# Patient Record
Sex: FEMALE | Race: WHITE | NOT HISPANIC OR LATINO
[De-identification: names, ages, dates, MRNs, and addresses within clinical notes are randomized per-mention and may not be internally consistent; named-entity substitution may affect disease eponyms.]

---

## 2019-03-21 ENCOUNTER — RESULT REVIEW (OUTPATIENT)
Age: 59
End: 2019-03-21

## 2019-05-07 ENCOUNTER — APPOINTMENT (OUTPATIENT)
Dept: NEUROSURGERY | Facility: CLINIC | Age: 59
End: 2019-05-07
Payer: MEDICARE

## 2019-05-07 VITALS
SYSTOLIC BLOOD PRESSURE: 154 MMHG | BODY MASS INDEX: 31.49 KG/M2 | WEIGHT: 189 LBS | DIASTOLIC BLOOD PRESSURE: 85 MMHG | HEIGHT: 65 IN

## 2019-05-07 PROCEDURE — 99205 OFFICE O/P NEW HI 60 MIN: CPT

## 2019-05-08 RX ORDER — FENTANYL 75 UG/H
75 PATCH, EXTENDED RELEASE TRANSDERMAL
Refills: 0 | Status: ACTIVE | COMMUNITY

## 2019-05-08 RX ORDER — OXYCODONE 20 MG/1
20 TABLET ORAL
Refills: 0 | Status: ACTIVE | COMMUNITY

## 2019-05-08 RX ORDER — LEVOTHYROXINE SODIUM 137 UG/1
TABLET ORAL
Refills: 0 | Status: ACTIVE | COMMUNITY

## 2019-05-08 RX ORDER — BACLOFEN 10 MG/1
10 TABLET ORAL
Refills: 0 | Status: ACTIVE | COMMUNITY

## 2019-05-23 ENCOUNTER — RX RENEWAL (OUTPATIENT)
Age: 59
End: 2019-05-23

## 2019-05-23 NOTE — REASON FOR VISIT
[New Patient Visit] : a new patient visit [Referred By: _________] : Patient was referred by YARED [FreeTextEntry1] : chronic pain

## 2019-05-23 NOTE — DATA REVIEWED
[de-identified] : 2/14/19 MRI Cervical (Stand up MRI pt disc reviewed today and returned same day)

## 2019-05-23 NOTE — ASSESSMENT
[FreeTextEntry1] : Mrs. Chaidez is a 58 yo woman with cervical myelopathy, positive reverse Lhermitte's sign, and positive Aviles's sign consistent with C3/C4 cervical cord compression.  We recommend a 2 part anterior and posterior decompression and fusion to adequately decompress and stabilize the cervical spine.  We discussed the risks and benefits including smoking cessation counseling ([place]-QUITS), and she wishes to proceed with the surgery.  She did have a previous surgical consult with Dr. Andres as well.  We will refer to ENT Dr. Dinh for pre operative swallowing evaluation due to preexisting dysphagia and management.  We will obtain a CT to evaluate the cervical bony anatomy prior to surgery as well.\par \par 1)  CT Cervical wo - now\par 2)  ENT - Dr. Dinh referral - pre op eval\par 3)  Schedule surgery

## 2019-05-23 NOTE — PHYSICAL EXAM
[General Appearance - Alert] : alert [General Appearance - In No Acute Distress] : in no acute distress [General Appearance - Well Developed] : well developed [General Appearance - Well Nourished] : well nourished [Impaired Insight] : insight and judgment were intact [Oriented To Time, Place, And Person] : oriented to person, place, and time [Affect] : the affect was normal [Mood] : the mood was normal [Cranial Nerves Oculomotor (III)] : extraocular motion intact [Cranial Nerves Optic (II)] : visual acuity intact bilaterally,  pupils equal round and reactive to light [Cranial Nerves Facial (VII)] : face symmetrical [Cranial Nerves Vestibulocochlear (VIII)] : hearing was intact bilaterally [Cranial Nerves Trigeminal (V)] : facial sensation intact symmetrically [Cranial Nerves Glossopharyngeal (IX)] : tongue and palate midline [Cranial Nerves Accessory (XI - Cranial And Spinal)] : head turning and shoulder shrug symmetric [Cranial Nerves Hypoglossal (XII)] : there was no tongue deviation with protrusion [Motor Strength] : muscle strength was normal in all four extremities [Sensation Tactile Decrease] : light touch was intact [Limited Balance] : the patient's balance was impaired [Aviles] : Aviles's sign was demonstrated [3+] : Brachioradialis left 3+ [No Masses] : no masses [No Visual Abnormalities] : no visible abnormalities [Full ROM] : full ROM [Normal] : normal [Able to toe walk] : the patient was able to toe walk [Able to heel walk] : the patient was able to heel walk [Intact] : all sensory within normal limits bilaterally [PERRL With Normal Accommodation] : pupils were equal in size, round, reactive to light, with normal accommodation [Neck Appearance] : the appearance of the neck was normal [Auscultation Breath Sounds / Voice Sounds] : lungs were clear to auscultation bilaterally [Respiration, Rhythm And Depth] : normal respiratory rhythm and effort [] : no respiratory distress [Heart Sounds] : normal S1 and S2 [Edema] : there was no peripheral edema [Abnormal Walk] : normal gait [Involuntary Movements] : no involuntary movements were seen [Motor Tone] : muscle strength and tone were normal [Skin Color & Pigmentation] : normal skin color and pigmentation [Tremor] : no tremor present [FreeTextEntry8] : reports being off balance , ambulating independently

## 2019-05-23 NOTE — HISTORY OF PRESENT ILLNESS
[> 3 months] : more  than 3 months [FreeTextEntry1] : chronic pain  [de-identified] : Mrs. Chaidez is a 58 yo woman with PMH of HLD, Hashimoto's, RA, fibromyalgia, nutcracker esophagus (spasms), 2003 Lumbar fusion, 2010 R THR, 2012 L TKR, and chronic neck pain over 30 years without neck pain recently.  Today, she arrives for initial consult for new 2 week onset of bilateral upper extremity electric shocks, posterior head and upper traps tingling, and numbness of 5th digits (denies neck pain).  She reports also being off balance for a few weeks.\par \par 2/14/19 Stand up MRI discs reviewed today demonstrating C3 on C4 anterior spondylolisthesis and degenerative changes with spinal cord compression.

## 2019-05-23 NOTE — REVIEW OF SYSTEMS
[Numbness] : numbness [Tingling] : tingling [Abnormal Sensation] : an abnormal sensation [Difficulty Walking] : difficulty walking [As Noted in HPI] : as noted in HPI [Negative] : Respiratory [Abdominal Pain] : no abdominal pain [Vomiting] : no vomiting [Diarrhea] : no diarrhea [Dysuria] : no dysuria [Incontinence] : no incontinence [Skin Lesions] : no skin lesions [Skin Wound] : no skin wound [Itching] : no itching [Easy Bleeding] : no tendency for easy bleeding [Easy Bruising] : no tendency for easy bruising

## 2019-05-24 ENCOUNTER — OUTPATIENT (OUTPATIENT)
Dept: OUTPATIENT SERVICES | Facility: HOSPITAL | Age: 59
LOS: 1 days | End: 2019-05-24
Payer: COMMERCIAL

## 2019-05-24 VITALS
SYSTOLIC BLOOD PRESSURE: 166 MMHG | DIASTOLIC BLOOD PRESSURE: 89 MMHG | HEART RATE: 63 BPM | RESPIRATION RATE: 20 BRPM | OXYGEN SATURATION: 96 % | TEMPERATURE: 98 F | WEIGHT: 188.94 LBS | HEIGHT: 63.5 IN

## 2019-05-24 DIAGNOSIS — Z98.1 ARTHRODESIS STATUS: Chronic | ICD-10-CM

## 2019-05-24 DIAGNOSIS — Z96.652 PRESENCE OF LEFT ARTIFICIAL KNEE JOINT: Chronic | ICD-10-CM

## 2019-05-24 DIAGNOSIS — G95.20 UNSPECIFIED CORD COMPRESSION: ICD-10-CM

## 2019-05-24 DIAGNOSIS — Z01.818 ENCOUNTER FOR OTHER PREPROCEDURAL EXAMINATION: ICD-10-CM

## 2019-05-24 DIAGNOSIS — Z98.890 OTHER SPECIFIED POSTPROCEDURAL STATES: Chronic | ICD-10-CM

## 2019-05-24 DIAGNOSIS — Z96.641 PRESENCE OF RIGHT ARTIFICIAL HIP JOINT: Chronic | ICD-10-CM

## 2019-05-24 DIAGNOSIS — Z29.9 ENCOUNTER FOR PROPHYLACTIC MEASURES, UNSPECIFIED: ICD-10-CM

## 2019-05-24 LAB
ANION GAP SERPL CALC-SCNC: 12 MMOL/L — SIGNIFICANT CHANGE UP (ref 5–17)
BLD GP AB SCN SERPL QL: NEGATIVE — SIGNIFICANT CHANGE UP
BUN SERPL-MCNC: 19 MG/DL — SIGNIFICANT CHANGE UP (ref 7–23)
CALCIUM SERPL-MCNC: 9.9 MG/DL — SIGNIFICANT CHANGE UP (ref 8.4–10.5)
CHLORIDE SERPL-SCNC: 95 MMOL/L — LOW (ref 96–108)
CO2 SERPL-SCNC: 28 MMOL/L — SIGNIFICANT CHANGE UP (ref 22–31)
CREAT SERPL-MCNC: 0.79 MG/DL — SIGNIFICANT CHANGE UP (ref 0.5–1.3)
GLUCOSE SERPL-MCNC: 94 MG/DL — SIGNIFICANT CHANGE UP (ref 70–99)
HCT VFR BLD CALC: 41 % — SIGNIFICANT CHANGE UP (ref 34.5–45)
HGB BLD-MCNC: 12.8 G/DL — SIGNIFICANT CHANGE UP (ref 11.5–15.5)
MCHC RBC-ENTMCNC: 30 PG — SIGNIFICANT CHANGE UP (ref 27–34)
MCHC RBC-ENTMCNC: 31.2 GM/DL — LOW (ref 32–36)
MCV RBC AUTO: 96 FL — SIGNIFICANT CHANGE UP (ref 80–100)
PLATELET # BLD AUTO: 422 K/UL — HIGH (ref 150–400)
POTASSIUM SERPL-MCNC: 3.9 MMOL/L — SIGNIFICANT CHANGE UP (ref 3.5–5.3)
POTASSIUM SERPL-SCNC: 3.9 MMOL/L — SIGNIFICANT CHANGE UP (ref 3.5–5.3)
RBC # BLD: 4.27 M/UL — SIGNIFICANT CHANGE UP (ref 3.8–5.2)
RBC # FLD: 13.9 % — SIGNIFICANT CHANGE UP (ref 10.3–14.5)
RH IG SCN BLD-IMP: POSITIVE — SIGNIFICANT CHANGE UP
SODIUM SERPL-SCNC: 135 MMOL/L — SIGNIFICANT CHANGE UP (ref 135–145)
WBC # BLD: 10.13 K/UL — SIGNIFICANT CHANGE UP (ref 3.8–10.5)
WBC # FLD AUTO: 10.13 K/UL — SIGNIFICANT CHANGE UP (ref 3.8–10.5)

## 2019-05-24 PROCEDURE — 87640 STAPH A DNA AMP PROBE: CPT

## 2019-05-24 PROCEDURE — 86900 BLOOD TYPING SEROLOGIC ABO: CPT

## 2019-05-24 PROCEDURE — 87641 MR-STAPH DNA AMP PROBE: CPT

## 2019-05-24 PROCEDURE — 86901 BLOOD TYPING SEROLOGIC RH(D): CPT

## 2019-05-24 PROCEDURE — G0463: CPT

## 2019-05-24 PROCEDURE — 85027 COMPLETE CBC AUTOMATED: CPT

## 2019-05-24 PROCEDURE — 86850 RBC ANTIBODY SCREEN: CPT

## 2019-05-24 PROCEDURE — 80048 BASIC METABOLIC PNL TOTAL CA: CPT

## 2019-05-24 NOTE — H&P PST ADULT - NSICDXPROBLEM_GEN_ALL_CORE_FT
PROBLEM DIAGNOSES  Problem: Cervical spinal cord compression  Assessment and Plan:  Stage 1 C3 Corpectomy, CAGE Placement, C2-4 Posterior cervical decompression and fusion on 6/3/19.          Problem: Need for prophylactic measure  Assessment and Plan: The Caprini score indicates that this patient is at high risk for a VTE event (score 6 or greater). Surgical patients in this group will benefit from both pharmacologic prophylaxis and intermittent compression devices.  The surgical team will determine the balance between VTE risk and bleeding risk, and other clinical considerations

## 2019-05-24 NOTE — H&P PST ADULT - NSICDXPASTSURGICALHX_GEN_ALL_CORE_FT
PAST SURGICAL HISTORY:  S/P arthroscopy of left knee x 4    S/P hip replacement, right     S/P laminectomy with spinal fusion     S/P total knee replacement, left

## 2019-05-24 NOTE — H&P PST ADULT - HISTORY OF PRESENT ILLNESS
59 yr old female with history of Hashimoto's hypothyroid, dyskinesia of esophagus , severe OA, Fibromyalgia, Chronic pain , with mew onset bilateral upper extremity electric shocks, posterior head tingling, numbness of 5 th digits, found to have C3-4 Anterior spondylolisthesis and degenerative changes with spinal cord compression. Now coming in for Stage 1 C3 Corpectomy, CAGE Placement, C2-4 Posterior cervical decompression and fusion on 6/3/19.

## 2019-05-24 NOTE — H&P PST ADULT - RS GEN PE MLT RESP DETAILS PC
respirations non-labored/breath sounds equal/normal/clear to auscultation bilaterally/good air movement/airway patent

## 2019-05-24 NOTE — H&P PST ADULT - NSICDXPASTMEDICALHX_GEN_ALL_CORE_FT
PAST MEDICAL HISTORY:  Cervical herniated disc     Depression     Dyskinesia of esophagus     H/O gastritis 1 mth ago - on pantoprazole    History of chronic pain on meds for 15 yrs    History of dysphagia     Hypothyroidism     Osteoarthritis     Spondylolisthesis, cervical region PAST MEDICAL HISTORY:  Achilles tendon tear bilateral    Cervical herniated disc     Degenerative tear of acetabular labrum of left hip with stress fracture    Depression     Dyskinesia of esophagus     Fibromyalgia     H/O gastritis 1 mth ago - on pantoprazole    History of chronic pain on meds for 15 yrs    History of dysphagia with neck issues to obtain barium swallow study soon    Hypothyroidism     Osteoarthritis     Spondylogenic compression of cervical spinal cord     Spondylolisthesis, cervical region

## 2019-05-24 NOTE — H&P PST ADULT - ASSESSMENT
SHAYYI VTE 2.0 SCORE [CLOT updated 2019]    AGE RELATED RISK FACTORS                                                       MOBILITY RELATED FACTORS  [x ] Age 41-60 years                                            (1 Point)                    [ ] Bed rest                                                        (1 Point)  [ ] Age: 61-74 years                                           (2 Points)                  [ ] Plaster cast                                                   (2 Points)  [ ] Age= 75 years                                              (3 Points)                    [ ] Bed bound for more than 72 hours                 (2 Points)    DISEASE RELATED RISK FACTORS                                               GENDER SPECIFIC FACTORS  [ ] Edema in the lower extremities                       (1 Point)              [ ] Pregnancy                                                     (1 Point)  [ ] Varicose veins                                               (1 Point)                     [ ] Post-partum < 6 weeks                                   (1 Point)             [x ] BMI > 25 Kg/m2                                            (1 Point)                     [ ] Hormonal therapy  or oral contraception          (1 Point)                 [ ] Sepsis (in the previous month)                        (1 Point)               [ ] History of pregnancy complications                 (1 point)  [ ] Pneumonia or serious lung disease                                               [ ] Unexplained or recurrent                     (1 Point)           (in the previous month)                               (1 Point)  [ ] Abnormal pulmonary function test                     (1 Point)                 SURGERY RELATED RISK FACTORS  [ ] Acute myocardial infarction                              (1 Point)               [ ]  Section                                             (1 Point)  [ ] Congestive heart failure (in the previous month)  (1 Point)      [ ] Minor surgery                                                  (1 Point)   [ ] Inflammatory bowel disease                             (1 Point)               [ ] Arthroscopic surgery                                        (2 Points)  [ ] Central venous access                                      (2 Points)                [x ] General surgery lasting more than 45 minutes (2 points)  [ ] Malignancy- Present or previous                   (2 Points)                [ ] Elective arthroplasty                                         (5 points)    [ ] Stroke (in the previous month)                          (5 Points)                                                                                                                                                           HEMATOLOGY RELATED FACTORS                                                 TRAUMA RELATED RISK FACTORS  [ ] Prior episodes of VTE                                     (3 Points)                [ ] Fracture of the hip, pelvis, or leg                       (5 Points)  [x ] Positive family history for VTE                         (3 Points)             [ ] Acute spinal cord injury (in the previous month)  (5 Points)  [ ] Prothrombin 83116 A                                     (3 Points)               [ ] Paralysis  (less than 1 month)                             (5 Points)  [ ] Factor V Leiden                                             (3 Points)                  [ ] Multiple Trauma within 1 month                        (5 Points)  [ ] Lupus anticoagulants                                     (3 Points)                                                           [ ] Anticardiolipin antibodies                               (3 Points)                                                       [ ] High homocysteine in the blood                      (3 Points)                                             [ ] Other congenital or acquired thrombophilia      (3 Points)                                                [ ] Heparin induced thrombocytopenia                  (3 Points)                                     Total Score [    7      ]

## 2019-05-24 NOTE — H&P PST ADULT - NSANTHOSAYNRD_GEN_A_CORE
No. JO screening performed.  STOP BANG Legend: 0-2 = LOW Risk; 3-4 = INTERMEDIATE Risk; 5-8 = HIGH Risk

## 2019-05-25 LAB
MRSA PCR RESULT.: SIGNIFICANT CHANGE UP
S AUREUS DNA NOSE QL NAA+PROBE: SIGNIFICANT CHANGE UP

## 2019-05-28 ENCOUNTER — FORM ENCOUNTER (OUTPATIENT)
Age: 59
End: 2019-05-28

## 2019-05-28 PROBLEM — M19.90 UNSPECIFIED OSTEOARTHRITIS, UNSPECIFIED SITE: Chronic | Status: ACTIVE | Noted: 2019-05-24

## 2019-05-28 PROBLEM — M43.12 SPONDYLOLISTHESIS, CERVICAL REGION: Chronic | Status: ACTIVE | Noted: 2019-05-24

## 2019-05-28 PROBLEM — E03.9 HYPOTHYROIDISM, UNSPECIFIED: Chronic | Status: ACTIVE | Noted: 2019-05-24

## 2019-05-28 PROBLEM — M50.20 OTHER CERVICAL DISC DISPLACEMENT, UNSPECIFIED CERVICAL REGION: Chronic | Status: ACTIVE | Noted: 2019-05-24

## 2019-05-28 PROBLEM — M79.7 FIBROMYALGIA: Chronic | Status: ACTIVE | Noted: 2019-05-24

## 2019-05-28 PROBLEM — M47.12 OTHER SPONDYLOSIS WITH MYELOPATHY, CERVICAL REGION: Chronic | Status: ACTIVE | Noted: 2019-05-24

## 2019-05-28 PROBLEM — K22.4 DYSKINESIA OF ESOPHAGUS: Chronic | Status: ACTIVE | Noted: 2019-05-24

## 2019-05-28 PROBLEM — S86.019A STRAIN OF UNSPECIFIED ACHILLES TENDON, INITIAL ENCOUNTER: Chronic | Status: ACTIVE | Noted: 2019-05-24

## 2019-05-28 PROBLEM — F32.9 MAJOR DEPRESSIVE DISORDER, SINGLE EPISODE, UNSPECIFIED: Chronic | Status: ACTIVE | Noted: 2019-05-24

## 2019-05-29 ENCOUNTER — OUTPATIENT (OUTPATIENT)
Dept: OUTPATIENT SERVICES | Facility: HOSPITAL | Age: 59
LOS: 1 days | End: 2019-05-29

## 2019-05-29 ENCOUNTER — APPOINTMENT (OUTPATIENT)
Dept: SPEECH THERAPY | Facility: HOSPITAL | Age: 59
End: 2019-05-29
Payer: MEDICARE

## 2019-05-29 ENCOUNTER — APPOINTMENT (OUTPATIENT)
Dept: RADIOLOGY | Facility: HOSPITAL | Age: 59
End: 2019-05-29
Payer: MEDICARE

## 2019-05-29 ENCOUNTER — OUTPATIENT (OUTPATIENT)
Dept: OUTPATIENT SERVICES | Facility: HOSPITAL | Age: 59
LOS: 1 days | Discharge: HOME | End: 2019-05-29

## 2019-05-29 DIAGNOSIS — Z96.641 PRESENCE OF RIGHT ARTIFICIAL HIP JOINT: Chronic | ICD-10-CM

## 2019-05-29 DIAGNOSIS — Z98.1 ARTHRODESIS STATUS: Chronic | ICD-10-CM

## 2019-05-29 DIAGNOSIS — Z96.652 PRESENCE OF LEFT ARTIFICIAL KNEE JOINT: Chronic | ICD-10-CM

## 2019-05-29 DIAGNOSIS — R13.10 DYSPHAGIA, UNSPECIFIED: ICD-10-CM

## 2019-05-29 DIAGNOSIS — Z98.890 OTHER SPECIFIED POSTPROCEDURAL STATES: Chronic | ICD-10-CM

## 2019-05-29 PROCEDURE — 74230 X-RAY XM SWLNG FUNCJ C+: CPT | Mod: 26

## 2019-05-31 ENCOUNTER — APPOINTMENT (OUTPATIENT)
Dept: CT IMAGING | Facility: CLINIC | Age: 59
End: 2019-05-31

## 2019-05-31 ENCOUNTER — OUTPATIENT (OUTPATIENT)
Dept: OUTPATIENT SERVICES | Facility: HOSPITAL | Age: 59
LOS: 1 days | End: 2019-05-31
Payer: COMMERCIAL

## 2019-05-31 ENCOUNTER — APPOINTMENT (OUTPATIENT)
Dept: OTOLARYNGOLOGY | Facility: CLINIC | Age: 59
End: 2019-05-31
Payer: MEDICARE

## 2019-05-31 VITALS
SYSTOLIC BLOOD PRESSURE: 143 MMHG | WEIGHT: 188 LBS | HEIGHT: 63.5 IN | BODY MASS INDEX: 32.9 KG/M2 | DIASTOLIC BLOOD PRESSURE: 81 MMHG | HEART RATE: 75 BPM

## 2019-05-31 DIAGNOSIS — Z98.1 ARTHRODESIS STATUS: Chronic | ICD-10-CM

## 2019-05-31 DIAGNOSIS — Z98.890 OTHER SPECIFIED POSTPROCEDURAL STATES: Chronic | ICD-10-CM

## 2019-05-31 DIAGNOSIS — Z96.652 PRESENCE OF LEFT ARTIFICIAL KNEE JOINT: Chronic | ICD-10-CM

## 2019-05-31 DIAGNOSIS — Z96.641 PRESENCE OF RIGHT ARTIFICIAL HIP JOINT: Chronic | ICD-10-CM

## 2019-05-31 DIAGNOSIS — Z00.8 ENCOUNTER FOR OTHER GENERAL EXAMINATION: ICD-10-CM

## 2019-05-31 PROCEDURE — 72125 CT NECK SPINE W/O DYE: CPT | Mod: 26

## 2019-05-31 PROCEDURE — 99204 OFFICE O/P NEW MOD 45 MIN: CPT | Mod: 25

## 2019-05-31 PROCEDURE — 72125 CT NECK SPINE W/O DYE: CPT

## 2019-05-31 PROCEDURE — 31575 DIAGNOSTIC LARYNGOSCOPY: CPT

## 2019-05-31 NOTE — ASSESSMENT
[FreeTextEntry1] : preop ACDF; hx esophageal spasm:\par - MBS reviewed\par - discussed risks of increased dysphagia/spasm postop and may need GI consult post-op if increase in esophageal spasm\par - discussed risks of injury to esophagus and/or laryngeal nerve resulting in temporary or permanent hoarseness\par \par HTN\par - F/U with PMD\par

## 2019-05-31 NOTE — PROCEDURE
[de-identified] : Afrin 0.05% and lidocaine 4% sprayed into both nasal passages. Flexible scope #2 used. Passed through nasal passage and nasopharynx/oropharynx/hypopharynx clear. Supraglottis normal. Glottis with fully mobile vocal cords without lesions or masses but mild Anna's edema. Visualized subglottis clear. Postcricoid area without erythema or edema. No pooling of secretions.

## 2019-05-31 NOTE — CONSULT LETTER
[Dear  ___] : Dear  [unfilled], [Courtesy Letter:] : I had the pleasure of seeing your patient, [unfilled], in my office today. [Please see my note below.] : Please see my note below. [Consult Closing:] : Thank you very much for allowing me to participate in the care of this patient.  If you have any questions, please do not hesitate to contact me. [Sincerely,] : Sincerely, [FreeTextEntry3] : Sudha Dinh MD\par Otolaryngology and Cranial Base Surgery\par Attending Physician - Department of Otolaryngology and Head & Neck Surgery\par [facility]\par  - [employer] [facility]\par Office: [phone]\par Fax: [phone]\par

## 2019-05-31 NOTE — HISTORY OF PRESENT ILLNESS
[de-identified] : Going for ACDF with Dr. Acevedo 6/3/19\par Notes voice is good.  No prior neck surgery.  Pos neck pain, no throat pain.  Hx of Dysphagia with esophageal spasm.  Had swallow study 5/29/19 that showed Functional oral and mild pharyngeal stage dysphagia. Notes she has spasms in esophagus, usually at night. She isnt really bothered and has had over 15 years without GI follow up. Notes hx tobacco and just quit 1.5 weeks ago.

## 2019-06-02 ENCOUNTER — TRANSCRIPTION ENCOUNTER (OUTPATIENT)
Age: 59
End: 2019-06-02

## 2019-06-03 ENCOUNTER — APPOINTMENT (OUTPATIENT)
Dept: NEUROSURGERY | Facility: HOSPITAL | Age: 59
End: 2019-06-03

## 2019-06-03 ENCOUNTER — APPOINTMENT (OUTPATIENT)
Dept: OTOLARYNGOLOGY | Facility: HOSPITAL | Age: 59
End: 2019-06-03

## 2019-06-03 ENCOUNTER — INPATIENT (INPATIENT)
Facility: HOSPITAL | Age: 59
LOS: 0 days | Discharge: HOME | DRG: 472 | End: 2019-06-04
Attending: STUDENT IN AN ORGANIZED HEALTH CARE EDUCATION/TRAINING PROGRAM | Admitting: STUDENT IN AN ORGANIZED HEALTH CARE EDUCATION/TRAINING PROGRAM
Payer: COMMERCIAL

## 2019-06-03 VITALS
RESPIRATION RATE: 18 BRPM | HEART RATE: 69 BPM | DIASTOLIC BLOOD PRESSURE: 76 MMHG | WEIGHT: 188.94 LBS | HEIGHT: 63.5 IN | SYSTOLIC BLOOD PRESSURE: 146 MMHG | TEMPERATURE: 99 F | OXYGEN SATURATION: 96 %

## 2019-06-03 DIAGNOSIS — Z96.641 PRESENCE OF RIGHT ARTIFICIAL HIP JOINT: Chronic | ICD-10-CM

## 2019-06-03 DIAGNOSIS — G95.20 UNSPECIFIED CORD COMPRESSION: ICD-10-CM

## 2019-06-03 DIAGNOSIS — Z96.652 PRESENCE OF LEFT ARTIFICIAL KNEE JOINT: Chronic | ICD-10-CM

## 2019-06-03 DIAGNOSIS — Z98.1 ARTHRODESIS STATUS: Chronic | ICD-10-CM

## 2019-06-03 DIAGNOSIS — Z98.890 OTHER SPECIFIED POSTPROCEDURAL STATES: Chronic | ICD-10-CM

## 2019-06-03 DIAGNOSIS — M43.12 SPONDYLOLISTHESIS, CERVICAL REGION: ICD-10-CM

## 2019-06-03 LAB
ANION GAP SERPL CALC-SCNC: 12 MMOL/L — SIGNIFICANT CHANGE UP (ref 5–17)
BUN SERPL-MCNC: 9 MG/DL — SIGNIFICANT CHANGE UP (ref 7–23)
CALCIUM SERPL-MCNC: 9.5 MG/DL — SIGNIFICANT CHANGE UP (ref 8.4–10.5)
CHLORIDE SERPL-SCNC: 99 MMOL/L — SIGNIFICANT CHANGE UP (ref 96–108)
CO2 SERPL-SCNC: 29 MMOL/L — SIGNIFICANT CHANGE UP (ref 22–31)
CREAT SERPL-MCNC: 0.65 MG/DL — SIGNIFICANT CHANGE UP (ref 0.5–1.3)
GLUCOSE SERPL-MCNC: 122 MG/DL — HIGH (ref 70–99)
HCT VFR BLD CALC: 34.6 % — SIGNIFICANT CHANGE UP (ref 34.5–45)
HGB BLD-MCNC: 11.9 G/DL — SIGNIFICANT CHANGE UP (ref 11.5–15.5)
MCHC RBC-ENTMCNC: 32.5 PG — SIGNIFICANT CHANGE UP (ref 27–34)
MCHC RBC-ENTMCNC: 34.3 GM/DL — SIGNIFICANT CHANGE UP (ref 32–36)
MCV RBC AUTO: 94.9 FL — SIGNIFICANT CHANGE UP (ref 80–100)
PLATELET # BLD AUTO: 337 K/UL — SIGNIFICANT CHANGE UP (ref 150–400)
POTASSIUM SERPL-MCNC: 4 MMOL/L — SIGNIFICANT CHANGE UP (ref 3.5–5.3)
POTASSIUM SERPL-SCNC: 4 MMOL/L — SIGNIFICANT CHANGE UP (ref 3.5–5.3)
RBC # BLD: 3.65 M/UL — LOW (ref 3.8–5.2)
RBC # FLD: 13 % — SIGNIFICANT CHANGE UP (ref 10.3–14.5)
RH IG SCN BLD-IMP: POSITIVE — SIGNIFICANT CHANGE UP
SODIUM SERPL-SCNC: 140 MMOL/L — SIGNIFICANT CHANGE UP (ref 135–145)
WBC # BLD: 10.5 K/UL — SIGNIFICANT CHANGE UP (ref 3.8–10.5)
WBC # FLD AUTO: 10.5 K/UL — SIGNIFICANT CHANGE UP (ref 3.8–10.5)

## 2019-06-03 PROCEDURE — 22551 ARTHRD ANT NTRBDY CERVICAL: CPT | Mod: 62

## 2019-06-03 PROCEDURE — 70496 CT ANGIOGRAPHY HEAD: CPT | Mod: 26

## 2019-06-03 PROCEDURE — 99222 1ST HOSP IP/OBS MODERATE 55: CPT | Mod: GC

## 2019-06-03 PROCEDURE — 22853 INSJ BIOMECHANICAL DEVICE: CPT | Mod: AS

## 2019-06-03 PROCEDURE — 70498 CT ANGIOGRAPHY NECK: CPT | Mod: 26

## 2019-06-03 PROCEDURE — 22853 INSJ BIOMECHANICAL DEVICE: CPT

## 2019-06-03 PROCEDURE — 22551 ARTHRD ANT NTRBDY CERVICAL: CPT | Mod: AS

## 2019-06-03 RX ORDER — FENTANYL CITRATE 50 UG/ML
1 INJECTION INTRAVENOUS
Refills: 0 | Status: DISCONTINUED | OUTPATIENT
Start: 2019-06-03 | End: 2019-06-04

## 2019-06-03 RX ORDER — ACETAMINOPHEN 500 MG
650 TABLET ORAL EVERY 6 HOURS
Refills: 0 | Status: DISCONTINUED | OUTPATIENT
Start: 2019-06-03 | End: 2019-06-04

## 2019-06-03 RX ORDER — OXYCODONE HYDROCHLORIDE 5 MG/1
5 TABLET ORAL EVERY 4 HOURS
Refills: 0 | Status: DISCONTINUED | OUTPATIENT
Start: 2019-06-03 | End: 2019-06-04

## 2019-06-03 RX ORDER — DOCUSATE SODIUM 100 MG
100 CAPSULE ORAL THREE TIMES A DAY
Refills: 0 | Status: DISCONTINUED | OUTPATIENT
Start: 2019-06-03 | End: 2019-06-04

## 2019-06-03 RX ORDER — DEXTROSE MONOHYDRATE, SODIUM CHLORIDE, AND POTASSIUM CHLORIDE 50; .745; 4.5 G/1000ML; G/1000ML; G/1000ML
1000 INJECTION, SOLUTION INTRAVENOUS
Refills: 0 | Status: DISCONTINUED | OUTPATIENT
Start: 2019-06-03 | End: 2019-06-04

## 2019-06-03 RX ORDER — DEXAMETHASONE 0.5 MG/5ML
4 ELIXIR ORAL EVERY 6 HOURS
Refills: 0 | Status: COMPLETED | OUTPATIENT
Start: 2019-06-03 | End: 2019-06-04

## 2019-06-03 RX ORDER — PANTOPRAZOLE SODIUM 20 MG/1
40 TABLET, DELAYED RELEASE ORAL
Refills: 0 | Status: DISCONTINUED | OUTPATIENT
Start: 2019-06-03 | End: 2019-06-04

## 2019-06-03 RX ORDER — CEFAZOLIN SODIUM 1 G
2000 VIAL (EA) INJECTION ONCE
Refills: 0 | Status: DISCONTINUED | OUTPATIENT
Start: 2019-06-03 | End: 2019-06-03

## 2019-06-03 RX ORDER — LEVOTHYROXINE SODIUM 125 MCG
200 TABLET ORAL DAILY
Refills: 0 | Status: DISCONTINUED | OUTPATIENT
Start: 2019-06-03 | End: 2019-06-04

## 2019-06-03 RX ORDER — CEFAZOLIN SODIUM 1 G
2000 VIAL (EA) INJECTION EVERY 8 HOURS
Refills: 0 | Status: COMPLETED | OUTPATIENT
Start: 2019-06-03 | End: 2019-06-04

## 2019-06-03 RX ORDER — GABAPENTIN 400 MG/1
600 CAPSULE ORAL THREE TIMES A DAY
Refills: 0 | Status: DISCONTINUED | OUTPATIENT
Start: 2019-06-03 | End: 2019-06-04

## 2019-06-03 RX ORDER — HYDROMORPHONE HYDROCHLORIDE 2 MG/ML
0.5 INJECTION INTRAMUSCULAR; INTRAVENOUS; SUBCUTANEOUS
Refills: 0 | Status: DISCONTINUED | OUTPATIENT
Start: 2019-06-03 | End: 2019-06-03

## 2019-06-03 RX ORDER — OXYCODONE HYDROCHLORIDE 5 MG/1
40 TABLET ORAL EVERY 12 HOURS
Refills: 0 | Status: DISCONTINUED | OUTPATIENT
Start: 2019-06-03 | End: 2019-06-04

## 2019-06-03 RX ORDER — SENNA PLUS 8.6 MG/1
2 TABLET ORAL AT BEDTIME
Refills: 0 | Status: DISCONTINUED | OUTPATIENT
Start: 2019-06-03 | End: 2019-06-04

## 2019-06-03 RX ADMIN — OXYCODONE HYDROCHLORIDE 40 MILLIGRAM(S): 5 TABLET ORAL at 18:45

## 2019-06-03 RX ADMIN — Medication 4 MILLIGRAM(S): at 22:22

## 2019-06-03 RX ADMIN — Medication 100 MILLIGRAM(S): at 22:22

## 2019-06-03 RX ADMIN — GABAPENTIN 600 MILLIGRAM(S): 400 CAPSULE ORAL at 15:10

## 2019-06-03 RX ADMIN — Medication 4 MILLIGRAM(S): at 16:01

## 2019-06-03 RX ADMIN — GABAPENTIN 600 MILLIGRAM(S): 400 CAPSULE ORAL at 22:22

## 2019-06-03 RX ADMIN — Medication 100 MILLIGRAM(S): at 15:12

## 2019-06-03 RX ADMIN — OXYCODONE HYDROCHLORIDE 40 MILLIGRAM(S): 5 TABLET ORAL at 18:05

## 2019-06-03 RX ADMIN — DEXTROSE MONOHYDRATE, SODIUM CHLORIDE, AND POTASSIUM CHLORIDE 75 MILLILITER(S): 50; .745; 4.5 INJECTION, SOLUTION INTRAVENOUS at 13:05

## 2019-06-03 RX ADMIN — FENTANYL CITRATE 1 PATCH: 50 INJECTION INTRAVENOUS at 12:53

## 2019-06-03 RX ADMIN — SENNA PLUS 2 TABLET(S): 8.6 TABLET ORAL at 22:22

## 2019-06-03 NOTE — CONSULT NOTE ADULT - ATTENDING COMMENTS
VASCULAR NEUROLOGY ATTENDING  The patient is seen and examined the history and imaging are reviewed. I agree with the resident note unless otherwise noted. No further inpatient neurologic work-up required.

## 2019-06-03 NOTE — CONSULT NOTE ADULT - SUBJECTIVE AND OBJECTIVE BOX
Neurology Consult    59y Female PMH Hashimoto's hypothyroid, dyskinesia of esophagus, severe OA, Fibromyalgia, Chronic pain , with mew onset bilateral upper extremity electric shocks, posterior head tingling, numbness of 5 th digits, found to have C3-4 Anterior spondylolisthesis and degenerative changes with spinal cord compression s/p decompression p/w as code stroke w/ facial droop. LKW 0730; no facial droop noted on preop; after surgery, patient w/ R facial droop.     REVIEW OF SYSTEMS:  As above    MEDICATIONS  acetaminophen   Tablet .. 650 milliGRAM(s) Oral every 6 hours PRN  ceFAZolin   IVPB 2000 milliGRAM(s) IV Intermittent every 8 hours  dexamethasone  Injectable 4 milliGRAM(s) IV Push every 6 hours  docusate sodium 100 milliGRAM(s) Oral three times a day  fentaNYL   Patch  75 MICROgram(s)/Hr 1 Patch Transdermal every 72 hours  gabapentin 600 milliGRAM(s) Oral three times a day  HYDROmorphone  Injectable 0.5 milliGRAM(s) IV Push every 10 minutes PRN  levothyroxine 200 MICROGram(s) Oral daily  oxyCODONE    IR 5 milliGRAM(s) Oral every 4 hours PRN  oxyCODONE  ER Tablet 40 milliGRAM(s) Oral every 12 hours  pantoprazole    Tablet 40 milliGRAM(s) Oral before breakfast  PARoxetine 20 milliGRAM(s) Oral daily  senna 2 Tablet(s) Oral at bedtime  sodium chloride 0.9% with potassium chloride 20 mEq/L 1000 milliLiter(s) IV Continuous <Continuous>      PMH: Spondylogenic compression of cervical spinal cord  Fibromyalgia  Degenerative tear of acetabular labrum of left hip  Achilles tendon tear  Depression  History of chronic pain  Dyskinesia of esophagus  History of dysphagia  H/O gastritis  Osteoarthritis  Spondylolisthesis, cervical region  Cervical herniated disc  Hypothyroidism       PSH: S/P arthroscopy of left knee  S/P laminectomy with spinal fusion  S/P hip replacement, right  S/P total knee replacement, left      FAMILY HISTORY:  Family history of DVT: mother / brother    No history of dementia, strokes, or seizures     SOCIAL HISTORY:  No history of tobacco or alcohol use     Allergies  No Known Allergies    Intolerances    Vital Signs Last 24 Hrs  T(C): 36.3 (03 Jun 2019 11:15), Max: 37.2 (03 Jun 2019 06:16)  T(F): 97.3 (03 Jun 2019 11:15), Max: 99 (03 Jun 2019 06:16)  HR: 66 (03 Jun 2019 11:15) (66 - 69)  BP: 153/73 (03 Jun 2019 11:15) (146/76 - 153/73)  BP(mean): --  RR: 17 (03 Jun 2019 11:15) (17 - 18)  SpO2: 97% (03 Jun 2019 11:15) (96% - 97%)    Neurological Examination:    Mental Status: Patient is alert, awake, oriented X3. Patient is fluent, no dysarthria, no aphasia. Follows commands well and able to answer questions appropriately. Mood and affect normal.    Cranial Nerves:EOMI, visual field intact, V1-V3 intact, slight facial droop on R with smile, not noted otherwise, tongue/uvula midline    Motor Exam: No drift  Right upper extremity: 5/5  Left upper extremity: 5/5  Right lower extremity: 5/5  Left lower extremity: 5/5    Normal bulk/tone    Sensory: Intact to light touch and pinprick bilaterally. No extinction    Coordination: Finger to nose intact bilaterally     GENERAL: No acute distress  HEENT:  Normocephalic, atraumatic  EXTREMITIES: No edema, clubbing, cyanosis  MUSCULOSKELETAL: Normal range of motion  SKIN: No rashes    LABS:  CBC Full  -  ( 03 Jun 2019 11:49 )  WBC Count : 10.5 K/uL  RBC Count : 3.65 M/uL  Hemoglobin : 11.9 g/dL  Hematocrit : 34.6 %  Platelet Count - Automated : 337 K/uL  Mean Cell Volume : 94.9 fl  Mean Cell Hemoglobin : 32.5 pg  Mean Cell Hemoglobin Concentration : 34.3 gm/dL  Auto Neutrophil # : x  Auto Lymphocyte # : x  Auto Monocyte # : x  Auto Eosinophil # : x  Auto Basophil # : x  Auto Neutrophil % : x  Auto Lymphocyte % : x  Auto Monocyte % : x  Auto Eosinophil % : x  Auto Basophil % : x              Hemoglobin A1C:           RADIOLOGY:  CT head:  MRI:
S/P Cervical surgical intervention this am with ho chronic low back pain. She reports 6/10 responding to current regime.     Exam: No signs of toxicity. Moves around without discomfort  Able to move all extremities again gravity

## 2019-06-03 NOTE — PROGRESS NOTE ADULT - ASSESSMENT
60 y/o female with h/o cord compression POD0 s/p ACDF, Reports she is feeling a lot better, minimal pain. Voice is at baseline, stable postop from ENT standpoint

## 2019-06-03 NOTE — PROGRESS NOTE ADULT - SUBJECTIVE AND OBJECTIVE BOX
ENT ISSUE/POD: POD0 ACDF    HPI: Patient is a 58 y/o female with h/o cord compression POD0 s/p ACDF, Reports she is feeling a lot better, minimal pain. Voice is at baseline, no issues with liquids         PAST MEDICAL & SURGICAL HISTORY:  Spondylogenic compression of cervical spinal cord  Fibromyalgia  Degenerative tear of acetabular labrum of left hip: with stress fracture  Achilles tendon tear: bilateral  Depression  History of chronic pain: on meds for 15 yrs  Dyskinesia of esophagus  History of dysphagia: with neck issues to obtain barium swallow study soon  H/O gastritis: 1 mth ago - on pantoprazole  Osteoarthritis  Spondylolisthesis, cervical region  Cervical herniated disc  Hypothyroidism  S/P arthroscopy of left knee: x 4  S/P laminectomy with spinal fusion  S/P hip replacement, right  S/P total knee replacement, left    Allergies    No Known Allergies    Intolerances      MEDICATIONS  (STANDING):  ceFAZolin   IVPB 2000 milliGRAM(s) IV Intermittent every 8 hours  dexamethasone  Injectable 4 milliGRAM(s) IV Push every 6 hours  docusate sodium 100 milliGRAM(s) Oral three times a day  fentaNYL   Patch  75 MICROgram(s)/Hr 1 Patch Transdermal every 72 hours  gabapentin 600 milliGRAM(s) Oral three times a day  levothyroxine 200 MICROGram(s) Oral daily  oxyCODONE  ER Tablet 40 milliGRAM(s) Oral every 12 hours  pantoprazole    Tablet 40 milliGRAM(s) Oral before breakfast  PARoxetine 20 milliGRAM(s) Oral daily  senna 2 Tablet(s) Oral at bedtime  sodium chloride 0.9% with potassium chloride 20 mEq/L 1000 milliLiter(s) (75 mL/Hr) IV Continuous <Continuous>    MEDICATIONS  (PRN):  acetaminophen   Tablet .. 650 milliGRAM(s) Oral every 6 hours PRN Temp greater or equal to 38C (100.4F), Mild Pain (1 - 3)  HYDROmorphone  Injectable 0.5 milliGRAM(s) IV Push every 10 minutes PRN Moderate Pain (4 - 6)  oxyCODONE    IR 5 milliGRAM(s) Oral every 4 hours PRN Severe Pain (7 - 10)    Vital Signs Last 24 Hrs  T(C): 36.2 (03 Jun 2019 20:00), Max: 37.2 (03 Jun 2019 06:16)  T(F): 97.2 (03 Jun 2019 20:00), Max: 99 (03 Jun 2019 06:16)  HR: 71 (03 Jun 2019 20:00) (59 - 72)  BP: 122/62 (03 Jun 2019 20:00) (118/59 - 155/66)  BP(mean): 95 (03 Jun 2019 18:27) (83 - 99)  RR: 18 (03 Jun 2019 20:00) (16 - 18)  SpO2: 97% (03 Jun 2019 20:00) (94% - 99%)                          11.9   10.5  )-----------( 337      ( 03 Jun 2019 11:49 )             34.6    06-03    140  |  99  |  9   ----------------------------<  122<H>  4.0   |  29  |  0.65    Ca    9.5      03 Jun 2019 11:49         PHYSICAL EXAM:  Gen: NAD  Skin: No rashes, bruises, or lesions  Head: Normocephalic, Atraumatic  Face: no edema, erythema, or fluctuance. Parotid glands soft without mass  Eyes: no scleral injection  Nose: Nares bilaterally patent, no discharge  Mouth: No Stridor / Drooling / Trismus.  Mucosa moist, tongue/uvula midline, oropharynx clear  Neck: Flat, supple, no lymphadenopathy, trachea midline, no masses, horizontal incision, clean dry, no signs of infection/oozing/erythema   Lymphatic: No lymphadenopathy  Resp: breathing easily, no stridor  Neuro: facial nerve intact, no facial droop

## 2019-06-03 NOTE — CONSULT NOTE ADULT - ASSESSMENT
59y Female PMH Hashimoto's hypothyroid, dyskinesia of esophagus, severe OA, Fibromyalgia, Chronic pain , with mew onset bilateral upper extremity electric shocks, posterior head tingling, numbness of 5 th digits, found to have C3-4 Anterior spondylolisthesis and degenerative changes with spinal cord compression s/p decompression p/w as code stroke w/ facial droop. NIHSS 1, MRS 0. R facial droop possibly 2/2 L brain dysfunction possibly 2/2 ischemic infarction. Would obtain MR head w/o contrast to screen for ischemia as well as secondary stroke prevention. As per discussion with Neurosurg, would hold of on Plavix given recent surgery. TPA not given due to recent surgery, NIHSS too low for endovascular intervention.     Recs:  F/u CT angio H/N results  MRI brain without contrast  Aspirin for secondary stroke prevention at this time. Atorvastatin 80, titrate the dose according to LDL.   CHANCE deferred due to recent surgery  TTE with bubble study and telemetry to look for a cardiac source of embolism.   DVT prophylaxis, Neurochecks  Permissive HTN up to 220/120 mmHg for first 24 hours after the symptom onset followed by gradual normotension.   HbA1C and LDL.
Chronic pain syndrome  Lumbosacral radiculopathy  S/P Cervical intervention for subacute neck pain    She appears to be stable with current dose of Duragesic 75 q 72 hours, Oxy 40 mgs bis and Oxycodone 5 mgs (not using0 for breakthrough  No signs of toxicity. For now would rec continue current analgesic regime. FU at your request

## 2019-06-03 NOTE — PHYSICAL THERAPY INITIAL EVALUATION ADULT - PLANNED THERAPY INTERVENTIONS, PT EVAL
transfer training/GOAL: Pt will perform 12 stairs with or without U HR as needed within 3-4weeks./bed mobility training/balance training/gait training

## 2019-06-03 NOTE — PHYSICAL THERAPY INITIAL EVALUATION ADULT - ADDITIONAL COMMENTS
As per pt, pt lives in a private house alone and 12 steps to enter w/ UHR. PTA pt was independent w/ all mobility and straight cane and ADLs.

## 2019-06-03 NOTE — PROGRESS NOTE ADULT - SUBJECTIVE AND OBJECTIVE BOX
Patient seen and examined at bedside.    --Anticoagulation--    T(C): 36.2 (06-03-19 @ 14:00), Max: 37.2 (06-03-19 @ 06:16)  HR: 62 (06-03-19 @ 15:30) (62 - 72)  BP: 139/66 (06-03-19 @ 14:00) (118/59 - 153/73)  RR: 16 (06-03-19 @ 15:30) (16 - 18)  SpO2: 98% (06-03-19 @ 15:30) (94% - 99%)  Wt(kg): --    Exam:  AOx3, FC, PERRL, EOMI, no facial   5/5 throughout, no drift  SILT  no clonus

## 2019-06-03 NOTE — PROGRESS NOTE ADULT - ASSESSMENT
59F s/p 1 level acdf with code stroke called for L facial which has since resolved  - CTH / CTA WNL  - Mild post operative swelling / mild ecchymosis, incision soft and non-indurated, no difficulty swallowing or breathing  - Pt to stay in PACU for 6h, if stable and no new complains may go to floor with cont pulse ox

## 2019-06-03 NOTE — CHART NOTE - NSCHARTNOTEFT_GEN_A_CORE
Called by nurse postoperatively to assess a Rt facial noted postop.  On exam pt AOx3, FC, COOK 5/5, no drift, speech fluent, mild right facial noted. Code stroke called and pt taken to CT scan.  Neurosurgeon and Neurosurgery resident aware. CT/CTA preliminary report negative. F/u official report. Plan to continue to monitor in PACU and postop check.

## 2019-06-03 NOTE — CHART NOTE - NSCHARTNOTEFT_GEN_A_CORE
PROCEDURE: S/P Anterior cervical discectomy with fusion  POD#0     Pt seen and examined. Pt denies any complaints. Code stroke called psotop and CT/CTA reviewed by neurosurgeon.      Vital Signs Last 24 Hrs  T(C): 36.2 (03 Jun 2019 14:00), Max: 37.2 (03 Jun 2019 06:16)  T(F): 97.2 (03 Jun 2019 14:00), Max: 99 (03 Jun 2019 06:16)  HR: 70 (03 Jun 2019 14:30) (65 - 72)  BP: 139/66 (03 Jun 2019 14:00) (118/59 - 153/73)  BP(mean): 95 (03 Jun 2019 14:00) (83 - 99)  RR: 17 (03 Jun 2019 14:30) (17 - 18)  SpO2: 95% (03 Jun 2019 14:30) (94% - 99%)    LABS:                        11.9   10.5  )-----------( 337      ( 03 Jun 2019 11:49 )             34.6     06-03    140  |  99  |  9   ----------------------------<  122<H>  4.0   |  29  |  0.65    Ca    9.5      03 Jun 2019 11:49    EXAM:   NEURO: Awake and alert, Ox3, Following Commands, Moving All Extremities. Mild Rt facial improving since initial postop exam.   INCISION: Dressing in place, dry and intact     PLAN:   Tx pt to Floor [x] 4 Ramirez Continue Q4 hour stroke neuro checks   Discontinue Gamez in AM   Physical Therapy to see in AM   Follow up AM Labs - CBC, BMP   Poss D/c home in Am PROCEDURE: S/P Anterior cervical discectomy with fusion  POD#0     Pt seen and examined. Pt denies any complaints. Code stroke called psotop and CT/CTA reviewed by neurosurgeon.      Vital Signs Last 24 Hrs  T(C): 36.2 (03 Jun 2019 14:00), Max: 37.2 (03 Jun 2019 06:16)  T(F): 97.2 (03 Jun 2019 14:00), Max: 99 (03 Jun 2019 06:16)  HR: 70 (03 Jun 2019 14:30) (65 - 72)  BP: 139/66 (03 Jun 2019 14:00) (118/59 - 153/73)  BP(mean): 95 (03 Jun 2019 14:00) (83 - 99)  RR: 17 (03 Jun 2019 14:30) (17 - 18)  SpO2: 95% (03 Jun 2019 14:30) (94% - 99%)    LABS:                        11.9   10.5  )-----------( 337      ( 03 Jun 2019 11:49 )             34.6     06-03    140  |  99  |  9   ----------------------------<  122<H>  4.0   |  29  |  0.65    Ca    9.5      03 Jun 2019 11:49    EXAM:   NEURO: Awake and alert, Ox3, Following Commands, Moving All Extremities. Mild Rt facial improving since initial postop exam.   INCISION: Dressing in place, dry and intact     PLAN:   Tx pt to Floor [x] 4 Ramirez Continue Q4 hour stroke neuro checks   Discontinue Gamez in AM   Physical Therapy to see in AM   Follow up AM Labs - CBC, BMP   Poss D/c home in Am  Chronic pain called for pain management

## 2019-06-04 ENCOUNTER — TRANSCRIPTION ENCOUNTER (OUTPATIENT)
Age: 59
End: 2019-06-04

## 2019-06-04 VITALS
RESPIRATION RATE: 18 BRPM | TEMPERATURE: 98 F | HEART RATE: 63 BPM | SYSTOLIC BLOOD PRESSURE: 126 MMHG | DIASTOLIC BLOOD PRESSURE: 71 MMHG | OXYGEN SATURATION: 95 %

## 2019-06-04 DIAGNOSIS — M50.322 OTHER CERVICAL DISC DEGENERATION AT C5-C6 LEVEL: ICD-10-CM

## 2019-06-04 DIAGNOSIS — Z01.818 ENCOUNTER FOR OTHER PREPROCEDURAL EXAMINATION: ICD-10-CM

## 2019-06-04 DIAGNOSIS — R13.13 DYSPHAGIA, PHARYNGEAL PHASE: ICD-10-CM

## 2019-06-04 DIAGNOSIS — M50.321 OTHER CERVICAL DISC DEGENERATION AT C4-C5 LEVEL: ICD-10-CM

## 2019-06-04 DIAGNOSIS — M50.323 OTHER CERVICAL DISC DEGENERATION AT C6-C7 LEVEL: ICD-10-CM

## 2019-06-04 LAB
ANION GAP SERPL CALC-SCNC: 10 MMOL/L — SIGNIFICANT CHANGE UP (ref 5–17)
BUN SERPL-MCNC: 7 MG/DL — SIGNIFICANT CHANGE UP (ref 7–23)
CALCIUM SERPL-MCNC: 9.7 MG/DL — SIGNIFICANT CHANGE UP (ref 8.4–10.5)
CHLORIDE SERPL-SCNC: 98 MMOL/L — SIGNIFICANT CHANGE UP (ref 96–108)
CO2 SERPL-SCNC: 32 MMOL/L — HIGH (ref 22–31)
CREAT SERPL-MCNC: 0.59 MG/DL — SIGNIFICANT CHANGE UP (ref 0.5–1.3)
GLUCOSE SERPL-MCNC: 134 MG/DL — HIGH (ref 70–99)
HCT VFR BLD CALC: 36 % — SIGNIFICANT CHANGE UP (ref 34.5–45)
HGB BLD-MCNC: 11.5 G/DL — SIGNIFICANT CHANGE UP (ref 11.5–15.5)
MCHC RBC-ENTMCNC: 30.5 PG — SIGNIFICANT CHANGE UP (ref 27–34)
MCHC RBC-ENTMCNC: 31.9 GM/DL — LOW (ref 32–36)
MCV RBC AUTO: 95.5 FL — SIGNIFICANT CHANGE UP (ref 80–100)
PLATELET # BLD AUTO: 422 K/UL — HIGH (ref 150–400)
POTASSIUM SERPL-MCNC: 4.2 MMOL/L — SIGNIFICANT CHANGE UP (ref 3.5–5.3)
POTASSIUM SERPL-SCNC: 4.2 MMOL/L — SIGNIFICANT CHANGE UP (ref 3.5–5.3)
RBC # BLD: 3.77 M/UL — LOW (ref 3.8–5.2)
RBC # FLD: 13.6 % — SIGNIFICANT CHANGE UP (ref 10.3–14.5)
SODIUM SERPL-SCNC: 140 MMOL/L — SIGNIFICANT CHANGE UP (ref 135–145)
WBC # BLD: 9.45 K/UL — SIGNIFICANT CHANGE UP (ref 3.8–10.5)
WBC # FLD AUTO: 9.45 K/UL — SIGNIFICANT CHANGE UP (ref 3.8–10.5)

## 2019-06-04 PROCEDURE — 72125 CT NECK SPINE W/O DYE: CPT

## 2019-06-04 PROCEDURE — C1889: CPT

## 2019-06-04 PROCEDURE — 80048 BASIC METABOLIC PNL TOTAL CA: CPT

## 2019-06-04 PROCEDURE — 99024 POSTOP FOLLOW-UP VISIT: CPT

## 2019-06-04 PROCEDURE — 70496 CT ANGIOGRAPHY HEAD: CPT

## 2019-06-04 PROCEDURE — 86900 BLOOD TYPING SEROLOGIC ABO: CPT

## 2019-06-04 PROCEDURE — 70450 CT HEAD/BRAIN W/O DYE: CPT

## 2019-06-04 PROCEDURE — 82962 GLUCOSE BLOOD TEST: CPT

## 2019-06-04 PROCEDURE — 86901 BLOOD TYPING SEROLOGIC RH(D): CPT

## 2019-06-04 PROCEDURE — 76000 FLUOROSCOPY <1 HR PHYS/QHP: CPT

## 2019-06-04 PROCEDURE — 85027 COMPLETE CBC AUTOMATED: CPT

## 2019-06-04 PROCEDURE — 97161 PT EVAL LOW COMPLEX 20 MIN: CPT

## 2019-06-04 PROCEDURE — 70498 CT ANGIOGRAPHY NECK: CPT

## 2019-06-04 RX ORDER — ENOXAPARIN SODIUM 100 MG/ML
40 INJECTION SUBCUTANEOUS AT BEDTIME
Refills: 0 | Status: DISCONTINUED | OUTPATIENT
Start: 2019-06-04 | End: 2019-06-04

## 2019-06-04 RX ORDER — OXYCODONE HYDROCHLORIDE 5 MG/1
1 TABLET ORAL
Qty: 20 | Refills: 0
Start: 2019-06-04

## 2019-06-04 RX ORDER — DOCUSATE SODIUM 100 MG
1 CAPSULE ORAL
Qty: 0 | Refills: 0 | DISCHARGE
Start: 2019-06-04

## 2019-06-04 RX ORDER — DIPHENHYDRAMINE HCL 50 MG
50 CAPSULE ORAL ONCE
Refills: 0 | Status: COMPLETED | OUTPATIENT
Start: 2019-06-04 | End: 2019-06-04

## 2019-06-04 RX ORDER — SENNA PLUS 8.6 MG/1
2 TABLET ORAL
Qty: 0 | Refills: 0 | DISCHARGE
Start: 2019-06-04

## 2019-06-04 RX ORDER — ACETAMINOPHEN 500 MG
2 TABLET ORAL
Qty: 0 | Refills: 0 | DISCHARGE
Start: 2019-06-04

## 2019-06-04 RX ADMIN — OXYCODONE HYDROCHLORIDE 40 MILLIGRAM(S): 5 TABLET ORAL at 06:40

## 2019-06-04 RX ADMIN — Medication 50 MILLIGRAM(S): at 00:54

## 2019-06-04 RX ADMIN — Medication 100 MILLIGRAM(S): at 06:12

## 2019-06-04 RX ADMIN — Medication 200 MICROGRAM(S): at 06:11

## 2019-06-04 RX ADMIN — Medication 4 MILLIGRAM(S): at 10:11

## 2019-06-04 RX ADMIN — GABAPENTIN 600 MILLIGRAM(S): 400 CAPSULE ORAL at 06:10

## 2019-06-04 RX ADMIN — Medication 100 MILLIGRAM(S): at 06:10

## 2019-06-04 RX ADMIN — OXYCODONE HYDROCHLORIDE 40 MILLIGRAM(S): 5 TABLET ORAL at 06:10

## 2019-06-04 RX ADMIN — FENTANYL CITRATE 1 PATCH: 50 INJECTION INTRAVENOUS at 06:17

## 2019-06-04 RX ADMIN — Medication 4 MILLIGRAM(S): at 04:09

## 2019-06-04 RX ADMIN — DEXTROSE MONOHYDRATE, SODIUM CHLORIDE, AND POTASSIUM CHLORIDE 75 MILLILITER(S): 50; .745; 4.5 INJECTION, SOLUTION INTRAVENOUS at 04:09

## 2019-06-04 RX ADMIN — GABAPENTIN 600 MILLIGRAM(S): 400 CAPSULE ORAL at 14:17

## 2019-06-04 RX ADMIN — Medication 100 MILLIGRAM(S): at 14:17

## 2019-06-04 RX ADMIN — Medication 20 MILLIGRAM(S): at 12:42

## 2019-06-04 RX ADMIN — PANTOPRAZOLE SODIUM 40 MILLIGRAM(S): 20 TABLET, DELAYED RELEASE ORAL at 06:11

## 2019-06-04 NOTE — PROGRESS NOTE ADULT - SUBJECTIVE AND OBJECTIVE BOX
POD/STATUS POST/ENT ISSUE:  S/P ACDF POD#1    INTERVAL HPI/OVERNIGHT EVENTS:Patient is a 58 y/o female with h/o cord compression POD#1 s/p ACDF, Reports she is feeling a lot better, minimal pain. Voice is at baseline, no issues with liquids         Vital Signs Last 24 Hrs  T(C): 36.5 (04 Jun 2019 05:04), Max: 37.2 (03 Jun 2019 09:19)  T(F): 97.7 (04 Jun 2019 05:04), Max: 98 (03 Jun 2019 22:40)  HR: 54 (04 Jun 2019 05:04) (54 - 84)  BP: 104/65 (04 Jun 2019 05:04) (104/65 - 155/66)  BP(mean): 95 (03 Jun 2019 18:27) (83 - 99)  RR: 18 (04 Jun 2019 05:04) (16 - 18)  SpO2: 95% (04 Jun 2019 05:04) (93% - 99%)    LABS:                        11.9   10.5  )-----------( 337      ( 03 Jun 2019 11:49 )             34.6       PHYSICAL EXAM:  Gen: NAD, well-developed  Head: Normocephalic, Atraumatic  Eyes:  no scleral injectio  Nose: Nares bilaterally patent, no discharge  Mouth: Mucosa moist, tongue/uvula midline, oropharynx clear  Neck: Flat, supple, no lymphadenopathy, trachea midline, no masses,horizontal incision, clean dry, no signs of infection/oozing/erythema   Resp: breathing easily, no stridor  CV: no peripheral edema/cyanosis    IMAGING/ADDITIONAL STUDIES:

## 2019-06-04 NOTE — DISCHARGE NOTE PROVIDER - CARE PROVIDER_API CALL
Ventura Acevedo)  Neurosurgery  General  [address] [facility], [address]  [place] [address]  Phone: [phone]  Fax: [phone]  Follow Up Time:

## 2019-06-04 NOTE — DISCHARGE NOTE PROVIDER - NSDCACTIVITY_GEN_ALL_CORE
Walking - Outdoors allowed/Do not drive or operate machinery/Showering allowed/Bathing allowed/No heavy lifting/straining/Do not make important decisions/Walking - Indoors allowed/Stairs allowed

## 2019-06-04 NOTE — PROGRESS NOTE ADULT - SUBJECTIVE AND OBJECTIVE BOX
CHIEF COMPLAINT: patient reports feeling much better post op, c/o sore throat  + void, pain well controlled     Vital Signs Last 24 Hrs  T(C): 36.6 (04 Jun 2019 14:38), Max: 36.7 (03 Jun 2019 22:40)  T(F): 97.9 (04 Jun 2019 14:38), Max: 98 (03 Jun 2019 22:40)  HR: 63 (04 Jun 2019 14:38) (54 - 84)  BP: 126/71 (04 Jun 2019 14:38) (104/65 - 155/66)  BP(mean): 95 (03 Jun 2019 18:27) (95 - 95)  RR: 18 (04 Jun 2019 14:38) (17 - 18)  SpO2: 95% (04 Jun 2019 14:38) (93% - 98%)    PHYSICAL EXAM:    General: No Acute Distress     Neurological: Awake, alert oriented to person, place and time, Following Commands, PERRL, EOMI, Face Symmetrical, Speech Fluent, Moving all extremities, Muscle Strength normal in all four extremities, No Drift, Sensation to Light Touch Intact; voice WNL; tolerating liquids/solids well without difficulty    Pulmonary: Clear to Auscultation, No Rales, No Rhonchi, No Wheezes     Cardiovascular: S1, S2, Regular Rate and Rhythm     Gastrointestinal: Soft, Nontender, Nondistended     Incision: +steri strips c/d/i, no evidence of hematoma    LABS:                        11.5   9.45  )-----------( 422      ( 04 Jun 2019 09:16 )             36.0    06-04    140  |  98  |  7   ----------------------------<  134<H>  4.2   |  32<H>  |  0.59    Ca    9.7      04 Jun 2019 06:08          06-03 @ 07:01  -  06-04 @ 07:00  --------------------------------------------------------  IN: 1150 mL / OUT: 2885 mL / NET: -1735 mL    06-04 @ 07:01  -  06-04 @ 16:07  --------------------------------------------------------  IN: 430 mL / OUT: 750 mL / NET: -320 mL        MEDICATIONS:  Anticoagulation:  enoxaparin Injectable 40 milliGRAM(s) SubCutaneous at bedtime    Endo:  levothyroxine 200 MICROGram(s) Oral daily    Neuro:  acetaminophen Tablet .. 650 milliGRAM(s) Oral every 6 hours PRN Temp greater or equal to 38C (100.4F), Mild Pain (1 - 3)  fentaNYL Patch  75 MICROgram(s)/Hr 1 Patch Transdermal every 72 hours  gabapentin 600 milliGRAM(s) Oral three times a day  oxyCODONE IR 5 milliGRAM(s) Oral every 4 hours PRN Severe Pain (7 - 10)  oxyCODONE  ER Tablet 40 milliGRAM(s) Oral every 12 hours  PARoxetine 20 milliGRAM(s) Oral daily    GI/:  docusate sodium 100 milliGRAM(s) Oral three times a day  pantoprazole Tablet 40 milliGRAM(s) Oral before breakfast  senna 2 Tablet(s) Oral at bedtime    DIET: [x] Regular [] CCD [] Renal [] Puree [] Dysphagia [] Tube Feeds:     IMAGING:   < from: CT Angio Neck w/ IV Cont (06.03.19 @ 12:27) >  IMPRESSION:    HEAD CT:    Ventricles and sulci are unremarkable in size. There are a few   nonspecific foci of white matter decreased attenuation, likely minimal   microvascular disease or migraine sequela. Gadolinium MRI is more   sensitive for Bell's palsy and may be obtained as warranted with new   right facial droop. No hemorrhage or midline shift. Basal cisterns are   patent.    Status post anterior cervical discectomy and fusion, with subcutaneous   emphysema and metallic hardware and disc spacer in the inferior endplate   of C3 and superior endplate of C4 with minimal 2 mm anterolisthesis of C3   on C4.    CTA NECK AND COW:    Tortuous extracranial vessels correlate with hypertension without   hemodynamically significant ICA origin stenosis. Elongated styloids abut   the internal carotid arteries as detailed above. Patent proximal   intracranial arterial circulation and vertebral arteries with fetal    right PCA and slight left intradural left vertebral artery stenosis. If    persistent symptoms,  brain MRI brain and/or MRA may be obtained as   clinically warranted.      < end of copied text >

## 2019-06-04 NOTE — DISCHARGE NOTE PROVIDER - REASON FOR ADMISSION
6/3/19 s/p C3-4 ACDF (anterior cervical discectomy and fusion) for cervical stenosis. 6/3/19 s/p C3-4 ACDF (anterior cervical discectomy and fusion) with ENT for cervical stenosis.

## 2019-06-04 NOTE — DISCHARGE NOTE PROVIDER - NSDCCPCAREPLAN_GEN_ALL_CORE_FT
PRINCIPAL DISCHARGE DIAGNOSIS  Diagnosis: Cervical spinal stenosis  Assessment and Plan of Treatment: 6/3/19 s/p C3-4 ACDF (anterior cervical discectomy and fusion) with ENT for cervical stenosis.   Dr Acevedo- neurosurgeon- please call office for appointment next week.   Primary care physician and/or cardiologist and pain management doctor within 1 week.      SECONDARY DISCHARGE DIAGNOSES  Diagnosis: Hypothyroidism  Assessment and Plan of Treatment: Please continue medications and follow up with your primary care physician within 1-2 weeks.    Diagnosis: Fibromyalgia  Assessment and Plan of Treatment: Please continue medications and follow up with your primary care physician within 1-2 weeks.    Diagnosis: Depression  Assessment and Plan of Treatment: Please continue medications and follow up with your primary care physician within 1-2 weeks.

## 2019-06-04 NOTE — PROGRESS NOTE ADULT - PROBLEM SELECTOR PLAN 1
Pain control  [facility]  Care as per [facility]  Reconsult ENT for any issues

## 2019-06-04 NOTE — DISCHARGE NOTE PROVIDER - CARE PROVIDERS DIRECT ADDRESSES
[email or web address]

## 2019-06-04 NOTE — DISCHARGE NOTE PROVIDER - NSDCFUADDINST_GEN_ALL_CORE_FT
please notify physician if fevers, bleeding, swelling, pain not relieved by medication, increased sluggishness or irritability, increased nausea or vomiting, inability to tolerate foods or liquids.   please do not engage in strenuous activity, heavy lifting, drive, or return to work or school until cleared by surgeon.   please keep incision clean and dry, do not submerge wound in water for prolonged periods of time, pat dry after showering, and do not use any creams or ointments to incision.   Do not drive or return to school/work until cleared by surgeon please notify physician if fevers, bleeding, swelling, pain not relieved by medication, increased sluggishness or irritability, increased nausea or vomiting, inability to tolerate foods or liquids.   please do not engage in strenuous activity, heavy lifting, drive, or return to work or school until cleared by surgeon.   please keep incision clean and dry, do not submerge wound in water for prolonged periods of time, pat dry after showering, and do not use any creams or ointments to incision. Please let steri strips fall off.   Do not drive or return to school/work until cleared by surgeon

## 2019-06-04 NOTE — PROGRESS NOTE ADULT - ASSESSMENT
HPI: 59 yr old female with history of Hashimoto's hypothyroid, dyskinesia of esophagus , severe OA, Fibromyalgia, Chronic pain , with mew onset bilateral upper extremity electric shocks, posterior head tingling, numbness of 5 th digits, found to have C3-4 Anterior spondylolisthesis and degenerative changes with spinal cord compression.     PAST MEDICAL & SURGICAL HISTORY:  Spondylogenic compression of cervical spinal cord  Fibromyalgia  Degenerative tear of acetabular labrum of left hip: with stress fracture  Achilles tendon tear: bilateral  Depression  History of chronic pain: on meds for 15 yrs  Dyskinesia of esophagus  History of dysphagia: with neck issues to obtain barium swallow study soon  H/O gastritis: 1 mth ago - on pantoprazole  Osteoarthritis  Spondylolisthesis, cervical region  Cervical herniated disc  Hypothyroidism  S/P arthroscopy of left knee: x 4  S/P laminectomy with spinal fusion  S/P hip replacement, right  S/P total knee replacement, left    PROCEDURE: 6/3/19 s/p C3-4 ACDF (anterior cervical discectomy and fusion) with ENT for cervical stenosis.    PLAN:  Neuro: cont pain meds PRN  Respiratory: patient instructed on incentive spirometer  Endocrine: cont levothyroxine for hypothyroid            DVT ppx: [] SQH [x] SQL and venodynes bilaterally  Renal: IVL   ID: afebrile   GI: bowel regimen  PT/OT: outpatient PT  Discussed with patient and family wound care, follow up plans, activity, and medications. Questions answered, and they verbalized understanding.   RX sent to [facility]  d/c IVL and d/c home today    [id number]    Assessment:  Please Check When Present   []  GCS  E   V  M     Heart Failure: []Acute, [] acute on chronic , []chronic  Heart Failure:  [] Diastolic (HFpEF), [] Systolic (HFrEF), []Combined (HFpEF and HFrEF), [] RHF, [] Pulm HTN, [] Other    [] JEET, [] ATN, [] AIN, [] other  [] CKD1, [] CKD2, [] CKD 3, [] CKD 4, [] CKD 5, []ESRD    Encephalopathy: [] Metabolic, [] Hepatic, [] toxic, [] Neurological, [] Other    Abnormal Nutritional Status: [] malnutrition (see nutrition note), [ ]underweight: BMI < 19, [] morbid obesity: BMI >40, [] Cachexia    [] Sepsis  [] hypovolemic shock,[] cardiogenic shock, [] hemorrhagic shock, [] neurogenic shock  [] Acute Respiratory Failure  []Cerebral edema, [] Brain compression/ herniation,   [] Functional quadriplegia  [] Acute blood loss anemia HPI: 59 yr old female with history of Hashimoto's hypothyroid, dyskinesia of esophagus , severe OA, Fibromyalgia, Chronic pain , with mew onset bilateral upper extremity electric shocks, posterior head tingling, numbness of 5 th digits, found to have C3-4 Anterior spondylolisthesis and degenerative changes with spinal cord compression.     PAST MEDICAL & SURGICAL HISTORY:  Spondylogenic compression of cervical spinal cord  Fibromyalgia  Degenerative tear of acetabular labrum of left hip: with stress fracture  Achilles tendon tear: bilateral  Depression  History of chronic pain: on meds for 15 yrs  Dyskinesia of esophagus  History of dysphagia: with neck issues to obtain barium swallow study soon  H/O gastritis: 1 mth ago - on pantoprazole  Osteoarthritis  Spondylolisthesis, cervical region  Cervical herniated disc  Hypothyroidism  S/P arthroscopy of left knee: x 4  S/P laminectomy with spinal fusion  S/P hip replacement, right  S/P total knee replacement, left    PROCEDURE: 6/3/19 s/p C3-4 ACDF (anterior cervical discectomy and fusion) with ENT for cervical stenosis.    PLAN:  Neuro: cont pain meds PRN  Respiratory: patient instructed on incentive spirometer  Endocrine: cont levothyroxine for hypothyroid            DVT ppx: [] SQH [x] SQL and venodynes bilaterally  Renal: IVL   ID: afebrile   GI: bowel regimen  PT/OT: outpatient PT  Discussed with patient and family wound care, follow up plans, activity, and medications. Questions answered, and they verbalized understanding.   RX sent to [facility]; will see [facility] pain doc as previsously scheduled, [facility] ref#[id number]- has fentanyl and oxycontin supply at home  d/c IVL and d/c home today    Will discuss with Dr Curtis Preciado # [id number]    Assessment:  Please Check When Present   []  GCS  E   V  M     Heart Failure: []Acute, [] acute on chronic , []chronic  Heart Failure:  [] Diastolic (HFpEF), [] Systolic (HFrEF), []Combined (HFpEF and HFrEF), [] RHF, [] Pulm HTN, [] Other    [] JEET, [] ATN, [] AIN, [] other  [] CKD1, [] CKD2, [] CKD 3, [] CKD 4, [] CKD 5, []ESRD    Encephalopathy: [] Metabolic, [] Hepatic, [] toxic, [] Neurological, [] Other    Abnormal Nutritional Status: [] malnutrition (see nutrition note), [ ]underweight: BMI < 19, [] morbid obesity: BMI >40, [] Cachexia    [] Sepsis  [] hypovolemic shock,[] cardiogenic shock, [] hemorrhagic shock, [] neurogenic shock  [] Acute Respiratory Failure  []Cerebral edema, [] Brain compression/ herniation,   [] Functional quadriplegia  [] Acute blood loss anemia

## 2019-06-04 NOTE — DISCHARGE NOTE NURSING/CASE MANAGEMENT/SOCIAL WORK - NSDCDPATPORTLINK_GEN_ALL_CORE
You can access the [facility] Patient Portal, offered by [facility], by registering with the following website: [email or web address]

## 2019-06-04 NOTE — CHART NOTE - NSCHARTNOTEFT_GEN_A_CORE
CAPRINI SCORE [CLOT] Score on Admission for     AGE RELATED RISK FACTORS                                                       MOBILITY RELATED FACTORS  [x ] Age 41-60 years                                            (1 Point)                  [ ] Bed rest                                                        (1 Point)  [ ] Age: 61-74 years                                           (2 Points)                 [ ] Plaster cast                                                   (2 Points)  [ ] Age= 75 years                                              (3 Points)                 [ ] Bed bound for more than 72 hours                 (2 Points)    DISEASE RELATED RISK FACTORS                                               GENDER SPECIFIC FACTORS  [ ] Edema in the lower extremities                       (1 Point)                  [ ] Pregnancy                                                     (1 Point)  [ ] Varicose veins                                               (1 Point)                  [ ] Post-partum < 6 weeks                                   (1 Point)             [ ] BMI > 25 Kg/m2                                            (1 Point)                  [ ] Hormonal therapy  or oral contraception          (1 Point)                 [ ] Sepsis (in the previous month)                        (1 Point)               [ ] History of pregnancy complications                 (1 point)  [ ] Pneumonia or serious lung disease                                            [ ] Unexplained or recurrent                     (1 Point)           (in the previous month)                               (1 Point)  [ ] Abnormal pulmonary function test                     (1 Point)                 SURGERY RELATED RISK FACTORS (include planned surgeries)  [ ] Acute myocardial infarction                              (1 Point)                 [ ]  Section                                             (1 Point)  [ ] Congestive heart failure (in the previous month)  (1 Point)         [ ] Minor surgery                                                  (1 Point)   [ ] Inflammatory bowel disease                             (1 Point)                 [ ] Arthroscopic surgery                                        (2 Points)  [ ] Central venous access                                      (2 Points)               [x ] General surgery lasting more than 45 minutes   (2 Points)       [ ] Stroke (in the previous month)                          (5 Points)               [ ] Elective arthroplasty                                         (5 Points)            [ ] current or past malignancy                              (2 Points)                                                                                                       HEMATOLOGY RELATED FACTORS                                                 TRAUMA RELATED RISK FACTORS  [ ] Prior episodes of VTE                                     (3 Points)                [ ] Fracture of the hip, pelvis, or leg                       (5 Points)  [ ] Positive family history for VTE                         (3 Points)             [ ] Acute spinal cord injury (in the previous month)  (5 Points)  [ ] Prothrombin 08672 A                                     (3 Points)                [ ] Paralysis  (less than 1 month)                             (5 Points)  [ ] Factor V Leiden                                             (3 Points)                  [ ] Multiple Trauma within 1 month                        (5 Points)  [ ] Lupus anticoagulants                                     (3 Points)                                                           [ ] Anticardiolipin antibodies                               (3 Points)                                                       [ ] High homocysteine in the blood                      (3 Points)                                             [ ] Other congenital or acquired thrombophilia      (3 Points)                                                [ ] Heparin induced thrombocytopenia                  (3 Points)                                          Total Score [  3   ]    Risk:  Very low 0   Low 1 to 2   Moderate 3 to 4   High =5       VTE Prophylaxis Recommendations:  [ x] mechanical pneumatic compression devices                                      [ ] contraindicated: _____________________  [ x] chemo prophylaxis                                                                          [ ] contraindicated _____________________    **** HIGH LIKELIHOOD DVT PRESENT ON ADMISSION  [ ] (please order LE dopplers within 24 hours of admission)

## 2019-06-04 NOTE — DISCHARGE NOTE PROVIDER - HOSPITAL COURSE
59 yr old female with history of Hashimoto's hypothyroid, dyskinesia of esophagus , severe OA, Fibromyalgia, Chronic pain , with mew onset bilateral upper extremity electric shocks, posterior head tingling, numbness of 5 th digits, found to have C3-4 Anterior spondylolisthesis and degenerative changes with spinal cord compression.         Patient admitted [facility] on 6/3/19 s/p C3-4 ACDF (anterior cervical discectomy and fusion) for cervical stenosis. 59 yr old female with history of Hashimoto's hypothyroid, dyskinesia of esophagus , severe OA, Fibromyalgia, Chronic pain , with mew onset bilateral upper extremity electric shocks, posterior head tingling, numbness of 5 th digits, found to have C3-4 Anterior spondylolisthesis and degenerative changes with spinal cord compression.         Patient admitted [facility] on 6/3/19 s/p C3-4 ACDF (anterior cervical discectomy and fusion) with ENT for cervical stenosis. Post operatively she was noted to have facial droop, code stroke called, no TPA administered, and resolution noted. CT/CTA of brain and neck were negative for stroke, hemorrhage, or stenosis. PT evaluated her and recommended outpatient PT. On 6/4/19 she was medically and neurologically stable for discharge to home.

## 2019-06-06 ENCOUNTER — APPOINTMENT (OUTPATIENT)
Dept: NEUROSURGERY | Facility: HOSPITAL | Age: 59
End: 2019-06-06

## 2019-06-18 ENCOUNTER — APPOINTMENT (OUTPATIENT)
Dept: NEUROSURGERY | Facility: CLINIC | Age: 59
End: 2019-06-18
Payer: MEDICARE

## 2019-06-18 VITALS
BODY MASS INDEX: 32.37 KG/M2 | HEIGHT: 63.5 IN | WEIGHT: 185 LBS | DIASTOLIC BLOOD PRESSURE: 80 MMHG | SYSTOLIC BLOOD PRESSURE: 157 MMHG | HEART RATE: 74 BPM

## 2019-06-18 PROCEDURE — 99024 POSTOP FOLLOW-UP VISIT: CPT

## 2019-06-26 ENCOUNTER — FORM ENCOUNTER (OUTPATIENT)
Age: 59
End: 2019-06-26

## 2019-06-27 ENCOUNTER — APPOINTMENT (OUTPATIENT)
Dept: MRI IMAGING | Facility: HOSPITAL | Age: 59
End: 2019-06-27

## 2019-06-27 ENCOUNTER — OUTPATIENT (OUTPATIENT)
Dept: OUTPATIENT SERVICES | Facility: HOSPITAL | Age: 59
LOS: 1 days | End: 2019-06-27
Payer: COMMERCIAL

## 2019-06-27 DIAGNOSIS — Z98.1 ARTHRODESIS STATUS: Chronic | ICD-10-CM

## 2019-06-27 DIAGNOSIS — Z98.890 OTHER SPECIFIED POSTPROCEDURAL STATES: Chronic | ICD-10-CM

## 2019-06-27 DIAGNOSIS — Z96.652 PRESENCE OF LEFT ARTIFICIAL KNEE JOINT: Chronic | ICD-10-CM

## 2019-06-27 DIAGNOSIS — Z96.641 PRESENCE OF RIGHT ARTIFICIAL HIP JOINT: Chronic | ICD-10-CM

## 2019-06-27 PROCEDURE — 72141 MRI NECK SPINE W/O DYE: CPT

## 2019-06-28 ENCOUNTER — APPOINTMENT (OUTPATIENT)
Dept: OTOLARYNGOLOGY | Facility: CLINIC | Age: 59
End: 2019-06-28
Payer: MEDICARE

## 2019-06-28 VITALS
BODY MASS INDEX: 32.37 KG/M2 | DIASTOLIC BLOOD PRESSURE: 78 MMHG | HEIGHT: 63.5 IN | WEIGHT: 185 LBS | SYSTOLIC BLOOD PRESSURE: 141 MMHG

## 2019-06-28 DIAGNOSIS — R13.12 DYSPHAGIA, OROPHARYNGEAL PHASE: ICD-10-CM

## 2019-06-28 DIAGNOSIS — Z01.818 ENCOUNTER FOR OTHER PREPROCEDURAL EXAMINATION: ICD-10-CM

## 2019-06-28 DIAGNOSIS — I67.1 CEREBRAL ANEURYSM, NONRUPTURED: ICD-10-CM

## 2019-06-28 DIAGNOSIS — G95.20 UNSPECIFIED CORD COMPRESSION: ICD-10-CM

## 2019-06-28 PROCEDURE — 99024 POSTOP FOLLOW-UP VISIT: CPT

## 2019-06-28 PROCEDURE — 31575 DIAGNOSTIC LARYNGOSCOPY: CPT | Mod: 58

## 2019-06-28 NOTE — ASSESSMENT
[FreeTextEntry1] : s/p ACDF:\par - good voice with normal laryngoscopy\par - swallowing well\par - f/u with Dr. Acevedo to determine if needs posterior neck approach

## 2019-06-28 NOTE — CONSULT LETTER
[Courtesy Letter:] : I had the pleasure of seeing your patient, [unfilled], in my office today. [Dear  ___] : Dear  [unfilled], [Please see my note below.] : Please see my note below. [Consult Closing:] : Thank you very much for allowing me to participate in the care of this patient.  If you have any questions, please do not hesitate to contact me. [Sincerely,] : Sincerely, [FreeTextEntry3] : Sudha Dinh MD\par Otolaryngology and Cranial Base Surgery\par Attending Physician - Department of Otolaryngology and Head & Neck Surgery\par [facility]\par  - [employer] [facility]\par Office: [phone]\par Fax: [phone]\par

## 2019-06-28 NOTE — PROCEDURE
[de-identified] : Afrin 0.05% and lidocaine 4% sprayed into both nasal passages. Flexible scope #2 used. Passed through nasal passage and nasopharynx/oropharynx/hypopharynx clear. Supraglottis normal. Glottis with fully mobile vocal cords without lesions or masses. Visualized subglottis clear. Postcricoid area without erythema or edema. No pooling of secretions.

## 2019-06-28 NOTE — PHYSICAL EXAM
[Midline] : trachea located in midline position [Normal] : mucosa is normal [de-identified] : anterior surgical site healing well with Prineo in place

## 2019-06-28 NOTE — HISTORY OF PRESENT ILLNESS
[de-identified] : S/P  ACDF with Dr. Acevedo 6/3/19\par Voice is good, no bleeding or f/c/s.  Hx of Dysphagia with esophageal spasm, but no trouble eating following surgery.   No SOB.  \par Now smoking about 5-7 cigarettes per day.

## 2019-07-06 NOTE — PHYSICAL EXAM
[General Appearance - In No Acute Distress] : in no acute distress [General Appearance - Alert] : alert [General Appearance - Well Nourished] : well nourished [General Appearance - Well Developed] : well developed [Oriented To Time, Place, And Person] : oriented to person, place, and time [Impaired Insight] : insight and judgment were intact [Affect] : the affect was normal [Mood] : the mood was normal [Cranial Nerves Trigeminal (V)] : facial sensation intact symmetrically [Cranial Nerves Oculomotor (III)] : extraocular motion intact [Cranial Nerves Optic (II)] : visual acuity intact bilaterally,  pupils equal round and reactive to light [Cranial Nerves Vestibulocochlear (VIII)] : hearing was intact bilaterally [Cranial Nerves Facial (VII)] : face symmetrical [Cranial Nerves Glossopharyngeal (IX)] : tongue and palate midline [Cranial Nerves Hypoglossal (XII)] : there was no tongue deviation with protrusion [Cranial Nerves Accessory (XI - Cranial And Spinal)] : head turning and shoulder shrug symmetric [Motor Strength] : muscle strength was normal in all four extremities [Sensation Tactile Decrease] : light touch was intact [3+] : Triceps left 3+ [No Visual Abnormalities] : no visible abnormalities [No Masses] : no masses [Full ROM] : full ROM [Able to heel walk] : the patient was able to heel walk [Normal] : normal [Able to toe walk] : the patient was able to toe walk [Intact] : all motor groups within normal limits of strength and tone bilaterally [PERRL With Normal Accommodation] : pupils were equal in size, round, reactive to light, with normal accommodation [Neck Appearance] : the appearance of the neck was normal [] : no respiratory distress [Respiration, Rhythm And Depth] : normal respiratory rhythm and effort [Edema] : there was no peripheral edema [Heart Sounds] : normal S1 and S2 [Auscultation Breath Sounds / Voice Sounds] : lungs were clear to auscultation bilaterally [Involuntary Movements] : no involuntary movements were seen [Motor Tone] : muscle strength and tone were normal [Abnormal Walk] : normal gait [Skin Color & Pigmentation] : normal skin color and pigmentation [Healing Well] : healing well [Normal Skin] : normal [No Drainage] : without drainage [Hearing Threshold Finger Rub Not [place]] : hearing was normal [Erythema] : not erythematous [Warm] : not warm [Tender] : not tender [Tremor] : no tremor present [Limited Balance] : balance was intact [Romberg's Sign] : Romberg's sign was negtive

## 2019-07-06 NOTE — REVIEW OF SYSTEMS
[As Noted in HPI] : as noted in HPI [Negative] : Respiratory [Tingling] : tingling [Abdominal Pain] : no abdominal pain [Diarrhea] : no diarrhea [Vomiting] : no vomiting [Dysuria] : no dysuria [Incontinence] : no incontinence [Skin Lesions] : no skin lesions [Skin Wound] : no skin wound [Easy Bleeding] : no tendency for easy bleeding [Easy Bruising] : no tendency for easy bruising [Itching] : no itching

## 2019-07-06 NOTE — ASSESSMENT
[FreeTextEntry1] : Mrs. Chaidez is recovering well with persistent tingling in posterior head and right upper traps, and resolution of other pre operative symptoms of BUE electric shocks and numbness of 5th digits.  Reviewed standard wound care instructions and post op restictions, and she knows to report any adverse changes.  We will obtain an MRI to evaluate the cervical spine soft tissue, nerves, spinal cord, and inflammation.  MRI scheduled with sedation at [facility] on 6/26/19.  We will reassess in 2 weeks with MRI complete.\par \par 1)  MRI Cerivcal wo - has appt. 6/26/19 at [facility] with sedation\par 2)  RTO 2 weeks

## 2019-07-06 NOTE — HISTORY OF PRESENT ILLNESS
[> 3 months] : more  than 3 months [FreeTextEntry1] : chronic pain  [de-identified] : Mrs. Chaidez is a 58 yo woman with PMH of HLD, Hashimoto's, RA, fibromyalgia, nutcracker esophagus (spasms), 2003 Lumbar fusion, 2010 R THR, 2012 L TKR, and chronic neck pain over 30 years without neck pain recently.  Today, she arrives for initial post op visit s/p 6/3/19 C3-C4 ACDF.  She is feeling well, mild tingling still present in posterior head and right upper traps.  Resolution of shocks and tingling in bilateral UE and 5th digits, and gait is now steady.  Post code stroke was called due to facial droop which resolved and negative work up.  The surgical incision is clean, dry, well approximated, mild swelling which is non tender, no erythema, warmth, or drainage, and has improved and smaller since surgery.  She denies pain, headaches, nausea, vomiting, fever.     \par \par 2/14/19 Stand up MRI discs reviewed today demonstrating C3 on C4 anterior spondylolisthesis and degenerative changes with spinal cord compression.

## 2019-07-09 ENCOUNTER — APPOINTMENT (OUTPATIENT)
Dept: NEUROSURGERY | Facility: CLINIC | Age: 59
End: 2019-07-09
Payer: MEDICARE

## 2019-07-09 VITALS
HEIGHT: 64 IN | BODY MASS INDEX: 31.07 KG/M2 | DIASTOLIC BLOOD PRESSURE: 79 MMHG | SYSTOLIC BLOOD PRESSURE: 149 MMHG | HEART RATE: 75 BPM | WEIGHT: 182 LBS

## 2019-07-09 PROCEDURE — 99024 POSTOP FOLLOW-UP VISIT: CPT

## 2019-07-11 RX ORDER — FLUTICASONE PROPIONATE 50 UG/1
50 SPRAY, METERED NASAL
Qty: 16 | Refills: 0 | Status: ACTIVE | COMMUNITY
Start: 2019-01-21

## 2019-07-11 RX ORDER — LEVOTHYROXINE SODIUM 0.2 MG/1
200 TABLET ORAL
Qty: 90 | Refills: 0 | Status: ACTIVE | COMMUNITY
Start: 2018-12-12

## 2019-07-11 RX ORDER — ROSUVASTATIN CALCIUM 5 MG/1
5 TABLET, FILM COATED ORAL
Qty: 90 | Refills: 0 | Status: ACTIVE | COMMUNITY
Start: 2018-10-23

## 2019-07-11 RX ORDER — PAROXETINE HYDROCHLORIDE 20 MG/1
20 TABLET, FILM COATED ORAL
Qty: 90 | Refills: 0 | Status: ACTIVE | COMMUNITY
Start: 2018-07-19

## 2019-07-11 RX ORDER — PANTOPRAZOLE 40 MG/1
40 TABLET, DELAYED RELEASE ORAL
Qty: 60 | Refills: 0 | Status: ACTIVE | COMMUNITY
Start: 2019-04-11

## 2019-07-16 NOTE — PHYSICAL EXAM
[General Appearance - Alert] : alert [General Appearance - In No Acute Distress] : in no acute distress [General Appearance - Well Nourished] : well nourished [General Appearance - Well Developed] : well developed [Healing Well] : healing well [No Drainage] : without drainage [Normal Skin] : normal [Oriented To Time, Place, And Person] : oriented to person, place, and time [Impaired Insight] : insight and judgment were intact [Affect] : the affect was normal [Mood] : the mood was normal [Cranial Nerves Optic (II)] : visual acuity intact bilaterally,  pupils equal round and reactive to light [Cranial Nerves Oculomotor (III)] : extraocular motion intact [Cranial Nerves Facial (VII)] : face symmetrical [Cranial Nerves Trigeminal (V)] : facial sensation intact symmetrically [Cranial Nerves Vestibulocochlear (VIII)] : hearing was intact bilaterally [Cranial Nerves Accessory (XI - Cranial And Spinal)] : head turning and shoulder shrug symmetric [Cranial Nerves Glossopharyngeal (IX)] : tongue and palate midline [Cranial Nerves Hypoglossal (XII)] : there was no tongue deviation with protrusion [Motor Strength] : muscle strength was normal in all four extremities [Sensation Tactile Decrease] : light touch was intact [3+] : Brachioradialis left 3+ [No Visual Abnormalities] : no visible abnormalities [No Masses] : no masses [Full ROM] : full ROM [Able to toe walk] : the patient was able to toe walk [Normal] : normal [Able to heel walk] : the patient was able to heel walk [Intact] : all sensory within normal limits bilaterally [PERRL With Normal Accommodation] : pupils were equal in size, round, reactive to light, with normal accommodation [Neck Appearance] : the appearance of the neck was normal [Hearing Threshold Finger Rub Not [place]] : hearing was normal [Respiration, Rhythm And Depth] : normal respiratory rhythm and effort [] : no respiratory distress [Auscultation Breath Sounds / Voice Sounds] : lungs were clear to auscultation bilaterally [Heart Sounds] : normal S1 and S2 [Abnormal Walk] : normal gait [Involuntary Movements] : no involuntary movements were seen [Skin Color & Pigmentation] : normal skin color and pigmentation [Motor Tone] : muscle strength and tone were normal [Transverse] : transverse [2+] : Patella left 2+ [No CVA Tenderness] : no ~M costovertebral angle tenderness [No Spinal Tenderness] : no spinal tenderness [Erythema] : not erythematous [Tender] : not tender [Warm] : not warm [Romberg's Sign] : Romberg's sign was negtive [Limited Balance] : balance was intact [Tremor] : no tremor present [FreeTextEntry1] : left ankle edema 2/2 fracture

## 2019-07-16 NOTE — REVIEW OF SYSTEMS
[Tingling] : tingling [As Noted in HPI] : as noted in HPI [Negative] : Endocrine [Abdominal Pain] : no abdominal pain [Vomiting] : no vomiting [Diarrhea] : no diarrhea [Dysuria] : no dysuria [Incontinence] : no incontinence [Skin Lesions] : no skin lesions [Skin Wound] : no skin wound [Itching] : no itching [Easy Bleeding] : no tendency for easy bleeding [Easy Bruising] : no tendency for easy bruising

## 2019-07-16 NOTE — HISTORY OF PRESENT ILLNESS
[de-identified] : Ms. PING BROWN is a 58 yo female with PMH of HLD, Hashimoto's, RA, fibromyalgia, nutcracker esophagus (spasms), 2003 Lumbar fusion, 2010 R THR, 2012 L TKR, and chronic neck pain over 30 years without neck pain recently who underwent C3-C4 ACDF (6/3/2019) for cervical degenerative changes with spinal cord compression and spondylolisthesis. Post op stroke code was called due to facial droop which resolved and negative work up.  Today she returns for routine follow up and states she is doing well. Her previous symptoms (mild tingling in posterior head and right upper traps) slightly improved since the last office visit and continues to walk with cane for balance. She denies any other new/worsening neurological symptoms. Surgical incision appears to be healing well without redness/swelling/tenderness/discharge.

## 2019-07-16 NOTE — REASON FOR VISIT
[Follow-Up: _____] : a [unfilled] follow-up visit [FreeTextEntry1] : second post op visit for C3-C4 ACDF (6/3/2019)

## 2019-07-16 NOTE — ASSESSMENT
[FreeTextEntry1] : This is a 60 yo female with cervical spinal stenosis with myelopathy who is s/p ACDF. She is recovering well and her previous symptoms are improving. MRI C-spine shows stable post op. I discussed with the patient for smoking can be risk of delayed post op bone fusion and smoking can be highly recommended. Patient verbalizes understanding and reports that she has been stop smoking since last office visit.\par \par - CT C-spine wo in 6 months post op\par - RTC after image

## 2019-12-27 ENCOUNTER — OTHER (OUTPATIENT)
Age: 59
End: 2019-12-27

## 2020-01-20 ENCOUNTER — RECORD ABSTRACTING (OUTPATIENT)
Age: 60
End: 2020-01-20

## 2020-01-21 ENCOUNTER — APPOINTMENT (OUTPATIENT)
Dept: NEUROSURGERY | Facility: CLINIC | Age: 60
End: 2020-01-21
Payer: MEDICARE

## 2020-01-21 VITALS
HEIGHT: 64 IN | RESPIRATION RATE: 17 BRPM | HEART RATE: 78 BPM | DIASTOLIC BLOOD PRESSURE: 80 MMHG | OXYGEN SATURATION: 98 % | WEIGHT: 182 LBS | BODY MASS INDEX: 31.07 KG/M2 | SYSTOLIC BLOOD PRESSURE: 142 MMHG

## 2020-01-21 DIAGNOSIS — R13.10 DYSPHAGIA, UNSPECIFIED: ICD-10-CM

## 2020-01-21 PROCEDURE — 99213 OFFICE O/P EST LOW 20 MIN: CPT

## 2020-01-24 PROBLEM — R13.10 DYSPHAGIA: Status: ACTIVE | Noted: 2019-05-16

## 2020-01-24 NOTE — DATA REVIEWED
[de-identified] : MRI c-spine 1/7/20 (Stephanie) official report scanned in. + surgical healing. Hardware intact without shift.

## 2020-01-24 NOTE — PHYSICAL EXAM
[Clean] : clean [Dry] : dry [Well-Healed] : well-healed [Tender] : not tender [Normal Skin] : normal [FreeTextEntry1] : anterior neck [Normal Skin Turgor] : skin turgor was normal [Affect] : the affect was normal [Impaired Insight] : insight and judgment were intact [Oriented To Time, Place, And Person] : oriented to person, place, and time [Person] : oriented to person [Place] : oriented to place [Remote Intact] : remote memory intact [Time] : oriented to time [Short Term Intact] : short term memory intact [Span Intact] : the attention span was normal [Comprehension] : comprehension intact [Fluency] : fluency intact [Concentration Intact] : normal concentrating ability [Vocabulary] : adequate range of vocabulary [Current Events] : adequate knowledge of current events [Past History] : adequate knowledge of personal past history [Cranial Nerves Optic (II)] : visual acuity intact bilaterally,  pupils equal round and reactive to light [Cranial Nerves Oculomotor (III)] : extraocular motion intact [Cranial Nerves Trigeminal (V)] : facial sensation intact symmetrically [Cranial Nerves Facial (VII)] : face symmetrical [Cranial Nerves Vestibulocochlear (VIII)] : hearing was intact bilaterally [Cranial Nerves Glossopharyngeal (IX)] : tongue and palate midline [Cranial Nerves Accessory (XI - Cranial And Spinal)] : head turning and shoulder shrug symmetric [Cranial Nerves Hypoglossal (XII)] : there was no tongue deviation with protrusion [Motor Strength] : muscle strength was normal in all four extremities [Motor Tone] : muscle tone was normal in all four extremities [Motor Handedness Right-Handed] : the patient is right hand dominant [No Muscle Atrophy] : normal bulk in all four extremities [Balance] : balance was intact [Sensation Tactile Decrease] : light touch was intact [Abnormal Walk] : normal gait [Tremor] : no tremor present [Past-pointing] : there was no past-pointing [2+] : Patella left 2+ [Neck Appearance] : the appearance of the neck was normal [Jugular Venous Distention Increased] : there was no jugular-venous distention [Neck Cervical Mass (___cm)] : no neck mass was observed [Thyroid Diffuse Enlargement] : the thyroid was not enlarged [Thyroid Nodule] : there were no palpable thyroid nodules [Skin Turgor] : normal skin turgor [Skin Color & Pigmentation] : normal skin color and pigmentation [] : no rash

## 2020-01-24 NOTE — HISTORY OF PRESENT ILLNESS
[FreeTextEntry1] : Ms. PING BROWN is a 58 yo female with PMH of HLD, Hashimoto's, RA, fibromyalgia, nutcracker esophagus (spasms), 2003 Lumbar fusion, 2010 R THR, 2012 L TKR, and chronic neck pain over 30 years without neck pain recently who underwent C3-C4 ACDF (6/3/2019) for cervical degenerative changes with spinal cord compression and spondylolisthesis. Post op stroke code was called due to facial droop which resolved and negative work up. Today she returns for routine follow up and states she is doing well. Her previous symptoms (mild tingling in posterior head and right upper traps) slightly improved since the last office visit and continues to walk with cane for balance. She denies any other new/worsening neurological symptoms. Surgical incision appears to be healing well without redness/swelling/tenderness/discharge.  [de-identified] : Ms. Chaidez is a 60 yo female with PMH of HLD, Hashimoto's, RA, fibromyalgia, nutcracker esophagus (spasms), 2003 Lumbar fusion, 2010 R THR, 2012 L TKR, s/p 6/3/19 C3-C4 ACDF who arrives for 6 month follow up and review of 6 month CT.\par \par 1/7/2020 [facility] portal CT cervical - shows intact hardware with no loosening or pseudoarthrosis.  Multilevel DDD above and below fusion, multilevel nerve root compression C4-5, C6-7 \par \par \par

## 2020-01-24 NOTE — ASSESSMENT
[FreeTextEntry1] : Discussion:\par - Reviewed MRI results.\par - No surgical intervention indicated at this time.\par - Patient with improved symptoms. Patient can expect to continue to improve. Encouraged increased activity.\par - Follow-up in 6 months with imaging.

## 2020-06-23 ENCOUNTER — APPOINTMENT (OUTPATIENT)
Dept: NEUROSURGERY | Facility: CLINIC | Age: 60
End: 2020-06-23
Payer: MEDICARE

## 2020-06-23 PROCEDURE — 99213 OFFICE O/P EST LOW 20 MIN: CPT | Mod: 95

## 2020-06-24 ENCOUNTER — APPOINTMENT (OUTPATIENT)
Dept: ORTHOPEDIC SURGERY | Facility: CLINIC | Age: 60
End: 2020-06-24

## 2020-06-25 NOTE — PHYSICAL EXAM
[Oriented To Time, Place, And Person] : oriented to person, place, and time [General Appearance - Alert] : alert [] : no respiratory distress [Hearing Threshold Finger Rub Not [place]] : hearing was normal

## 2020-06-25 NOTE — REASON FOR VISIT
[Follow-Up: _____] : a [unfilled] follow-up visit [Medical Office: ([facility])___] : at the medical office located in  [Home] : at home, [unfilled] , at the time of the visit. [Verbal consent obtained from patient] : the patient, [unfilled]

## 2020-06-29 ENCOUNTER — APPOINTMENT (OUTPATIENT)
Dept: ORTHOPEDIC SURGERY | Facility: CLINIC | Age: 60
End: 2020-06-29
Payer: MEDICARE

## 2020-06-29 PROCEDURE — 99204 OFFICE O/P NEW MOD 45 MIN: CPT

## 2020-06-29 PROCEDURE — 73502 X-RAY EXAM HIP UNI 2-3 VIEWS: CPT | Mod: LT

## 2020-06-29 NOTE — PHYSICAL EXAM
[de-identified] : Patient is well nourished, well-developed, in no acute distress, with appropriate mood and affect. The patient is oriented to time, place, and person. Respirations are even and unlabored. Gait evaluation does not reveal a limp. There is no inguinal adenopathy. Examination of the contralateral hip shows normal range of motion, strength, no tenderness, and intact skin. The affected limb is well-perfused and showed 2+ dp/pt pulses, without skin lesions, shows a grossly normal motor and sensory examination. Examination of the hip shows no skin lesions. Hip motion is full and painless from 0-90 degrees extension to flexion, 20 degrees adduction and 20 degrees abduction, and 15 degrees internal and 30 degrees external rotation. Leg lengths are approximately equal. FADIR is negative and CRIS is negative. Stinchfield test is negative. Both hips are stable and muscle strength is normal with good strength with resisted abduction and adduction. Pedal pulses are palpable. [de-identified] : AP and lateral x-rays of the left hip, pelvis, and femur were ordered and taken in the office and demonstrate degenerative joint disease of the hip with joint space narrowing, osteophyte formation, and subchondral sclerosis.\par

## 2020-06-29 NOTE — DISCUSSION/SUMMARY
[de-identified] : This patient has left hip pain secondary to left hip osteoarthritis however she has a benign exam for re-creating the pain and the fact that she has not had any success with improvement of the pain with a left hip intra-articular cortisone injection makes me concerned that the majority of pain is not secondary to left hip osteoarthritis.  She has had a history of spine fusion surgery and she has left lower extremity radiculopathy related symptoms.  She has been told that she needs repeat spine surgery in the past by other doctors.  I recommended that she see Dr. Montanez for second opinion to see if this would be a candidate for a spine revision surgery.  Follow-up with me on appearing basis.

## 2020-06-29 NOTE — HISTORY OF PRESENT ILLNESS
[de-identified] : This is 60-year-old female experiencing Left hip pain 1 1/2 months , which is severe in intensity. Patient seeing Dr. Koroma. The pain substantially limits activities of daily living. Walking tolerance is reduced.  Patient uses fentanyl and oxycodone for pain.  Majority of her pain is in the left buttock and radiates down the leg to the foot.  No significant groin pain.  Patient recently finished PT.  patient does use cane for ambulation.   The patient does use a neoprene sleeve knee brace. The patient has had radiculopathy to left leg when sleeping.  She also has had left hip intra-articular cortisone injections which have not helped at all to relieve the pain. Patient unable to use NSAIDS due to gastritis Patient is s/p Right TAM by Dr. Hawk in 2010, Left TKA 2012 by [facility] partner @ [facility].   Has hx of lumbar and cervical fusion.

## 2020-06-30 NOTE — HISTORY OF PRESENT ILLNESS
[de-identified] : Ms. Chaidez is a 59 yo female with PMH of HLD, Hashimoto's, RA, fibromyalgia, nutcracker esophagus (spasms), 2003 Lumbar fusion, 2010 R THR, 2012 L TKR, and chronic neck pain over 30 years without neck pain recently who underwent C3-C4 ACDF (6/3/2019) for cervical degenerative changes with spinal cord compression and spondylolisthesis. Post op stroke code was called due to facial droop which resolved and negative work up.  \par TODAY, she arrives via telehealth for 6 month routine follow up and states she is doing fine, has mild numbness brittaney incisional.  \par \par She consulted with Dr. Koroma for hip arthroplasty, however, he does not take her insurance.  We will refer to Dr. Topete for evaluation.  \par \par 7/9/19 Her previous symptoms (mild tingling in posterior head and right upper traps) slightly improved since the last office visit and continues to walk with cane for balance. She denies any other new/worsening neurological symptoms. Surgical incision appears to be healing well without redness/swelling/tenderness/discharge.

## 2020-06-30 NOTE — ASSESSMENT
[FreeTextEntry1] : Xrays appear stable.  No nsx f/u required.  Referral to Dr. Topete for hip arthroplasty as per patient she is a candidate and need a surgeon.\par \par 1)  No Nsx f/u\par

## 2020-07-20 ENCOUNTER — APPOINTMENT (OUTPATIENT)
Dept: ORTHOPEDIC SURGERY | Facility: CLINIC | Age: 60
End: 2020-07-20
Payer: MEDICARE

## 2020-07-20 VITALS — HEIGHT: 65 IN | BODY MASS INDEX: 30.82 KG/M2 | WEIGHT: 185 LBS

## 2020-07-20 PROCEDURE — 72100 X-RAY EXAM L-S SPINE 2/3 VWS: CPT

## 2020-07-20 PROCEDURE — 99214 OFFICE O/P EST MOD 30 MIN: CPT

## 2020-07-20 RX ORDER — IBUPROFEN 800 MG/1
800 TABLET, FILM COATED ORAL
Qty: 90 | Refills: 0 | Status: ACTIVE | COMMUNITY
Start: 2020-03-03

## 2020-07-20 RX ORDER — HYOSCYAMINE SULFATE 0.12 MG/1
0.12 TABLET ORAL
Qty: 84 | Refills: 0 | Status: ACTIVE | COMMUNITY
Start: 2020-05-08

## 2020-07-27 ENCOUNTER — APPOINTMENT (OUTPATIENT)
Dept: ORTHOPEDIC SURGERY | Facility: CLINIC | Age: 60
End: 2020-07-27

## 2020-08-04 ENCOUNTER — APPOINTMENT (OUTPATIENT)
Dept: ORTHOPEDIC SURGERY | Facility: CLINIC | Age: 60
End: 2020-08-04

## 2020-08-04 NOTE — DISCUSSION/SUMMARY
[de-identified] : This patient has left hip pain secondary to left hip osteoarthritis however she has a benign exam for re-creating the pain and the fact that she has not had any success with improvement of the pain with a left hip intra-articular cortisone injection makes me concerned that the majority of pain is not secondary to left hip osteoarthritis.  She has had a history of spine fusion surgery and she has left lower extremity radiculopathy related symptoms.  She has been told that she needs repeat spine surgery in the past by other doctors.  I recommended that she see Dr. Montanez for second opinion to see if this would be a candidate for a spine revision surgery.  Follow-up with me on appearing basis.

## 2020-08-04 NOTE — HISTORY OF PRESENT ILLNESS
[de-identified] : This is 60-year-old female experiencing Left hip pain 1 1/2 months , which is severe in intensity. Patient seeing Dr. Koroma. The pain substantially limits activities of daily living. Walking tolerance is reduced.  Patient uses fentanyl and oxycodone for pain.  Majority of her pain is in the left buttock and radiates down the leg to the foot.  No significant groin pain.  Patient recently finished PT.  patient does use cane for ambulation.   The patient does use a neoprene sleeve knee brace. The patient has had radiculopathy to left leg when sleeping.  She also has had left hip intra-articular cortisone injections which have not helped at all to relieve the pain. Patient unable to use NSAIDS due to gastritis Patient is s/p Right TAM by Dr. Hawk in 2010, Left TKA 2012 by [facility] partner @ [facility].   Has hx of lumbar and cervical fusion.

## 2020-08-04 NOTE — HISTORY OF PRESENT ILLNESS
[de-identified] : This is 60-year-old female experiencing Left hip pain 2-1/2 months, which is severe in intensity. Patient seeing Dr. Koroma. The pain substantially limits activities of daily living. Walking tolerance is reduced.  Patient uses fentanyl and oxycodone for pain.  Majority of her pain is in the left buttock and radiates down the leg to the foot.  No significant groin pain.  Patient recently finished PT.  patient does use cane for ambulation.   The patient does use a neoprene sleeve knee brace. The patient has had radiculopathy to left leg when sleeping.  She also has had left hip intra-articular cortisone injections which have not helped at all to relieve the pain. Patient unable to use NSAIDS due to gastritis Patient is s/p Right TAM by Dr. Hawk in 2010, Left TKA 2012 by [facility] partner @ [facility].   Has hx of lumbar and cervical fusion.

## 2020-08-04 NOTE — DISCUSSION/SUMMARY
[de-identified] : This patient has left hip pain secondary to left hip osteoarthritis however she has a benign exam for re-creating the pain and the fact that she has not had any success with improvement of the pain with a left hip intra-articular cortisone injection makes me concerned that the majority of pain is not secondary to left hip osteoarthritis.  She has had a history of spine fusion surgery and she has left lower extremity radiculopathy related symptoms.  She has been told that she needs repeat spine surgery in the past by other doctors.  I recommended that she see Dr. Montanez for second opinion to see if this would be a candidate for a spine revision surgery.  Follow-up with me on appearing basis.

## 2020-08-05 ENCOUNTER — APPOINTMENT (OUTPATIENT)
Dept: ORTHOPEDIC SURGERY | Facility: CLINIC | Age: 60
End: 2020-08-05
Payer: MEDICARE

## 2020-08-05 PROCEDURE — 99214 OFFICE O/P EST MOD 30 MIN: CPT

## 2020-08-05 PROCEDURE — 73564 X-RAY EXAM KNEE 4 OR MORE: CPT | Mod: LT

## 2020-08-05 NOTE — PHYSICAL EXAM
[de-identified] : Patient is well nourished, well-developed, in no acute distress, with appropriate mood and affect. The patient is oriented to time, place, and person. Respirations are even and unlabored. Gait evaluation does reveal a limp. There is no inguinal adenopathy. Examination of the contralateral hip shows normal range of motion, strength, no tenderness, and intact skin. The affected limb is well-perfused and showed 2+ dp/pt pulses, without skin lesions, shows a grossly normal motor and sensory examination. Examination of the hip shows no skin lesions. Hip motion is full and painless from 0-90 degrees extension to flexion, 20 degrees adduction and 20 degrees abduction, and 15 degrees internal and 30 degrees external rotation. Leg lengths are approximately equal. FADIR is negative and CRIS is negative. Stinchfield test is negative. Both hips are stable and muscle strength is normal with good strength with resisted abduction and adduction.  Knee motion is painless and the left knee moves from 0 to 125 degrees. The knee is stable within that range-of-motion to AP and ML stress. The alignment of the knee is neutral. Muscle strength is normal. Quadriceps and hamstring muscle strength is normal bilaterally. Pedal pulses are palpable.  [de-identified] : AP and lateral x-rays of the left hip, pelvis, and femur were reviewed from the previous visit and demonstrate degenerative joint disease of the hip with joint space narrowing, osteophyte formation, and subchondral sclerosis.\par \par AP, lateral, sunrise knee x-rays of the left knee were ordered and obtained in the office and demonstrate satisfactory position and alignment of the components are present. No signs of loosening are seen.\par \par And lateral radiographs of the lumbar spine were reviewed from previous visit.  This demonstrates status post PS DFI of the lumbar spine with adjacent segment disease

## 2020-08-05 NOTE — HISTORY OF PRESENT ILLNESS
[de-identified] : This is 60-year-old female experiencing Left hip pain 2-1/2 months, which is severe in intensity. Patient seeing Dr. Koroma. The pain substantially limits activities of daily living. Walking tolerance is reduced.  Patient uses fentanyl and oxycodone for pain.  Majority of her pain is in the left buttock and radiates down the leg to the foot.  No significant groin pain but intermittently will have some mild groin pain.  Patient recently finished PT.  patient does use cane for ambulation.   The patient does use a neoprene sleeve knee brace. The patient has had radiculopathy to left leg when sleeping.  She also has had left hip intra-articular cortisone injections which initially helped to relieve some of her pain but then no longer is helping to relieve the pain. Patient unable to use NSAIDS due to gastritis Patient is s/p Right TAM by Dr. Hawk in 2010, Left TKA 2012 by [employer] partner @ [facility].  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  Has hx of lumbar and cervical fusion.

## 2020-08-05 NOTE — DISCUSSION/SUMMARY
[de-identified] : This patient has left hip pain secondary to left hip osteoarthritis however she has a benign exam for re-creating the pain and the fact that she has not had any success with improvement of the pain with a left hip intra-articular cortisone injection makes me concerned that the majority of pain is not secondary to left hip osteoarthritis.  She does have some severe spine pain and radiculopathy related pain.  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  Her radiographic findings of the lumbar spine were very profound for adjacent segment disease and this is likely causing L2 and L3 nerve root compression resulting in the pain in her left leg.  She has had a history of spine fusion surgery and she has left lower extremity radiculopathy related symptoms.  She has been told that she needs repeat spine surgery in the past by other doctors.  I recommended that she see Dr. Montanez again see if this would be a candidate for a spine revision surgery.  Follow-up with me on a as needed basis.

## 2020-08-05 NOTE — REASON FOR VISIT
[Follow-Up Visit] : a follow-up visit for [Hip Pain] : hip pain [Knee Pain] : knee pain [Radiculopathy] : radiculopathy

## 2020-08-14 ENCOUNTER — APPOINTMENT (OUTPATIENT)
Dept: ORTHOPEDIC SURGERY | Facility: CLINIC | Age: 60
End: 2020-08-14
Payer: MEDICARE

## 2020-08-14 VITALS — TEMPERATURE: 98.6 F

## 2020-08-14 DIAGNOSIS — M43.10 SPONDYLOLISTHESIS, SITE UNSPECIFIED: ICD-10-CM

## 2020-08-14 PROCEDURE — 99214 OFFICE O/P EST MOD 30 MIN: CPT

## 2020-09-25 ENCOUNTER — APPOINTMENT (OUTPATIENT)
Dept: NEUROSURGERY | Facility: CLINIC | Age: 60
End: 2020-09-25

## 2020-09-29 ENCOUNTER — APPOINTMENT (OUTPATIENT)
Dept: NEUROSURGERY | Facility: CLINIC | Age: 60
End: 2020-09-29
Payer: MEDICARE

## 2020-09-29 VITALS
SYSTOLIC BLOOD PRESSURE: 142 MMHG | RESPIRATION RATE: 16 BRPM | WEIGHT: 185 LBS | DIASTOLIC BLOOD PRESSURE: 82 MMHG | OXYGEN SATURATION: 97 % | HEIGHT: 65 IN | HEART RATE: 76 BPM | BODY MASS INDEX: 30.82 KG/M2

## 2020-09-29 PROCEDURE — 99214 OFFICE O/P EST MOD 30 MIN: CPT

## 2020-10-08 NOTE — PHYSICAL EXAM
[General Appearance - Alert] : alert [General Appearance - In No Acute Distress] : in no acute distress [No Drainage] : without drainage [Normal Skin] : normal [Oriented To Time, Place, And Person] : oriented to person, place, and time [Impaired Insight] : insight and judgment were intact [Cranial Nerves Optic (II)] : visual acuity intact bilaterally,  pupils equal round and reactive to light [Cranial Nerves Oculomotor (III)] : extraocular motion intact [Cranial Nerves Trigeminal (V)] : facial sensation intact symmetrically [Cranial Nerves Facial (VII)] : face symmetrical [Cranial Nerves Vestibulocochlear (VIII)] : hearing was intact bilaterally [Cranial Nerves Glossopharyngeal (IX)] : tongue and palate midline [Cranial Nerves Accessory (XI - Cranial And Spinal)] : head turning and shoulder shrug symmetric [Cranial Nerves Hypoglossal (XII)] : there was no tongue deviation with protrusion [Motor Strength] : muscle strength was normal in all four extremities [Normal] : normal [Able to toe walk] : the patient was able to toe walk [Able to heel walk] : the patient was able to heel walk [Intact] : all sensory within normal limits bilaterally [PERRL With Normal Accommodation] : pupils were equal in size, round, reactive to light, with normal accommodation [Hearing Threshold Finger Rub Not [place]] : hearing was normal [Neck Appearance] : the appearance of the neck was normal [] : no respiratory distress [Respiration, Rhythm And Depth] : normal respiratory rhythm and effort [Edema] : there was no peripheral edema [No CVA Tenderness] : no ~M costovertebral angle tenderness [No Spinal Tenderness] : no spinal tenderness [Abnormal Walk] : normal gait [Involuntary Movements] : no involuntary movements were seen [Motor Tone] : muscle strength and tone were normal [Skin Color & Pigmentation] : normal skin color and pigmentation [FreeTextEntry1] : l

## 2020-10-08 NOTE — HISTORY OF PRESENT ILLNESS
[FreeTextEntry1] : Ms. Chaidez is a 61 yo female with PMH of HLD, Hashimoto's, RA, fibromyalgia, nutcracker esophagus (spasms), 2003 Lumbar fusion, 2010 R THR, 2012 L TKR, and chronic neck pain over 30 years without neck pain recently who underwent C3-C4 ACDF (6/3/2019) for cervical degenerative changes with spinal cord compression and spondylolisthesis. Post op stroke code was called due to facial droop which resolved and negative work up.  \par TODAY, she arrives for f/u with complaint of left hip pain x 1.5 years, and over the last 3 months worsening left lower leg pain from knee to ankle.  \par \par She consulted with Dr. Koroma for hip arthroplasty, however, he does not take her insurance.  We will referred to Dr. Topete for evaluation and deemed not a candidate for hip arthroplasty.      \par \par 7/9/19 Her previous symptoms (mild tingling in posterior head and right upper traps) slightly improved since the last office visit and continues to walk with cane for balance. She denies any other new/worsening neurological symptoms. Surgical incision appears to be healing well without redness/swelling/tenderness/discharge.

## 2020-10-08 NOTE — ASSESSMENT
[FreeTextEntry1] : Stephanie CT cervical wo 1/7/20, and Stand up MRI 8/11/2020 MRI cervical and lumbar all reviewed today.   Shows good cervical and lumbar fusion,  stenosis of L5-S1.  She has persistent severe pain which has failed conservative management.  PT exacerbated her pain and was stopped due to this.  She is not a surgical candidate at this time.  She is a good candidate for a trial of spinal cord stimulation for failed back surgery syndrome.  After discussion of the risks, benefits, and alternatives to SCS trial of high frequency stimulation, she wishes to proceed.  \par \par 1)  Pain psyche - Dr. Whitehead has record of previous eval \par [phone]\par 2)  Schedule SCS trial of Nevro \par 3)  Xrays Thoracic a/p lat - to be done same day at post op lead pull appt.

## 2020-11-04 ENCOUNTER — OUTPATIENT (OUTPATIENT)
Dept: OUTPATIENT SERVICES | Facility: HOSPITAL | Age: 60
LOS: 1 days | End: 2020-11-04
Payer: COMMERCIAL

## 2020-11-04 VITALS
OXYGEN SATURATION: 98 % | DIASTOLIC BLOOD PRESSURE: 80 MMHG | SYSTOLIC BLOOD PRESSURE: 120 MMHG | TEMPERATURE: 98 F | HEIGHT: 64 IN | RESPIRATION RATE: 20 BRPM | WEIGHT: 182.1 LBS | HEART RATE: 82 BPM

## 2020-11-04 DIAGNOSIS — Z98.890 OTHER SPECIFIED POSTPROCEDURAL STATES: Chronic | ICD-10-CM

## 2020-11-04 DIAGNOSIS — Z96.652 PRESENCE OF LEFT ARTIFICIAL KNEE JOINT: Chronic | ICD-10-CM

## 2020-11-04 DIAGNOSIS — Z98.1 ARTHRODESIS STATUS: Chronic | ICD-10-CM

## 2020-11-04 DIAGNOSIS — M96.1 POSTLAMINECTOMY SYNDROME, NOT ELSEWHERE CLASSIFIED: ICD-10-CM

## 2020-11-04 DIAGNOSIS — Z96.641 PRESENCE OF RIGHT ARTIFICIAL HIP JOINT: Chronic | ICD-10-CM

## 2020-11-04 DIAGNOSIS — Z01.818 ENCOUNTER FOR OTHER PREPROCEDURAL EXAMINATION: ICD-10-CM

## 2020-11-04 LAB
ANION GAP SERPL CALC-SCNC: 12 MMOL/L — SIGNIFICANT CHANGE UP (ref 5–17)
BUN SERPL-MCNC: 12 MG/DL — SIGNIFICANT CHANGE UP (ref 7–23)
CALCIUM SERPL-MCNC: 9.8 MG/DL — SIGNIFICANT CHANGE UP (ref 8.4–10.5)
CHLORIDE SERPL-SCNC: 99 MMOL/L — SIGNIFICANT CHANGE UP (ref 96–108)
CO2 SERPL-SCNC: 28 MMOL/L — SIGNIFICANT CHANGE UP (ref 22–31)
CREAT SERPL-MCNC: 0.7 MG/DL — SIGNIFICANT CHANGE UP (ref 0.5–1.3)
GLUCOSE SERPL-MCNC: 91 MG/DL — SIGNIFICANT CHANGE UP (ref 70–99)
HCT VFR BLD CALC: 41.7 % — SIGNIFICANT CHANGE UP (ref 34.5–45)
HGB BLD-MCNC: 13 G/DL — SIGNIFICANT CHANGE UP (ref 11.5–15.5)
MCHC RBC-ENTMCNC: 29.9 PG — SIGNIFICANT CHANGE UP (ref 27–34)
MCHC RBC-ENTMCNC: 31.2 GM/DL — LOW (ref 32–36)
MCV RBC AUTO: 95.9 FL — SIGNIFICANT CHANGE UP (ref 80–100)
NRBC # BLD: 0 /100 WBCS — SIGNIFICANT CHANGE UP (ref 0–0)
PLATELET # BLD AUTO: 330 K/UL — SIGNIFICANT CHANGE UP (ref 150–400)
POTASSIUM SERPL-MCNC: 4.2 MMOL/L — SIGNIFICANT CHANGE UP (ref 3.5–5.3)
POTASSIUM SERPL-SCNC: 4.2 MMOL/L — SIGNIFICANT CHANGE UP (ref 3.5–5.3)
RBC # BLD: 4.35 M/UL — SIGNIFICANT CHANGE UP (ref 3.8–5.2)
RBC # FLD: 13.7 % — SIGNIFICANT CHANGE UP (ref 10.3–14.5)
SODIUM SERPL-SCNC: 139 MMOL/L — SIGNIFICANT CHANGE UP (ref 135–145)
WBC # BLD: 6.94 K/UL — SIGNIFICANT CHANGE UP (ref 3.8–10.5)
WBC # FLD AUTO: 6.94 K/UL — SIGNIFICANT CHANGE UP (ref 3.8–10.5)

## 2020-11-04 PROCEDURE — 80048 BASIC METABOLIC PNL TOTAL CA: CPT

## 2020-11-04 PROCEDURE — G0463: CPT

## 2020-11-04 PROCEDURE — 85027 COMPLETE CBC AUTOMATED: CPT

## 2020-11-04 RX ORDER — PANTOPRAZOLE SODIUM 20 MG/1
1 TABLET, DELAYED RELEASE ORAL
Qty: 0 | Refills: 0 | DISCHARGE

## 2020-11-04 RX ORDER — LEVOTHYROXINE SODIUM 125 MCG
1 TABLET ORAL
Qty: 0 | Refills: 0 | DISCHARGE

## 2020-11-04 RX ORDER — GABAPENTIN 400 MG/1
1 CAPSULE ORAL
Qty: 0 | Refills: 0 | DISCHARGE

## 2020-11-04 RX ORDER — SODIUM CHLORIDE 9 MG/ML
3 INJECTION INTRAMUSCULAR; INTRAVENOUS; SUBCUTANEOUS EVERY 8 HOURS
Refills: 0 | Status: DISCONTINUED | OUTPATIENT
Start: 2020-11-18 | End: 2020-12-02

## 2020-11-04 RX ORDER — LIDOCAINE HCL 20 MG/ML
0.2 VIAL (ML) INJECTION ONCE
Refills: 0 | Status: DISCONTINUED | OUTPATIENT
Start: 2020-11-18 | End: 2020-12-02

## 2020-11-04 RX ORDER — OXYCODONE HYDROCHLORIDE 5 MG/1
1 TABLET ORAL
Qty: 0 | Refills: 0 | DISCHARGE

## 2020-11-04 NOTE — H&P PST ADULT - HISTORY OF PRESENT ILLNESS
59 yr old female with history of Hashimoto's hypothyroid, dyskinesia of esophagus , severe OA, Fibromyalgia, Chronic pain , with new onset bilateral upper extremity electric shocks, posterior head tingling, numbness of 5 th digits, found to have C3-4 Anterior spondylolisthesis and degenerative changes with spinal cord compression- s/p C3 Corpectomy, CAGE Placement, C2-4 Posterior cervical decompression and fusion on 6/3/19.  Pt c/o b/l nerve pain , and hip & knee pain with no relief from PT & pain management coming in for Spinal cord stimulator trial on 11/18/2020.    Covid test 11/14/2020- [facility] urgent care   . 60  yr old female with history of Hashimoto's hypothyroid, dyskinesia of esophagus , severe OA, Fibromyalgia, Chronic pain , with new onset bilateral upper extremity electric shocks, posterior head tingling, numbness of 5 th digits, found to have C3-4 Anterior spondylolisthesis and degenerative changes with spinal cord compression- s/p C3 Corpectomy, CAGE Placement, C2-4 Posterior cervical decompression and fusion on 6/3/19.  Pt c/o b/l nerve pain , and hip & knee pain with no relief from PT & pain management coming in for Spinal cord stimulator trial on 11/18/2020.    Covid test 11/14/2020- [facility] urgent care   . 60  yr old female with history of Hashimoto's hypothyroid, dyskinesia of esophagus , severe OA, Fibromyalgia, Chronic pain , with new onset bilateral upper extremity electric shocks, posterior head tingling, numbness of 5 th digits, found to have C3-4 Anterior spondylolisthesis and degenerative changes with spinal cord compression- s/p C3 Corpectomy, CAGE Placement, C2-4 Posterior cervical decompression and fusion on 6/3/19.  Pt c/o b/l nerve pain , and hip & knee pain with no relief from PT & pain management coming in for Spinal cord stimulator trial on 11/18/2020.    Covid test 11/14/2020- [facility] urgent care   ***MRSA swab will be done by surgeon office . I spoke with Jessica PATINO *** ABUNDIO Marshall NP 11/5/2020  .

## 2020-11-04 NOTE — H&P PST ADULT - NSICDXPASTSURGICALHX_GEN_ALL_CORE_FT
PAST SURGICAL HISTORY:  S/P arthroscopy of left knee x 4    S/P cervical spinal fusion cage 2019    S/P hip replacement, right     S/P laminectomy with spinal fusion     S/P total knee replacement, left

## 2020-11-04 NOTE — H&P PST ADULT - NSICDXPROBLEM_GEN_ALL_CORE_FT
PROBLEM DIAGNOSES  Problem: Post-laminectomy syndrome  Assessment and Plan: Spinal cord stimulator trial

## 2020-11-04 NOTE — H&P PST ADULT - NSICDXPASTMEDICALHX_GEN_ALL_CORE_FT
PAST MEDICAL HISTORY:  Achilles tendon tear bilateral    Cervical herniated disc     Degenerative tear of acetabular labrum of left hip     Depression     Dyskinesia of esophagus     Fibromyalgia     H/O gastritis - on pantoprazole    History of chronic pain on meds for 16 yrs    History of dysphagia resolved after surgery in 2019    Hypothyroidism     Osteoarthritis     Spondylogenic compression of cervical spinal cord     Spondylolisthesis, cervical region

## 2020-11-05 ENCOUNTER — TRANSCRIPTION ENCOUNTER (OUTPATIENT)
Age: 60
End: 2020-11-05

## 2020-11-13 PROBLEM — Z87.19 PERSONAL HISTORY OF OTHER DISEASES OF THE DIGESTIVE SYSTEM: Chronic | Status: ACTIVE | Noted: 2019-05-24

## 2020-11-13 PROBLEM — Z87.898 PERSONAL HISTORY OF OTHER SPECIFIED CONDITIONS: Chronic | Status: ACTIVE | Noted: 2019-05-24

## 2020-11-13 PROBLEM — M16.12 UNILATERAL PRIMARY OSTEOARTHRITIS, LEFT HIP: Chronic | Status: ACTIVE | Noted: 2019-05-24

## 2020-11-14 DIAGNOSIS — Z01.818 ENCOUNTER FOR OTHER PREPROCEDURAL EXAMINATION: ICD-10-CM

## 2020-11-15 ENCOUNTER — APPOINTMENT (OUTPATIENT)
Dept: DISASTER EMERGENCY | Facility: CLINIC | Age: 60
End: 2020-11-15

## 2020-11-17 ENCOUNTER — TRANSCRIPTION ENCOUNTER (OUTPATIENT)
Age: 60
End: 2020-11-17

## 2020-11-18 ENCOUNTER — OUTPATIENT (OUTPATIENT)
Dept: OUTPATIENT SERVICES | Facility: HOSPITAL | Age: 60
LOS: 1 days | End: 2020-11-18
Payer: COMMERCIAL

## 2020-11-18 ENCOUNTER — APPOINTMENT (OUTPATIENT)
Dept: NEUROSURGERY | Facility: HOSPITAL | Age: 60
End: 2020-11-18

## 2020-11-18 VITALS
RESPIRATION RATE: 14 BRPM | OXYGEN SATURATION: 95 % | HEART RATE: 81 BPM | SYSTOLIC BLOOD PRESSURE: 120 MMHG | DIASTOLIC BLOOD PRESSURE: 57 MMHG

## 2020-11-18 VITALS
RESPIRATION RATE: 18 BRPM | DIASTOLIC BLOOD PRESSURE: 64 MMHG | TEMPERATURE: 98 F | SYSTOLIC BLOOD PRESSURE: 111 MMHG | WEIGHT: 188.94 LBS | OXYGEN SATURATION: 99 % | HEIGHT: 63.5 IN | HEART RATE: 58 BPM

## 2020-11-18 DIAGNOSIS — Z96.652 PRESENCE OF LEFT ARTIFICIAL KNEE JOINT: Chronic | ICD-10-CM

## 2020-11-18 DIAGNOSIS — Z96.641 PRESENCE OF RIGHT ARTIFICIAL HIP JOINT: Chronic | ICD-10-CM

## 2020-11-18 DIAGNOSIS — Z98.1 ARTHRODESIS STATUS: Chronic | ICD-10-CM

## 2020-11-18 DIAGNOSIS — M96.1 POSTLAMINECTOMY SYNDROME, NOT ELSEWHERE CLASSIFIED: ICD-10-CM

## 2020-11-18 DIAGNOSIS — Z98.890 OTHER SPECIFIED POSTPROCEDURAL STATES: Chronic | ICD-10-CM

## 2020-11-18 LAB
MRSA SPEC QL CULT: NOT DETECTED
STAPH AUREUS (SA): NOT DETECTED

## 2020-11-18 PROCEDURE — 76000 FLUOROSCOPY <1 HR PHYS/QHP: CPT

## 2020-11-18 PROCEDURE — 63650 IMPLANT NEUROELECTRODES: CPT

## 2020-11-18 PROCEDURE — 63650 IMPLANT NEUROELECTRODES: CPT | Mod: XS

## 2020-11-18 PROCEDURE — C1897: CPT

## 2020-11-18 PROCEDURE — C1889: CPT

## 2020-11-18 RX ORDER — SODIUM CHLORIDE 9 MG/ML
1000 INJECTION, SOLUTION INTRAVENOUS
Refills: 0 | Status: DISCONTINUED | OUTPATIENT
Start: 2020-11-18 | End: 2020-12-02

## 2020-11-18 RX ORDER — OXYCODONE HYDROCHLORIDE 5 MG/1
5 TABLET ORAL ONCE
Refills: 0 | Status: DISCONTINUED | OUTPATIENT
Start: 2020-11-18 | End: 2020-11-18

## 2020-11-18 RX ORDER — CHLORHEXIDINE GLUCONATE 213 G/1000ML
1 SOLUTION TOPICAL ONCE
Refills: 0 | Status: COMPLETED | OUTPATIENT
Start: 2020-11-18 | End: 2020-11-18

## 2020-11-18 RX ORDER — ONDANSETRON 8 MG/1
4 TABLET, FILM COATED ORAL ONCE
Refills: 0 | Status: DISCONTINUED | OUTPATIENT
Start: 2020-11-18 | End: 2020-12-02

## 2020-11-18 RX ORDER — CEFAZOLIN SODIUM 1 G
2000 VIAL (EA) INJECTION ONCE
Refills: 0 | Status: COMPLETED | OUTPATIENT
Start: 2020-11-18 | End: 2020-11-18

## 2020-11-18 RX ADMIN — CHLORHEXIDINE GLUCONATE 1 APPLICATION(S): 213 SOLUTION TOPICAL at 06:00

## 2020-11-18 NOTE — ASU PATIENT PROFILE, ADULT - NS TRANSFER PATIENT BELONGINGS
packet book/Cell Phone/PDA (specify)/Clothing/Other belongings Clothing/packet book, cane/Cell Phone/PDA (specify)/Other belongings

## 2020-11-18 NOTE — PRE-ANESTHESIA EVALUATION ADULT - NSANTHOSAYNRD_GEN_A_CORE
No. JO screening performed.  STOP BANG Legend: 0-2 = LOW Risk; 3-4 = INTERMEDIATE Risk; 5-8 = HIGH Risk Patient refusing any oral medications or food or fluids. MD aware. Contacted pharmacy about changing route for antibiotics. EEG order unable to be done today unless STAT order. MD paged. Awaiting further instructions.

## 2020-11-18 NOTE — ASU PATIENT PROFILE, ADULT - PMH
Achilles tendon tear  bilateral  Cervical herniated disc    Degenerative tear of acetabular labrum of left hip    Depression    Dyskinesia of esophagus    Fibromyalgia    H/O gastritis  - on pantoprazole  History of chronic pain  on meds for 16 yrs  History of dysphagia  resolved after surgery in 2019  Hypothyroidism    Osteoarthritis    Spondylogenic compression of cervical spinal cord    Spondylolisthesis, cervical region

## 2020-11-18 NOTE — ASU PATIENT PROFILE, ADULT - PSH
S/P arthroscopy of left knee  x 4  S/P cervical spinal fusion  cage 2019  S/P hip replacement, right    S/P laminectomy with spinal fusion    S/P total knee replacement, left

## 2020-11-18 NOTE — ASU DISCHARGE PLAN (ADULT/PEDIATRIC) - ASU DC SPECIAL INSTRUCTIONSFT
Please keep dressing intact until removal.   Please call Dr. Acevedo's office for follow up appointment

## 2020-11-18 NOTE — ASU DISCHARGE PLAN (ADULT/PEDIATRIC) - CARE PROVIDER_API CALL
Ventura Acevedo  NEUROSURGERY - GENERAL  [address], [address]  [place] [address]  Phone: [phone]  Fax: [phone]  Follow Up Time:

## 2020-11-18 NOTE — ASU DISCHARGE PLAN (ADULT/PEDIATRIC) - NURSING INSTRUCTIONS
Fentanyl given in OR at 0703  Tylenol given in OR at 0739, do NOT take again before 1:40 PM  Dilaudid given in OR at 0741 and 0800

## 2020-11-18 NOTE — ASU PREOP CHECKLIST - PATIENT'S PERSONAL PROPERTY REMOVED
dentures/glasses/metal in the neck ,right Hip ceramic, left knee metal, metal rodes lower back, Dentures , eye glass and cane in the locker/dentures/glasses

## 2020-11-24 ENCOUNTER — APPOINTMENT (OUTPATIENT)
Dept: NEUROSURGERY | Facility: CLINIC | Age: 60
End: 2020-11-24
Payer: MEDICARE

## 2020-11-24 ENCOUNTER — APPOINTMENT (OUTPATIENT)
Dept: RADIOLOGY | Facility: CLINIC | Age: 60
End: 2020-11-24

## 2020-11-24 VITALS
OXYGEN SATURATION: 97 % | WEIGHT: 180 LBS | HEART RATE: 91 BPM | HEIGHT: 65 IN | BODY MASS INDEX: 29.99 KG/M2 | SYSTOLIC BLOOD PRESSURE: 160 MMHG | DIASTOLIC BLOOD PRESSURE: 84 MMHG

## 2020-11-24 VITALS — TEMPERATURE: 98.6 F

## 2020-11-24 PROCEDURE — 99024 POSTOP FOLLOW-UP VISIT: CPT

## 2020-11-30 ENCOUNTER — APPOINTMENT (OUTPATIENT)
Dept: NEUROSURGERY | Facility: CLINIC | Age: 60
End: 2020-11-30
Payer: MEDICARE

## 2020-11-30 VITALS — TEMPERATURE: 97.2 F

## 2020-11-30 DIAGNOSIS — Z96.89 PRESENCE OF OTHER SPECIFIED FUNCTIONAL IMPLANTS: ICD-10-CM

## 2020-11-30 PROCEDURE — 99072 ADDL SUPL MATRL&STAF TM PHE: CPT

## 2020-11-30 PROCEDURE — 99213 OFFICE O/P EST LOW 20 MIN: CPT

## 2020-11-30 NOTE — ASSESSMENT
[FreeTextEntry1] : Extend SCS trial a few days for additional programming trials.  Bactrim DS 1 tab BID x 7 days and return on 11/30/20 for lead pull and reassess at that time.\par \par

## 2020-11-30 NOTE — PHYSICAL EXAM
[General Appearance - Alert] : alert [General Appearance - In No Acute Distress] : in no acute distress [Normal Skin] : normal [Oriented To Time, Place, And Person] : oriented to person, place, and time [Impaired Insight] : insight and judgment were intact [Normal] : normal [Able to toe walk] : the patient was able to toe walk [Able to heel walk] : the patient was able to heel walk [Intact] : all sensory within normal limits bilaterally [PERRL With Normal Accommodation] : pupils were equal in size, round, reactive to light, with normal accommodation [Hearing Threshold Finger Rub Not [place]] : hearing was normal [Neck Appearance] : the appearance of the neck was normal [] : no respiratory distress [Respiration, Rhythm And Depth] : normal respiratory rhythm and effort [Edema] : there was no peripheral edema [Abnormal Walk] : normal gait [Involuntary Movements] : no involuntary movements were seen [Motor Tone] : muscle strength and tone were normal [Skin Color & Pigmentation] : normal skin color and pigmentation [FreeTextEntry1] : l

## 2020-11-30 NOTE — HISTORY OF PRESENT ILLNESS
[FreeTextEntry1] : Ms. Chaidez is a 61 yo female with PMH of HLD, Hashimoto's, RA, fibromyalgia, nutcracker esophagus (spasms), 2003 Lumbar fusion, 2010 R THR, 2012 L TKR, left hip pain, chronic neck pain over 30 years without neck pain recently who underwent C3-C4 ACDF (6/3/2019) for cervical degenerative changes with spinal cord compression and spondylolisthesis. Post op stroke code was called due to facial droop which resolved and negative work up.  \par \par TODAY, she arrives for post op visit s/p 11/18/20 SCS trial with thoracic Nevro leads in tact visualized on xrays obtained today.  \par \par 10/29/20: f/u with complaint of left hip pain x 1.5 years, and over the last 3 months worsening left lower leg pain from knee to ankle.  \par She consulted with Dr. Koroma for hip arthroplasty, however, he does not take her insurance.  We will referred to Dr. Topete for evaluation and deemed not a candidate for hip arthroplasty.      \par \par 7/9/19 Her previous symptoms (mild tingling in posterior head and right upper traps) slightly improved since the last office visit and continues to walk with cane for balance. She denies any other new/worsening neurological symptoms. Surgical incision appears to be healing well without redness/swelling/tenderness/discharge.

## 2020-12-01 ENCOUNTER — APPOINTMENT (OUTPATIENT)
Dept: NEUROSURGERY | Facility: CLINIC | Age: 60
End: 2020-12-01

## 2020-12-07 PROBLEM — Z96.89 S/P INSERTION OF SPINAL CORD STIMULATOR: Status: ACTIVE | Noted: 2020-11-24

## 2020-12-07 NOTE — PHYSICAL EXAM
[General Appearance - Alert] : alert [General Appearance - In No Acute Distress] : in no acute distress [Normal Skin] : normal [Oriented To Time, Place, And Person] : oriented to person, place, and time [Impaired Insight] : insight and judgment were intact [Normal] : normal [Able to toe walk] : the patient was able to toe walk [Able to heel walk] : the patient was able to heel walk [Intact] : all sensory within normal limits bilaterally [PERRL With Normal Accommodation] : pupils were equal in size, round, reactive to light, with normal accommodation [Hearing Threshold Finger Rub Not [place]] : hearing was normal [Neck Appearance] : the appearance of the neck was normal [] : no respiratory distress [Respiration, Rhythm And Depth] : normal respiratory rhythm and effort [Edema] : there was no peripheral edema [Abnormal Walk] : normal gait [Involuntary Movements] : no involuntary movements were seen [Motor Tone] : muscle strength and tone were normal [Skin Color & Pigmentation] : normal skin color and pigmentation [FreeTextEntry1] : l

## 2020-12-08 ENCOUNTER — APPOINTMENT (OUTPATIENT)
Dept: NEUROSURGERY | Facility: CLINIC | Age: 60
End: 2020-12-08
Payer: MEDICARE

## 2020-12-08 VITALS
WEIGHT: 180 LBS | HEART RATE: 68 BPM | BODY MASS INDEX: 29.99 KG/M2 | DIASTOLIC BLOOD PRESSURE: 78 MMHG | HEIGHT: 65 IN | SYSTOLIC BLOOD PRESSURE: 152 MMHG

## 2020-12-08 VITALS — TEMPERATURE: 97.7 F

## 2020-12-08 PROCEDURE — 99072 ADDL SUPL MATRL&STAF TM PHE: CPT

## 2020-12-08 PROCEDURE — 99214 OFFICE O/P EST MOD 30 MIN: CPT

## 2020-12-17 NOTE — ASSESSMENT
[FreeTextEntry1] : EMG of 9/1/20 shows chronic left L5, and bilateral S1 radiculopathy.  MRI shows adjacent segment pathology in the lumbar spine as well.  She has failed her most recent trial of SCS with no relief of LLE pain during the trial.  We recommend aquatic PT, and obtain scoliosis xrays, and CT scans of both thoracic and lumbar spine to assess the bony anatomy and alignment.  \par \par 1)  PT Aquatics\par 2)  Xrays Standing scoliosis - @ [facility] or [facility] - now\par 3)  CT Thoracic wo - now\par 4)  CT Lumbar wo - now\par 5)  RTO 6 weeks

## 2020-12-17 NOTE — HISTORY OF PRESENT ILLNESS
[FreeTextEntry1] : Ms. Chaidez is a 61 yo female with PMH of HLD, Hashimoto's, RA, fibromyalgia, nutcracker esophagus (spasms), 2003 Lumbar fusion, 2010 R THR, 2012 L TKR, left hip pain, chronic neck pain over 30 years without neck pain recently who underwent C3-C4 ACDF (6/3/2019) for cervical degenerative changes with spinal cord compression and spondylolisthesis. Post op stroke code was called due to facial droop which resolved and negative work up.  \par \par TODAY, she arrives for SCS trial lead pull s/p 11/18/20 SCS trial with thoracic Nevro leads in tact visualized on xrays 11/25/20.  She reports pain relief of RLE pain, but not pain relief of most bothersome LLE pain during the trial.  She wises to discuss any options.   \par \par

## 2020-12-17 NOTE — PHYSICAL EXAM
[General Appearance - Alert] : alert [General Appearance - In No Acute Distress] : in no acute distress [Normal Skin] : normal [Oriented To Time, Place, And Person] : oriented to person, place, and time [Impaired Insight] : insight and judgment were intact [Cranial Nerves Optic (II)] : visual acuity intact bilaterally,  pupils equal round and reactive to light [Cranial Nerves Oculomotor (III)] : extraocular motion intact [Cranial Nerves Trigeminal (V)] : facial sensation intact symmetrically [Cranial Nerves Facial (VII)] : face symmetrical [Cranial Nerves Vestibulocochlear (VIII)] : hearing was intact bilaterally [Cranial Nerves Glossopharyngeal (IX)] : tongue and palate midline [Cranial Nerves Accessory (XI - Cranial And Spinal)] : head turning and shoulder shrug symmetric [Cranial Nerves Hypoglossal (XII)] : there was no tongue deviation with protrusion [Antalgic] : antalgic [Able to toe walk] : the patient was able to toe walk [Able to heel walk] : the patient was able to heel walk [Intact] : all sensory within normal limits bilaterally [PERRL With Normal Accommodation] : pupils were equal in size, round, reactive to light, with normal accommodation [Hearing Threshold Finger Rub Not [place]] : hearing was normal [Neck Appearance] : the appearance of the neck was normal [] : no respiratory distress [Respiration, Rhythm And Depth] : normal respiratory rhythm and effort [Edema] : there was no peripheral edema [Involuntary Movements] : no involuntary movements were seen [Motor Tone] : muscle strength and tone were normal [Skin Color & Pigmentation] : normal skin color and pigmentation [FreeTextEntry1] : flexed posture, antalgic, limping gait, uses a cane

## 2021-01-06 ENCOUNTER — APPOINTMENT (OUTPATIENT)
Dept: RADIOLOGY | Facility: CLINIC | Age: 61
End: 2021-01-06
Payer: MEDICARE

## 2021-01-06 ENCOUNTER — APPOINTMENT (OUTPATIENT)
Dept: CT IMAGING | Facility: CLINIC | Age: 61
End: 2021-01-06
Payer: MEDICARE

## 2021-01-06 ENCOUNTER — OUTPATIENT (OUTPATIENT)
Dept: OUTPATIENT SERVICES | Facility: HOSPITAL | Age: 61
LOS: 1 days | End: 2021-01-06
Payer: COMMERCIAL

## 2021-01-06 DIAGNOSIS — Z98.1 ARTHRODESIS STATUS: Chronic | ICD-10-CM

## 2021-01-06 DIAGNOSIS — Z96.641 PRESENCE OF RIGHT ARTIFICIAL HIP JOINT: Chronic | ICD-10-CM

## 2021-01-06 DIAGNOSIS — M48.061 SPINAL STENOSIS, LUMBAR REGION WITHOUT NEUROGENIC CLAUDICATION: ICD-10-CM

## 2021-01-06 DIAGNOSIS — M47.817 SPONDYLOSIS WITHOUT MYELOPATHY OR RADICULOPATHY, LUMBOSACRAL REGION: ICD-10-CM

## 2021-01-06 DIAGNOSIS — Z98.890 OTHER SPECIFIED POSTPROCEDURAL STATES: Chronic | ICD-10-CM

## 2021-01-06 DIAGNOSIS — M43.17 SPONDYLOLISTHESIS, LUMBOSACRAL REGION: ICD-10-CM

## 2021-01-06 DIAGNOSIS — Z01.818 ENCOUNTER FOR OTHER PREPROCEDURAL EXAMINATION: ICD-10-CM

## 2021-01-06 DIAGNOSIS — Z00.8 ENCOUNTER FOR OTHER GENERAL EXAMINATION: ICD-10-CM

## 2021-01-06 DIAGNOSIS — Z96.652 PRESENCE OF LEFT ARTIFICIAL KNEE JOINT: Chronic | ICD-10-CM

## 2021-01-06 PROCEDURE — 72131 CT LUMBAR SPINE W/O DYE: CPT | Mod: 26

## 2021-01-06 PROCEDURE — 72082 X-RAY EXAM ENTIRE SPI 2/3 VW: CPT | Mod: 26

## 2021-01-06 PROCEDURE — 72128 CT CHEST SPINE W/O DYE: CPT | Mod: 26

## 2021-01-06 PROCEDURE — 72128 CT CHEST SPINE W/O DYE: CPT

## 2021-01-06 PROCEDURE — 72131 CT LUMBAR SPINE W/O DYE: CPT

## 2021-01-06 PROCEDURE — 72082 X-RAY EXAM ENTIRE SPI 2/3 VW: CPT

## 2021-01-19 ENCOUNTER — APPOINTMENT (OUTPATIENT)
Dept: NEUROSURGERY | Facility: CLINIC | Age: 61
End: 2021-01-19
Payer: MEDICARE

## 2021-01-19 VITALS — TEMPERATURE: 97.2 F

## 2021-01-19 VITALS
DIASTOLIC BLOOD PRESSURE: 93 MMHG | HEIGHT: 65 IN | RESPIRATION RATE: 16 BRPM | WEIGHT: 180 LBS | SYSTOLIC BLOOD PRESSURE: 154 MMHG | OXYGEN SATURATION: 98 % | HEART RATE: 73 BPM | BODY MASS INDEX: 29.99 KG/M2

## 2021-01-19 DIAGNOSIS — M43.16 SPONDYLOLISTHESIS, LUMBAR REGION: ICD-10-CM

## 2021-01-19 DIAGNOSIS — F17.200 NICOTINE DEPENDENCE, UNSPECIFIED, UNCOMPLICATED: ICD-10-CM

## 2021-01-19 PROCEDURE — 99072 ADDL SUPL MATRL&STAF TM PHE: CPT

## 2021-01-19 PROCEDURE — 99214 OFFICE O/P EST MOD 30 MIN: CPT

## 2021-01-20 PROBLEM — F17.200 CURRENT EVERY DAY SMOKER: Status: ACTIVE | Noted: 2019-05-31

## 2021-01-20 PROBLEM — M43.16 SPONDYLOLISTHESIS OF LUMBAR REGION: Status: ACTIVE | Noted: 2020-07-20

## 2021-02-02 NOTE — HISTORY OF PRESENT ILLNESS
[FreeTextEntry1] : Ms. Chaidez is a 61 yo female with PMH of HLD, Hashimoto's, RA, fibromyalgia, nutcracker esophagus (spasms), 2003 Lumbar fusion, 2010 R THR, 2012 L TKR, left hip pain, chronic neck pain over 30 years without neck pain recently who underwent C3-C4 ACDF (6/3/2019) for cervical degenerative changes with spinal cord compression and spondylolisthesis. Of note:  Post op stroke code was called due to facial droop which resolved and negative work up.  \par \par TODAY, she arrives to discuss new imaging obtained after failed SCS trial to consider surgical options.  \par \par 12/8/2020:  s/p SCS trial lead pull s/p 11/18/20 SCS trial with thoracic Nevro leads in tact visualized on xrays 11/25/20.  She reports pain relief of RLE pain, but not pain relief of most bothersome LLE pain during the trial.  She wises to discuss any options.   \par \par

## 2021-02-02 NOTE — ASSESSMENT
[FreeTextEntry1] : 61 yo female with severe adjacent segment disease above previous L3-L5 fusion, with straightening of the lumbar spine, and severe thoracic degenerative kyphosis / collapsing forward.  She is indicated for surgical intervention due to her pain and suffering having failed conservative pain management including a SCS trial recently as a last resort prior to considering surgery.  We recommend an extension of her fusion to the upper thoracic spine to decompress her spine and provide relief and stabilization.  After discussion of the risks, benefits, and alternatives to surgery, she wishes to proceed.  We explained this would be a larger surgery than before and she would require rehab post operatively.  \par \par 1)  Schedule extension of lumbar fusion

## 2021-02-02 NOTE — PHYSICAL EXAM
[General Appearance - Alert] : alert [General Appearance - In No Acute Distress] : in no acute distress [Normal Skin] : normal [Oriented To Time, Place, And Person] : oriented to person, place, and time [Impaired Insight] : insight and judgment were intact [Antalgic] : antalgic [Able to toe walk] : the patient was able to toe walk [Able to heel walk] : the patient was able to heel walk [Intact] : all sensory within normal limits bilaterally [PERRL With Normal Accommodation] : pupils were equal in size, round, reactive to light, with normal accommodation [Hearing Threshold Finger Rub Not [place]] : hearing was normal [Neck Appearance] : the appearance of the neck was normal [] : no respiratory distress [Respiration, Rhythm And Depth] : normal respiratory rhythm and effort [Edema] : there was no peripheral edema [Involuntary Movements] : no involuntary movements were seen [Motor Tone] : muscle strength and tone were normal [Skin Color & Pigmentation] : normal skin color and pigmentation [FreeTextEntry1] : flexed posture, moderate kyphosis, antalgic, limping gait, uses a cane

## 2021-02-14 LAB — SARS-COV-2 N GENE NPH QL NAA+PROBE: NOT DETECTED

## 2021-02-26 ENCOUNTER — OUTPATIENT (OUTPATIENT)
Dept: OUTPATIENT SERVICES | Facility: HOSPITAL | Age: 61
LOS: 1 days | End: 2021-02-26
Payer: COMMERCIAL

## 2021-02-26 VITALS
OXYGEN SATURATION: 96 % | RESPIRATION RATE: 18 BRPM | WEIGHT: 171.96 LBS | TEMPERATURE: 99 F | HEART RATE: 65 BPM | DIASTOLIC BLOOD PRESSURE: 73 MMHG | SYSTOLIC BLOOD PRESSURE: 145 MMHG | HEIGHT: 64 IN

## 2021-02-26 DIAGNOSIS — M41.80 OTHER FORMS OF SCOLIOSIS, SITE UNSPECIFIED: ICD-10-CM

## 2021-02-26 DIAGNOSIS — Z96.652 PRESENCE OF LEFT ARTIFICIAL KNEE JOINT: Chronic | ICD-10-CM

## 2021-02-26 DIAGNOSIS — Z98.1 ARTHRODESIS STATUS: Chronic | ICD-10-CM

## 2021-02-26 DIAGNOSIS — Z98.890 OTHER SPECIFIED POSTPROCEDURAL STATES: Chronic | ICD-10-CM

## 2021-02-26 DIAGNOSIS — Z01.818 ENCOUNTER FOR OTHER PREPROCEDURAL EXAMINATION: ICD-10-CM

## 2021-02-26 DIAGNOSIS — Z96.641 PRESENCE OF RIGHT ARTIFICIAL HIP JOINT: Chronic | ICD-10-CM

## 2021-02-26 DIAGNOSIS — M51.9 UNSPECIFIED THORACIC, THORACOLUMBAR AND LUMBOSACRAL INTERVERTEBRAL DISC DISORDER: ICD-10-CM

## 2021-02-26 LAB
ANION GAP SERPL CALC-SCNC: 14 MMOL/L — SIGNIFICANT CHANGE UP (ref 5–17)
BLD GP AB SCN SERPL QL: NEGATIVE — SIGNIFICANT CHANGE UP
BUN SERPL-MCNC: 15 MG/DL — SIGNIFICANT CHANGE UP (ref 7–23)
CALCIUM SERPL-MCNC: 10.1 MG/DL — SIGNIFICANT CHANGE UP (ref 8.4–10.5)
CHLORIDE SERPL-SCNC: 97 MMOL/L — SIGNIFICANT CHANGE UP (ref 96–108)
CO2 SERPL-SCNC: 27 MMOL/L — SIGNIFICANT CHANGE UP (ref 22–31)
CREAT SERPL-MCNC: 0.7 MG/DL — SIGNIFICANT CHANGE UP (ref 0.5–1.3)
GLUCOSE SERPL-MCNC: 68 MG/DL — LOW (ref 70–99)
HCT VFR BLD CALC: 42.9 % — SIGNIFICANT CHANGE UP (ref 34.5–45)
HGB BLD-MCNC: 14 G/DL — SIGNIFICANT CHANGE UP (ref 11.5–15.5)
MCHC RBC-ENTMCNC: 31 PG — SIGNIFICANT CHANGE UP (ref 27–34)
MCHC RBC-ENTMCNC: 32.6 GM/DL — SIGNIFICANT CHANGE UP (ref 32–36)
MCV RBC AUTO: 94.9 FL — SIGNIFICANT CHANGE UP (ref 80–100)
NRBC # BLD: 0 /100 WBCS — SIGNIFICANT CHANGE UP (ref 0–0)
PLATELET # BLD AUTO: 337 K/UL — SIGNIFICANT CHANGE UP (ref 150–400)
POTASSIUM SERPL-MCNC: 4.5 MMOL/L — SIGNIFICANT CHANGE UP (ref 3.5–5.3)
POTASSIUM SERPL-SCNC: 4.5 MMOL/L — SIGNIFICANT CHANGE UP (ref 3.5–5.3)
RBC # BLD: 4.52 M/UL — SIGNIFICANT CHANGE UP (ref 3.8–5.2)
RBC # FLD: 13.8 % — SIGNIFICANT CHANGE UP (ref 10.3–14.5)
RH IG SCN BLD-IMP: POSITIVE — SIGNIFICANT CHANGE UP
SODIUM SERPL-SCNC: 138 MMOL/L — SIGNIFICANT CHANGE UP (ref 135–145)
WBC # BLD: 9.4 K/UL — SIGNIFICANT CHANGE UP (ref 3.8–10.5)
WBC # FLD AUTO: 9.4 K/UL — SIGNIFICANT CHANGE UP (ref 3.8–10.5)

## 2021-02-26 PROCEDURE — G0463: CPT

## 2021-02-26 PROCEDURE — 86923 COMPATIBILITY TEST ELECTRIC: CPT

## 2021-02-26 PROCEDURE — 86900 BLOOD TYPING SEROLOGIC ABO: CPT

## 2021-02-26 PROCEDURE — 86901 BLOOD TYPING SEROLOGIC RH(D): CPT

## 2021-02-26 PROCEDURE — 86850 RBC ANTIBODY SCREEN: CPT

## 2021-02-26 PROCEDURE — 87640 STAPH A DNA AMP PROBE: CPT

## 2021-02-26 PROCEDURE — 85027 COMPLETE CBC AUTOMATED: CPT

## 2021-02-26 PROCEDURE — 80048 BASIC METABOLIC PNL TOTAL CA: CPT

## 2021-02-26 PROCEDURE — 87641 MR-STAPH DNA AMP PROBE: CPT

## 2021-02-26 RX ORDER — CEFAZOLIN SODIUM 1 G
2000 VIAL (EA) INJECTION ONCE
Refills: 0 | Status: DISCONTINUED | OUTPATIENT
Start: 2021-03-12 | End: 2021-03-15

## 2021-02-26 NOTE — H&P PST ADULT - NSICDXPASTSURGICALHX_GEN_ALL_CORE_FT
PAST SURGICAL HISTORY:  S/P arthroscopy of left knee x 4    S/P cervical spinal fusion cage 2019    S/P hip replacement, right     S/P laminectomy with spinal fusion lumbar spine    S/P total knee replacement, left

## 2021-02-26 NOTE — H&P PST ADULT - NSICDXPROBLEM_GEN_ALL_CORE_FT
PROBLEM DIAGNOSES  Problem: Lumbar disc disease  Assessment and Plan: L1-2, L2-L3 lateral interbody fusion, stage 2 posterior T8-pelvis instrumented fusion with mazor robot, plastic closure

## 2021-02-26 NOTE — H&P PST ADULT - HISTORY OF PRESENT ILLNESS
This is a 62 y/o female PMH esophageal spasms, usually while lying down, dysphagia,  is followed by GI for gastritis, as well, had an endoscopy in October, no interventions performed, takes pantoprazole, chronic lumbar and cervical spine disc disease, S/P cervical spinal fusion in 2020, S/P lumbar fusion 2003 This is a 62 y/o female PMH esophageal spasms, usually while lying down, dysphagia, gastritis, is followed by GI, had an endoscopy in October, no interventions performed, takes pantoprazole, chronic lumbar and cervical spine disc disease, S/P cervical spinal fusion in 2020, S/P lumbar fusion 2003, continues to have low back pain and bilateral sciatica.  Presents today for L1-L2, L2-3 lateral interbody fusion, posterior T8-pelvis instrumented fusion with Mazor robot plastic closure. This is a 60 y/o female PMH esophageal spasms, usually while lying down, dysphagia, gastritis, is followed by GI, had an endoscopy in October, no interventions performed, takes pantoprazole, chronic lumbar and cervical spine disc disease, S/P cervical spinal fusion in 2020, S/P lumbar fusion 2003, continues to have low back pain and bilateral sciatica.  Presents today for stage 1 L1-L2, L2-3 lateral interbody fusion, stage 2 posterior T8-pelvis instrumented fusion with Mazor robot plastic closure, both to be done on 3/12/21.

## 2021-02-27 LAB
MRSA PCR RESULT.: SIGNIFICANT CHANGE UP
S AUREUS DNA NOSE QL NAA+PROBE: SIGNIFICANT CHANGE UP

## 2021-03-05 DIAGNOSIS — Z01.812 ENCOUNTER FOR PREPROCEDURAL LABORATORY EXAMINATION: ICD-10-CM

## 2021-03-11 ENCOUNTER — TRANSCRIPTION ENCOUNTER (OUTPATIENT)
Age: 61
End: 2021-03-11

## 2021-03-12 ENCOUNTER — APPOINTMENT (OUTPATIENT)
Dept: NEUROSURGERY | Facility: HOSPITAL | Age: 61
End: 2021-03-12

## 2021-03-12 ENCOUNTER — INPATIENT (INPATIENT)
Facility: HOSPITAL | Age: 61
LOS: 8 days | Discharge: OTHER INSTITUTION | DRG: 454 | End: 2021-03-21
Attending: STUDENT IN AN ORGANIZED HEALTH CARE EDUCATION/TRAINING PROGRAM | Admitting: STUDENT IN AN ORGANIZED HEALTH CARE EDUCATION/TRAINING PROGRAM
Payer: COMMERCIAL

## 2021-03-12 VITALS
WEIGHT: 171.96 LBS | RESPIRATION RATE: 16 BRPM | TEMPERATURE: 98 F | DIASTOLIC BLOOD PRESSURE: 68 MMHG | HEART RATE: 66 BPM | SYSTOLIC BLOOD PRESSURE: 129 MMHG | HEIGHT: 64 IN | OXYGEN SATURATION: 96 %

## 2021-03-12 DIAGNOSIS — Z96.652 PRESENCE OF LEFT ARTIFICIAL KNEE JOINT: Chronic | ICD-10-CM

## 2021-03-12 DIAGNOSIS — Z96.641 PRESENCE OF RIGHT ARTIFICIAL HIP JOINT: Chronic | ICD-10-CM

## 2021-03-12 DIAGNOSIS — Z98.1 ARTHRODESIS STATUS: Chronic | ICD-10-CM

## 2021-03-12 DIAGNOSIS — M41.80 OTHER FORMS OF SCOLIOSIS, SITE UNSPECIFIED: ICD-10-CM

## 2021-03-12 DIAGNOSIS — Z98.890 OTHER SPECIFIED POSTPROCEDURAL STATES: Chronic | ICD-10-CM

## 2021-03-12 LAB
ANION GAP SERPL CALC-SCNC: 10 MMOL/L — SIGNIFICANT CHANGE UP (ref 5–17)
BASOPHILS # BLD AUTO: 0 K/UL — SIGNIFICANT CHANGE UP (ref 0–0.2)
BASOPHILS NFR BLD AUTO: 0 % — SIGNIFICANT CHANGE UP (ref 0–2)
BUN SERPL-MCNC: 9 MG/DL — SIGNIFICANT CHANGE UP (ref 7–23)
CALCIUM SERPL-MCNC: 9 MG/DL — SIGNIFICANT CHANGE UP (ref 8.4–10.5)
CHLORIDE SERPL-SCNC: 100 MMOL/L — SIGNIFICANT CHANGE UP (ref 96–108)
CO2 SERPL-SCNC: 24 MMOL/L — SIGNIFICANT CHANGE UP (ref 22–31)
CREAT SERPL-MCNC: 0.54 MG/DL — SIGNIFICANT CHANGE UP (ref 0.5–1.3)
EOSINOPHIL # BLD AUTO: 0 K/UL — SIGNIFICANT CHANGE UP (ref 0–0.5)
EOSINOPHIL NFR BLD AUTO: 0 % — SIGNIFICANT CHANGE UP (ref 0–6)
GAS PNL BLDA: SIGNIFICANT CHANGE UP
GLUCOSE BLDC GLUCOMTR-MCNC: 169 MG/DL — HIGH (ref 70–99)
GLUCOSE SERPL-MCNC: 200 MG/DL — HIGH (ref 70–99)
HCT VFR BLD CALC: 36.2 % — SIGNIFICANT CHANGE UP (ref 34.5–45)
HGB BLD-MCNC: 11.9 G/DL — SIGNIFICANT CHANGE UP (ref 11.5–15.5)
LYMPHOCYTES # BLD AUTO: 0.68 K/UL — LOW (ref 1–3.3)
LYMPHOCYTES # BLD AUTO: 5.2 % — LOW (ref 13–44)
MANUAL SMEAR VERIFICATION: SIGNIFICANT CHANGE UP
MCHC RBC-ENTMCNC: 30.7 PG — SIGNIFICANT CHANGE UP (ref 27–34)
MCHC RBC-ENTMCNC: 32.9 GM/DL — SIGNIFICANT CHANGE UP (ref 32–36)
MCV RBC AUTO: 93.3 FL — SIGNIFICANT CHANGE UP (ref 80–100)
MONOCYTES # BLD AUTO: 1.03 K/UL — HIGH (ref 0–0.9)
MONOCYTES NFR BLD AUTO: 7.8 % — SIGNIFICANT CHANGE UP (ref 2–14)
NEUTROPHILS # BLD AUTO: 11.32 K/UL — HIGH (ref 1.8–7.4)
NEUTROPHILS NFR BLD AUTO: 78.3 % — HIGH (ref 43–77)
NEUTS BAND # BLD: 7.8 % — SIGNIFICANT CHANGE UP (ref 0–8)
PLAT MORPH BLD: NORMAL — SIGNIFICANT CHANGE UP
PLATELET # BLD AUTO: 352 K/UL — SIGNIFICANT CHANGE UP (ref 150–400)
POTASSIUM SERPL-MCNC: 3.9 MMOL/L — SIGNIFICANT CHANGE UP (ref 3.5–5.3)
POTASSIUM SERPL-SCNC: 3.9 MMOL/L — SIGNIFICANT CHANGE UP (ref 3.5–5.3)
RBC # BLD: 3.88 M/UL — SIGNIFICANT CHANGE UP (ref 3.8–5.2)
RBC # FLD: 13.8 % — SIGNIFICANT CHANGE UP (ref 10.3–14.5)
RBC BLD AUTO: SIGNIFICANT CHANGE UP
SODIUM SERPL-SCNC: 134 MMOL/L — LOW (ref 135–145)
TROPONIN T, HIGH SENSITIVITY RESULT: 8 NG/L — SIGNIFICANT CHANGE UP (ref 0–51)
VARIANT LYMPHS # BLD: 0.9 % — SIGNIFICANT CHANGE UP (ref 0–6)
WBC # BLD: 13.15 K/UL — HIGH (ref 3.8–10.5)
WBC # FLD AUTO: 13.15 K/UL — HIGH (ref 3.8–10.5)

## 2021-03-12 PROCEDURE — 22533 ARTHRD LAT XTRCVTRY TQ LMBR: CPT

## 2021-03-12 PROCEDURE — 22216 INCIS ADDL SPINE SEGMENT: CPT | Mod: 82

## 2021-03-12 PROCEDURE — 22848 INSERT PELV FIXATION DEVICE: CPT | Mod: 82

## 2021-03-12 PROCEDURE — 22534 ARTHRD LAT XTRCVTRY TQ EA AD: CPT

## 2021-03-12 PROCEDURE — 22843 INSERT SPINE FIXATION DEVICE: CPT | Mod: 82

## 2021-03-12 PROCEDURE — 99291 CRITICAL CARE FIRST HOUR: CPT

## 2021-03-12 PROCEDURE — 22853 INSJ BIOMECHANICAL DEVICE: CPT | Mod: 82

## 2021-03-12 PROCEDURE — 22853 INSJ BIOMECHANICAL DEVICE: CPT

## 2021-03-12 PROCEDURE — 22802 ARTHRD PST DFRM 7-12 VRT SGM: CPT | Mod: 82

## 2021-03-12 PROCEDURE — 22533 ARTHRD LAT XTRCVTRY TQ LMBR: CPT | Mod: 82

## 2021-03-12 PROCEDURE — 22534 ARTHRD LAT XTRCVTRY TQ EA AD: CPT | Mod: 82

## 2021-03-12 PROCEDURE — 22214 INCIS 1 VERTEBRAL SEG LUMBAR: CPT | Mod: 82

## 2021-03-12 PROCEDURE — 72020 X-RAY EXAM OF SPINE 1 VIEW: CPT | Mod: 26

## 2021-03-12 RX ORDER — DIAZEPAM 5 MG
5 TABLET ORAL EVERY 6 HOURS
Refills: 0 | Status: DISCONTINUED | OUTPATIENT
Start: 2021-03-12 | End: 2021-03-15

## 2021-03-12 RX ORDER — LEVOTHYROXINE SODIUM 125 MCG
200 TABLET ORAL DAILY
Refills: 0 | Status: DISCONTINUED | OUTPATIENT
Start: 2021-03-12 | End: 2021-03-21

## 2021-03-12 RX ORDER — INSULIN LISPRO 100/ML
VIAL (ML) SUBCUTANEOUS EVERY 6 HOURS
Refills: 0 | Status: DISCONTINUED | OUTPATIENT
Start: 2021-03-12 | End: 2021-03-14

## 2021-03-12 RX ORDER — HYDROMORPHONE HYDROCHLORIDE 2 MG/ML
0.5 INJECTION INTRAMUSCULAR; INTRAVENOUS; SUBCUTANEOUS
Refills: 0 | Status: DISCONTINUED | OUTPATIENT
Start: 2021-03-12 | End: 2021-03-14

## 2021-03-12 RX ORDER — HYDROMORPHONE HYDROCHLORIDE 2 MG/ML
30 INJECTION INTRAMUSCULAR; INTRAVENOUS; SUBCUTANEOUS
Refills: 0 | Status: DISCONTINUED | OUTPATIENT
Start: 2021-03-12 | End: 2021-03-14

## 2021-03-12 RX ORDER — NALOXONE HYDROCHLORIDE 4 MG/.1ML
0.1 SPRAY NASAL
Refills: 0 | Status: DISCONTINUED | OUTPATIENT
Start: 2021-03-12 | End: 2021-03-21

## 2021-03-12 RX ORDER — DEXTROSE MONOHYDRATE, SODIUM CHLORIDE, AND POTASSIUM CHLORIDE 50; .745; 4.5 G/1000ML; G/1000ML; G/1000ML
1000 INJECTION, SOLUTION INTRAVENOUS
Refills: 0 | Status: DISCONTINUED | OUTPATIENT
Start: 2021-03-12 | End: 2021-03-15

## 2021-03-12 RX ORDER — FENTANYL CITRATE 50 UG/ML
50 INJECTION INTRAVENOUS ONCE
Refills: 0 | Status: DISCONTINUED | OUTPATIENT
Start: 2021-03-12 | End: 2021-03-12

## 2021-03-12 RX ORDER — ATORVASTATIN CALCIUM 80 MG/1
20 TABLET, FILM COATED ORAL AT BEDTIME
Refills: 0 | Status: DISCONTINUED | OUTPATIENT
Start: 2021-03-12 | End: 2021-03-21

## 2021-03-12 RX ORDER — PANTOPRAZOLE SODIUM 20 MG/1
20 TABLET, DELAYED RELEASE ORAL
Refills: 0 | Status: DISCONTINUED | OUTPATIENT
Start: 2021-03-12 | End: 2021-03-21

## 2021-03-12 RX ORDER — SENNA PLUS 8.6 MG/1
2 TABLET ORAL AT BEDTIME
Refills: 0 | Status: DISCONTINUED | OUTPATIENT
Start: 2021-03-12 | End: 2021-03-21

## 2021-03-12 RX ORDER — DEXMEDETOMIDINE HYDROCHLORIDE IN 0.9% SODIUM CHLORIDE 4 UG/ML
1.5 INJECTION INTRAVENOUS
Qty: 200 | Refills: 0 | Status: DISCONTINUED | OUTPATIENT
Start: 2021-03-12 | End: 2021-03-13

## 2021-03-12 RX ORDER — HYDROMORPHONE HYDROCHLORIDE 2 MG/ML
30 INJECTION INTRAMUSCULAR; INTRAVENOUS; SUBCUTANEOUS
Refills: 0 | Status: DISCONTINUED | OUTPATIENT
Start: 2021-03-12 | End: 2021-03-12

## 2021-03-12 RX ORDER — INFLUENZA VIRUS VACCINE 15; 15; 15; 15 UG/.5ML; UG/.5ML; UG/.5ML; UG/.5ML
0.5 SUSPENSION INTRAMUSCULAR ONCE
Refills: 0 | Status: COMPLETED | OUTPATIENT
Start: 2021-03-12 | End: 2021-03-12

## 2021-03-12 RX ORDER — HYDROMORPHONE HYDROCHLORIDE 2 MG/ML
0.5 INJECTION INTRAMUSCULAR; INTRAVENOUS; SUBCUTANEOUS ONCE
Refills: 0 | Status: DISCONTINUED | OUTPATIENT
Start: 2021-03-12 | End: 2021-03-12

## 2021-03-12 RX ORDER — SODIUM CHLORIDE 9 MG/ML
1000 INJECTION INTRAMUSCULAR; INTRAVENOUS; SUBCUTANEOUS ONCE
Refills: 0 | Status: COMPLETED | OUTPATIENT
Start: 2021-03-12 | End: 2021-03-12

## 2021-03-12 RX ORDER — FENTANYL CITRATE 50 UG/ML
1 INJECTION INTRAVENOUS
Refills: 0 | Status: DISCONTINUED | OUTPATIENT
Start: 2021-03-12 | End: 2021-03-16

## 2021-03-12 RX ORDER — CEFAZOLIN SODIUM 1 G
2000 VIAL (EA) INJECTION EVERY 8 HOURS
Refills: 0 | Status: COMPLETED | OUTPATIENT
Start: 2021-03-12 | End: 2021-03-13

## 2021-03-12 RX ORDER — ENOXAPARIN SODIUM 100 MG/ML
40 INJECTION SUBCUTANEOUS DAILY
Refills: 0 | Status: DISCONTINUED | OUTPATIENT
Start: 2021-03-13 | End: 2021-03-21

## 2021-03-12 RX ORDER — HYOSCYAMINE SULFATE 0.13 MG
0.12 TABLET ORAL DAILY
Refills: 0 | Status: DISCONTINUED | OUTPATIENT
Start: 2021-03-13 | End: 2021-03-21

## 2021-03-12 RX ORDER — ONDANSETRON 8 MG/1
4 TABLET, FILM COATED ORAL EVERY 6 HOURS
Refills: 0 | Status: DISCONTINUED | OUTPATIENT
Start: 2021-03-12 | End: 2021-03-21

## 2021-03-12 RX ORDER — CHLORHEXIDINE GLUCONATE 213 G/1000ML
1 SOLUTION TOPICAL
Refills: 0 | Status: DISCONTINUED | OUTPATIENT
Start: 2021-03-12 | End: 2021-03-13

## 2021-03-12 RX ORDER — DIAZEPAM 5 MG
2 TABLET ORAL ONCE
Refills: 0 | Status: DISCONTINUED | OUTPATIENT
Start: 2021-03-12 | End: 2021-03-13

## 2021-03-12 RX ORDER — SODIUM CHLORIDE 9 MG/ML
3 INJECTION INTRAMUSCULAR; INTRAVENOUS; SUBCUTANEOUS EVERY 8 HOURS
Refills: 0 | Status: DISCONTINUED | OUTPATIENT
Start: 2021-03-12 | End: 2021-03-12

## 2021-03-12 RX ADMIN — HYDROMORPHONE HYDROCHLORIDE 0.5 MILLIGRAM(S): 2 INJECTION INTRAMUSCULAR; INTRAVENOUS; SUBCUTANEOUS at 22:50

## 2021-03-12 RX ADMIN — CHLORHEXIDINE GLUCONATE 1 APPLICATION(S): 213 SOLUTION TOPICAL at 22:30

## 2021-03-12 RX ADMIN — FENTANYL CITRATE 50 MICROGRAM(S): 50 INJECTION INTRAVENOUS at 17:40

## 2021-03-12 RX ADMIN — HYDROMORPHONE HYDROCHLORIDE 30 MILLILITER(S): 2 INJECTION INTRAMUSCULAR; INTRAVENOUS; SUBCUTANEOUS at 22:33

## 2021-03-12 RX ADMIN — SENNA PLUS 2 TABLET(S): 8.6 TABLET ORAL at 23:08

## 2021-03-12 RX ADMIN — FENTANYL CITRATE 50 MICROGRAM(S): 50 INJECTION INTRAVENOUS at 17:55

## 2021-03-12 RX ADMIN — HYDROMORPHONE HYDROCHLORIDE 0.5 MILLIGRAM(S): 2 INJECTION INTRAMUSCULAR; INTRAVENOUS; SUBCUTANEOUS at 23:05

## 2021-03-12 RX ADMIN — Medication 100 MILLIGRAM(S): at 22:41

## 2021-03-12 RX ADMIN — SODIUM CHLORIDE 1000 MILLILITER(S): 9 INJECTION INTRAMUSCULAR; INTRAVENOUS; SUBCUTANEOUS at 22:25

## 2021-03-12 RX ADMIN — Medication 2: at 23:02

## 2021-03-12 RX ADMIN — ATORVASTATIN CALCIUM 20 MILLIGRAM(S): 80 TABLET, FILM COATED ORAL at 23:20

## 2021-03-12 RX ADMIN — HYDROMORPHONE HYDROCHLORIDE 30 MILLILITER(S): 2 INJECTION INTRAMUSCULAR; INTRAVENOUS; SUBCUTANEOUS at 23:05

## 2021-03-12 NOTE — PRE-ANESTHESIA EVALUATION ADULT - NSPROPOSEDPROCEDFT_GEN_ALL_CORE
stage 1 L1-L2, L2-3 lateral interbody fusion, stage 2 posterior T8-pelvis instrumented fusion with Mazor robot plastic closure

## 2021-03-12 NOTE — PRE-ANESTHESIA EVALUATION ADULT - NSANTHAIRWAYFT_ENT_ALL_CORE
Mouth opening: >2cm  Thyromental distance: >3 FBs  Upper lip bite: adequate  Cervical ROM: restricted

## 2021-03-12 NOTE — PRE-ANESTHESIA EVALUATION ADULT - NSANTHPMHFT_GEN_ALL_CORE
This is a 60 y/o female PMH esophageal spasms, usually while lying down, dysphagia, gastritis, is followed by GI, had an endoscopy in October, no interventions performed, takes pantoprazole, chronic lumbar and cervical spine disc disease, S/P cervical spinal fusion in 2020, S/P lumbar fusion 2003, continues to have low back pain and bilateral sciatica.  Presents today for stage 1 L1-L2, L2-3 lateral interbody fusion, stage 2 posterior T8-pelvis instrumented fusion with Mazor robot plastic closure, both to be done on 3/12/21.

## 2021-03-12 NOTE — PROGRESS NOTE ADULT - ASSESSMENT
ASSESSMENT: Spinal stenosis and failed back syndrome status post L1-3 XLIF with T8-pelvis arthrodesis, post-operative day 0    PLAN:  Feeding: [] diet [] tube feeds  Analgesia/Sedation:   Seizure prophylaxis: [] not indicated  Thromboprophylaxis: [] SCDs [] chemoprophylaxis [] hold chemoprophylaxis due to: [] high risk of DVT/PE on admission due to:  Head of Bed/Activity: [] 30 degrees [] mobilize as tolerated [] PT [] OT [] PMNR  Ulcer prophylaxis: [] not indicated [] PPI [] other:  Glucose Control: Goal -180 [] RISS [] other:    [x] Patient critically ill due to: acute blood loss edema and risk of hemorrhagic shock    Contact: [phone] ASSESSMENT: Spinal stenosis and failed back syndrome status post L1-3 XLIF with T8-pelvis arthrodesis, post-operative day 0    PLAN:  CT T/L spine post-op  Monitor H/H  Monitor drain outputs (HMV x 2, UCHE x 2)  -160mmHg  Telemetry monitoring given reported bigeminy at end of OR case  Optimize electrolytes  Feeding: [x] diet: as tolerated    Analgesia: PCA pump; PRN muscle relaxant  Thromboprophylaxis: [x] SCDs [] chemoprophylaxis [x] hold chemoprophylaxis due to: fresh post-op [x] high risk of DVT/PE on admission due to: limited mobility   Head of Bed/Activity: [x] mobilize as tolerated [x] PT [x] OT  Glucose Control: Goal -180 [] RISS [] other:    [x] Patient critically ill due to: acute blood loss edema and risk of hemorrhagic shock    Contact: [phone]

## 2021-03-12 NOTE — BRIEF OPERATIVE NOTE - OPERATION/FINDINGS
Closure of the back incision with a muscle flap.
T8-PELVIS ARTHRODESIS  L1-3 ANTERIOR COLUMN RELEASE (RIGHT LATERAL APPROACH)

## 2021-03-12 NOTE — PRE-OP CHECKLIST - HEIGHT IN CM
Telephone Encounter by Gwen Gross NCMA at 08/13/18 02:08 PM     Author:  Gwen Gross NCMA Service:  (none) Author Type:  Certified Medical Assistant     Filed:  08/13/18 02:08 PM Encounter Date:  8/13/2018 Status:  Signed     :  Gwen Gross NCMA (Certified Medical Assistant)            Medication refilled per pcp authorization.[KS1.1T]  Faxed to pharmacy.[KS1.1M]     Electronically Signed by:    FRANK Mckenzie , 8/13/2018[KS1.1T]          Revision History        User Key Date/Time User Provider Type Action    > KS1.1 08/13/18 02:08 PM Gwen Gross NCMA Certified Medical Assistant Sign    M - Manual, T - Template            
Telephone Encounter by Gwen Gross NCMA at 08/13/18 11:24 AM     Author:  Gwen Gross NCMA Service:  (none) Author Type:  Certified Medical Assistant     Filed:  08/13/18 11:25 AM Encounter Date:  8/13/2018 Status:  Signed     :  Gwen Gross NCMA (Certified Medical Assistant)            Last seen 6/14/18- Next appt 1/10/19  Last refill 6/14/18 qty of 30x1[KS1.1M]    Medication not on protocol please advise, thanks[KS1.1T]            Revision History        User Key Date/Time User Provider Type Action    > KS1.1 08/13/18 11:25 AM Gwen Gross NCMA Certified Medical Assistant Sign    M - Manual, T - Template            
Telephone Encounter by Vamshi Fountain MD at 08/13/18 12:33 PM     Author:  Vamshi Fountain MD Service:  (none) Author Type:  Physician     Filed:  08/13/18 12:33 PM Encounter Date:  8/13/2018 Status:  Signed     :  Vamshi Fountain MD (Physician)            Ok[TK1.1M]          Revision History        User Key Date/Time User Provider Type Action    > TK1.1 08/13/18 12:33 PM Vamshi Fountain MD Physician Sign    M - Manual            
[id number]

## 2021-03-12 NOTE — PROGRESS NOTE ADULT - SUBJECTIVE AND OBJECTIVE BOX
NEUROCRITICAL CARE ATTENDING EVENING NOTE    BRIEF SUMMARY:  61 year-old woman underwent L1-3 XLIF with T8-pelvis arthrodesis and plastic surgery assisted closure. EBL ~500cc. History of chronic pain, placed on PC post-op.     24 HOUR EVENTS:  status post OR as above.     VITALS/IMAGING/LABORATORY VALUES/MEDICATIONS/FLUIDS AND DRIPS: [x] Reviewed    ALLERGIES:   No Known Allergies    EXAMINATION:  General:   HEENT: Anicteric sclerae  Cardiac: [id number]  Lungs: Clear  Abdomen: Soft, non-tender, +BS  Extremities: No c/c/e  Skin/Incision Site: Clean, dry and intact  Neurologic:      NEUROCRITICAL CARE ATTENDING EVENING NOTE    BRIEF SUMMARY:  61 year-old woman underwent L1-3 XLIF with T8-pelvis arthrodesis and plastic surgery assisted closure. EBL ~500cc. History of chronic pain, placed on PC post-op.     24 HOUR EVENTS:  status post OR as above.     VITALS/IMAGING/LABORATORY VALUES/MEDICATIONS/FLUIDS AND DRIPS: [x] Reviewed    ALLERGIES:   No Known Allergies    EXAMINATION:  General: Irritable  HEENT: Anicteric sclerae  Cardiac: [id number]  Lungs: Clear  Abdomen: Soft, non-tender, +BS  Extremities: No c/c/e  Skin/Incision Site: Clean, dry and intact  Neurologic: Pain limited exam, but when gives best effort, appears to have full strength throughout; denies loss of light touch sensation

## 2021-03-12 NOTE — BRIEF OPERATIVE NOTE - NSICDXBRIEFPROCEDURE_GEN_ALL_CORE_FT
PROCEDURES:  Closure of surgical wound of back 12-Mar-2021 17:27:48 with a muscle flap Irving Gonzalez  
PROCEDURES:  Arthrodesis of thoracic or thoracolumbar region posterolateral technique 12-Mar-2021 16:02:09  Espinoza Anderson

## 2021-03-12 NOTE — PROGRESS NOTE ADULT - SUBJECTIVE AND OBJECTIVE BOX
HPI:  This is a 60 y/o female PMH esophageal spasms, usually while lying down, dysphagia, gastritis, is followed by GI, had an endoscopy in October, no interventions performed, takes pantoprazole, chronic lumbar and cervical spine disc disease, S/P cervical spinal fusion in 2020, S/P lumbar fusion 2003, continues to have low back pain and bilateral sciatica.  Presents today for stage 1 L1-L2, L2-3 lateral interbody fusion, stage 2 posterior T8-pelvis instrumented fusion with Mazor robot plastic closure, both to be done on 3/12/21. (26 Feb 2021 15:12)    SURGERY: Closure of surgical wound of back  with a muscle flap    Arthrodesis of thoracic or thoracolumbar region posterolateral technique          EVENTS:         ICU Vital Signs Last 24 Hrs  T(C): 36.6 (12 Mar 2021 07:04), Max: 36.6 (12 Mar 2021 06:08)  T(F): 97.9 (12 Mar 2021 06:08), Max: 97.9 (12 Mar 2021 06:08)  HR: 66 (12 Mar 2021 07:04) (66 - 66)  BP: 129/68 (12 Mar 2021 07:04) (129/68 - 129/68)  BP(mean): --  ABP: --  ABP(mean): --  RR: 16 (12 Mar 2021 07:04) (16 - 16)  SpO2: 96% (12 Mar 2021 07:04) (96% - 96%)            ABG - ( 12 Mar 2021 16:52 )  pH, Arterial: 7.43  pH, Blood: x     /  pCO2: 41    /  pO2: 300   / HCO3: 27    / Base Excess: 2.6   /  SaO2: 100                      PHYSICAL EXAM:    General: No Acute Distress     Neurological: Awake, alert oriented to person, place and time, Following Commands, PERRL, EOMI, Face Symmetrical, Speech Fluent, Moving all extremities, Muscle Strength normal in all four extremities, No Drift, Sensation to Light Touch Intact    Pulmonary: Clear to Auscultation, No Rales, No Rhonchi, No Wheezes     Cardiovascular: S1, S2, Regular Rate and Rhythm     Gastrointestinal: Soft, Nontender, Nondistended     Extremities: No calf tenderness     Incision:       MEDICATIONS:  Antibiotics:   ceFAZolin   IVPB 2000 milliGRAM(s) IV Intermittent once     Neurological:     Cardiac:     Pulm:    Heme:     Other:   influenza   Vaccine 0.5 milliLiter(s) IntraMuscular once       DEVICES: [] Restraints [] UCHE/HMV []LD [] ET tube [] Trach [] Chest Tube [] A-line [] Gamez [] NGT [] Rectal Tube       A/P:  HPI:  This is a 60 y/o female PMH esophageal spasms, usually while lying down, dysphagia, gastritis, is followed by GI, had an endoscopy in October, no interventions performed, takes pantoprazole, chronic lumbar and cervical spine disc disease, S/P cervical spinal fusion in 2020, S/P lumbar fusion 2003, continues to have low back pain and bilateral sciatica.  Presents today for stage 1 L1-L2, L2-3 lateral interbody fusion, stage 2 posterior T8-pelvis instrumented fusion with Mazor robot plastic closure, both to be done on 3/12/21. (26 Feb 2021 15:12)      Neuro: neuro checks q 1 hr   2 deep hemovac  2 superficial UCHE   monitor output  CT spine in am   PCA pump   Respiratory: RA, IS    CV: MAP> 65 mmhg   Endocrine: target euglycemia   Heme/Onc:      ml  , will gte cbc now        DVT ppx: SCD   Renal:   ID:   GI:   Social/Family:   Discharge planning:     Code Status: [] Full Code [] DNR [] DNI [] Goals of Care:   Disposition: [] ICU [] Stroke Unit [] RCU []PCU []Floor [] Discharge Home     Patient at high risk for neurologic deterioration, critical care time, excluding procedures: 45 minutes                    HPI:  This is a 62 y/o female PMH esophageal spasms, usually while lying down, dysphagia, gastritis, is followed by GI, had an endoscopy in October, no interventions performed, takes pantoprazole, chronic lumbar and cervical spine disc disease, S/P cervical spinal fusion in 2020, S/P lumbar fusion 2003, continues to have low back pain and bilateral sciatica.  Presents today for stage 1 L1-L2, L2-3 lateral interbody fusion, stage 2 posterior T8-pelvis instrumented fusion with Mazor robot plastic closure, both to be done on 3/12/21. (26 Feb 2021 15:12)    SURGERY: Closure of surgical wound of back  with a muscle flap    Arthrodesis of thoracic or thoracolumbar region posterolateral technique    EVENTS:   T8-PELVIS ARTHRODESIS  L1-3 ANTERIOR COLUMN RELEASE (RIGHT LATERAL APPROACH)  She had 500 ml EBL  Received 3 L NS in the OR  at the end of the procedure, she went into bigeminy and hypotension , resolved       ICU Vital Signs Last 24 Hrs  T(C): 36.6 (12 Mar 2021 07:04), Max: 36.6 (12 Mar 2021 06:08)  T(F): 97.9 (12 Mar 2021 06:08), Max: 97.9 (12 Mar 2021 06:08)  HR: 66 (12 Mar 2021 07:04) (66 - 66)  BP: 129/68 (12 Mar 2021 07:04) (129/68 - 129/68)  BP(mean): --  ABP: --  ABP(mean): --  RR: 16 (12 Mar 2021 07:04) (16 - 16)  SpO2: 96% (12 Mar 2021 07:04) (96% - 96%)            ABG - ( 12 Mar 2021 16:52 )  pH, Arterial: 7.43  pH, Blood: x     /  pCO2: 41    /  pO2: 300   / HCO3: 27    / Base Excess: 2.6   /  SaO2: 100                      PHYSICAL EXAM:    General: No Acute Distress   lip edema   Neurological: Awake,, alert, agitated, not follwoing commands, moving all 4 extremities at least antigravity   Pulmonary: Clear to Auscultation, No Rales, No Rhonchi, No Wheezes     Cardiovascular: S1, S2, Regular Rate and Rhythm     Gastrointestinal: Soft, Nontender, Nondistended     Extremities: No calf tenderness     Incision:       MEDICATIONS:  Antibiotics:   ceFAZolin   IVPB 2000 milliGRAM(s) IV Intermittent once     Neurological:     Cardiac:     Pulm:    Heme:     Other:   influenza   Vaccine 0.5 milliLiter(s) IntraMuscular once       DEVICES: [] Restraints [] UCHE/HMV []LD [] ET tube [] Trach [] Chest Tube [] A-line [] Gamez [] NGT [] Rectal Tube       A/P:  This is a 62 y/o female s/p  stage 1 L1-L2, L2-3 lateral interbody fusion, stage 2 posterior T8-pelvis instrumented fusion with Mazor robot plastic closure, both to be done on 3/12/21. (26 Feb 2021 15:12)    Neuro: neuro checks q 1 hr   2 deep hemovac  2 superficial UCHE   monitor output  CT spine in am   PCA pump   history of opiate dependence   dilaudid as needed until she is more awake to use the PCA pump   precedex   Respiratory: RA, IS    CV: MAP> 65 mmhg, ECG  now as she was having bigeminy   a line   Endocrine: target euglycemia   Heme/Onc: cbc now      ml  , will gte cbc now    Tranfuse if hgb < 8    DVT ppx: SCD , hold chemoprophylaxis given POD 0   Renal: BMP  NS 75 ml/hr    ID: brittaney-op [id number]   GI: NPO for now   Discharge planning:     Code Status: [x] Full Code [] DNR [] DNI [] Goals of Care:   Disposition: [x] ICU [] Stroke Unit [] RCU []PCU []Floor [] Discharge Home     Patient at high risk for neurologic deterioration, critical care time, excluding procedures: 35 minutes                    HPI:  This is a 60 y/o female PMH esophageal spasms, usually while lying down, dysphagia, gastritis, is followed by GI, had an endoscopy in October, no interventions performed, takes pantoprazole, chronic lumbar and cervical spine disc disease, S/P cervical spinal fusion in 2020, S/P lumbar fusion 2003, continues to have low back pain and bilateral sciatica.  Presents today for stage 1 L1-L2, L2-3 lateral interbody fusion, stage 2 posterior T8-pelvis instrumented fusion with Mazor robot plastic closure, both to be done on 3/12/21. (26 Feb 2021 15:12)    SURGERY: Closure of surgical wound of back  with a muscle flap    Arthrodesis of thoracic or thoracolumbar region posterolateral technique    EVENTS:   T8-PELVIS ARTHRODESIS  L1-3 ANTERIOR COLUMN RELEASE (RIGHT LATERAL APPROACH)  She had 500 ml EBL  Received 3 L NS in the OR  at the end of the procedure, she went into bigeminy and hypotension , resolved       ICU Vital Signs Last 24 Hrs  T(C): 36.6 (12 Mar 2021 07:04), Max: 36.6 (12 Mar 2021 06:08)  T(F): 97.9 (12 Mar 2021 06:08), Max: 97.9 (12 Mar 2021 06:08)  HR: 66 (12 Mar 2021 07:04) (66 - 66)  BP: 129/68 (12 Mar 2021 07:04) (129/68 - 129/68)  BP(mean): --  ABP: --  ABP(mean): --  RR: 16 (12 Mar 2021 07:04) (16 - 16)  SpO2: 96% (12 Mar 2021 07:04) (96% - 96%)            ABG - ( 12 Mar 2021 16:52 )  pH, Arterial: 7.43  pH, Blood: x     /  pCO2: 41    /  pO2: 300   / HCO3: 27    / Base Excess: 2.6   /  SaO2: 100                      PHYSICAL EXAM:    General: No Acute Distress   lip edema   Neurological: Awake,, alert, agitated, not follwoing commands, moving all 4 extremities at least antigravity   Pulmonary: Clear to Auscultation, No Rales, No Rhonchi, No Wheezes     Cardiovascular: S1, S2, Regular Rate and Rhythm     Gastrointestinal: Soft, Nontender, Nondistended     Extremities: No calf tenderness     Incision:       MEDICATIONS:  Antibiotics:   ceFAZolin   IVPB 2000 milliGRAM(s) IV Intermittent once     Neurological:     Cardiac:     Pulm:    Heme:     Other:   influenza   Vaccine 0.5 milliLiter(s) IntraMuscular once       DEVICES: [] Restraints [] UCHE/HMV []LD [] ET tube [] Trach [] Chest Tube [] A-line [] Gamez [] NGT [] Rectal Tube       A/P:  This is a 60 y/o female s/p  stage 1 L1-L2, L2-3 lateral interbody fusion, stage 2 posterior T8-pelvis instrumented fusion with Mazor robot plastic closure, both to be done on 3/12/21. (26 Feb 2021 15:12)    Neuro: neuro checks q 1 hr   2 deep hemovac  2 superficial UCHE   monitor output  CT spine in am   PCA pump   history of opiate dependence   dilaudid as needed until she is more awake to use the PCA pump   precedex   fentanyl patch   depression continue paroxetine   Respiratory: RA, IS    CV: MAP> 65 mmhg, ECG  now as she was having bigeminy   a line   Endocrine: target euglycemia   hypothyroid on synthroid   Heme/Onc: cbc now      ml  , will gte cbc now    Tranfuse if hgb < 8    DVT ppx: SCD , hold chemoprophylaxis given POD 0   Renal: BMP  NS 75 ml/hr    ID: brittaney-op [id number]   GI: NPO for now   Discharge planning:     Code Status: [x] Full Code [] DNR [] DNI [] Goals of Care:   Disposition: [x] ICU [] Stroke Unit [] RCU []PCU []Floor [] Discharge Home     Patient at high risk for neurologic deterioration, critical care time, excluding procedures: 35 minutes

## 2021-03-12 NOTE — CHART NOTE - NSCHARTNOTEFT_GEN_A_CORE
CAPRINI SCORE [CLOT] Score on Admission for     AGE RELATED RISK FACTORS                                                       MOBILITY RELATED FACTORS  [ ] Age 41-60 years                                            (1 Point)                  [ ] Bed rest                                                        (1 Point)  [ x] Age: 61-74 years                                           (2 Points)                 [ ] Plaster cast                                                   (2 Points)  [ ] Age= 75 years                                              (3 Points)                 [ ] Bed bound for more than 72 hours                 (2 Points)    DISEASE RELATED RISK FACTORS                                               GENDER SPECIFIC FACTORS  [ ] Edema in the lower extremities                       (1 Point)                  [ ] Pregnancy                                                     (1 Point)  [ ] Varicose veins                                               (1 Point)                  [ ] Post-partum < 6 weeks                                   (1 Point)             [ x] BMI > 25 Kg/m2                                            (1 Point)                  [ ] Hormonal therapy  or oral contraception          (1 Point)                 [ ] Sepsis (in the previous month)                        (1 Point)                  [ ] History of pregnancy complications                 (1 point)  [ ] Pneumonia or serious lung disease                                               [ ] Unexplained or recurrent                     (1 Point)           (in the previous month)                               (1 Point)  [ ] Abnormal pulmonary function test                     (1 Point)                 SURGERY RELATED RISK FACTORS (include planned surgeries)  [ ] Acute myocardial infarction                              (1 Point)                 [ ]  Section                                             (1 Point)  [ ] Congestive heart failure (in the previous month)  (1 Point)               [ ] Minor surgery                                                  (1 Point)   [ ] Inflammatory bowel disease                             (1 Point)                 [ ] Arthroscopic surgery                                        (2 Points)  [ ] Central venous access                                      (2 Points)                [x ] General surgery lasting more than 45 minutes   (2 Points)       [ ] Stroke (in the previous month)                          (5 Points)               [ ] Elective arthroplasty                                         (5 Points)            [ ] Current or past malignancy                                (2 Points)                                                                                                     HEMATOLOGY RELATED FACTORS                                                 TRAUMA RELATED RISK FACTORS  [ ] Prior episodes of VTE                                     (3 Points)                [ ] Fracture of the hip, pelvis, or leg                       (5 Points)  [ ] Positive family history for VTE                         (3 Points)                 [ ] Acute spinal cord injury (in the previous month)  (5 Points)  [ ] Prothrombin 09296 A                                     (3 Points)                 [ ] Paralysis  (less than 1 month)                             (5 Points)  [ ] Factor V Leiden                                             (3 Points)                  [ ] Multiple Trauma within 1 month                        (5 Points)  [ ] Lupus anticoagulants                                     (3 Points)                                                           [ ] Anticardiolipin antibodies                               (3 Points)                                                       [ ] High homocysteine in the blood                      (3 Points)                                             [ ] Other congenital or acquired thrombophilia      (3 Points)                                                [ ] Heparin induced thrombocytopenia                  (3 Points)                                          Total Score [    5      ]    Risk:  Very low 0   Low 1 to 2   Moderate 3 to 4   High =5       VTE Prophylasix Recommednations:  [ x] mechanical pneumatic compression devices                                      [ ] contraindicated: _____________________  [ ] chemo prophylasix                                                                                   [ ] contraindicated _____________________    **** HIGH LIKELIHOOD DVT PRESENT ON ADMISSION  [x ] (please order LE dopplers within 24 hours of admission)

## 2021-03-12 NOTE — BRIEF OPERATIVE NOTE - NSICDXBRIEFPOSTOP_GEN_ALL_CORE_FT
POST-OP DIAGNOSIS:  Spinal stenosis of lumbar region with radiculopathy 12-Mar-2021 16:02:28  Espinoza Anderson  
POST-OP DIAGNOSIS:  Spinal stenosis of lumbar region with radiculopathy 12-Mar-2021 16:02:28  Espinoza Anderson

## 2021-03-12 NOTE — BRIEF OPERATIVE NOTE - NSICDXBRIEFPREOP_GEN_ALL_CORE_FT
PRE-OP DIAGNOSIS:  Spinal stenosis of lumbar region with radiculopathy 12-Mar-2021 16:02:18  Espinoza Anderson

## 2021-03-13 LAB
GLUCOSE BLDC GLUCOMTR-MCNC: 134 MG/DL — HIGH (ref 70–99)
GLUCOSE BLDC GLUCOMTR-MCNC: 135 MG/DL — HIGH (ref 70–99)
GLUCOSE BLDC GLUCOMTR-MCNC: 145 MG/DL — HIGH (ref 70–99)
GLUCOSE BLDC GLUCOMTR-MCNC: 156 MG/DL — HIGH (ref 70–99)

## 2021-03-13 PROCEDURE — 72131 CT LUMBAR SPINE W/O DYE: CPT | Mod: 26

## 2021-03-13 PROCEDURE — 93970 EXTREMITY STUDY: CPT | Mod: 26

## 2021-03-13 PROCEDURE — 99233 SBSQ HOSP IP/OBS HIGH 50: CPT

## 2021-03-13 PROCEDURE — 72128 CT CHEST SPINE W/O DYE: CPT | Mod: 26

## 2021-03-13 RX ADMIN — HYDROMORPHONE HYDROCHLORIDE 30 MILLILITER(S): 2 INJECTION INTRAMUSCULAR; INTRAVENOUS; SUBCUTANEOUS at 11:22

## 2021-03-13 RX ADMIN — PANTOPRAZOLE SODIUM 20 MILLIGRAM(S): 20 TABLET, DELAYED RELEASE ORAL at 07:40

## 2021-03-13 RX ADMIN — Medication 100 MILLIGRAM(S): at 13:07

## 2021-03-13 RX ADMIN — Medication 0.12 MILLIGRAM(S): at 11:21

## 2021-03-13 RX ADMIN — Medication 5 MILLIGRAM(S): at 09:00

## 2021-03-13 RX ADMIN — Medication 40 MILLIGRAM(S): at 11:21

## 2021-03-13 RX ADMIN — HYDROMORPHONE HYDROCHLORIDE 0.5 MILLIGRAM(S): 2 INJECTION INTRAMUSCULAR; INTRAVENOUS; SUBCUTANEOUS at 14:50

## 2021-03-13 RX ADMIN — HYDROMORPHONE HYDROCHLORIDE 30 MILLILITER(S): 2 INJECTION INTRAMUSCULAR; INTRAVENOUS; SUBCUTANEOUS at 07:19

## 2021-03-13 RX ADMIN — HYDROMORPHONE HYDROCHLORIDE 0.5 MILLIGRAM(S): 2 INJECTION INTRAMUSCULAR; INTRAVENOUS; SUBCUTANEOUS at 19:58

## 2021-03-13 RX ADMIN — FENTANYL CITRATE 1 PATCH: 50 INJECTION INTRAVENOUS at 08:23

## 2021-03-13 RX ADMIN — SENNA PLUS 2 TABLET(S): 8.6 TABLET ORAL at 22:39

## 2021-03-13 RX ADMIN — HYDROMORPHONE HYDROCHLORIDE 30 MILLILITER(S): 2 INJECTION INTRAMUSCULAR; INTRAVENOUS; SUBCUTANEOUS at 19:09

## 2021-03-13 RX ADMIN — Medication 100 MILLIGRAM(S): at 06:26

## 2021-03-13 RX ADMIN — ONDANSETRON 4 MILLIGRAM(S): 8 TABLET, FILM COATED ORAL at 03:00

## 2021-03-13 RX ADMIN — HYDROMORPHONE HYDROCHLORIDE 30 MILLILITER(S): 2 INJECTION INTRAMUSCULAR; INTRAVENOUS; SUBCUTANEOUS at 19:53

## 2021-03-13 RX ADMIN — FENTANYL CITRATE 1 PATCH: 50 INJECTION INTRAVENOUS at 03:00

## 2021-03-13 RX ADMIN — ATORVASTATIN CALCIUM 20 MILLIGRAM(S): 80 TABLET, FILM COATED ORAL at 22:39

## 2021-03-13 RX ADMIN — Medication 200 MICROGRAM(S): at 07:01

## 2021-03-13 RX ADMIN — FENTANYL CITRATE 1 PATCH: 50 INJECTION INTRAVENOUS at 19:33

## 2021-03-13 RX ADMIN — Medication 2: at 06:27

## 2021-03-13 RX ADMIN — ENOXAPARIN SODIUM 40 MILLIGRAM(S): 100 INJECTION SUBCUTANEOUS at 11:21

## 2021-03-13 RX ADMIN — HYDROMORPHONE HYDROCHLORIDE 0.5 MILLIGRAM(S): 2 INJECTION INTRAMUSCULAR; INTRAVENOUS; SUBCUTANEOUS at 09:50

## 2021-03-13 RX ADMIN — Medication 5 MILLIGRAM(S): at 23:09

## 2021-03-13 RX ADMIN — Medication 1 TABLET(S): at 11:21

## 2021-03-13 NOTE — PROGRESS NOTE ADULT - ASSESSMENT
A/P:     Patient 60 y/o F s/p T8-arthodesis, L1-L3 anterior column release with complex back closure 3/12:     - Primary management per NeuroSx, appreciate care   - Will change Aquacel POD#3  - Keep HV and UCHE drains to suction, monitor output.   - Will continue to monitor output, continue drains.       Plastic Surgery   [phone]

## 2021-03-13 NOTE — PROGRESS NOTE ADULT - SUBJECTIVE AND OBJECTIVE BOX
HPI:  This is a 62 y/o female PMH esophageal spasms, usually while lying down, dysphagia, gastritis, is followed by GI, had an endoscopy in October, no interventions performed, takes pantoprazole, chronic lumbar and cervical spine disc disease, S/P cervical spinal fusion in 2020, S/P lumbar fusion 2003, continues to have low back pain and bilateral sciatica.  Presents today for stage 1 L1-L2, L2-3 lateral interbody fusion, stage 2 posterior T8-pelvis instrumented fusion with Mazor robot plastic closure, both to be done on 3/12/21. (26 Feb 2021 15:12)    SURGERY: Closure of surgical wound of back with a muscle flap Arthrodesis of thoracic or thoracolumbar region posterolateral technique    24 hrEVENTS:   T8-PELVIS ARTHRODESIS, L1-3 ANTERIOR COLUMN RELEASE (RIGHT LATERAL APPROACH), She had 500 ml EBL, Received 3 L NS in the OR, at the end of the procedure, she went into bigeminy and hypotension , resolved     REVIEW OF SYSTEMS: [] Unable to Assess due to neurologic exam   [x] All ROS addressed below are non-contributory, except:  Neuro: [ ] Headache [ ] Back pain [ ] Numbness [ ] Weakness [ ] Ataxia [ ] Dizziness [ ] Aphasia [ ] Dysarthria [ ] Visual disturbance  Resp: [ ] Shortness of breath/dyspnea [ ] Orthopnea [ ] Cough  CV: [ ] Chest pain [ ] Palpitation [ ] Lightheadedness [ ] Syncope  Renal: [ ] Thirst [ ] Edema  GI: [ ] Nausea [ ] Emesis [ ] Abdominal pain [ ] Constipation [ ] Diarrhea  Hem: [ ] Hematemesis [ ] bBright red blood per rectum  ID: [ ] Fever [ ] Chills [ ] Dysuria  ENT: [ ] Rhinorrhea    PHYSICAL EXAM:  General: No Acute Distress   lip edema   Neurological: Awake,, alert, agitated, not follwoing commands, moving all 4 extremities at least antigravity   Pulmonary: Clear to Auscultation, No Rales, No Rhonchi, No Wheezes   Cardiovascular: S1, S2, Regular Rate and Rhythm   Gastrointestinal: Soft, Nontender, Nondistended   Extremities: No calf tenderness        DEVICES: [] Restraints [] UCHE/HMV []LD [] ET tube [] Trach [] Chest Tube [] A-line [] Gamez [] NGT [] Rectal Tube     ICU Vital Signs Last 24 Hrs  T(C): 36.6 (13 Mar 2021 03:00), Max: 37.7 (12 Mar 2021 17:40)  T(F): 97.8 (13 Mar 2021 03:00), Max: 99.9 (12 Mar 2021 17:40)  HR: 82 (13 Mar 2021 06:30) (52 - 82)  BP: 129/68 (12 Mar 2021 07:04) (129/68 - 129/68)  BP(mean): --  ABP: 118/67 (13 Mar 2021 06:30) (101/92 - 157/72)  ABP(mean): 86 (13 Mar 2021 06:30) (71 - 112)  RR: 16 (13 Mar 2021 06:30) (12 - 22)  SpO2: 98% (13 Mar 2021 06:30) (95% - 100%)      03-12-21 @ 07:01  -  03-13-21 @ 06:50  --------------------------------------------------------  IN: 2586.7 mL / OUT: 1360 mL / NET: 1226.7 mL            atorvastatin 20 milliGRAM(s) Oral at bedtime  ceFAZolin   IVPB 2000 milliGRAM(s) IV Intermittent every 8 hours  ceFAZolin   IVPB 2000 milliGRAM(s) IV Intermittent once  chlorhexidine 4% Liquid 1 Application(s) Topical <User Schedule>  dexMEDEtomidine Infusion 1.5 MICROgram(s)/kG/Hr (29.3 mL/Hr) IV Continuous <Continuous>  diazepam    Tablet 5 milliGRAM(s) Oral every 6 hours PRN  enoxaparin Injectable 40 milliGRAM(s) SubCutaneous daily  fentaNYL   Patch  50 MICROgram(s)/Hr 1 Patch Transdermal every 72 hours  HYDROmorphone PCA (1 mG/mL) 30 milliLiter(s) PCA Continuous PCA Continuous  HYDROmorphone PCA (1 mG/mL) Rescue Clinician Bolus 0.5 milliGRAM(s) IV Push every 15 minutes PRN  hyoscyamine SL 0.125 milliGRAM(s) SubLingual daily  influenza   Vaccine 0.5 milliLiter(s) IntraMuscular once  insulin lispro (ADMELOG) corrective regimen sliding scale   SubCutaneous every 6 hours  levothyroxine 200 MICROGram(s) Oral daily  multivitamin 1 Tablet(s) Oral daily  naloxone Injectable 0.1 milliGRAM(s) IV Push every 3 minutes PRN  ondansetron Injectable 4 milliGRAM(s) IV Push every 6 hours PRN  pantoprazole    Tablet 20 milliGRAM(s) Oral before breakfast  PARoxetine 40 milliGRAM(s) Oral daily  senna 2 Tablet(s) Oral at bedtime  sodium chloride 0.9% with potassium chloride 20 mEq/L 1000 milliLiter(s) (75 mL/Hr) IV Continuous <Continuous>      LABS:  Na: 134 (03-12 @ 19:54)  K: 3.9 (03-12 @ 19:54)  Cl: 100 (03-12 @ 19:54)  CO2: 24 (03-12 @ 19:54)  BUN: 9 (03-12 @ 19:54)  Cr: 0.54 (03-12 @ 19:54)  Glu: 200(03-12 @ 19:54)    Hgb: 11.9 (03-12 @ 19:54)  Hct: 36.2 (03-12 @ 19:54)  WBC: 13.15 (03-12 @ 19:54)  Plt: 352 (03-12 @ 19:54)    INR:   PTT:   ABG - ( 12 Mar 2021 16:52 )  pH, Arterial: 7.43  pH, Blood: x     /  pCO2: 41    /  pO2: 300   / HCO3: 27    / Base Excess: 2.6   /  SaO2: 100                                       HPI:  This is a 60 y/o female PMH esophageal spasms, usually while lying down, dysphagia, gastritis, is followed by GI, had an endoscopy in October, no interventions performed, takes pantoprazole, chronic lumbar and cervical spine disc disease, S/P cervical spinal fusion in 2020, S/P lumbar fusion 2003, continues to have low back pain and bilateral sciatica.  Presents today for stage 1 L1-L2, L2-3 lateral interbody fusion, stage 2 posterior T8-pelvis instrumented fusion with Mazor robot plastic closure, both to be done on 3/12/21. (26 Feb 2021 15:12)    SURGERY: Closure of surgical wound of back with a muscle flap Arthrodesis of thoracic or thoracolumbar region posterolateral technique      PAST MEDICAL & SURGICAL HISTORY:  Spondylogenic compression of cervical spinal cord    Fibromyalgia    Degenerative tear of acetabular labrum of left hip    Achilles tendon tear  bilateral    Depression    History of chronic pain  on meds for 16 yrs    Dyskinesia of esophagus    History of dysphagia  resolved after surgery in 2019    H/O gastritis  - on pantoprazole    Osteoarthritis    Spondylolisthesis, cervical region    Cervical herniated disc    Hypothyroidism    S/P cervical spinal fusion  cage 2019    S/P arthroscopy of left knee  x 4    S/P laminectomy with spinal fusion  lumbar spine    S/P hip replacement, right    S/P total knee replacement, left      Allergies    No Known Allergies    Intolerances        24 hrEVENTS:   agitated  PVA pump     REVIEW OF SYSTEMS: [] Unable to Assess due to neurologic exam   [x] All ROS addressed below are non-contributory, except:  Neuro: [ ] Headache [ x] Back pain [ ] Numbness [ ] Weakness [ ] Ataxia [ ] Dizziness [ ] Aphasia [ ] Dysarthria [ ] Visual disturbance  Resp: [ ] Shortness of breath/dyspnea [ ] Orthopnea [ ] Cough  CV: [ ] Chest pain [ ] Palpitation [ ] Lightheadedness [ ] Syncope  Renal: [ ] Thirst [ ] Edema  GI: [ ] Nausea [ ] Emesis [ ] Abdominal pain [ ] Constipation [ ] Diarrhea  Hem: [ ] Hematemesis [ ] bBright red blood per rectum  ID: [ ] Fever [ ] Chills [ ] Dysuria  ENT: [ ] Rhinorrhea    PHYSICAL EXAM:  General: No Acute Distress   lip edema   Neurological: Awake,, alert, following commands, moving all 4 extremities at least antigravity   Pulmonary: Clear to Auscultation, No Rales, No Rhonchi, No Wheezes   Cardiovascular: S1, S2, Regular Rate and Rhythm   Gastrointestinal: Soft, Nontender, Nondistended   Extremities: No calf tenderness        DEVICES: [] Restraints [] UCHE/HMV []LD [] ET tube [] Trach [] Chest Tube [] A-line [] Gamez [] NGT [] Rectal Tube     ICU Vital Signs Last 24 Hrs  T(C): 36.6 (13 Mar 2021 03:00), Max: 37.7 (12 Mar 2021 17:40)  T(F): 97.8 (13 Mar 2021 03:00), Max: 99.9 (12 Mar 2021 17:40)  HR: 82 (13 Mar 2021 06:30) (52 - 82)  BP: 129/68 (12 Mar 2021 07:04) (129/68 - 129/68)  BP(mean): --  ABP: 118/67 (13 Mar 2021 06:30) (101/92 - 157/72)  ABP(mean): 86 (13 Mar 2021 06:30) (71 - 112)  RR: 16 (13 Mar 2021 06:30) (12 - 22)  SpO2: 98% (13 Mar 2021 06:30) (95% - 100%)      03-12-21 @ 07:01  -  03-13-21 @ 06:50  --------------------------------------------------------  IN: 2586.7 mL / OUT: 1360 mL / NET: 1226.7 mL            atorvastatin 20 milliGRAM(s) Oral at bedtime  ceFAZolin   IVPB 2000 milliGRAM(s) IV Intermittent every 8 hours  ceFAZolin   IVPB 2000 milliGRAM(s) IV Intermittent once  chlorhexidine 4% Liquid 1 Application(s) Topical <User Schedule>  dexMEDEtomidine Infusion 1.5 MICROgram(s)/kG/Hr (29.3 mL/Hr) IV Continuous <Continuous>  diazepam    Tablet 5 milliGRAM(s) Oral every 6 hours PRN  enoxaparin Injectable 40 milliGRAM(s) SubCutaneous daily  fentaNYL   Patch  50 MICROgram(s)/Hr 1 Patch Transdermal every 72 hours  HYDROmorphone PCA (1 mG/mL) 30 milliLiter(s) PCA Continuous PCA Continuous  HYDROmorphone PCA (1 mG/mL) Rescue Clinician Bolus 0.5 milliGRAM(s) IV Push every 15 minutes PRN  hyoscyamine SL 0.125 milliGRAM(s) SubLingual daily  influenza   Vaccine 0.5 milliLiter(s) IntraMuscular once  insulin lispro (ADMELOG) corrective regimen sliding scale   SubCutaneous every 6 hours  levothyroxine 200 MICROGram(s) Oral daily  multivitamin 1 Tablet(s) Oral daily  naloxone Injectable 0.1 milliGRAM(s) IV Push every 3 minutes PRN  ondansetron Injectable 4 milliGRAM(s) IV Push every 6 hours PRN  pantoprazole    Tablet 20 milliGRAM(s) Oral before breakfast  PARoxetine 40 milliGRAM(s) Oral daily  senna 2 Tablet(s) Oral at bedtime  sodium chloride 0.9% with potassium chloride 20 mEq/L 1000 milliLiter(s) (75 mL/Hr) IV Continuous <Continuous>      LABS:  Na: 134 (03-12 @ 19:54)  K: 3.9 (03-12 @ 19:54)  Cl: 100 (03-12 @ 19:54)  CO2: 24 (03-12 @ 19:54)  BUN: 9 (03-12 @ 19:54)  Cr: 0.54 (03-12 @ 19:54)  Glu: 200(03-12 @ 19:54)    Hgb: 11.9 (03-12 @ 19:54)  Hct: 36.2 (03-12 @ 19:54)  WBC: 13.15 (03-12 @ 19:54)  Plt: 352 (03-12 @ 19:54)    INR:   PTT:   ABG - ( 12 Mar 2021 16:52 )  pH, Arterial: 7.43  pH, Blood: x     /  pCO2: 41    /  pO2: 300   / HCO3: 27    / Base Excess: 2.6   /  SaO2: 100

## 2021-03-13 NOTE — PROGRESS NOTE ADULT - SUBJECTIVE AND OBJECTIVE BOX
PING KEVIN  [id number]    Subjective:    Patient seen and examined in [facility], afebrile overnight.        Objective:  T(C): 36.6 (03-13-21 @ 07:00), Max: 37.7 (03-12-21 @ 17:40)  HR: 85 (03-13-21 @ 08:00) (52 - 85)  BP: 140/71 (03-13-21 @ 08:00) (140/71 - 140/71)  RR: 17 (03-13-21 @ 08:00) (12 - 22)  SpO2: 99% (03-13-21 @ 08:00) (95% - 100%)  Wt(kg): --   03-12    134<L>  |  100  |  9   ----------------------------<  200<H>  3.9   |  24  |  0.54    Ca    9.0      12 Mar 2021 19:54                          11.9   13.15 )-----------( 352      ( 12 Mar 2021 19:54 )             36.2       03-12 @ 07:01  -  03-13 @ 07:00  --------------------------------------------------------  IN: 2586.7 mL / OUT: 1360 mL / NET: 1226.7 mL    03-13 @ 06:01  -  03-13 @ 10:40  --------------------------------------------------------  IN: 285 mL / OUT: 95 mL / NET: 190 mL      PHYSICAL EXAM:    >> General: Well-developed. Well-nourished. No acute distress.  >> Neck: Supple.  >> Cardiovascular: RRR  >> Abdomen: Non-distended   >> Back: Back soft, flat, dressings clean dry and intact. Drains serosang, holding suction, R HV removed.   >> Extremities: Unremarkable.             MEDICATIONS  (STANDING):  atorvastatin 20 milliGRAM(s) Oral at bedtime  ceFAZolin   IVPB 2000 milliGRAM(s) IV Intermittent every 8 hours  ceFAZolin   IVPB 2000 milliGRAM(s) IV Intermittent once  chlorhexidine 4% Liquid 1 Application(s) Topical <User Schedule>  enoxaparin Injectable 40 milliGRAM(s) SubCutaneous daily  fentaNYL   Patch  50 MICROgram(s)/Hr 1 Patch Transdermal every 72 hours  HYDROmorphone PCA (1 mG/mL) 30 milliLiter(s) PCA Continuous PCA Continuous  hyoscyamine SL 0.125 milliGRAM(s) SubLingual daily  influenza   Vaccine 0.5 milliLiter(s) IntraMuscular once  insulin lispro (ADMELOG) corrective regimen sliding scale   SubCutaneous every 6 hours  levothyroxine 200 MICROGram(s) Oral daily  multivitamin 1 Tablet(s) Oral daily  pantoprazole    Tablet 20 milliGRAM(s) Oral before breakfast  PARoxetine 40 milliGRAM(s) Oral daily  senna 2 Tablet(s) Oral at bedtime  sodium chloride 0.9% with potassium chloride 20 mEq/L 1000 milliLiter(s) (75 mL/Hr) IV Continuous <Continuous>    MEDICATIONS  (PRN):  diazepam    Tablet 5 milliGRAM(s) Oral every 6 hours PRN SPASM  HYDROmorphone PCA (1 mG/mL) Rescue Clinician Bolus 0.5 milliGRAM(s) IV Push every 15 minutes PRN for Pain Scale GREATER THAN 6  naloxone Injectable 0.1 milliGRAM(s) IV Push every 3 minutes PRN For ANY of the following changes in patient status:  A. RR LESS THAN 10 breaths per minute, B. Oxygen saturation LESS THAN 90%, C. Sedation score of 6  ondansetron Injectable 4 milliGRAM(s) IV Push every 6 hours PRN Nausea

## 2021-03-13 NOTE — PROGRESS NOTE ADULT - ASSESSMENT
A/P: This is a 62 y/o female s/p  stage 1 L1-L2, L2-3 lateral interbody fusion, stage 2 posterior T8-pelvis instrumented fusion w/ Mazor robot  w/ plastic closure    NEURO:  neuro checks q 1 hr   2 deep hemovac, 2 superficial UCHE monitor output  CT spine in am   PCA pump   history of opiate dependence   Dilaudid as needed until she is more awake to use the PCA pump   Precedex   fentanyl patch   depression continue paroxetine   -PT/OT evaluation placed     PULMONARY:  saturating well on RA,   -continue to monitor on pulse o2   -incentive spirometry 10q/hr when awake     CARDIOVASCULAR:  monitor on telemetry   vitals q1  sbp goal 100-160  MAP> 65 mmhg,   a line     GASTROENTEROLOGY:  bedside speech & swallow if pass can start advancing diet as tolerated.   ensure BMs w/ Miralax & senna    RENAL/:  -check BMP qd  NS @75 ml/hr    strict i/o's    ENDOCRINE:  A1c-  TSH-  hypothyroid on synthroid     HEME/ONC:    ml   Transfuse if hgb < 8   DVT ppx: will hold chemical dvt ppx in setting of recent operation.     Code Status: [x] Full Code [] DNR [] DNI [] Goals of Care:   Disposition: [x] ICU [] Stroke Unit [] RCU []PCU []Floor [] Discharge Home     Patient at high risk for neurologic deterioration, critical care time, excluding procedures: 35 minutes   A/P: This is a 62 y/o female s/p  stage 1 L1-L2, L2-3 lateral interbody fusion, stage 2 posterior T8-pelvis instrumented fusion w/ Mazor robot  w/ plastic closure    NEURO:  neuro checks q 4 hr   2 deep hemovac,  lef 280 cc, right 275 ,2 superficial UCHE monitor output, left 60 cc, right 30 cc   CT spine T and L spine today    PCA pump   history of opiate dependence   Dilaudid PCA pump , anesthesia   fentanyl patch   valium PRN   depression continue paroxetine   -PT/OT evaluation placed     PULMONARY:  saturating well on NC 2 L   -continue to monitor on pulse o2   -incentive spirometry 10q/hr when awake     CARDIOVASCULAR:  monitor on telemetry   vitals stable   sbp goal 100-160  MAP> 65 mmhg,   d/c a line     GASTROENTEROLOGY:  bedside speech & swallow if pass can start advancing diet as tolerated.   ensure BMs w/ Miralax & senna    RENAL/:  -check BMP qd  NS @75 ml/hr  until she can tolerate PO intake   hyponatremia, will monitor   strict i/o's    ENDOCRINE:  hypothyroid on synthroid   ISS   finger sticks   slightly hyperglycemic could be from intra-op steroids she got     HEME/ONC:    ml   Transfuse if hgb < 8   slight drop in hgb , monitor   DVT ppx: lovenox 40 mg sc qhs     Code Status: [x] Full Code [] DNR [] DNI [] Goals of Care:   Disposition: [] ICU [] Stroke Unit [] RCU []PCU [x]Floor [] Discharge Home     not critical

## 2021-03-13 NOTE — PROGRESS NOTE ADULT - SUBJECTIVE AND OBJECTIVE BOX
Patient seen and examined at bedside.    --Anticoagulation--    T(C): 36.4 (03-12-21 @ 23:00), Max: 37.7 (03-12-21 @ 17:40)  HR: 56 (03-13-21 @ 01:00) (52 - 81)  BP: 129/68 (03-12-21 @ 07:04) (129/68 - 129/68)  RR: 15 (03-13-21 @ 01:00) (12 - 22)  SpO2: 98% (03-13-21 @ 01:00) (95% - 100%)  Wt(kg): --    Exam: AAOx3, FC, COOK full strength, SILT

## 2021-03-13 NOTE — PROGRESS NOTE ADULT - SUBJECTIVE AND OBJECTIVE BOX
Day __ of Anesthesia Pain Management Service    SUBJECTIVE: Patient is doing well with IV PCA    Pain Scale Score:	[X] Refer to charted pain scores    THERAPY:    [ ] IV PCA Morphine		[ ] 5 mg/mL	[ ] 1 mg/mL  [X] IV PCA Hydromorphone	[ ] 5 mg/mL	[X] 1 mg/mL  [ ] IV PCA Fentanyl		[ ] 50 micrograms/mL    Demand dose: 0.2 mg     Lockout: 6 minutes   Continuous Rate: 0 mg/hr  4 Hour Limit: 4 mg    MEDICATIONS  (STANDING):  atorvastatin 20 milliGRAM(s) Oral at bedtime  ceFAZolin   IVPB 2000 milliGRAM(s) IV Intermittent every 8 hours  ceFAZolin   IVPB 2000 milliGRAM(s) IV Intermittent once  chlorhexidine 4% Liquid 1 Application(s) Topical <User Schedule>  enoxaparin Injectable 40 milliGRAM(s) SubCutaneous daily  fentaNYL   Patch  50 MICROgram(s)/Hr 1 Patch Transdermal every 72 hours  HYDROmorphone PCA (1 mG/mL) 30 milliLiter(s) PCA Continuous PCA Continuous  hyoscyamine SL 0.125 milliGRAM(s) SubLingual daily  influenza   Vaccine 0.5 milliLiter(s) IntraMuscular once  insulin lispro (ADMELOG) corrective regimen sliding scale   SubCutaneous every 6 hours  levothyroxine 200 MICROGram(s) Oral daily  multivitamin 1 Tablet(s) Oral daily  pantoprazole    Tablet 20 milliGRAM(s) Oral before breakfast  PARoxetine 40 milliGRAM(s) Oral daily  senna 2 Tablet(s) Oral at bedtime  sodium chloride 0.9% with potassium chloride 20 mEq/L 1000 milliLiter(s) (75 mL/Hr) IV Continuous <Continuous>    MEDICATIONS  (PRN):  diazepam    Tablet 5 milliGRAM(s) Oral every 6 hours PRN SPASM  HYDROmorphone PCA (1 mG/mL) Rescue Clinician Bolus 0.5 milliGRAM(s) IV Push every 15 minutes PRN for Pain Scale GREATER THAN 6  naloxone Injectable 0.1 milliGRAM(s) IV Push every 3 minutes PRN For ANY of the following changes in patient status:  A. RR LESS THAN 10 breaths per minute, B. Oxygen saturation LESS THAN 90%, C. Sedation score of 6  ondansetron Injectable 4 milliGRAM(s) IV Push every 6 hours PRN Nausea      OBJECTIVE:    Sedation Score:	[ X] Alert	[ ] Drowsy 	[ ] Arousable	[ ] Asleep	[ ] Unresponsive    Side Effects:	[X ] None	[ ] Nausea	[ ] Vomiting	[ ] Pruritus  		[ ] Other:    Vital Signs Last 24 Hrs  T(C): 36.6 (13 Mar 2021 07:00), Max: 37.7 (12 Mar 2021 17:40)  T(F): 97.9 (13 Mar 2021 07:00), Max: 99.9 (12 Mar 2021 17:40)  HR: 85 (13 Mar 2021 08:00) (52 - 85)  BP: 140/71 (13 Mar 2021 08:00) (140/71 - 140/71)  BP(mean): 85 (13 Mar 2021 08:00) (85 - 85)  RR: 17 (13 Mar 2021 08:00) (12 - 22)  SpO2: 99% (13 Mar 2021 08:00) (95% - 100%)    ASSESSMENT/ PLAN    Therapy to  be:               [X] Continued   [ ] Discontinued   [ ] Changed to PRN Analgesics    Documentation and Verification of current medications:   [X] Done	[ ] Not done, not eligible    Comments:

## 2021-03-14 LAB
ANION GAP SERPL CALC-SCNC: 8 MMOL/L — SIGNIFICANT CHANGE UP (ref 5–17)
BUN SERPL-MCNC: 7 MG/DL — SIGNIFICANT CHANGE UP (ref 7–23)
CALCIUM SERPL-MCNC: 9.6 MG/DL — SIGNIFICANT CHANGE UP (ref 8.4–10.5)
CHLORIDE SERPL-SCNC: 102 MMOL/L — SIGNIFICANT CHANGE UP (ref 96–108)
CO2 SERPL-SCNC: 30 MMOL/L — SIGNIFICANT CHANGE UP (ref 22–31)
CREAT SERPL-MCNC: 0.58 MG/DL — SIGNIFICANT CHANGE UP (ref 0.5–1.3)
GLUCOSE BLDC GLUCOMTR-MCNC: 118 MG/DL — HIGH (ref 70–99)
GLUCOSE BLDC GLUCOMTR-MCNC: 123 MG/DL — HIGH (ref 70–99)
GLUCOSE BLDC GLUCOMTR-MCNC: 127 MG/DL — HIGH (ref 70–99)
GLUCOSE BLDC GLUCOMTR-MCNC: 130 MG/DL — HIGH (ref 70–99)
GLUCOSE SERPL-MCNC: 131 MG/DL — HIGH (ref 70–99)
HCT VFR BLD CALC: 32.3 % — LOW (ref 34.5–45)
HGB BLD-MCNC: 10.2 G/DL — LOW (ref 11.5–15.5)
MCHC RBC-ENTMCNC: 30.7 PG — SIGNIFICANT CHANGE UP (ref 27–34)
MCHC RBC-ENTMCNC: 31.6 GM/DL — LOW (ref 32–36)
MCV RBC AUTO: 97.3 FL — SIGNIFICANT CHANGE UP (ref 80–100)
NRBC # BLD: 0 /100 WBCS — SIGNIFICANT CHANGE UP (ref 0–0)
PLATELET # BLD AUTO: 337 K/UL — SIGNIFICANT CHANGE UP (ref 150–400)
POTASSIUM SERPL-MCNC: 4.4 MMOL/L — SIGNIFICANT CHANGE UP (ref 3.5–5.3)
POTASSIUM SERPL-SCNC: 4.4 MMOL/L — SIGNIFICANT CHANGE UP (ref 3.5–5.3)
RBC # BLD: 3.32 M/UL — LOW (ref 3.8–5.2)
RBC # FLD: 14.1 % — SIGNIFICANT CHANGE UP (ref 10.3–14.5)
SODIUM SERPL-SCNC: 140 MMOL/L — SIGNIFICANT CHANGE UP (ref 135–145)
WBC # BLD: 18.43 K/UL — HIGH (ref 3.8–10.5)
WBC # FLD AUTO: 18.43 K/UL — HIGH (ref 3.8–10.5)

## 2021-03-14 RX ORDER — GLUCAGON INJECTION, SOLUTION 0.5 MG/.1ML
1 INJECTION, SOLUTION SUBCUTANEOUS ONCE
Refills: 0 | Status: DISCONTINUED | OUTPATIENT
Start: 2021-03-14 | End: 2021-03-16

## 2021-03-14 RX ORDER — OXYCODONE HYDROCHLORIDE 5 MG/1
15 TABLET ORAL EVERY 4 HOURS
Refills: 0 | Status: DISCONTINUED | OUTPATIENT
Start: 2021-03-14 | End: 2021-03-16

## 2021-03-14 RX ORDER — LEVOTHYROXINE SODIUM 125 MCG
200 TABLET ORAL DAILY
Refills: 0 | Status: DISCONTINUED | OUTPATIENT
Start: 2021-03-14 | End: 2021-03-14

## 2021-03-14 RX ORDER — OXYCODONE HYDROCHLORIDE 5 MG/1
15 TABLET ORAL EVERY 4 HOURS
Refills: 0 | Status: DISCONTINUED | OUTPATIENT
Start: 2021-03-14 | End: 2021-03-14

## 2021-03-14 RX ORDER — ATORVASTATIN CALCIUM 80 MG/1
20 TABLET, FILM COATED ORAL AT BEDTIME
Refills: 0 | Status: DISCONTINUED | OUTPATIENT
Start: 2021-03-14 | End: 2021-03-14

## 2021-03-14 RX ORDER — PANTOPRAZOLE SODIUM 20 MG/1
20 TABLET, DELAYED RELEASE ORAL
Refills: 0 | Status: DISCONTINUED | OUTPATIENT
Start: 2021-03-14 | End: 2021-03-14

## 2021-03-14 RX ORDER — INSULIN LISPRO 100/ML
VIAL (ML) SUBCUTANEOUS
Refills: 0 | Status: DISCONTINUED | OUTPATIENT
Start: 2021-03-14 | End: 2021-03-16

## 2021-03-14 RX ORDER — OXYCODONE HYDROCHLORIDE 5 MG/1
15 TABLET ORAL EVERY 6 HOURS
Refills: 0 | Status: DISCONTINUED | OUTPATIENT
Start: 2021-03-14 | End: 2021-03-14

## 2021-03-14 RX ORDER — SODIUM CHLORIDE 9 MG/ML
1000 INJECTION, SOLUTION INTRAVENOUS
Refills: 0 | Status: DISCONTINUED | OUTPATIENT
Start: 2021-03-14 | End: 2021-03-16

## 2021-03-14 RX ORDER — DEXTROSE 50 % IN WATER 50 %
25 SYRINGE (ML) INTRAVENOUS ONCE
Refills: 0 | Status: DISCONTINUED | OUTPATIENT
Start: 2021-03-14 | End: 2021-03-16

## 2021-03-14 RX ORDER — INSULIN LISPRO 100/ML
VIAL (ML) SUBCUTANEOUS AT BEDTIME
Refills: 0 | Status: DISCONTINUED | OUTPATIENT
Start: 2021-03-14 | End: 2021-03-16

## 2021-03-14 RX ORDER — DEXTROSE 50 % IN WATER 50 %
15 SYRINGE (ML) INTRAVENOUS ONCE
Refills: 0 | Status: DISCONTINUED | OUTPATIENT
Start: 2021-03-14 | End: 2021-03-16

## 2021-03-14 RX ORDER — SODIUM CHLORIDE 9 MG/ML
1000 INJECTION, SOLUTION INTRAVENOUS
Refills: 0 | Status: DISCONTINUED | OUTPATIENT
Start: 2021-03-14 | End: 2021-03-15

## 2021-03-14 RX ORDER — DEXTROSE 50 % IN WATER 50 %
12.5 SYRINGE (ML) INTRAVENOUS ONCE
Refills: 0 | Status: DISCONTINUED | OUTPATIENT
Start: 2021-03-14 | End: 2021-03-16

## 2021-03-14 RX ORDER — OXYCODONE HYDROCHLORIDE 5 MG/1
10 TABLET ORAL EVERY 6 HOURS
Refills: 0 | Status: DISCONTINUED | OUTPATIENT
Start: 2021-03-14 | End: 2021-03-15

## 2021-03-14 RX ADMIN — OXYCODONE HYDROCHLORIDE 15 MILLIGRAM(S): 5 TABLET ORAL at 13:39

## 2021-03-14 RX ADMIN — ENOXAPARIN SODIUM 40 MILLIGRAM(S): 100 INJECTION SUBCUTANEOUS at 12:01

## 2021-03-14 RX ADMIN — OXYCODONE HYDROCHLORIDE 15 MILLIGRAM(S): 5 TABLET ORAL at 17:25

## 2021-03-14 RX ADMIN — OXYCODONE HYDROCHLORIDE 15 MILLIGRAM(S): 5 TABLET ORAL at 13:09

## 2021-03-14 RX ADMIN — DEXTROSE MONOHYDRATE, SODIUM CHLORIDE, AND POTASSIUM CHLORIDE 75 MILLILITER(S): 50; .745; 4.5 INJECTION, SOLUTION INTRAVENOUS at 05:03

## 2021-03-14 RX ADMIN — OXYCODONE HYDROCHLORIDE 15 MILLIGRAM(S): 5 TABLET ORAL at 21:43

## 2021-03-14 RX ADMIN — Medication 40 MILLIGRAM(S): at 12:00

## 2021-03-14 RX ADMIN — SENNA PLUS 2 TABLET(S): 8.6 TABLET ORAL at 21:43

## 2021-03-14 RX ADMIN — FENTANYL CITRATE 1 PATCH: 50 INJECTION INTRAVENOUS at 19:24

## 2021-03-14 RX ADMIN — FENTANYL CITRATE 1 PATCH: 50 INJECTION INTRAVENOUS at 07:06

## 2021-03-14 RX ADMIN — PANTOPRAZOLE SODIUM 20 MILLIGRAM(S): 20 TABLET, DELAYED RELEASE ORAL at 06:13

## 2021-03-14 RX ADMIN — HYDROMORPHONE HYDROCHLORIDE 0.5 MILLIGRAM(S): 2 INJECTION INTRAMUSCULAR; INTRAVENOUS; SUBCUTANEOUS at 01:50

## 2021-03-14 RX ADMIN — Medication 1 TABLET(S): at 12:00

## 2021-03-14 RX ADMIN — HYDROMORPHONE HYDROCHLORIDE 0.5 MILLIGRAM(S): 2 INJECTION INTRAMUSCULAR; INTRAVENOUS; SUBCUTANEOUS at 06:19

## 2021-03-14 RX ADMIN — OXYCODONE HYDROCHLORIDE 15 MILLIGRAM(S): 5 TABLET ORAL at 17:55

## 2021-03-14 RX ADMIN — DEXTROSE MONOHYDRATE, SODIUM CHLORIDE, AND POTASSIUM CHLORIDE 75 MILLILITER(S): 50; .745; 4.5 INJECTION, SOLUTION INTRAVENOUS at 17:29

## 2021-03-14 RX ADMIN — OXYCODONE HYDROCHLORIDE 15 MILLIGRAM(S): 5 TABLET ORAL at 09:03

## 2021-03-14 RX ADMIN — Medication 5 MILLIGRAM(S): at 11:18

## 2021-03-14 RX ADMIN — OXYCODONE HYDROCHLORIDE 15 MILLIGRAM(S): 5 TABLET ORAL at 09:33

## 2021-03-14 RX ADMIN — Medication 0.12 MILLIGRAM(S): at 12:00

## 2021-03-14 RX ADMIN — ATORVASTATIN CALCIUM 20 MILLIGRAM(S): 80 TABLET, FILM COATED ORAL at 21:43

## 2021-03-14 RX ADMIN — Medication 5 MILLIGRAM(S): at 05:10

## 2021-03-14 RX ADMIN — HYDROMORPHONE HYDROCHLORIDE 30 MILLILITER(S): 2 INJECTION INTRAMUSCULAR; INTRAVENOUS; SUBCUTANEOUS at 07:33

## 2021-03-14 RX ADMIN — OXYCODONE HYDROCHLORIDE 15 MILLIGRAM(S): 5 TABLET ORAL at 22:13

## 2021-03-14 RX ADMIN — Medication 200 MICROGRAM(S): at 05:00

## 2021-03-14 NOTE — PHYSICAL THERAPY INITIAL EVALUATION ADULT - DID THE PATIENT HAVE SURGERY?
yes stage 1 L1-L2, L2-3 lateral interbody fusion, stage 2 posterior T8-pelvis instrumented fusion w/ Mazor robot  w/ plastic closure./yes

## 2021-03-14 NOTE — PROGRESS NOTE ADULT - SUBJECTIVE AND OBJECTIVE BOX
Day __ of Anesthesia Pain Management Service    SUBJECTIVE: Patient is doing well with IV PCA    Pain Scale Score:	[X] Refer to charted pain scores    THERAPY:    [ ] IV PCA Morphine		[ ] 5 mg/mL	[ ] 1 mg/mL  [X] IV PCA Hydromorphone	[ ] 5 mg/mL	[X] 1 mg/mL  [ ] IV PCA Fentanyl		[ ] 50 micrograms/mL    Demand dose: 0.2 mg     Lockout: 6 minutes   Continuous Rate: 0 mg/hr  4 Hour Limit: 4 mg    MEDICATIONS  (STANDING):  atorvastatin 20 milliGRAM(s) Oral at bedtime  ceFAZolin   IVPB 2000 milliGRAM(s) IV Intermittent once  enoxaparin Injectable 40 milliGRAM(s) SubCutaneous daily  fentaNYL   Patch  50 MICROgram(s)/Hr 1 Patch Transdermal every 72 hours  hyoscyamine SL 0.125 milliGRAM(s) SubLingual daily  influenza   Vaccine 0.5 milliLiter(s) IntraMuscular once  insulin lispro (ADMELOG) corrective regimen sliding scale   SubCutaneous every 6 hours  levothyroxine 200 MICROGram(s) Oral daily  multivitamin 1 Tablet(s) Oral daily  pantoprazole    Tablet 20 milliGRAM(s) Oral before breakfast  PARoxetine 40 milliGRAM(s) Oral daily  senna 2 Tablet(s) Oral at bedtime  sodium chloride 0.9% with potassium chloride 20 mEq/L 1000 milliLiter(s) (75 mL/Hr) IV Continuous <Continuous>    MEDICATIONS  (PRN):  diazepam    Tablet 5 milliGRAM(s) Oral every 6 hours PRN SPASM  naloxone Injectable 0.1 milliGRAM(s) IV Push every 3 minutes PRN For ANY of the following changes in patient status:  A. RR LESS THAN 10 breaths per minute, B. Oxygen saturation LESS THAN 90%, C. Sedation score of 6  ondansetron Injectable 4 milliGRAM(s) IV Push every 6 hours PRN Nausea  oxyCODONE    IR 10 milliGRAM(s) Oral every 6 hours PRN Moderate Pain (4 - 6)  oxyCODONE    IR 15 milliGRAM(s) Oral every 4 hours PRN Severe Pain (7 - 10)      OBJECTIVE:    Sedation Score:	[ X] Alert	[ ] Drowsy 	[ ] Arousable	[ ] Asleep	[ ] Unresponsive    Side Effects:	[X ] None	[ ] Nausea	[ ] Vomiting	[ ] Pruritus  		[ ] Other:    Vital Signs Last 24 Hrs  T(C): 36.6 (14 Mar 2021 09:54), Max: 36.8 (14 Mar 2021 05:53)  T(F): 97.8 (14 Mar 2021 09:54), Max: 98.3 (14 Mar 2021 05:53)  HR: 82 (14 Mar 2021 09:54) (78 - 89)  BP: 153/63 (14 Mar 2021 09:54) (101/65 - 153/63)  BP(mean): 93 (13 Mar 2021 15:00) (93 - 93)  RR: 18 (14 Mar 2021 09:54) (16 - 18)  SpO2: 93% (14 Mar 2021 09:54) (92% - 100%)    ASSESSMENT/ PLAN    Therapy to  be:               [] Continued   [ ] Discontinued   [x ] Changed to PRN Analgesics    Documentation and Verification of current medications:   [X] Done	[ ] Not done, not eligible    Comments:

## 2021-03-14 NOTE — PROGRESS NOTE ADULT - SUBJECTIVE AND OBJECTIVE BOX
Plastic Surgery    SUBJECTIVE:   Seen and examined at bedside this am. No acute events overnight. Afebrile.     OBJECTIVE: T(C): 36.8 (03-14-21 @ 05:53), Max: 36.8 (03-14-21 @ 05:53)  HR: 86 (03-14-21 @ 05:53) (78 - 93)  BP: 101/65 (03-14-21 @ 05:53) (101/65 - 153/60)  RR: 17 (03-14-21 @ 05:53) (14 - 18)  SpO2: 95% (03-14-21 @ 05:53) (92% - 100%)  Wt(kg): --  I&O's Summary    13 Mar 2021 06:01  -  14 Mar 2021 07:00  --------------------------------------------------------  IN: 2195 mL / OUT: 1610 mL / NET: 585 mL      I&O's Detail    13 Mar 2021 06:01  -  14 Mar 2021 07:00  --------------------------------------------------------  IN:    IV PiggyBack: 50 mL    Oral Fluid: 720 mL    sodium chloride 0.9% w/ Additives: 1425 mL  Total IN: 2195 mL    OUT:    Accordian (mL): 415 mL    Bulb (mL): 75 mL    Bulb (mL): 45 mL    Dexmedetomidine: 0 mL    Indwelling Catheter - Urethral (mL): 1075 mL  Total OUT: 1610 mL    Total NET: 585 mL      General: NAD, lying down  Back: Back soft, flat, dressings clean dry and intact. Drains serosang, holding suction, L HV and JPx2 intact.        MEDICATIONS  (STANDING):  atorvastatin 20 milliGRAM(s) Oral at bedtime  atorvastatin 20 milliGRAM(s) Oral at bedtime  ceFAZolin   IVPB 2000 milliGRAM(s) IV Intermittent once  enoxaparin Injectable 40 milliGRAM(s) SubCutaneous daily  fentaNYL   Patch  50 MICROgram(s)/Hr 1 Patch Transdermal every 72 hours  HYDROmorphone PCA (1 mG/mL) 30 milliLiter(s) PCA Continuous PCA Continuous  hyoscyamine SL 0.125 milliGRAM(s) SubLingual daily  influenza   Vaccine 0.5 milliLiter(s) IntraMuscular once  insulin lispro (ADMELOG) corrective regimen sliding scale   SubCutaneous every 6 hours  levothyroxine 200 MICROGram(s) Oral daily  levothyroxine 200 MICROGram(s) Oral daily  multivitamin 1 Tablet(s) Oral daily  pantoprazole    Tablet 20 milliGRAM(s) Oral before breakfast  pantoprazole    Tablet 20 milliGRAM(s) Oral before breakfast  PARoxetine 40 milliGRAM(s) Oral daily  PARoxetine 30 milliGRAM(s) Oral daily  senna 2 Tablet(s) Oral at bedtime  sodium chloride 0.9% with potassium chloride 20 mEq/L 1000 milliLiter(s) (75 mL/Hr) IV Continuous <Continuous>    MEDICATIONS  (PRN):  diazepam    Tablet 5 milliGRAM(s) Oral every 6 hours PRN SPASM  HYDROmorphone PCA (1 mG/mL) Rescue Clinician Bolus 0.5 milliGRAM(s) IV Push every 15 minutes PRN for Pain Scale GREATER THAN 6  naloxone Injectable 0.1 milliGRAM(s) IV Push every 3 minutes PRN For ANY of the following changes in patient status:  A. RR LESS THAN 10 breaths per minute, B. Oxygen saturation LESS THAN 90%, C. Sedation score of 6  ondansetron Injectable 4 milliGRAM(s) IV Push every 6 hours PRN Nausea      LABS:                        10.2   18.43 )-----------( 337      ( 14 Mar 2021 07:06 )             32.3     03-14    140  |  102  |  7   ----------------------------<  131<H>  4.4   |  30  |  0.58    Ca    9.6      14 Mar 2021 07:05

## 2021-03-14 NOTE — PHYSICAL THERAPY INITIAL EVALUATION ADULT - NAME OF CLINICIAN
INDICATION: 

 

COMPARISON: None.

 

FINDINGS: Single frontal view of the chest is provided.

 

There is minor left basilar subsegmental atelectasis/scar.  The lungs are 

otherwise clear, without effusion or pneumothorax.  Heart size and 

pulmonary vascularity are normal.  There are no acute osseous findings.

 

IMPRESSION: No acute cardiopulmonary disease. NANNETTE Gardner

## 2021-03-14 NOTE — PROGRESS NOTE ADULT - SUBJECTIVE AND OBJECTIVE BOX
SUBJECTIVE: Patient in significant post operative pain, has not had a BM, denies n/v    Vital Signs Last 24 Hrs  T(C): 36.6 (03-14-21 @ 09:54), Max: 36.8 (03-14-21 @ 05:53)  T(F): 97.8 (03-14-21 @ 09:54), Max: 98.3 (03-14-21 @ 05:53)  HR: 82 (03-14-21 @ 09:54) (78 - 89)  BP: 153/63 (03-14-21 @ 09:54) (101/65 - 153/63)  BP(mean): 93 (03-13-21 @ 15:00) (93 - 93)  RR: 18 (03-14-21 @ 09:54) (16 - 18)  SpO2: 93% (03-14-21 @ 09:54) (92% - 100%)    PHYSICAL EXAM:    Constitutional: No Acute Distress     Neurological: Awake, alert, oriented to person, place and time, speech clear and fluent, face equal, tongue midline, briskly following commands, no drift, moves all extremities with 5/5 strength, sensation intact to light touch throughout    Incision: Aqucel in place    Drains: HMV and UCHE in place    Pulmonary: no resp distress    Gastrointestinal: Soft, Non-tender    Extremities: No calf tenderness bilaterally          LABS:                          10.2   18.43 )-----------( 337      ( 14 Mar 2021 07:06 )             32.3    03-14    140  |  102  |  7   ----------------------------<  131<H>  4.4   |  30  |  0.58    Ca    9.6      14 Mar 2021 07:05        03-13 @ 06:01  -  03-14 @ 07:00  --------------------------------------------------------  IN: 2195 mL / OUT: 1610 mL / NET: 585 mL    03-14 @ 07:01  -  03-14 @ 12:28  --------------------------------------------------------  IN: 240 mL / OUT: 570 mL / NET: -330 mL        IMAGING:     MEDICATIONS:  Antibiotics:  ceFAZolin   IVPB 2000 milliGRAM(s) IV Intermittent once    Neuro:  diazepam    Tablet 5 milliGRAM(s) Oral every 6 hours PRN SPASM  fentaNYL   Patch  50 MICROgram(s)/Hr 1 Patch Transdermal every 72 hours  ondansetron Injectable 4 milliGRAM(s) IV Push every 6 hours PRN Nausea  oxyCODONE    IR 10 milliGRAM(s) Oral every 6 hours PRN Moderate Pain (4 - 6)  oxyCODONE    IR 15 milliGRAM(s) Oral every 4 hours PRN Severe Pain (7 - 10)  PARoxetine 40 milliGRAM(s) Oral daily    Cardiac:    Pulm:    GI/:  hyoscyamine SL 0.125 milliGRAM(s) SubLingual daily  pantoprazole    Tablet 20 milliGRAM(s) Oral before breakfast  senna 2 Tablet(s) Oral at bedtime    Other:   atorvastatin 20 milliGRAM(s) Oral at bedtime  enoxaparin Injectable 40 milliGRAM(s) SubCutaneous daily  influenza   Vaccine 0.5 milliLiter(s) IntraMuscular once  insulin lispro (ADMELOG) corrective regimen sliding scale   SubCutaneous every 6 hours  levothyroxine 200 MICROGram(s) Oral daily  multivitamin 1 Tablet(s) Oral daily  naloxone Injectable 0.1 milliGRAM(s) IV Push every 3 minutes PRN For ANY of the following changes in patient status:  A. RR LESS THAN 10 breaths per minute, B. Oxygen saturation LESS THAN 90%, C. Sedation score of 6  sodium chloride 0.9% with potassium chloride 20 mEq/L 1000 milliLiter(s) IV Continuous <Continuous>        DIET:

## 2021-03-14 NOTE — PROGRESS NOTE ADULT - ASSESSMENT
61F with severe lumbar degenerative disease s/p L1-3 XLIF with ACR and T8 to pelvis posterior spinal fusion  Summary:     NEURO:  q4h neuro checks  Pain control - DCed PCA, started oral pain meds  Out of bed as tolerated  PT   Monitor CBC given high drain output  Follow up drain output, per plastics    CARDS:  -150    PULM:  sat > 92%    RENAL:  Light hydration     GASTRO:  advance as tolerated  Patient with no BM, needs BM, will advance bowel regimen    HEME:  monitor H/H    ---> DVT prophylaxis: SCDs, SQL    ENDO:  euglycemia    ID:  afebrile    Discussed with Dr. Petersen and Curtis

## 2021-03-14 NOTE — PHYSICAL THERAPY INITIAL EVALUATION ADULT - PLANNED THERAPY INTERVENTIONS, PT EVAL
1. GOAL: In 3 weeks, pt will be able to navigate 4 steps independently./bed mobility training/gait training/transfer training

## 2021-03-14 NOTE — PHYSICAL THERAPY INITIAL EVALUATION ADULT - PERTINENT HX OF CURRENT PROBLEM, REHAB EVAL
61 y.o. F PMH esophageal spasms, dysphagia, gastritis, chronic lumbar & cervical spine disc disease, S/P cervical spinal fusion in 2020, S/P lumbar fusion 2003, continues to have low back pain & bilateral sciatica. Now s/p stage 1 L1-L2, L2-3 lateral interbody fusion, stage 2 posterior T8-pelvis instrumented fusion w/ Mazor robot  w/ plastic closure.

## 2021-03-14 NOTE — PHYSICAL THERAPY INITIAL EVALUATION ADULT - ADDITIONAL COMMENTS
Pt lives alone in basement apartment, 4 steps down to enter. PTA pt reports being independent w/ all functional mobility & utilized a straight cane for ambulation

## 2021-03-14 NOTE — PROGRESS NOTE ADULT - ASSESSMENT
Patient 60 y/o F s/p T8-arthodesis, L1-L3 anterior column release with complex back closure 3/12.     - Primary management per NeuroSx, appreciate care   - Will change Aquacel POD#3  - Keep HV and UCHE drains to suction, monitor output.   - Will continue to monitor output, continue drains.       Plastic Surgery   [phone]

## 2021-03-15 LAB
A1C WITH ESTIMATED AVERAGE GLUCOSE RESULT: 5.6 % — SIGNIFICANT CHANGE UP (ref 4–5.6)
ESTIMATED AVERAGE GLUCOSE: 114 MG/DL — SIGNIFICANT CHANGE UP (ref 68–114)
GLUCOSE BLDC GLUCOMTR-MCNC: 109 MG/DL — HIGH (ref 70–99)
GLUCOSE BLDC GLUCOMTR-MCNC: 120 MG/DL — HIGH (ref 70–99)
GLUCOSE BLDC GLUCOMTR-MCNC: 153 MG/DL — HIGH (ref 70–99)
GLUCOSE BLDC GLUCOMTR-MCNC: 173 MG/DL — HIGH (ref 70–99)
SARS-COV-2 N GENE NPH QL NAA+PROBE: NOT DETECTED

## 2021-03-15 RX ORDER — POLYETHYLENE GLYCOL 3350 17 G/17G
17 POWDER, FOR SOLUTION ORAL
Refills: 0 | Status: DISCONTINUED | OUTPATIENT
Start: 2021-03-15 | End: 2021-03-21

## 2021-03-15 RX ORDER — ACETAMINOPHEN 500 MG
1000 TABLET ORAL ONCE
Refills: 0 | Status: COMPLETED | OUTPATIENT
Start: 2021-03-15 | End: 2021-03-15

## 2021-03-15 RX ORDER — SODIUM CHLORIDE 9 MG/ML
500 INJECTION INTRAMUSCULAR; INTRAVENOUS; SUBCUTANEOUS ONCE
Refills: 0 | Status: COMPLETED | OUTPATIENT
Start: 2021-03-15 | End: 2021-03-15

## 2021-03-15 RX ORDER — ACETAMINOPHEN 500 MG
650 TABLET ORAL EVERY 6 HOURS
Refills: 0 | Status: DISCONTINUED | OUTPATIENT
Start: 2021-03-15 | End: 2021-03-21

## 2021-03-15 RX ORDER — OXYCODONE HYDROCHLORIDE 5 MG/1
10 TABLET ORAL EVERY 4 HOURS
Refills: 0 | Status: DISCONTINUED | OUTPATIENT
Start: 2021-03-15 | End: 2021-03-16

## 2021-03-15 RX ORDER — DIAZEPAM 5 MG
5 TABLET ORAL EVERY 8 HOURS
Refills: 0 | Status: DISCONTINUED | OUTPATIENT
Start: 2021-03-15 | End: 2021-03-21

## 2021-03-15 RX ORDER — CYCLOBENZAPRINE HYDROCHLORIDE 10 MG/1
5 TABLET, FILM COATED ORAL THREE TIMES A DAY
Refills: 0 | Status: DISCONTINUED | OUTPATIENT
Start: 2021-03-15 | End: 2021-03-21

## 2021-03-15 RX ORDER — SODIUM CHLORIDE 9 MG/ML
1000 INJECTION INTRAMUSCULAR; INTRAVENOUS; SUBCUTANEOUS
Refills: 0 | Status: DISCONTINUED | OUTPATIENT
Start: 2021-03-15 | End: 2021-03-17

## 2021-03-15 RX ADMIN — OXYCODONE HYDROCHLORIDE 15 MILLIGRAM(S): 5 TABLET ORAL at 03:43

## 2021-03-15 RX ADMIN — Medication 0.12 MILLIGRAM(S): at 13:04

## 2021-03-15 RX ADMIN — SODIUM CHLORIDE 1000 MILLILITER(S): 9 INJECTION INTRAMUSCULAR; INTRAVENOUS; SUBCUTANEOUS at 15:13

## 2021-03-15 RX ADMIN — OXYCODONE HYDROCHLORIDE 15 MILLIGRAM(S): 5 TABLET ORAL at 04:13

## 2021-03-15 RX ADMIN — Medication 1000 MILLIGRAM(S): at 09:57

## 2021-03-15 RX ADMIN — POLYETHYLENE GLYCOL 3350 17 GRAM(S): 17 POWDER, FOR SOLUTION ORAL at 17:51

## 2021-03-15 RX ADMIN — Medication 1 TABLET(S): at 13:02

## 2021-03-15 RX ADMIN — CYCLOBENZAPRINE HYDROCHLORIDE 5 MILLIGRAM(S): 10 TABLET, FILM COATED ORAL at 15:13

## 2021-03-15 RX ADMIN — CYCLOBENZAPRINE HYDROCHLORIDE 5 MILLIGRAM(S): 10 TABLET, FILM COATED ORAL at 21:58

## 2021-03-15 RX ADMIN — Medication 200 MICROGRAM(S): at 06:02

## 2021-03-15 RX ADMIN — FENTANYL CITRATE 1 PATCH: 50 INJECTION INTRAVENOUS at 18:59

## 2021-03-15 RX ADMIN — OXYCODONE HYDROCHLORIDE 10 MILLIGRAM(S): 5 TABLET ORAL at 21:58

## 2021-03-15 RX ADMIN — SODIUM CHLORIDE 50 MILLILITER(S): 9 INJECTION INTRAMUSCULAR; INTRAVENOUS; SUBCUTANEOUS at 17:52

## 2021-03-15 RX ADMIN — OXYCODONE HYDROCHLORIDE 10 MILLIGRAM(S): 5 TABLET ORAL at 22:28

## 2021-03-15 RX ADMIN — FENTANYL CITRATE 1 PATCH: 50 INJECTION INTRAVENOUS at 06:01

## 2021-03-15 RX ADMIN — Medication 400 MILLIGRAM(S): at 09:17

## 2021-03-15 RX ADMIN — ONDANSETRON 4 MILLIGRAM(S): 8 TABLET, FILM COATED ORAL at 03:37

## 2021-03-15 RX ADMIN — PANTOPRAZOLE SODIUM 20 MILLIGRAM(S): 20 TABLET, DELAYED RELEASE ORAL at 06:02

## 2021-03-15 RX ADMIN — ATORVASTATIN CALCIUM 20 MILLIGRAM(S): 80 TABLET, FILM COATED ORAL at 21:58

## 2021-03-15 RX ADMIN — ENOXAPARIN SODIUM 40 MILLIGRAM(S): 100 INJECTION SUBCUTANEOUS at 13:06

## 2021-03-15 RX ADMIN — Medication 2: at 12:38

## 2021-03-15 RX ADMIN — Medication 40 MILLIGRAM(S): at 13:02

## 2021-03-15 NOTE — PROGRESS NOTE ADULT - ASSESSMENT
HPI:  This is a 62 y/o female PMH esophageal spasms, usually while lying down, dysphagia, gastritis, is followed by GI, had an endoscopy in October, no interventions performed, takes pantoprazole, chronic lumbar and cervical spine disc disease, S/P cervical spinal fusion in 2020, S/P lumbar fusion 2003, continues to have low back pain and bilateral sciatica.  Presents today for stage 1 L1-L2, L2-3 lateral interbody fusion, stage 2 posterior T8-pelvis instrumented fusion with Mazor robot plastic closure, both to be done on 3/12/21. (26 Feb 2021 15:12)    PAST MEDICAL & SURGICAL HISTORY:  Spondylogenic compression of cervical spinal cord    Fibromyalgia    Degenerative tear of acetabular labrum of left hip    Achilles tendon tear  bilateral    Depression    History of chronic pain  on meds for 16 yrs    Dyskinesia of esophagus    History of dysphagia  resolved after surgery in 2019    H/O gastritis  - on pantoprazole    Osteoarthritis    Spondylolisthesis, cervical region    Cervical herniated disc    Hypothyroidism    S/P cervical spinal fusion  cage 2019    S/P arthroscopy of left knee  x 4    S/P laminectomy with spinal fusion  lumbar spine    S/P hip replacement, right    S/P total knee replacement, left      PROCEDURE: 3/12/21 s/p T8-PELVIS ARTHRODESIS, L1-3 ANTERIOR COLUMN RELEASE (RIGHT LATERAL APPROACH), with plastic closure for spinal stenosis with radiculopathy.  POD#3    PLAN:  Neuro: continue fentanyl patch, oxycodone PRN pain, flexeril 5mg q8hrs with valium 5mg PRN severe spasms  continue drains and monitor output  Respiratory: patient instructed on incentive spirometer  CV: stable, no meds  Endocrine: on sliding scale coverage, HGA1C 5.6- will monitor and likely stop coverage if FS remain low              DVT ppx: [] SQH [x] SQL and venodynes bilaterally  Renal: IVL, d/c moran in am  ID: afebrile  GI: last BM 3/12, bowel regimen  PT/OT: TBD, needs to increase OOB  f/u am labs    Will discuss with Dr Curtis Contrerasink # [id number]

## 2021-03-15 NOTE — OCCUPATIONAL THERAPY INITIAL EVALUATION ADULT - LIVES WITH, PROFILE
lives alone, independent with ADLs, reports difficulty with IADLs but able to complete (assist for activities outside of the house ie: food shopping). Use of SC. +tub with seat, +standard toilet.

## 2021-03-15 NOTE — PROGRESS NOTE ADULT - SUBJECTIVE AND OBJECTIVE BOX
Patient is a 60 y/o F s/p T8-arthodesis, L1-L3 anterior column release with complex back closure 3/12. seen and examined at bedside. Afebrile and vital signs stable overnight. Reports nausea, but no vomiting.     SUBJECTIVE:   Patient seen and examined at bedside. Afebrile and vital signs stable overnight. Reports nausea, but no vomiting.     OBJECTIVE: T(C): 36.9 (03-15-21 @ 12:10), Max: 36.9 (03-15-21 @ 12:10)  HR: 88 (03-15-21 @ 12:10) (83 - 100)  BP: 129/67 (03-15-21 @ 12:10) (129/67 - 157/69)  RR: 18 (03-15-21 @ 12:10) (18 - 18)  SpO2: 92% (03-15-21 @ 12:10) (92% - 95%)  Wt(kg): --  I&O's Summary    14 Mar 2021 07:01  -  15 Mar 2021 07:00  --------------------------------------------------------  IN: 2645 mL / OUT: 1872 mL / NET: 773 mL    15 Mar 2021 07:01  -  15 Mar 2021 13:43  --------------------------------------------------------  IN: 120 mL / OUT: 370 mL / NET: -250 mL      I&O's Detail    14 Mar 2021 07:01  -  15 Mar 2021 07:00  --------------------------------------------------------  IN:    Oral Fluid: 1070 mL    sodium chloride 0.9% w/ Additives: 1575 mL  Total IN: 2645 mL    OUT:    Accordian (mL): 370 mL    Bulb (mL): 62 mL    Bulb (mL): 65 mL    Indwelling Catheter - Urethral (mL): 1375 mL  Total OUT: 1872 mL    Total NET: 773 mL      15 Mar 2021 07:01  -  15 Mar 2021 13:43  --------------------------------------------------------  IN:    Oral Fluid: 120 mL  Total IN: 120 mL    OUT:    Bulb (mL): 70 mL    Indwelling Catheter - Urethral (mL): 300 mL  Total OUT: 370 mL    Total NET: -250 mL    LHV: 370 cc  LJP: 25 cc  RJP: 42 cc    General: NAD, lying down  Back: Back soft, flat, no palpable collections. Dressings clean dry and intact. Aquplast, and bioderm dressings changed. Drains serosang, holding suction, L HV and JPx2 intact.      MEDICATIONS  (STANDING):  atorvastatin 20 milliGRAM(s) Oral at bedtime  cyclobenzaprine 5 milliGRAM(s) Oral three times a day  dextrose 40% Gel 15 Gram(s) Oral once  dextrose 5%. 1000 milliLiter(s) (100 mL/Hr) IV Continuous <Continuous>  dextrose 50% Injectable 25 Gram(s) IV Push once  dextrose 50% Injectable 12.5 Gram(s) IV Push once  dextrose 50% Injectable 25 Gram(s) IV Push once  enoxaparin Injectable 40 milliGRAM(s) SubCutaneous daily  fentaNYL   Patch  50 MICROgram(s)/Hr 1 Patch Transdermal every 72 hours  glucagon  Injectable 1 milliGRAM(s) IntraMuscular once  hyoscyamine SL 0.125 milliGRAM(s) SubLingual daily  influenza   Vaccine 0.5 milliLiter(s) IntraMuscular once  insulin lispro (ADMELOG) corrective regimen sliding scale   SubCutaneous three times a day before meals  insulin lispro (ADMELOG) corrective regimen sliding scale   SubCutaneous at bedtime  levothyroxine 200 MICROGram(s) Oral daily  multivitamin 1 Tablet(s) Oral daily  pantoprazole    Tablet 20 milliGRAM(s) Oral before breakfast  PARoxetine 40 milliGRAM(s) Oral daily  polyethylene glycol 3350 17 Gram(s) Oral two times a day  senna 2 Tablet(s) Oral at bedtime    MEDICATIONS  (PRN):  acetaminophen   Tablet .. 650 milliGRAM(s) Oral every 6 hours PRN Temp greater or equal to 38C (100.4F), Mild Pain (1 - 3)  bisacodyl Suppository 10 milliGRAM(s) Rectal daily PRN Constipation  diazepam    Tablet 5 milliGRAM(s) Oral every 8 hours PRN severe spasms  naloxone Injectable 0.1 milliGRAM(s) IV Push every 3 minutes PRN For ANY of the following changes in patient status:  A. RR LESS THAN 10 breaths per minute, B. Oxygen saturation LESS THAN 90%, C. Sedation score of 6  ondansetron Injectable 4 milliGRAM(s) IV Push every 6 hours PRN Nausea  oxyCODONE    IR 10 milliGRAM(s) Oral every 4 hours PRN Moderate Pain (4 - 6)  oxyCODONE    IR 15 milliGRAM(s) Oral every 4 hours PRN Severe Pain (7 - 10)      LABS:                        10.2   18.43 )-----------( 337      ( 14 Mar 2021 07:06 )             32.3     03-14    140  |  102  |  7   ----------------------------<  131<H>  4.4   |  30  |  0.58    Ca    9.6      14 Mar 2021 07:05

## 2021-03-15 NOTE — OCCUPATIONAL THERAPY INITIAL EVALUATION ADULT - PLANNED THERAPY INTERVENTIONS, OT EVAL
ADL retraining/balance training/bed mobility training/cognitive, visual perceptual/strengthening/transfer training

## 2021-03-15 NOTE — PROGRESS NOTE ADULT - SUBJECTIVE AND OBJECTIVE BOX
SUBJECTIVE:   Patient seen and examined at bedside. Afebrile and vital signs stable overnight. Reports nausea, but no vomiting.     OBJECTIVE: T(C): 36.9 (03-15-21 @ 12:10), Max: 36.9 (03-15-21 @ 12:10)  HR: 88 (03-15-21 @ 12:10) (83 - 100)  BP: 129/67 (03-15-21 @ 12:10) (129/67 - 157/69)  RR: 18 (03-15-21 @ 12:10) (18 - 18)  SpO2: 92% (03-15-21 @ 12:10) (92% - 95%)  Wt(kg): --  I&O's Summary    14 Mar 2021 07:01  -  15 Mar 2021 07:00  --------------------------------------------------------  IN: 2645 mL / OUT: 1872 mL / NET: 773 mL    15 Mar 2021 07:01  -  15 Mar 2021 13:43  --------------------------------------------------------  IN: 120 mL / OUT: 370 mL / NET: -250 mL      I&O's Detail    14 Mar 2021 07:01  -  15 Mar 2021 07:00  --------------------------------------------------------  IN:    Oral Fluid: 1070 mL    sodium chloride 0.9% w/ Additives: 1575 mL  Total IN: 2645 mL    OUT:    Accordian (mL): 370 mL    Bulb (mL): 62 mL    Bulb (mL): 65 mL    Indwelling Catheter - Urethral (mL): 1375 mL  Total OUT: 1872 mL    Total NET: 773 mL      15 Mar 2021 07:01  -  15 Mar 2021 13:43  --------------------------------------------------------  IN:    Oral Fluid: 120 mL  Total IN: 120 mL    OUT:    Bulb (mL): 70 mL    Indwelling Catheter - Urethral (mL): 300 mL  Total OUT: 370 mL    Total NET: -250 mL        General: NAD, lying down  Back: Back soft, flat, no palpable collections. dressings clean dry and intact. Drains serosang, holding suction, L HV and JPx2 intact.      MEDICATIONS  (STANDING):  atorvastatin 20 milliGRAM(s) Oral at bedtime  cyclobenzaprine 5 milliGRAM(s) Oral three times a day  dextrose 40% Gel 15 Gram(s) Oral once  dextrose 5%. 1000 milliLiter(s) (100 mL/Hr) IV Continuous <Continuous>  dextrose 50% Injectable 25 Gram(s) IV Push once  dextrose 50% Injectable 12.5 Gram(s) IV Push once  dextrose 50% Injectable 25 Gram(s) IV Push once  enoxaparin Injectable 40 milliGRAM(s) SubCutaneous daily  fentaNYL   Patch  50 MICROgram(s)/Hr 1 Patch Transdermal every 72 hours  glucagon  Injectable 1 milliGRAM(s) IntraMuscular once  hyoscyamine SL 0.125 milliGRAM(s) SubLingual daily  influenza   Vaccine 0.5 milliLiter(s) IntraMuscular once  insulin lispro (ADMELOG) corrective regimen sliding scale   SubCutaneous three times a day before meals  insulin lispro (ADMELOG) corrective regimen sliding scale   SubCutaneous at bedtime  levothyroxine 200 MICROGram(s) Oral daily  multivitamin 1 Tablet(s) Oral daily  pantoprazole    Tablet 20 milliGRAM(s) Oral before breakfast  PARoxetine 40 milliGRAM(s) Oral daily  polyethylene glycol 3350 17 Gram(s) Oral two times a day  senna 2 Tablet(s) Oral at bedtime    MEDICATIONS  (PRN):  acetaminophen   Tablet .. 650 milliGRAM(s) Oral every 6 hours PRN Temp greater or equal to 38C (100.4F), Mild Pain (1 - 3)  bisacodyl Suppository 10 milliGRAM(s) Rectal daily PRN Constipation  diazepam    Tablet 5 milliGRAM(s) Oral every 8 hours PRN severe spasms  naloxone Injectable 0.1 milliGRAM(s) IV Push every 3 minutes PRN For ANY of the following changes in patient status:  A. RR LESS THAN 10 breaths per minute, B. Oxygen saturation LESS THAN 90%, C. Sedation score of 6  ondansetron Injectable 4 milliGRAM(s) IV Push every 6 hours PRN Nausea  oxyCODONE    IR 10 milliGRAM(s) Oral every 4 hours PRN Moderate Pain (4 - 6)  oxyCODONE    IR 15 milliGRAM(s) Oral every 4 hours PRN Severe Pain (7 - 10)      LABS:                        10.2   18.43 )-----------( 337      ( 14 Mar 2021 07:06 )             32.3     03-14    140  |  102  |  7   ----------------------------<  131<H>  4.4   |  30  |  0.58    Ca    9.6      14 Mar 2021 07:05

## 2021-03-15 NOTE — OCCUPATIONAL THERAPY INITIAL EVALUATION ADULT - ADL RETRAINING, OT EVAL
1: complete LBD independently with AE as needed within 4 weeks. 2: complete grooming standing at sink independently within 4 weeks.

## 2021-03-15 NOTE — PROGRESS NOTE ADULT - SUBJECTIVE AND OBJECTIVE BOX
CHIEF COMPLAINT: patient reports pain horrible, not sleeping last night, not able to get OOB,   + moran and refusing to have removed, + HMVC x 1, + JPs x 2    Vital Signs Last 24 Hrs  T(C): 36.9 (15 Mar 2021 12:10), Max: 36.9 (15 Mar 2021 12:10)  T(F): 98.5 (15 Mar 2021 12:10), Max: 98.5 (15 Mar 2021 12:10)  HR: 88 (15 Mar 2021 12:10) (83 - 100)  BP: 129/67 (15 Mar 2021 12:10) (129/67 - 157/69)  BP(mean): --  RR: 18 (15 Mar 2021 12:10) (18 - 18)  SpO2: 92% (15 Mar 2021 12:10) (92% - 95%)    DRAINS: [x] UCHE (left 65cc/24h, R 62cc/24hrs) ) [x] HMV (L 370cc/24h)    PHYSICAL EXAM:    General: No Acute Distress     Neurological: Awake, alert oriented to person, place and time, Following Commands, PERRL, EOMI, Face Symmetrical, Speech Fluent, Moving all extremities, Muscle Strength normal in all four extremities in bed, pain limited at times, No Drift, Sensation to Light Touch Intact    Pulmonary: Clear to Auscultation, No Rales, No Rhonchi, No Wheezes     Cardiovascular: S1, S2, Regular Rate and Rhythm     Gastrointestinal: Soft, Nontender, Nondistended     Incision: +aqucel AG intact, drains x 3 in place    LABS:                        10.2   18.43 )-----------( 337      ( 14 Mar 2021 07:06 )             32.3    03-14    140  |  102  |  7   ----------------------------<  131<H>  4.4   |  30  |  0.58    Ca    9.6      14 Mar 2021 07:05    03-14 @ 07:01  -  03-15 @ 07:00  --------------------------------------------------------  IN: 2645 mL / OUT: 1872 mL / NET: 773 mL    03-15 @ 07:01  -  03-15 @ 13:30  --------------------------------------------------------  IN: 120 mL / OUT: 370 mL / NET: -250 mL    MEDICATIONS:  Anticoagulation:  enoxaparin Injectable 40 milliGRAM(s) SubCutaneous daily    Endo:  atorvastatin 20 milliGRAM(s) Oral at bedtime  dextrose 40% Gel 15 Gram(s) Oral once  dextrose 50% Injectable 25 Gram(s) IV Push once  dextrose 50% Injectable 12.5 Gram(s) IV Push once  dextrose 50% Injectable 25 Gram(s) IV Push once  glucagon  Injectable 1 milliGRAM(s) IntraMuscular once  insulin lispro (ADMELOG) corrective regimen sliding scale   SubCutaneous three times a day before meals  insulin lispro (ADMELOG) corrective regimen sliding scale   SubCutaneous at bedtime  levothyroxine 200 MICROGram(s) Oral daily    Neuro:  acetaminophen   Tablet .. 650 milliGRAM(s) Oral every 6 hours PRN Temp greater or equal to 38C (100.4F), Mild Pain (1 - 3)  cyclobenzaprine 5 milliGRAM(s) Oral three times a day  diazepam    Tablet 5 milliGRAM(s) Oral every 8 hours PRN severe spasms  fentaNYL   Patch  50 MICROgram(s)/Hr 1 Patch Transdermal every 72 hours  ondansetron Injectable 4 milliGRAM(s) IV Push every 6 hours PRN Nausea  oxyCODONE    IR 10 milliGRAM(s) Oral every 4 hours PRN Moderate Pain (4 - 6)  oxyCODONE    IR 15 milliGRAM(s) Oral every 4 hours PRN Severe Pain (7 - 10)  PARoxetine 40 milliGRAM(s) Oral daily    GI/:  bisacodyl Suppository 10 milliGRAM(s) Rectal daily PRN Constipation  hyoscyamine SL 0.125 milliGRAM(s) SubLingual daily  pantoprazole    Tablet 20 milliGRAM(s) Oral before breakfast  polyethylene glycol 3350 17 Gram(s) Oral two times a day  senna 2 Tablet(s) Oral at bedtime    Other:   dextrose 5%. 1000 milliLiter(s) IV Continuous <Continuous>  influenza   Vaccine 0.5 milliLiter(s) IntraMuscular once  multivitamin 1 Tablet(s) Oral daily  naloxone Injectable 0.1 milliGRAM(s) IV Push every 3 minutes PRN For ANY of the following changes in patient status:  A. RR LESS THAN 10 breaths per minute, B. Oxygen saturation LESS THAN 90%, C. Sedation score of 6  sodium chloride 0.9% with potassium chloride 20 mEq/L 1000 milliLiter(s) IV Continuous <Continuous>    DIET: [x] Regular [] CCD [] Renal [] Puree [] Dysphagia [] Tube Feeds:     IMAGING:

## 2021-03-15 NOTE — PROGRESS NOTE ADULT - ASSESSMENT
Patient is a 62 y/o F s/p T8-arthodesis, L1-L3 anterior column release with complex back closure 3/12.     - Today the Aquacel dressings, and biopatches were changed and  incisions and staples  were assessed No signs of infections. Dressing will be changed again in 3 days.  - Keep HV and UCHE drains to suction, monitor output. UCHE drain may be removed when output is <30 cc in 24 hours.  - Will continue to monitor output, continue drains.   - please call with any concerns [phone]

## 2021-03-15 NOTE — OCCUPATIONAL THERAPY INITIAL EVALUATION ADULT - THERAPY FREQUENCY, OT EVAL
Fidel Bermudez is a 76 y o  female  HPI 72-1-2x/week

## 2021-03-15 NOTE — PROGRESS NOTE ADULT - ASSESSMENT
Patient 62 y/o F s/p T8-arthodesis, L1-L3 anterior column release with complex back closure 3/12.     - Primary management per NeuroSx, appreciate care   - Change Aquacel, assess incisions and staples     - Keep HV and UCHE drains to suction, monitor output.   - Will continue to monitor output, continue drains.       Plastic Surgery   [phone]

## 2021-03-15 NOTE — OCCUPATIONAL THERAPY INITIAL EVALUATION ADULT - PERTINENT HX OF CURRENT PROBLEM, REHAB EVAL
60yo F PMH esophageal spasms, usually while lying down, dysphagia, gastritis, is followed by GI, had an endoscopy in October, no interventions performed, takes pantoprazole, chronic lumbar and cervical spine disc disease, S/P cervical spinal fusion in 2020, S/P lumbar fusion 2003, continues to have low back pain and bilateral sciatica.  Presents today for stage 1 L1-L2, L2-3 lateral interbody fusion, stage 2 posterior T8-pelvis instrumented fusion with Mazor robot plastic closure on 3/12/21.

## 2021-03-15 NOTE — OCCUPATIONAL THERAPY INITIAL EVALUATION ADULT - HOME MANAGEMENT SKILLS, PREVIOUS LEVEL OF FUNCTION, OT EVAL
assist outside of the house (ie: food shopping). independent in the home but reports some difficulty due to back pain./independent/needed assist

## 2021-03-16 LAB
ANION GAP SERPL CALC-SCNC: 8 MMOL/L — SIGNIFICANT CHANGE UP (ref 5–17)
BASOPHILS # BLD AUTO: 0.01 K/UL — SIGNIFICANT CHANGE UP (ref 0–0.2)
BASOPHILS NFR BLD AUTO: 0.1 % — SIGNIFICANT CHANGE UP (ref 0–2)
BUN SERPL-MCNC: 5 MG/DL — LOW (ref 7–23)
CALCIUM SERPL-MCNC: 8.6 MG/DL — SIGNIFICANT CHANGE UP (ref 8.4–10.5)
CHLORIDE SERPL-SCNC: 100 MMOL/L — SIGNIFICANT CHANGE UP (ref 96–108)
CO2 SERPL-SCNC: 31 MMOL/L — SIGNIFICANT CHANGE UP (ref 22–31)
CREAT SERPL-MCNC: 0.45 MG/DL — LOW (ref 0.5–1.3)
EOSINOPHIL # BLD AUTO: 0.02 K/UL — SIGNIFICANT CHANGE UP (ref 0–0.5)
EOSINOPHIL NFR BLD AUTO: 0.2 % — SIGNIFICANT CHANGE UP (ref 0–6)
GLUCOSE BLDC GLUCOMTR-MCNC: 113 MG/DL — HIGH (ref 70–99)
GLUCOSE BLDC GLUCOMTR-MCNC: 130 MG/DL — HIGH (ref 70–99)
GLUCOSE SERPL-MCNC: 115 MG/DL — HIGH (ref 70–99)
HCT VFR BLD CALC: 26.8 % — LOW (ref 34.5–45)
HGB BLD-MCNC: 8.7 G/DL — LOW (ref 11.5–15.5)
IMM GRANULOCYTES NFR BLD AUTO: 0.7 % — SIGNIFICANT CHANGE UP (ref 0–1.5)
LYMPHOCYTES # BLD AUTO: 1.6 K/UL — SIGNIFICANT CHANGE UP (ref 1–3.3)
LYMPHOCYTES # BLD AUTO: 13 % — SIGNIFICANT CHANGE UP (ref 13–44)
MCHC RBC-ENTMCNC: 30.3 PG — SIGNIFICANT CHANGE UP (ref 27–34)
MCHC RBC-ENTMCNC: 32.5 GM/DL — SIGNIFICANT CHANGE UP (ref 32–36)
MCV RBC AUTO: 93.4 FL — SIGNIFICANT CHANGE UP (ref 80–100)
MONOCYTES # BLD AUTO: 1.23 K/UL — HIGH (ref 0–0.9)
MONOCYTES NFR BLD AUTO: 10 % — SIGNIFICANT CHANGE UP (ref 2–14)
NEUTROPHILS # BLD AUTO: 9.33 K/UL — HIGH (ref 1.8–7.4)
NEUTROPHILS NFR BLD AUTO: 76 % — SIGNIFICANT CHANGE UP (ref 43–77)
NRBC # BLD: 0 /100 WBCS — SIGNIFICANT CHANGE UP (ref 0–0)
PLATELET # BLD AUTO: 342 K/UL — SIGNIFICANT CHANGE UP (ref 150–400)
POTASSIUM SERPL-MCNC: 3.4 MMOL/L — LOW (ref 3.5–5.3)
POTASSIUM SERPL-SCNC: 3.4 MMOL/L — LOW (ref 3.5–5.3)
RBC # BLD: 2.87 M/UL — LOW (ref 3.8–5.2)
RBC # FLD: 14 % — SIGNIFICANT CHANGE UP (ref 10.3–14.5)
SODIUM SERPL-SCNC: 139 MMOL/L — SIGNIFICANT CHANGE UP (ref 135–145)
WBC # BLD: 12.28 K/UL — HIGH (ref 3.8–10.5)
WBC # FLD AUTO: 12.28 K/UL — HIGH (ref 3.8–10.5)

## 2021-03-16 PROCEDURE — 99222 1ST HOSP IP/OBS MODERATE 55: CPT

## 2021-03-16 RX ORDER — OXYCODONE HYDROCHLORIDE 5 MG/1
20 TABLET ORAL EVERY 4 HOURS
Refills: 0 | Status: DISCONTINUED | OUTPATIENT
Start: 2021-03-16 | End: 2021-03-21

## 2021-03-16 RX ORDER — LANOLIN ALCOHOL/MO/W.PET/CERES
3 CREAM (GRAM) TOPICAL AT BEDTIME
Refills: 0 | Status: DISCONTINUED | OUTPATIENT
Start: 2021-03-16 | End: 2021-03-21

## 2021-03-16 RX ORDER — FENTANYL CITRATE 50 UG/ML
1 INJECTION INTRAVENOUS
Refills: 0 | Status: DISCONTINUED | OUTPATIENT
Start: 2021-03-16 | End: 2021-03-21

## 2021-03-16 RX ORDER — POTASSIUM CHLORIDE 20 MEQ
40 PACKET (EA) ORAL EVERY 4 HOURS
Refills: 0 | Status: COMPLETED | OUTPATIENT
Start: 2021-03-16 | End: 2021-03-16

## 2021-03-16 RX ORDER — ACETAMINOPHEN 500 MG
1000 TABLET ORAL ONCE
Refills: 0 | Status: COMPLETED | OUTPATIENT
Start: 2021-03-16 | End: 2021-03-21

## 2021-03-16 RX ADMIN — Medication 40 MILLIGRAM(S): at 12:33

## 2021-03-16 RX ADMIN — CYCLOBENZAPRINE HYDROCHLORIDE 5 MILLIGRAM(S): 10 TABLET, FILM COATED ORAL at 23:14

## 2021-03-16 RX ADMIN — SODIUM CHLORIDE 50 MILLILITER(S): 9 INJECTION INTRAMUSCULAR; INTRAVENOUS; SUBCUTANEOUS at 06:52

## 2021-03-16 RX ADMIN — Medication 0.12 MILLIGRAM(S): at 12:32

## 2021-03-16 RX ADMIN — Medication 1 TABLET(S): at 12:33

## 2021-03-16 RX ADMIN — CYCLOBENZAPRINE HYDROCHLORIDE 5 MILLIGRAM(S): 10 TABLET, FILM COATED ORAL at 13:20

## 2021-03-16 RX ADMIN — ENOXAPARIN SODIUM 40 MILLIGRAM(S): 100 INJECTION SUBCUTANEOUS at 12:33

## 2021-03-16 RX ADMIN — Medication 3 MILLIGRAM(S): at 23:16

## 2021-03-16 RX ADMIN — POLYETHYLENE GLYCOL 3350 17 GRAM(S): 17 POWDER, FOR SOLUTION ORAL at 06:50

## 2021-03-16 RX ADMIN — OXYCODONE HYDROCHLORIDE 20 MILLIGRAM(S): 5 TABLET ORAL at 16:40

## 2021-03-16 RX ADMIN — PANTOPRAZOLE SODIUM 20 MILLIGRAM(S): 20 TABLET, DELAYED RELEASE ORAL at 06:50

## 2021-03-16 RX ADMIN — FENTANYL CITRATE 1 PATCH: 50 INJECTION INTRAVENOUS at 10:14

## 2021-03-16 RX ADMIN — FENTANYL CITRATE 1 PATCH: 50 INJECTION INTRAVENOUS at 19:28

## 2021-03-16 RX ADMIN — FENTANYL CITRATE 1 PATCH: 50 INJECTION INTRAVENOUS at 03:40

## 2021-03-16 RX ADMIN — CYCLOBENZAPRINE HYDROCHLORIDE 5 MILLIGRAM(S): 10 TABLET, FILM COATED ORAL at 06:50

## 2021-03-16 RX ADMIN — OXYCODONE HYDROCHLORIDE 20 MILLIGRAM(S): 5 TABLET ORAL at 16:01

## 2021-03-16 RX ADMIN — OXYCODONE HYDROCHLORIDE 15 MILLIGRAM(S): 5 TABLET ORAL at 02:53

## 2021-03-16 RX ADMIN — FENTANYL CITRATE 1 PATCH: 50 INJECTION INTRAVENOUS at 07:00

## 2021-03-16 RX ADMIN — ATORVASTATIN CALCIUM 20 MILLIGRAM(S): 80 TABLET, FILM COATED ORAL at 23:14

## 2021-03-16 RX ADMIN — FENTANYL CITRATE 1 PATCH: 50 INJECTION INTRAVENOUS at 03:30

## 2021-03-16 RX ADMIN — Medication 10 MILLIGRAM(S): at 14:10

## 2021-03-16 RX ADMIN — Medication 40 MILLIEQUIVALENT(S): at 10:14

## 2021-03-16 RX ADMIN — Medication 200 MICROGRAM(S): at 06:50

## 2021-03-16 RX ADMIN — OXYCODONE HYDROCHLORIDE 20 MILLIGRAM(S): 5 TABLET ORAL at 23:15

## 2021-03-16 RX ADMIN — POLYETHYLENE GLYCOL 3350 17 GRAM(S): 17 POWDER, FOR SOLUTION ORAL at 17:58

## 2021-03-16 RX ADMIN — OXYCODONE HYDROCHLORIDE 20 MILLIGRAM(S): 5 TABLET ORAL at 23:45

## 2021-03-16 RX ADMIN — Medication 40 MILLIEQUIVALENT(S): at 13:20

## 2021-03-16 RX ADMIN — OXYCODONE HYDROCHLORIDE 15 MILLIGRAM(S): 5 TABLET ORAL at 03:23

## 2021-03-16 NOTE — PROGRESS NOTE ADULT - ASSESSMENT
Patient 60 y/o F s/p T8-arthodesis, L1-L3 anterior column release with complex back closure 3/12.     - Aquacel dressings changed yesterday  - Left UCHE to be removed today  - Primary management per NeuroSx, appreciate care   - Keep HV and UCHE drains to suction, monitor output very closely  - Will continue to monitor output, continue drains.       Plastic Surgery   [phone]

## 2021-03-16 NOTE — CONSULT NOTE ADULT - SUBJECTIVE AND OBJECTIVE BOX
Patient is a 61y old  Female who presents with a chief complaint of Patient is a 62 y/o F s/p T8-arthodesis, L1-L3 anterior column release with complex back closure 3/12. (16 Mar 2021 09:57)      HPI:  This is a 62 y/o female PMH esophageal spasms, usually while lying down, dysphagia, gastritis, is followed by GI, had an endoscopy in October, no interventions performed, takes pantoprazole, chronic lumbar and cervical spine disc disease, S/P cervical spinal fusion in 2020, S/P lumbar fusion 2003, continues to have low back pain and bilateral sciatica.  Presents today for stage 1 L1-L2, L2-3 lateral interbody fusion, stage 2 posterior T8-pelvis instrumented fusion with Mazor robot plastic closure, both to be done on 3/12/21. (26 Feb 2021 15:12)    Interval History:  On 3/12 underwent  stage 1 L1-L2, L2-3 lateral interbody fusion, stage 2 posterior T8-pelvis instrumented fusion with Mazor robot plastic closure,  Post-op course uncomplicated.     REVIEW OF SYSTEMS: No chest pain, shortness of breath, nausea, vomiting or diarhea; other ROS neg     PAST MEDICAL & SURGICAL HISTORY  Spondylogenic compression of cervical spinal cord    Fibromyalgia    Degenerative tear of acetabular labrum of left hip    Achilles tendon tear    Depression    History of chronic pain    Dyskinesia of esophagus    History of dysphagia    H/O gastritis    Osteoarthritis    Spondylolisthesis, cervical region    Cervical herniated disc    Hypothyroidism    S/P cervical spinal fusion    S/P arthroscopy of left knee    S/P laminectomy with spinal fusion    S/P hip replacement, right    S/P total knee replacement, left    FUNCTIONAL HISTORY:   Lives alone in a basement apartmetn w 4 steps downstairs.  PTA Independent    CURRENT FUNCTIONAL STATUS:  Mod A transfers.    FAMILY HISTORY   Family history of DVT    MEDICATIONS   acetaminophen   Tablet .. 650 milliGRAM(s) Oral every 6 hours PRN  atorvastatin 20 milliGRAM(s) Oral at bedtime  bisacodyl Suppository 10 milliGRAM(s) Rectal daily PRN  cyclobenzaprine 5 milliGRAM(s) Oral three times a day  dextrose 40% Gel 15 Gram(s) Oral once  dextrose 5%. 1000 milliLiter(s) IV Continuous <Continuous>  dextrose 50% Injectable 25 Gram(s) IV Push once  dextrose 50% Injectable 25 Gram(s) IV Push once  dextrose 50% Injectable 12.5 Gram(s) IV Push once  diazepam    Tablet 5 milliGRAM(s) Oral every 8 hours PRN  enoxaparin Injectable 40 milliGRAM(s) SubCutaneous daily  fentaNYL   Patch  75 MICROgram(s)/Hr 1 Patch Transdermal every 72 hours  glucagon  Injectable 1 milliGRAM(s) IntraMuscular once  hyoscyamine SL 0.125 milliGRAM(s) SubLingual daily  insulin lispro (ADMELOG) corrective regimen sliding scale   SubCutaneous three times a day before meals  insulin lispro (ADMELOG) corrective regimen sliding scale   SubCutaneous at bedtime  levothyroxine 200 MICROGram(s) Oral daily  multivitamin 1 Tablet(s) Oral daily  naloxone Injectable 0.1 milliGRAM(s) IV Push every 3 minutes PRN  ondansetron Injectable 4 milliGRAM(s) IV Push every 6 hours PRN  oxyCODONE    IR 15 milliGRAM(s) Oral every 4 hours PRN  pantoprazole    Tablet 20 milliGRAM(s) Oral before breakfast  PARoxetine 40 milliGRAM(s) Oral daily  polyethylene glycol 3350 17 Gram(s) Oral two times a day  potassium chloride    Tablet ER 40 milliEquivalent(s) Oral every 4 hours  senna 2 Tablet(s) Oral at bedtime  sodium chloride 0.9%. 1000 milliLiter(s) IV Continuous <Continuous>      ALLERGIES  No Known Allergies      VITALS  T(C): 36.8 (03-16-21 @ 05:55), Max: 37.2 (03-15-21 @ 23:58)  HR: 90 (03-16-21 @ 05:55) (86 - 97)  BP: 138/67 (03-16-21 @ 05:55) (127/65 - 147/76)  RR: 18 (03-16-21 @ 05:55) (18 - 18)  SpO2: 97% (03-16-21 @ 05:55) (92% - 97%)  Wt(kg): --    PHYSICAL EXAM  Constitutional - NAD, Comfortable  HEENT - NCAT, EOMI  Neck - Supple, No limited ROM  Chest - CTA bilaterally, No wheeze, No rhonchi, No crackles  Cardiovascular - RRR, S1S2, No murmurs  Abdomen - BS+, Soft, NTND  Extremities - No C/C/E, No calf tenderness   Neurologic Exam -                    Cognitive - Awake, Alert, AAO to self, place, date, year, situation     Communication - Fluent, No dysarthria, no aphasia     Cranial Nerves - CN 2-12 intact     Motor - No focal deficits      Sensory - Intact to LT     Reflexes - DTR Intact, No primitive reflexive  Psychiatric - Mood stable, Affect WNL    RECENT LABS/IMAGING  CBC Full  -  ( 16 Mar 2021 06:29 )  WBC Count : 12.28 K/uL  RBC Count : 2.87 M/uL  Hemoglobin : 8.7 g/dL  Hematocrit : 26.8 %  Platelet Count - Automated : 342 K/uL  Mean Cell Volume : 93.4 fl  Mean Cell Hemoglobin : 30.3 pg  Mean Cell Hemoglobin Concentration : 32.5 gm/dL  Auto Neutrophil # : 9.33 K/uL  Auto Lymphocyte # : 1.60 K/uL  Auto Monocyte # : 1.23 K/uL  Auto Eosinophil # : 0.02 K/uL  Auto Basophil # : 0.01 K/uL  Auto Neutrophil % : 76.0 %  Auto Lymphocyte % : 13.0 %  Auto Monocyte % : 10.0 %  Auto Eosinophil % : 0.2 %  Auto Basophil % : 0.1 %    03-16    139  |  100  |  5<L>  ----------------------------<  115<H>  3.4<L>   |  31  |  0.45<L>    Ca    8.6      16 Mar 2021 06:29    Impression:  60 yo with functional deficits secondary to diagnosis of Lumbar stenosis s/p fusion.    Plan:  PT- ROM, Bed Mob, Transfers, Amb w AD and bracing as needed  OT- ADLs, bracing  SLP- Dysphagia eval and treat  Prec- Falls, Cardiac  DVT Prophylaxis- Lovenox  monitor anemia and K+  Skin- Turn q2 h  Dispo-  Patient is a 61y old  Female who presents with a chief complaint of Patient is a 60 y/o F s/p T8-arthodesis, L1-L3 anterior column release with complex back closure 3/12. (16 Mar 2021 09:57)      HPI:  This is a 60 y/o female PMH esophageal spasms, usually while lying down, dysphagia, gastritis, is followed by GI, had an endoscopy in October, no interventions performed, takes pantoprazole, chronic lumbar and cervical spine disc disease, S/P cervical spinal fusion in 2020, S/P lumbar fusion 2003, continues to have low back pain and bilateral sciatica.  Presents today for stage 1 L1-L2, L2-3 lateral interbody fusion, stage 2 posterior T8-pelvis instrumented fusion with Mazor robot plastic closure, both to be done on 3/12/21. (26 Feb 2021 15:12)    Interval History:  On 3/12 underwent  stage 1 L1-L2, L2-3 lateral interbody fusion, stage 2 posterior T8-pelvis instrumented fusion with Mazor robot plastic closure,  Post-op course uncomplicated.     REVIEW OF SYSTEMS: Back pain, non - radiating, No chest pain, shortness of breath, nausea, vomiting or diarhea; other ROS neg     PAST MEDICAL & SURGICAL HISTORY  Spondylogenic compression of cervical spinal cord    Fibromyalgia    Degenerative tear of acetabular labrum of left hip    Achilles tendon tear    Depression    History of chronic pain    Dyskinesia of esophagus    History of dysphagia    H/O gastritis    Osteoarthritis    Spondylolisthesis, cervical region    Cervical herniated disc    Hypothyroidism    S/P cervical spinal fusion    S/P arthroscopy of left knee    S/P laminectomy with spinal fusion    S/P hip replacement, right    S/P total knee replacement, left    FUNCTIONAL HISTORY:   Lives alone in a basement apartment w 2 steps downstairs.  PTA Independent    CURRENT FUNCTIONAL STATUS:  Mod A transfers.    FAMILY HISTORY   Family history of DVT    MEDICATIONS   acetaminophen   Tablet .. 650 milliGRAM(s) Oral every 6 hours PRN  atorvastatin 20 milliGRAM(s) Oral at bedtime  bisacodyl Suppository 10 milliGRAM(s) Rectal daily PRN  cyclobenzaprine 5 milliGRAM(s) Oral three times a day  dextrose 40% Gel 15 Gram(s) Oral once  dextrose 5%. 1000 milliLiter(s) IV Continuous <Continuous>  dextrose 50% Injectable 25 Gram(s) IV Push once  dextrose 50% Injectable 25 Gram(s) IV Push once  dextrose 50% Injectable 12.5 Gram(s) IV Push once  diazepam    Tablet 5 milliGRAM(s) Oral every 8 hours PRN  enoxaparin Injectable 40 milliGRAM(s) SubCutaneous daily  fentaNYL   Patch  75 MICROgram(s)/Hr 1 Patch Transdermal every 72 hours  glucagon  Injectable 1 milliGRAM(s) IntraMuscular once  hyoscyamine SL 0.125 milliGRAM(s) SubLingual daily  insulin lispro (ADMELOG) corrective regimen sliding scale   SubCutaneous three times a day before meals  insulin lispro (ADMELOG) corrective regimen sliding scale   SubCutaneous at bedtime  levothyroxine 200 MICROGram(s) Oral daily  multivitamin 1 Tablet(s) Oral daily  naloxone Injectable 0.1 milliGRAM(s) IV Push every 3 minutes PRN  ondansetron Injectable 4 milliGRAM(s) IV Push every 6 hours PRN  oxyCODONE    IR 15 milliGRAM(s) Oral every 4 hours PRN  pantoprazole    Tablet 20 milliGRAM(s) Oral before breakfast  PARoxetine 40 milliGRAM(s) Oral daily  polyethylene glycol 3350 17 Gram(s) Oral two times a day  potassium chloride    Tablet ER 40 milliEquivalent(s) Oral every 4 hours  senna 2 Tablet(s) Oral at bedtime  sodium chloride 0.9%. 1000 milliLiter(s) IV Continuous <Continuous>    ALLERGIES  No Known Allergies    VITALS  T(C): 36.8 (03-16-21 @ 05:55), Max: 37.2 (03-15-21 @ 23:58)  HR: 90 (03-16-21 @ 05:55) (86 - 97)  BP: 138/67 (03-16-21 @ 05:55) (127/65 - 147/76)  RR: 18 (03-16-21 @ 05:55) (18 - 18)  SpO2: 97% (03-16-21 @ 05:55) (92% - 97%)  Wt(kg): --    PHYSICAL EXAM  Constitutional - NAD, Comfortable  HEENT - NCAT, EOMI  Neck - Supple, No limited ROM  Chest - CTA bilaterally, No wheeze, No rhonchi, No crackles  Cardiovascular - RRR, S1S2, No murmurs  Abdomen - BS+, Soft, NTND  Extremities - No C/C/E, No calf tenderness   Neurologic Exam -                 AAO x 3   Motor 4+/5 bl UE and LEs  Sens intact  No clonus     Psychiatric - Mood stable, Affect WNL    RECENT LABS/IMAGING  CBC Full  -  ( 16 Mar 2021 06:29 )  WBC Count : 12.28 K/uL  RBC Count : 2.87 M/uL  Hemoglobin : 8.7 g/dL  Hematocrit : 26.8 %  Platelet Count - Automated : 342 K/uL  Mean Cell Volume : 93.4 fl  Mean Cell Hemoglobin : 30.3 pg  Mean Cell Hemoglobin Concentration : 32.5 gm/dL  Auto Neutrophil # : 9.33 K/uL  Auto Lymphocyte # : 1.60 K/uL  Auto Monocyte # : 1.23 K/uL  Auto Eosinophil # : 0.02 K/uL  Auto Basophil # : 0.01 K/uL  Auto Neutrophil % : 76.0 %  Auto Lymphocyte % : 13.0 %  Auto Monocyte % : 10.0 %  Auto Eosinophil % : 0.2 %  Auto Basophil % : 0.1 %    03-16    139  |  100  |  5<L>  ----------------------------<  115<H>  3.4<L>   |  31  |  0.45<L>    Ca    8.6      16 Mar 2021 06:29    Impression:  60 yo with functional deficits secondary to diagnosis of Lumbar stenosis s/p fusion.    Plan:  PT- ROM, Bed Mob, Transfers, Amb w AD and bracing as needed  OT- ADLs, bracing  Prec- Falls, Cardiac  DVT Prophylaxis- Lovenox  monitor anemia and K+  Skin- Turn q2 h  Dispo- Acute Rehab- can tolerate 3h/d of therapies and requires daily physician visits

## 2021-03-16 NOTE — PROGRESS NOTE ADULT - SUBJECTIVE AND OBJECTIVE BOX
CHIEF COMPLAINT: patient reports pain still bad, not sleeping again last night, got OOB with PT yesterday,   TOV today, + HMVC x 1, + JPs x 2    Vital Signs Last 24 Hrs  T(C): 36.8 (16 Mar 2021 05:55), Max: 37.2 (15 Mar 2021 23:58)  T(F): 98.2 (16 Mar 2021 05:55), Max: 98.9 (15 Mar 2021 23:58)  HR: 90 (16 Mar 2021 05:55) (86 - 97)  BP: 138/67 (16 Mar 2021 05:55) (127/65 - 147/76)  BP(mean): --  RR: 18 (16 Mar 2021 05:55) (18 - 18)  SpO2: 97% (16 Mar 2021 05:55) (92% - 97%)    DRAINS: [x] UCHE (left 25cc/24h, R 120cc/24hrs) ) [x] HMV (L 215cc/24h)    PHYSICAL EXAM:    General: No Acute Distress     Neurological: Awake, alert oriented to person, place and time, Following Commands, PERRL, EOMI, Face Symmetrical, Speech Fluent, Moving all extremities, Muscle Strength normal in all four extremities in bed, pain limited at times, No Drift, Sensation to Light Touch Intact    Pulmonary: Clear to Auscultation, No Rales, No Rhonchi, No Wheezes     Cardiovascular: S1, S2, Regular Rate and Rhythm     Gastrointestinal: Soft, Nontender, Nondistended     Incision: +aqucel AG intact (changed by plastics yesterday), drains x 3 in place (plastics managing)    LABS:                        8.7    12.28 )-----------( 342      ( 16 Mar 2021 06:29 )             26.8   03-16    139  |  100  |  5<L>  ----------------------------<  115<H>  3.4<L>   |  31  |  0.45<L>    Ca    8.6      16 Mar 2021 06:29     MEDICATIONS  (STANDING):  atorvastatin 20 milliGRAM(s) Oral at bedtime  cyclobenzaprine 5 milliGRAM(s) Oral three times a day  dextrose 40% Gel 15 Gram(s) Oral once  dextrose 5%. 1000 milliLiter(s) (100 mL/Hr) IV Continuous <Continuous>  dextrose 50% Injectable 25 Gram(s) IV Push once  dextrose 50% Injectable 12.5 Gram(s) IV Push once  dextrose 50% Injectable 25 Gram(s) IV Push once  enoxaparin Injectable 40 milliGRAM(s) SubCutaneous daily  fentaNYL   Patch  75 MICROgram(s)/Hr 1 Patch Transdermal every 72 hours  glucagon  Injectable 1 milliGRAM(s) IntraMuscular once  hyoscyamine SL 0.125 milliGRAM(s) SubLingual daily  insulin lispro (ADMELOG) corrective regimen sliding scale   SubCutaneous three times a day before meals  insulin lispro (ADMELOG) corrective regimen sliding scale   SubCutaneous at bedtime  levothyroxine 200 MICROGram(s) Oral daily  multivitamin 1 Tablet(s) Oral daily  pantoprazole    Tablet 20 milliGRAM(s) Oral before breakfast  PARoxetine 40 milliGRAM(s) Oral daily  polyethylene glycol 3350 17 Gram(s) Oral two times a day  potassium chloride    Tablet ER 40 milliEquivalent(s) Oral every 4 hours  senna 2 Tablet(s) Oral at bedtime  sodium chloride 0.9%. 1000 milliLiter(s) (50 mL/Hr) IV Continuous <Continuous>    MEDICATIONS  (PRN):  acetaminophen   Tablet .. 650 milliGRAM(s) Oral every 6 hours PRN Temp greater or equal to 38C (100.4F), Mild Pain (1 - 3)  bisacodyl Suppository 10 milliGRAM(s) Rectal daily PRN Constipation  diazepam    Tablet 5 milliGRAM(s) Oral every 8 hours PRN severe spasms  naloxone Injectable 0.1 milliGRAM(s) IV Push every 3 minutes PRN For ANY of the following changes in patient status:  A. RR LESS THAN 10 breaths per minute, B. Oxygen saturation LESS THAN 90%, C. Sedation score of 6  ondansetron Injectable 4 milliGRAM(s) IV Push every 6 hours PRN Nausea  oxyCODONE    IR 15 milliGRAM(s) Oral every 4 hours PRN Severe Pain (7 - 10)    DIET: [x] Regular [] CCD [] Renal [] Puree [] Dysphagia [] Tube Feeds:     IMAGING:

## 2021-03-16 NOTE — PROGRESS NOTE ADULT - SUBJECTIVE AND OBJECTIVE BOX
SUBJECTIVE:  Doing well.   No overnight events. Nausea improved. OOB yesterday with PT.     OBJECTIVE:     ** VITAL SIGNS / I&O's **    T(C): 36.8 (03-16-21 @ 05:55), Max: 37.2 (03-15-21 @ 23:58)  T(F): 98.2 (03-16-21 @ 05:55), Max: 98.9 (03-15-21 @ 23:58)  HR: 90 (03-16-21 @ 05:55) (83 - 97)  BP: 138/67 (03-16-21 @ 05:55) (127/65 - 157/69)  RR: 18 (03-16-21 @ 05:55) (18 - 18)  SpO2: 97% (03-16-21 @ 05:55) (92% - 97%)      15 Mar 2021 07:01  -  16 Mar 2021 07:00  --------------------------------------------------------  IN:    Oral Fluid: 480 mL    sodium chloride 0.9%: 500 mL  Total IN: 980 mL    OUT:    Accordian (mL): 215 mL    Bulb (mL): 120 mL    Bulb (mL): 25 mL    Indwelling Catheter - Urethral (mL): 1525 mL  Total OUT: 1885 mL    Total NET: -905 mL      ** PHYSICAL EXAM **    -- CONSTITUTIONAL: AOx3. NAD.   -- RESPIRATORY: Nonlabored respirations  -- BACK: Dressing clean, dry, intact. Back soft, no collections. Drains serosanguinous.  -- NEUROLOGICAL: SILT in BLE. TA/TP, FHL/EHL, FDL/EDL intact bilaterally.    ** LABS **                 8.7    12.28  )----------(  342       ( 16 Mar 2021 06:29 )               26.8      139    |  100    |  5      ----------------------------<  115        ( 16 Mar 2021 06:29 )  3.4     |  31     |  0.45     Ca    8.6        ( 16 Mar 2021 06:29 )

## 2021-03-16 NOTE — PROGRESS NOTE ADULT - ASSESSMENT
A: Patient 62 y/o F s/p T8-arthodesis, L1-L3 anterior column release with complex back closure 3/12.     P:  - Aquacel dressings changed yesterday  - Left UCHE was removed today.  - Primary management per NeuroSx, appreciate care   - Keep HV and RJP drains to suction, monitor output very closely. Once UCHE drain is <30 cc over 24 hours UCHE drain will be removed.  - Will continue to monitor output, continue drains.   - please call with any concerns [phone]

## 2021-03-16 NOTE — PROGRESS NOTE ADULT - SUBJECTIVE AND OBJECTIVE BOX
Patient is a 60 y/o F s/p T8-arthodesis, L1-L3 anterior column release with complex back closure 3/12. Seen and examined at bedside. Afebrile and vital signs stable overnight. Reports nausea, but no vomiting. Nausea has improved since yesterday. She states pain has improved since yesterday.     OBJECTIVE:     ** VITAL SIGNS / I&O's **    T(C): 36.8 (03-16-21 @ 05:55), Max: 37.2 (03-15-21 @ 23:58)  T(F): 98.2 (03-16-21 @ 05:55), Max: 98.9 (03-15-21 @ 23:58)  HR: 90 (03-16-21 @ 05:55) (83 - 97)  BP: 138/67 (03-16-21 @ 05:55) (127/65 - 157/69)  RR: 18 (03-16-21 @ 05:55) (18 - 18)  SpO2: 97% (03-16-21 @ 05:55) (92% - 97%)      15 Mar 2021 07:01  -  16 Mar 2021 07:00  --------------------------------------------------------  IN:    Oral Fluid: 480 mL    sodium chloride 0.9%: 500 mL  Total IN: 980 mL    OUT:  HMV: 165  LJP: 25  RJP: 30    Procedure: LJP removed with ease. Dressed with guaze and paper tape.       PHYSICAL EXAM     -- CONSTITUTIONAL: AOx3. NAD.   -- RESPIRATORY: Nonlabored respirations  -- BACK: Dressing clean, dry, intact. Back soft, no collections. Drains serosanguinous.    ** LABS **                 8.7    12.28  )----------(  342       ( 16 Mar 2021 06:29 )               26.8      139    |  100    |  5      ----------------------------<  115        ( 16 Mar 2021 06:29 )  3.4     |  31     |  0.45     Ca    8.6        ( 16 Mar 2021 06:29 )

## 2021-03-16 NOTE — PROGRESS NOTE ADULT - ASSESSMENT
HPI:  This is a 62 y/o female PMH esophageal spasms, usually while lying down, dysphagia, gastritis, is followed by GI, had an endoscopy in October, no interventions performed, takes pantoprazole, chronic lumbar and cervical spine disc disease, S/P cervical spinal fusion in 2020, S/P lumbar fusion 2003, continues to have low back pain and bilateral sciatica.  Presents today for stage 1 L1-L2, L2-3 lateral interbody fusion, stage 2 posterior T8-pelvis instrumented fusion with Mazor robot plastic closure, both to be done on 3/12/21. (26 Feb 2021 15:12)    PAST MEDICAL & SURGICAL HISTORY:  Spondylogenic compression of cervical spinal cord    Fibromyalgia    Degenerative tear of acetabular labrum of left hip    Achilles tendon tear  bilateral    Depression    History of chronic pain  on meds for 16 yrs    Dyskinesia of esophagus    History of dysphagia  resolved after surgery in 2019    H/O gastritis  - on pantoprazole    Osteoarthritis    Spondylolisthesis, cervical region    Cervical herniated disc    Hypothyroidism    S/P cervical spinal fusion  cage 2019    S/P arthroscopy of left knee  x 4    S/P laminectomy with spinal fusion  lumbar spine    S/P hip replacement, right    S/P total knee replacement, left    PROCEDURE: 3/12/21 s/p T8-PELVIS ARTHRODESIS, L1-3 ANTERIOR COLUMN RELEASE (RIGHT LATERAL APPROACH), with plastic closure for spinal stenosis with radiculopathy.  POD#4    PLAN:  Neuro: will increase fentanyl patch to 75mcg/72hrs- patient reportedly was on 100mcg approx 1 year ago, oxycodone PRN pain, flexeril 5mg q8hrs with valium 5mg PRN severe spasms; will add melatonin to help with sleep  continue drains and monitor output, managed per plastics   Respiratory: patient instructed on incentive spirometer  CV: stable, no meds  HEME: monitor H/H and trend, clinically stable  Endocrine: will d/c sliding scale coverage as FS < 200, HGA1C 5.6           DVT ppx: [] SQH [x] SQL and venodynes bilaterally  Renal: d/c moran and TOV today  ID: afebrile  GI: last BM 3/12, bowel regimen  PT/OT: acute rehab upon d/c, PMR asked to see  supplement hypokalemia K= 3.4, f/u am labs    Will discuss with Dr Acevedo  [facility] # [id number]       HPI:  This is a 62 y/o female PMH esophageal spasms, usually while lying down, dysphagia, gastritis, is followed by GI, had an endoscopy in October, no interventions performed, takes pantoprazole, chronic lumbar and cervical spine disc disease, S/P cervical spinal fusion in 2020, S/P lumbar fusion 2003, continues to have low back pain and bilateral sciatica.  Presents today for stage 1 L1-L2, L2-3 lateral interbody fusion, stage 2 posterior T8-pelvis instrumented fusion with Mazor robot plastic closure, both to be done on 3/12/21. (26 Feb 2021 15:12)    PAST MEDICAL & SURGICAL HISTORY:  Spondylogenic compression of cervical spinal cord    Fibromyalgia    Degenerative tear of acetabular labrum of left hip    Achilles tendon tear  bilateral    Depression    History of chronic pain  on meds for 16 yrs    Dyskinesia of esophagus    History of dysphagia  resolved after surgery in 2019    H/O gastritis  - on pantoprazole    Osteoarthritis    Spondylolisthesis, cervical region    Cervical herniated disc    Hypothyroidism    S/P cervical spinal fusion  cage 2019    S/P arthroscopy of left knee  x 4    S/P laminectomy with spinal fusion  lumbar spine    S/P hip replacement, right    S/P total knee replacement, left    PROCEDURE: 3/12/21 s/p T8-PELVIS ARTHRODESIS, L1-3 ANTERIOR COLUMN RELEASE (RIGHT LATERAL APPROACH), with plastic closure for spinal stenosis with radiculopathy.  POD#4    PLAN:  Neuro: will increase fentanyl patch to 75mcg/72hrs- patient reportedly was on 100mcg approx 1 year ago, oxycodone PRN pain, flexeril 5mg q8hrs with valium 5mg PRN severe spasms; will add melatonin to help with sleep  continue drains and monitor output, managed per plastics   Respiratory: patient instructed on incentive spirometer  CV: stable, no meds  HEME: monitor H/H and trend, clinically stable  Endocrine: will d/c sliding scale coverage as FS < 200, HGA1C 5.6           DVT ppx: [] SQH [x] SQL and venodynes bilaterally  Renal: d/c moran and TOV today  ID: afebrile; leukocytosis trending down, likely due to intraop decadron   GI: last BM 3/12, bowel regimen  PT/OT: acute rehab upon d/c, PMR asked to see  supplement hypokalemia K= 3.4, f/u am labs    Will discuss with Dr Acevedo  [facility] # [id number]       HPI:  This is a 60 y/o female PMH esophageal spasms, usually while lying down, dysphagia, gastritis, is followed by GI, had an endoscopy in October, no interventions performed, takes pantoprazole, chronic lumbar and cervical spine disc disease, S/P cervical spinal fusion in 2020, S/P lumbar fusion 2003, continues to have low back pain and bilateral sciatica.  Presents today for stage 1 L1-L2, L2-3 lateral interbody fusion, stage 2 posterior T8-pelvis instrumented fusion with Mazor robot plastic closure, both to be done on 3/12/21. (26 Feb 2021 15:12)    PAST MEDICAL & SURGICAL HISTORY:  Spondylogenic compression of cervical spinal cord    Fibromyalgia    Degenerative tear of acetabular labrum of left hip    Achilles tendon tear  bilateral    Depression    History of chronic pain  on meds for 16 yrs    Dyskinesia of esophagus    History of dysphagia  resolved after surgery in 2019    H/O gastritis  - on pantoprazole    Osteoarthritis    Spondylolisthesis, cervical region    Cervical herniated disc    Hypothyroidism    S/P cervical spinal fusion  cage 2019    S/P arthroscopy of left knee  x 4    S/P laminectomy with spinal fusion  lumbar spine    S/P hip replacement, right    S/P total knee replacement, left    PROCEDURE: 3/12/21 s/p T8-PELVIS ARTHRODESIS, L1-3 ANTERIOR COLUMN RELEASE (RIGHT LATERAL APPROACH), with plastic closure for spinal stenosis with radiculopathy.  POD#4    PLAN:  Neuro: will increase fentanyl patch to 75mcg/72hrs- patient reportedly was on 100mcg approx 1 year ago, increase to home oxycodone 20mg PRN pain, flexeril 5mg q8hrs with valium 5mg PRN severe spasms; will add melatonin to help with sleep  ([employer] ref #[id number] confirms duragesic 50mcg, oxycodone 20mg at home)  continue drains and monitor output, managed per plastics   Respiratory: patient instructed on incentive spirometer  CV: stable, no meds  HEME: monitor H/H and trend, clinically stable  Endocrine: will d/c sliding scale coverage as FS < 200, HGA1C 5.6           DVT ppx: [] SQH [x] SQL and venodynes bilaterally  Renal: d/c moran and TOV today  ID: afebrile; leukocytosis trending down, likely due to intraop decadron   GI: last BM 3/12, bowel regimen  PT/OT: acute rehab upon d/c, PMR asked to see  supplement hypokalemia K= 3.4, f/u am labs    Will discuss with Dr Acevedo  [facility] # [id number]

## 2021-03-17 LAB
ANION GAP SERPL CALC-SCNC: 9 MMOL/L — SIGNIFICANT CHANGE UP (ref 5–17)
BUN SERPL-MCNC: 6 MG/DL — LOW (ref 7–23)
CALCIUM SERPL-MCNC: 9.3 MG/DL — SIGNIFICANT CHANGE UP (ref 8.4–10.5)
CHLORIDE SERPL-SCNC: 92 MMOL/L — LOW (ref 96–108)
CO2 SERPL-SCNC: 35 MMOL/L — HIGH (ref 22–31)
CREAT SERPL-MCNC: 0.54 MG/DL — SIGNIFICANT CHANGE UP (ref 0.5–1.3)
GLUCOSE SERPL-MCNC: 117 MG/DL — HIGH (ref 70–99)
POTASSIUM SERPL-MCNC: 3.9 MMOL/L — SIGNIFICANT CHANGE UP (ref 3.5–5.3)
POTASSIUM SERPL-SCNC: 3.9 MMOL/L — SIGNIFICANT CHANGE UP (ref 3.5–5.3)
SODIUM SERPL-SCNC: 136 MMOL/L — SIGNIFICANT CHANGE UP (ref 135–145)

## 2021-03-17 RX ADMIN — Medication 40 MILLIGRAM(S): at 12:35

## 2021-03-17 RX ADMIN — FENTANYL CITRATE 1 PATCH: 50 INJECTION INTRAVENOUS at 18:30

## 2021-03-17 RX ADMIN — CYCLOBENZAPRINE HYDROCHLORIDE 5 MILLIGRAM(S): 10 TABLET, FILM COATED ORAL at 13:27

## 2021-03-17 RX ADMIN — Medication 5 MILLIGRAM(S): at 02:10

## 2021-03-17 RX ADMIN — OXYCODONE HYDROCHLORIDE 20 MILLIGRAM(S): 5 TABLET ORAL at 10:15

## 2021-03-17 RX ADMIN — OXYCODONE HYDROCHLORIDE 20 MILLIGRAM(S): 5 TABLET ORAL at 10:45

## 2021-03-17 RX ADMIN — ENOXAPARIN SODIUM 40 MILLIGRAM(S): 100 INJECTION SUBCUTANEOUS at 12:35

## 2021-03-17 RX ADMIN — Medication 1 TABLET(S): at 12:35

## 2021-03-17 RX ADMIN — SENNA PLUS 2 TABLET(S): 8.6 TABLET ORAL at 21:46

## 2021-03-17 RX ADMIN — PANTOPRAZOLE SODIUM 20 MILLIGRAM(S): 20 TABLET, DELAYED RELEASE ORAL at 06:06

## 2021-03-17 RX ADMIN — Medication 3 MILLIGRAM(S): at 21:46

## 2021-03-17 RX ADMIN — POLYETHYLENE GLYCOL 3350 17 GRAM(S): 17 POWDER, FOR SOLUTION ORAL at 17:40

## 2021-03-17 RX ADMIN — FENTANYL CITRATE 1 PATCH: 50 INJECTION INTRAVENOUS at 06:05

## 2021-03-17 RX ADMIN — OXYCODONE HYDROCHLORIDE 20 MILLIGRAM(S): 5 TABLET ORAL at 15:43

## 2021-03-17 RX ADMIN — OXYCODONE HYDROCHLORIDE 20 MILLIGRAM(S): 5 TABLET ORAL at 16:16

## 2021-03-17 RX ADMIN — CYCLOBENZAPRINE HYDROCHLORIDE 5 MILLIGRAM(S): 10 TABLET, FILM COATED ORAL at 21:46

## 2021-03-17 RX ADMIN — Medication 0.12 MILLIGRAM(S): at 12:35

## 2021-03-17 RX ADMIN — OXYCODONE HYDROCHLORIDE 20 MILLIGRAM(S): 5 TABLET ORAL at 06:06

## 2021-03-17 RX ADMIN — CYCLOBENZAPRINE HYDROCHLORIDE 5 MILLIGRAM(S): 10 TABLET, FILM COATED ORAL at 06:06

## 2021-03-17 RX ADMIN — Medication 200 MICROGRAM(S): at 06:06

## 2021-03-17 RX ADMIN — ATORVASTATIN CALCIUM 20 MILLIGRAM(S): 80 TABLET, FILM COATED ORAL at 21:46

## 2021-03-17 NOTE — PROGRESS NOTE ADULT - ASSESSMENT
Patient 60 y/o F s/p T8-arthodesis, L1-L3 anterior column release with complex back closure 3/12.     - Aquacel dressings intact  - Primary management per NeuroSx, appreciate care   - Keep HV and UCHE drains to suction, monitor output very closely  - Will continue to monitor output, continue drains.       Plastic Surgery   [phone]

## 2021-03-17 NOTE — PROGRESS NOTE ADULT - SUBJECTIVE AND OBJECTIVE BOX
Plastic Surgery      SUBJECTIVE:   Seen and examined at bedside. No acute events overnight.     OBJECTIVE: T(C): 36.7 (03-17-21 @ 09:51), Max: 36.9 (03-16-21 @ 17:29)  HR: 78 (03-17-21 @ 09:51) (68 - 85)  BP: 100/54 (03-17-21 @ 09:51) (100/54 - 167/78)  RR: 17 (03-17-21 @ 09:51) (17 - 18)  SpO2: 93% (03-17-21 @ 09:51) (92% - 98%)  Wt(kg): --  I&O's Summary    16 Mar 2021 07:01  -  17 Mar 2021 07:00  --------------------------------------------------------  IN: 930 mL / OUT: 1900 mL / NET: -970 mL    17 Mar 2021 07:01  -  17 Mar 2021 11:43  --------------------------------------------------------  IN: 120 mL / OUT: 300 mL / NET: -180 mL      I&O's Detail    16 Mar 2021 07:01  -  17 Mar 2021 07:00  --------------------------------------------------------  IN:    Oral Fluid: 480 mL    sodium chloride 0.9%: 450 mL  Total IN: 930 mL    OUT:    Accordian (mL): 110 mL    Bulb (mL): 140 mL    Post-Void Residual per Intermittent Catheterization (mL): 950 mL    Voided (mL): 700 mL  Total OUT: 1900 mL    Total NET: -970 mL      17 Mar 2021 07:01  -  17 Mar 2021 11:43  --------------------------------------------------------  IN:    Oral Fluid: 120 mL  Total IN: 120 mL    OUT:    Voided (mL): 300 mL  Total OUT: 300 mL    Total NET: -180 mL        ** PHYSICAL EXAM **    -- CONSTITUTIONAL: AOx3. NAD.   -- RESPIRATORY: Nonlabored respirations  -- BACK: Dressing clean, dry, intact. Back soft, no collections. Drains serosanguinous.  -- NEUROLOGICAL: SILT in BLE. TA/TP, FHL/EHL, FDL/EDL intact bilaterally.    MEDICATIONS  (STANDING):  atorvastatin 20 milliGRAM(s) Oral at bedtime  cyclobenzaprine 5 milliGRAM(s) Oral three times a day  enoxaparin Injectable 40 milliGRAM(s) SubCutaneous daily  fentaNYL   Patch  75 MICROgram(s)/Hr 1 Patch Transdermal every 72 hours  hyoscyamine SL 0.125 milliGRAM(s) SubLingual daily  levothyroxine 200 MICROGram(s) Oral daily  melatonin 3 milliGRAM(s) Oral at bedtime  multivitamin 1 Tablet(s) Oral daily  pantoprazole    Tablet 20 milliGRAM(s) Oral before breakfast  PARoxetine 40 milliGRAM(s) Oral daily  polyethylene glycol 3350 17 Gram(s) Oral two times a day  senna 2 Tablet(s) Oral at bedtime    MEDICATIONS  (PRN):  acetaminophen   Tablet .. 650 milliGRAM(s) Oral every 6 hours PRN Temp greater or equal to 38C (100.4F), Mild Pain (1 - 3)  acetaminophen  IVPB .. 1000 milliGRAM(s) IV Intermittent once PRN Severe Pain (7 - 10)  bisacodyl Suppository 10 milliGRAM(s) Rectal daily PRN Constipation  diazepam    Tablet 5 milliGRAM(s) Oral every 8 hours PRN severe spasms  naloxone Injectable 0.1 milliGRAM(s) IV Push every 3 minutes PRN For ANY of the following changes in patient status:  A. RR LESS THAN 10 breaths per minute, B. Oxygen saturation LESS THAN 90%, C. Sedation score of 6  ondansetron Injectable 4 milliGRAM(s) IV Push every 6 hours PRN Nausea  oxyCODONE    IR 20 milliGRAM(s) Oral every 4 hours PRN Moderate Pain (4 - 6)      LABS:                        8.7    12.28 )-----------( 342      ( 16 Mar 2021 06:29 )             26.8     03-17    136  |  92<L>  |  6<L>  ----------------------------<  117<H>  3.9   |  35<H>  |  0.54    Ca    9.3      17 Mar 2021 07:19

## 2021-03-17 NOTE — PROGRESS NOTE ADULT - SUBJECTIVE AND OBJECTIVE BOX
CHIEF COMPLAINT: patient reports pain still bad, not sleeping again last night, got OOB with PT yesterday,   TOV today, + HMVC x 1, + JPs x 2    Vital Signs Last 24 Hrs  T(C): 36.8 (16 Mar 2021 05:55), Max: 37.2 (15 Mar 2021 23:58)  T(F): 98.2 (16 Mar 2021 05:55), Max: 98.9 (15 Mar 2021 23:58)  HR: 90 (16 Mar 2021 05:55) (86 - 97)  BP: 138/67 (16 Mar 2021 05:55) (127/65 - 147/76)  BP(mean): --  RR: 18 (16 Mar 2021 05:55) (18 - 18)  SpO2: 97% (16 Mar 2021 05:55) (92% - 97%)    DRAINS: [x] UCHE (left 25cc/24h, R 120cc/24hrs) ) [x] HMV (L 215cc/24h)    PHYSICAL EXAM:    General: No Acute Distress     Neurological: Awake, alert oriented to person, place and time, Following Commands, PERRL, EOMI, Face Symmetrical, Speech Fluent, Moving all extremities, Muscle Strength normal in all four extremities in bed, pain limited at times, No Drift, Sensation to Light Touch Intact    Pulmonary: Clear to Auscultation, No Rales, No Rhonchi, No Wheezes     Cardiovascular: S1, S2, Regular Rate and Rhythm     Gastrointestinal: Soft, Nontender, Nondistended     Incision: +aqucel AG intact (changed by plastics yesterday), drains x 3 in place (plastics managing)    LABS:                        8.7    12.28 )-----------( 342      ( 16 Mar 2021 06:29 )             26.8   03-16    139  |  100  |  5<L>  ----------------------------<  115<H>  3.4<L>   |  31  |  0.45<L>    Ca    8.6      16 Mar 2021 06:29     MEDICATIONS  (STANDING):  atorvastatin 20 milliGRAM(s) Oral at bedtime  cyclobenzaprine 5 milliGRAM(s) Oral three times a day  dextrose 40% Gel 15 Gram(s) Oral once  dextrose 5%. 1000 milliLiter(s) (100 mL/Hr) IV Continuous <Continuous>  dextrose 50% Injectable 25 Gram(s) IV Push once  dextrose 50% Injectable 12.5 Gram(s) IV Push once  dextrose 50% Injectable 25 Gram(s) IV Push once  enoxaparin Injectable 40 milliGRAM(s) SubCutaneous daily  fentaNYL   Patch  75 MICROgram(s)/Hr 1 Patch Transdermal every 72 hours  glucagon  Injectable 1 milliGRAM(s) IntraMuscular once  hyoscyamine SL 0.125 milliGRAM(s) SubLingual daily  insulin lispro (ADMELOG) corrective regimen sliding scale   SubCutaneous three times a day before meals  insulin lispro (ADMELOG) corrective regimen sliding scale   SubCutaneous at bedtime  levothyroxine 200 MICROGram(s) Oral daily  multivitamin 1 Tablet(s) Oral daily  pantoprazole    Tablet 20 milliGRAM(s) Oral before breakfast  PARoxetine 40 milliGRAM(s) Oral daily  polyethylene glycol 3350 17 Gram(s) Oral two times a day  potassium chloride    Tablet ER 40 milliEquivalent(s) Oral every 4 hours  senna 2 Tablet(s) Oral at bedtime  sodium chloride 0.9%. 1000 milliLiter(s) (50 mL/Hr) IV Continuous <Continuous>    MEDICATIONS  (PRN):  acetaminophen   Tablet .. 650 milliGRAM(s) Oral every 6 hours PRN Temp greater or equal to 38C (100.4F), Mild Pain (1 - 3)  bisacodyl Suppository 10 milliGRAM(s) Rectal daily PRN Constipation  diazepam    Tablet 5 milliGRAM(s) Oral every 8 hours PRN severe spasms  naloxone Injectable 0.1 milliGRAM(s) IV Push every 3 minutes PRN For ANY of the following changes in patient status:  A. RR LESS THAN 10 breaths per minute, B. Oxygen saturation LESS THAN 90%, C. Sedation score of 6  ondansetron Injectable 4 milliGRAM(s) IV Push every 6 hours PRN Nausea  oxyCODONE    IR 15 milliGRAM(s) Oral every 4 hours PRN Severe Pain (7 - 10)    DIET: [x] Regular [] CCD [] Renal [] Puree [] Dysphagia [] Tube Feeds:     IMAGING:    Patient seen and examined, endorses pain is better controlled. +TOV.  Ambulating with PT.     OVERNIGHT: Gamez removed, +TOV.  LT UCHE removed yesterday.    Vital Signs Last 24 Hrs  T(C): 36.7 (17 Mar 2021 09:51), Max: 36.9 (16 Mar 2021 17:29)  T(F): 98 (17 Mar 2021 09:51), Max: 98.5 (16 Mar 2021 17:29)  HR: 78 (17 Mar 2021 09:51) (68 - 85)  BP: 100/54 (17 Mar 2021 09:51) (100/54 - 143/59)  BP(mean): --  RR: 17 (17 Mar 2021 09:51) (17 - 18)  SpO2: 93% (17 Mar 2021 09:51) (93% - 98%)    DRAINS: [x] UCHE (lRIGHT 140cc/24hrs) ) [x] HMV (L 110cc/24h)    PHYSICAL EXAM:    General: No Acute Distress     Neurological: Awake, alert oriented to person, place and time, Following Commands, PERRL, EOMI, Face Symmetrical, Speech Fluent, Moving all extremities, Muscle Strength normal in all four extremities in bed, pain limited at times, No Drift, Sensation to Light Touch Intact    Pulmonary: Clear to Auscultation, No Rales, No Rhonchi, No Wheezes     Cardiovascular: S1, S2, Regular Rate and Rhythm     Gastrointestinal: Soft, Nontender, Nondistended     Incision: +aqucel AG intact (changed by plastics 3/15), LT HMV RT UCHE drains remain (per plastics)    LABS:                        8.7    12.28 )-----------( 342      ( 16 Mar 2021 06:29 )             26.8     03-17    136  |  92<L>  |  6<L>  ----------------------------<  117<H>  3.9   |  35<H>  |  0.54    Ca    9.3      17 Mar 2021 07:19    MEDICATIONS  (STANDING):  atorvastatin 20 milliGRAM(s) Oral at bedtime  cyclobenzaprine 5 milliGRAM(s) Oral three times a day  enoxaparin Injectable 40 milliGRAM(s) SubCutaneous daily  fentaNYL   Patch  75 MICROgram(s)/Hr 1 Patch Transdermal every 72 hours  hyoscyamine SL 0.125 milliGRAM(s) SubLingual daily  levothyroxine 200 MICROGram(s) Oral daily  melatonin 3 milliGRAM(s) Oral at bedtime  multivitamin 1 Tablet(s) Oral daily  pantoprazole    Tablet 20 milliGRAM(s) Oral before breakfast  PARoxetine 40 milliGRAM(s) Oral daily  polyethylene glycol 3350 17 Gram(s) Oral two times a day  senna 2 Tablet(s) Oral at bedtime    MEDICATIONS  (PRN):  acetaminophen   Tablet .. 650 milliGRAM(s) Oral every 6 hours PRN Temp greater or equal to 38C (100.4F), Mild Pain (1 - 3)  acetaminophen  IVPB .. 1000 milliGRAM(s) IV Intermittent once PRN Severe Pain (7 - 10)  bisacodyl Suppository 10 milliGRAM(s) Rectal daily PRN Constipation  diazepam    Tablet 5 milliGRAM(s) Oral every 8 hours PRN severe spasms  naloxone Injectable 0.1 milliGRAM(s) IV Push every 3 minutes PRN For ANY of the following changes in patient status:  A. RR LESS THAN 10 breaths per minute, B. Oxygen saturation LESS THAN 90%, C. Sedation score of 6  ondansetron Injectable 4 milliGRAM(s) IV Push every 6 hours PRN Nausea  oxyCODONE    IR 20 milliGRAM(s) Oral every 4 hours PRN Moderate Pain (4 - 6)      DIET: [x] Regular [] CCD [] Renal [] Puree [] Dysphagia [] Tube Feeds:     IMAGING:

## 2021-03-17 NOTE — PROGRESS NOTE ADULT - ASSESSMENT
HPI:  This is a 60 y/o female PMH esophageal spasms, usually while lying down, dysphagia, gastritis, is followed by GI, had an endoscopy in October, no interventions performed, takes pantoprazole, chronic lumbar and cervical spine disc disease, S/P cervical spinal fusion in 2020, S/P lumbar fusion 2003, continues to have low back pain and bilateral sciatica.  Presents today for stage 1 L1-L2, L2-3 lateral interbody fusion, stage 2 posterior T8-pelvis instrumented fusion with Mazor robot plastic closure, both to be done on 3/12/21. (26 Feb 2021 15:12)    PAST MEDICAL & SURGICAL HISTORY:  Spondylogenic compression of cervical spinal cord    Fibromyalgia    Degenerative tear of acetabular labrum of left hip    Achilles tendon tear  bilateral    Depression    History of chronic pain  on meds for 16 yrs    Dyskinesia of esophagus    History of dysphagia  resolved after surgery in 2019    H/O gastritis  - on pantoprazole    Osteoarthritis    Spondylolisthesis, cervical region    Cervical herniated disc    Hypothyroidism    S/P cervical spinal fusion  cage 2019    S/P arthroscopy of left knee  x 4    S/P laminectomy with spinal fusion  lumbar spine    S/P hip replacement, right    S/P total knee replacement, left    PROCEDURE: 3/12/21 s/p T8-PELVIS ARTHRODESIS, L1-3 ANTERIOR COLUMN RELEASE (RIGHT LATERAL APPROACH), with plastic closure for spinal stenosis with radiculopathy.  POD#4    PLAN:  Neuro: will increase fentanyl patch to 75mcg/72hrs- patient reportedly was on 100mcg approx 1 year ago, increase to home oxycodone 20mg PRN pain, flexeril 5mg q8hrs with valium 5mg PRN severe spasms; will add melatonin to help with sleep  ([employer] ref #[id number] confirms duragesic 50mcg, oxycodone 20mg at home)  continue drains and monitor output, managed per plastics   Respiratory: patient instructed on incentive spirometer  CV: stable, no meds  HEME: monitor H/H and trend, clinically stable  Endocrine: will d/c sliding scale coverage as FS < 200, HGA1C 5.6           DVT ppx: [] SQH [x] SQL and venodynes bilaterally  Renal: d/c moran and TOV today  ID: afebrile; leukocytosis trending down, likely due to intraop decadron   GI: last BM 3/12, bowel regimen  PT/OT: acute rehab upon d/c, PMR asked to see  supplement hypokalemia K= 3.4, f/u am labs    Will discuss with Dr Acevedo  [facility] # [id number]       60 y/o female PMH esophageal spasms, usually while lying down, dysphagia, gastritis, is followed by GI, had an endoscopy in October, no interventions performed, takes pantoprazole, chronic lumbar and cervical spine disc disease, S/P cervical spinal fusion in 2020, S/P lumbar fusion 2003, Now POD 5 s/p stage 1 L1-L2, L2-3 lateral interbody fusion, stage 2 posterior T8-pelvis instrumented fusion with Mazor robot plastic closure    PLAN:  Neuro:   - Awaiting standing Xray films  - cont fentanyl patch to 75mcg/72hrs- patient reportedly was on 100mcg approx 1 year ago, increase to home oxycodone 20mg PRN pain, flexeril 5mg q8hrs with valium 5mg PRN severe spasms;   - continue drains per plastics and monitor output, goal output <30 cc /24hrs   Resp: incentive spirometer  CV:   -SbP 100-160  HEME:   -DVT ppx: SQL and venodynes bilaterally   Endocrine:   -cont synthroid for hypothyroidism        Renal: +voiding  ID:   - afebrile; leukocytosis trending down, likely due to intraop decadron   GI:   - last BM 3/16, bowel regimen  PT/OT: acute rehab upon d/c, PMR following    d/w with Dr Acevedo  [facility] # [id number]

## 2021-03-18 LAB
ANION GAP SERPL CALC-SCNC: 7 MMOL/L — SIGNIFICANT CHANGE UP (ref 5–17)
BUN SERPL-MCNC: 6 MG/DL — LOW (ref 7–23)
CALCIUM SERPL-MCNC: 9.3 MG/DL — SIGNIFICANT CHANGE UP (ref 8.4–10.5)
CHLORIDE SERPL-SCNC: 93 MMOL/L — LOW (ref 96–108)
CO2 SERPL-SCNC: 34 MMOL/L — HIGH (ref 22–31)
CREAT SERPL-MCNC: 0.59 MG/DL — SIGNIFICANT CHANGE UP (ref 0.5–1.3)
GLUCOSE SERPL-MCNC: 116 MG/DL — HIGH (ref 70–99)
HCT VFR BLD CALC: 29.5 % — LOW (ref 34.5–45)
HGB BLD-MCNC: 9.2 G/DL — LOW (ref 11.5–15.5)
MCHC RBC-ENTMCNC: 29.9 PG — SIGNIFICANT CHANGE UP (ref 27–34)
MCHC RBC-ENTMCNC: 31.2 GM/DL — LOW (ref 32–36)
MCV RBC AUTO: 95.8 FL — SIGNIFICANT CHANGE UP (ref 80–100)
NRBC # BLD: 0 /100 WBCS — SIGNIFICANT CHANGE UP (ref 0–0)
PLATELET # BLD AUTO: 374 K/UL — SIGNIFICANT CHANGE UP (ref 150–400)
POTASSIUM SERPL-MCNC: 3.7 MMOL/L — SIGNIFICANT CHANGE UP (ref 3.5–5.3)
POTASSIUM SERPL-SCNC: 3.7 MMOL/L — SIGNIFICANT CHANGE UP (ref 3.5–5.3)
RBC # BLD: 3.08 M/UL — LOW (ref 3.8–5.2)
RBC # FLD: 14.4 % — SIGNIFICANT CHANGE UP (ref 10.3–14.5)
SARS-COV-2 RNA SPEC QL NAA+PROBE: SIGNIFICANT CHANGE UP
SODIUM SERPL-SCNC: 134 MMOL/L — LOW (ref 135–145)
WBC # BLD: 9.3 K/UL — SIGNIFICANT CHANGE UP (ref 3.8–10.5)
WBC # FLD AUTO: 9.3 K/UL — SIGNIFICANT CHANGE UP (ref 3.8–10.5)

## 2021-03-18 PROCEDURE — 72082 X-RAY EXAM ENTIRE SPI 2/3 VW: CPT | Mod: 26

## 2021-03-18 PROCEDURE — 93010 ELECTROCARDIOGRAM REPORT: CPT

## 2021-03-18 RX ORDER — POTASSIUM CHLORIDE 20 MEQ
40 PACKET (EA) ORAL ONCE
Refills: 0 | Status: COMPLETED | OUTPATIENT
Start: 2021-03-18 | End: 2021-03-18

## 2021-03-18 RX ADMIN — OXYCODONE HYDROCHLORIDE 20 MILLIGRAM(S): 5 TABLET ORAL at 01:25

## 2021-03-18 RX ADMIN — CYCLOBENZAPRINE HYDROCHLORIDE 5 MILLIGRAM(S): 10 TABLET, FILM COATED ORAL at 21:45

## 2021-03-18 RX ADMIN — OXYCODONE HYDROCHLORIDE 20 MILLIGRAM(S): 5 TABLET ORAL at 16:34

## 2021-03-18 RX ADMIN — FENTANYL CITRATE 1 PATCH: 50 INJECTION INTRAVENOUS at 06:45

## 2021-03-18 RX ADMIN — OXYCODONE HYDROCHLORIDE 20 MILLIGRAM(S): 5 TABLET ORAL at 22:28

## 2021-03-18 RX ADMIN — OXYCODONE HYDROCHLORIDE 20 MILLIGRAM(S): 5 TABLET ORAL at 00:28

## 2021-03-18 RX ADMIN — PANTOPRAZOLE SODIUM 20 MILLIGRAM(S): 20 TABLET, DELAYED RELEASE ORAL at 05:16

## 2021-03-18 RX ADMIN — Medication 40 MILLIEQUIVALENT(S): at 12:32

## 2021-03-18 RX ADMIN — Medication 0.12 MILLIGRAM(S): at 12:32

## 2021-03-18 RX ADMIN — Medication 3 MILLIGRAM(S): at 21:45

## 2021-03-18 RX ADMIN — POLYETHYLENE GLYCOL 3350 17 GRAM(S): 17 POWDER, FOR SOLUTION ORAL at 17:22

## 2021-03-18 RX ADMIN — POLYETHYLENE GLYCOL 3350 17 GRAM(S): 17 POWDER, FOR SOLUTION ORAL at 05:16

## 2021-03-18 RX ADMIN — Medication 1 TABLET(S): at 12:33

## 2021-03-18 RX ADMIN — OXYCODONE HYDROCHLORIDE 20 MILLIGRAM(S): 5 TABLET ORAL at 10:30

## 2021-03-18 RX ADMIN — OXYCODONE HYDROCHLORIDE 20 MILLIGRAM(S): 5 TABLET ORAL at 21:48

## 2021-03-18 RX ADMIN — CYCLOBENZAPRINE HYDROCHLORIDE 5 MILLIGRAM(S): 10 TABLET, FILM COATED ORAL at 05:16

## 2021-03-18 RX ADMIN — CYCLOBENZAPRINE HYDROCHLORIDE 5 MILLIGRAM(S): 10 TABLET, FILM COATED ORAL at 13:08

## 2021-03-18 RX ADMIN — ATORVASTATIN CALCIUM 20 MILLIGRAM(S): 80 TABLET, FILM COATED ORAL at 21:45

## 2021-03-18 RX ADMIN — FENTANYL CITRATE 1 PATCH: 50 INJECTION INTRAVENOUS at 19:15

## 2021-03-18 RX ADMIN — OXYCODONE HYDROCHLORIDE 20 MILLIGRAM(S): 5 TABLET ORAL at 09:52

## 2021-03-18 RX ADMIN — SENNA PLUS 2 TABLET(S): 8.6 TABLET ORAL at 21:45

## 2021-03-18 RX ADMIN — Medication 40 MILLIGRAM(S): at 12:32

## 2021-03-18 RX ADMIN — Medication 200 MICROGRAM(S): at 05:16

## 2021-03-18 RX ADMIN — OXYCODONE HYDROCHLORIDE 20 MILLIGRAM(S): 5 TABLET ORAL at 15:59

## 2021-03-18 RX ADMIN — ENOXAPARIN SODIUM 40 MILLIGRAM(S): 100 INJECTION SUBCUTANEOUS at 12:32

## 2021-03-18 NOTE — PROGRESS NOTE ADULT - ASSESSMENT
Patient 60 y/o F s/p T8-arthodesis, L1-L3 anterior column release with complex back closure 3/12.     - Aquacel dressings changed today, plan for dressing change on Sun 3/21  - Primary management per NeuroSx, appreciate care   - Keep HV and UCHE drains to suction, monitor output very closely  - Will continue to monitor output, continue drains.       Plastic Surgery   [phone]

## 2021-03-18 NOTE — PROGRESS NOTE ADULT - ASSESSMENT
62 y/o female PMH esophageal spasms, usually while lying down, dysphagia, gastritis, is followed by GI, had an endoscopy in October, no interventions performed, takes pantoprazole, chronic lumbar and cervical spine disc disease, S/P cervical spinal fusion in 2020, S/P lumbar fusion 2003, Now POD 6 s/p stage 1 L1-L2, L2-3 lateral interbody fusion, stage 2 posterior T8-pelvis instrumented fusion with Mazor robot plastic closure    PLAN:  Neuro:   - Awaiting standing Xray films later this morning  - Pain controlled with fentanyl patch to 75mcg/72hrs- patient reportedly was on 100mcg approx 1 year ago, increase to home oxycodone 20mg PRN pain, flexeril 5mg q8hrs with valium 5mg PRN severe spasms;   - continue drains per plastics and monitor output, goal output <30 cc /24hrs (currently RT 85cc LT 50cc)  Resp: incentive spirometer  CV:   -SbP 100-160  HEME:   -DVT ppx: SQL and venodynes bilaterally   - H/H stabilized   Endocrine:   -cont synthroid for hypothyroidism        Renal: +voiding  ID:   - afebrile; leukocytosis trending down, likely due to intraop decadron   GI:   - last BM 3/16, bowel regimen  PT/OT: acute rehab upon drain removal, PMR following.  Anticipate drain removal tomorrow / saturday     d/w with Dr Acevedo  [facility] # [id number]

## 2021-03-18 NOTE — PROGRESS NOTE ADULT - SUBJECTIVE AND OBJECTIVE BOX
Patient is a 60 y/o F s/p T8-arthodesis, L1-L3 anterior column release with complex back closure 3/12. Seen and examined at bedside. Afebrile and vital signs stable overnight. She states pain is improving. She states she was able to walk to her chair yesterday.       OBJECTIVE: T(C): 36.7 (03-17-21 @ 09:51), Max: 36.9 (03-16-21 @ 17:29)  HR: 78 (03-17-21 @ 09:51) (68 - 85)  BP: 100/54 (03-17-21 @ 09:51) (100/54 - 167/78)  RR: 17 (03-17-21 @ 09:51) (17 - 18)  SpO2: 93% (03-17-21 @ 09:51) (92% - 98%)  Wt(kg): --  I&O's Summary    16 Mar 2021 07:01  -  17 Mar 2021 07:00  --------------------------------------------------------  IN: 930 mL / OUT: 1900 mL / NET: -970 mL    17 Mar 2021 07:01  -  17 Mar 2021 11:43  --------------------------------------------------------  IN: 120 mL / OUT: 300 mL / NET: -180 mL      I&O's Detail    16 Mar 2021 07:01  -  17 Mar 2021 07:00  --------------------------------------------------------  IN:    Oral Fluid: 480 mL    sodium chloride 0.9%: 450 mL  Total IN: 930 mL    OUT:    Accordian (mL): 85 mL    Bulb (mL): 50 mL    Post-Void Residual per Intermittent Catheterization (mL): 950 mL    Voided (mL): 700 mL    Total NET: -970 mL      17 Mar 2021 07:01  -  17 Mar 2021 11:43  --------------------------------------------------------  IN:    Oral Fluid: 120 mL  Total IN: 120 mL    OUT:    Voided (mL): 300 mL  Total OUT: 300 mL    Total NET: -180 mL        ** PHYSICAL EXAM **    -- CONSTITUTIONAL: AOx3. NAD.   -- RESPIRATORY: Nonlabored respirations  -- BACK: Dressing clean, dry, intact. Back soft, no collections. Drains serosanguinous. Incision site is C/D/I. No signs of infection  -- NEUROLOGICAL: SILT in BLE. TA/TP, FHL/EHL, FDL/EDL intact bilaterally.    Procedure: Aquacel dressing changed    MEDICATIONS  (STANDING):  atorvastatin 20 milliGRAM(s) Oral at bedtime  cyclobenzaprine 5 milliGRAM(s) Oral three times a day  enoxaparin Injectable 40 milliGRAM(s) SubCutaneous daily  fentaNYL   Patch  75 MICROgram(s)/Hr 1 Patch Transdermal every 72 hours  hyoscyamine SL 0.125 milliGRAM(s) SubLingual daily  levothyroxine 200 MICROGram(s) Oral daily  melatonin 3 milliGRAM(s) Oral at bedtime  multivitamin 1 Tablet(s) Oral daily  pantoprazole    Tablet 20 milliGRAM(s) Oral before breakfast  PARoxetine 40 milliGRAM(s) Oral daily  polyethylene glycol 3350 17 Gram(s) Oral two times a day  senna 2 Tablet(s) Oral at bedtime    MEDICATIONS  (PRN):  acetaminophen   Tablet .. 650 milliGRAM(s) Oral every 6 hours PRN Temp greater or equal to 38C (100.4F), Mild Pain (1 - 3)  acetaminophen  IVPB .. 1000 milliGRAM(s) IV Intermittent once PRN Severe Pain (7 - 10)  bisacodyl Suppository 10 milliGRAM(s) Rectal daily PRN Constipation  diazepam    Tablet 5 milliGRAM(s) Oral every 8 hours PRN severe spasms  naloxone Injectable 0.1 milliGRAM(s) IV Push every 3 minutes PRN For ANY of the following changes in patient status:  A. RR LESS THAN 10 breaths per minute, B. Oxygen saturation LESS THAN 90%, C. Sedation score of 6  ondansetron Injectable 4 milliGRAM(s) IV Push every 6 hours PRN Nausea  oxyCODONE    IR 20 milliGRAM(s) Oral every 4 hours PRN Moderate Pain (4 - 6)      LABS:                        8.7    12.28 )-----------( 342      ( 16 Mar 2021 06:29 )             26.8     03-17    136  |  92<L>  |  6<L>  ----------------------------<  117<H>  3.9   |  35<H>  |  0.54    Ca    9.3      17 Mar 2021 07:19

## 2021-03-18 NOTE — PROGRESS NOTE ADULT - SUBJECTIVE AND OBJECTIVE BOX
SUBJECTIVE:   Seen and examined at bedside. No acute events overnight. Aquacel dressings changed in the AM.     OBJECTIVE: T(C): 36.6 (03-18-21 @ 05:15), Max: 37.1 (03-17-21 @ 17:22)  HR: 89 (03-18-21 @ 05:15) (73 - 98)  BP: 108/59 (03-18-21 @ 05:15) (93/54 - 128/64)  RR: 18 (03-18-21 @ 05:15) (17 - 18)  SpO2: 95% (03-18-21 @ 05:15) (93% - 98%)  Wt(kg): --  I&O's Summary    17 Mar 2021 07:01  -  18 Mar 2021 07:00  --------------------------------------------------------  IN: 840 mL / OUT: 1660 mL / NET: -820 mL      I&O's Detail    17 Mar 2021 07:01  -  18 Mar 2021 07:00  --------------------------------------------------------  IN:    Oral Fluid: 840 mL  Total IN: 840 mL    OUT:    Accordian (mL): 85 mL    Bulb (mL): 50 mL    Voided (mL): 1525 mL  Total OUT: 1660 mL    Total NET: -820 mL        General: A&Ox3, NAD  Resp: nonlabored   Back: Dressing clean, dry, intact. Back soft, no collections. Drains serosanguinous.    MEDICATIONS  (STANDING):  atorvastatin 20 milliGRAM(s) Oral at bedtime  cyclobenzaprine 5 milliGRAM(s) Oral three times a day  enoxaparin Injectable 40 milliGRAM(s) SubCutaneous daily  fentaNYL   Patch  75 MICROgram(s)/Hr 1 Patch Transdermal every 72 hours  hyoscyamine SL 0.125 milliGRAM(s) SubLingual daily  levothyroxine 200 MICROGram(s) Oral daily  melatonin 3 milliGRAM(s) Oral at bedtime  multivitamin 1 Tablet(s) Oral daily  pantoprazole    Tablet 20 milliGRAM(s) Oral before breakfast  PARoxetine 40 milliGRAM(s) Oral daily  polyethylene glycol 3350 17 Gram(s) Oral two times a day  senna 2 Tablet(s) Oral at bedtime    MEDICATIONS  (PRN):  acetaminophen   Tablet .. 650 milliGRAM(s) Oral every 6 hours PRN Temp greater or equal to 38C (100.4F), Mild Pain (1 - 3)  acetaminophen  IVPB .. 1000 milliGRAM(s) IV Intermittent once PRN Severe Pain (7 - 10)  bisacodyl Suppository 10 milliGRAM(s) Rectal daily PRN Constipation  diazepam    Tablet 5 milliGRAM(s) Oral every 8 hours PRN severe spasms  naloxone Injectable 0.1 milliGRAM(s) IV Push every 3 minutes PRN For ANY of the following changes in patient status:  A. RR LESS THAN 10 breaths per minute, B. Oxygen saturation LESS THAN 90%, C. Sedation score of 6  ondansetron Injectable 4 milliGRAM(s) IV Push every 6 hours PRN Nausea  oxyCODONE    IR 20 milliGRAM(s) Oral every 4 hours PRN Moderate Pain (4 - 6)      LABS:                        9.2    9.30  )-----------( 374      ( 18 Mar 2021 06:14 )             29.5     03-18    134<L>  |  93<L>  |  6<L>  ----------------------------<  116<H>  3.7   |  34<H>  |  0.59    Ca    9.3      18 Mar 2021 06:14

## 2021-03-18 NOTE — PROGRESS NOTE ADULT - SUBJECTIVE AND OBJECTIVE BOX
Patient seen and examined, endorses pain is better controlled.     OVERNIGHT: no acute events    Vital Signs Last 24 Hrs  T(C): 36.6 (18 Mar 2021 09:10), Max: 37.1 (17 Mar 2021 17:22)  T(F): 97.8 (18 Mar 2021 09:10), Max: 98.7 (17 Mar 2021 17:22)  HR: 75 (18 Mar 2021 09:10) (73 - 98)  BP: 122/67 (18 Mar 2021 09:10) (93/54 - 128/64)  BP(mean): --  RR: 18 (18 Mar 2021 09:10) (17 - 18)  SpO2: 99% (18 Mar 2021 09:10) (94% - 99%)    DRAINS: [x] UCHE (RIGHT 50cc/24hrs) ) [x] HMV (L 85cc/24h)    PHYSICAL EXAM:    General: No Acute Distress     Neurological: Awake, alert oriented to person, place and time, Following Commands, PERRL, EOMI, Face Symmetrical, Speech Fluent, Moving all extremities, Muscle Strength normal in all four extremities in bed, pain limited at times, No Drift, Sensation to Light Touch Intact    Pulmonary: Clear to Auscultation, No Rales, No Rhonchi, No Wheezes     Cardiovascular: S1, S2, Regular Rate and Rhythm     Gastrointestinal: Soft, Nontender, Nondistended     Incision: +aqucel AG intact (changed by plastics 3/18), LT HMV RT UCHE drains remain (per plastics)    LABS:                                   9.2    9.30  )-----------( 374      ( 18 Mar 2021 06:14 )             29.5     03-18    134<L>  |  93<L>  |  6<L>  ----------------------------<  116<H>  3.7   |  34<H>  |  0.59    Ca    9.3      18 Mar 2021 06:14        MEDICATIONS  (STANDING):  atorvastatin 20 milliGRAM(s) Oral at bedtime  cyclobenzaprine 5 milliGRAM(s) Oral three times a day  enoxaparin Injectable 40 milliGRAM(s) SubCutaneous daily  fentaNYL   Patch  75 MICROgram(s)/Hr 1 Patch Transdermal every 72 hours  hyoscyamine SL 0.125 milliGRAM(s) SubLingual daily  levothyroxine 200 MICROGram(s) Oral daily  melatonin 3 milliGRAM(s) Oral at bedtime  multivitamin 1 Tablet(s) Oral daily  pantoprazole    Tablet 20 milliGRAM(s) Oral before breakfast  PARoxetine 40 milliGRAM(s) Oral daily  polyethylene glycol 3350 17 Gram(s) Oral two times a day  senna 2 Tablet(s) Oral at bedtime    MEDICATIONS  (PRN):  acetaminophen   Tablet .. 650 milliGRAM(s) Oral every 6 hours PRN Temp greater or equal to 38C (100.4F), Mild Pain (1 - 3)  acetaminophen  IVPB .. 1000 milliGRAM(s) IV Intermittent once PRN Severe Pain (7 - 10)  bisacodyl Suppository 10 milliGRAM(s) Rectal daily PRN Constipation  diazepam    Tablet 5 milliGRAM(s) Oral every 8 hours PRN severe spasms  naloxone Injectable 0.1 milliGRAM(s) IV Push every 3 minutes PRN For ANY of the following changes in patient status:  A. RR LESS THAN 10 breaths per minute, B. Oxygen saturation LESS THAN 90%, C. Sedation score of 6  ondansetron Injectable 4 milliGRAM(s) IV Push every 6 hours PRN Nausea  oxyCODONE    IR 20 milliGRAM(s) Oral every 4 hours PRN Moderate Pain (4 - 6)      DIET: [x] Regular [] CCD [] Renal [] Puree [] Dysphagia [] Tube Feeds:     IMAGING:

## 2021-03-18 NOTE — PROGRESS NOTE ADULT - ASSESSMENT
A: Patient 60 y/o F s/p T8-arthodesis, L1-L3 anterior column release with complex back closure 3/12.     P:  - Aquacel dressings changed today. Pt tolerated dressing change well.  - Primary management per NeuroSx, appreciate care. Neurosurgery will be removing staples when appropriate  - Nylon sutures will be removed 3 weeks post-op  - Keep HV and UCHE drains to suction, monitor output very closely  - Will continue to monitor output, continue drains.   - Please call with any questions or concerns. [phone]

## 2021-03-19 LAB
ANION GAP SERPL CALC-SCNC: 12 MMOL/L — SIGNIFICANT CHANGE UP (ref 5–17)
BUN SERPL-MCNC: 6 MG/DL — LOW (ref 7–23)
CALCIUM SERPL-MCNC: 9.5 MG/DL — SIGNIFICANT CHANGE UP (ref 8.4–10.5)
CHLORIDE SERPL-SCNC: 95 MMOL/L — LOW (ref 96–108)
CO2 SERPL-SCNC: 30 MMOL/L — SIGNIFICANT CHANGE UP (ref 22–31)
CREAT SERPL-MCNC: 0.67 MG/DL — SIGNIFICANT CHANGE UP (ref 0.5–1.3)
GLUCOSE SERPL-MCNC: 114 MG/DL — HIGH (ref 70–99)
POTASSIUM SERPL-MCNC: 3.9 MMOL/L — SIGNIFICANT CHANGE UP (ref 3.5–5.3)
POTASSIUM SERPL-SCNC: 3.9 MMOL/L — SIGNIFICANT CHANGE UP (ref 3.5–5.3)
SODIUM SERPL-SCNC: 137 MMOL/L — SIGNIFICANT CHANGE UP (ref 135–145)

## 2021-03-19 RX ADMIN — ENOXAPARIN SODIUM 40 MILLIGRAM(S): 100 INJECTION SUBCUTANEOUS at 12:26

## 2021-03-19 RX ADMIN — CYCLOBENZAPRINE HYDROCHLORIDE 5 MILLIGRAM(S): 10 TABLET, FILM COATED ORAL at 21:54

## 2021-03-19 RX ADMIN — CYCLOBENZAPRINE HYDROCHLORIDE 5 MILLIGRAM(S): 10 TABLET, FILM COATED ORAL at 13:29

## 2021-03-19 RX ADMIN — Medication 200 MICROGRAM(S): at 05:21

## 2021-03-19 RX ADMIN — OXYCODONE HYDROCHLORIDE 20 MILLIGRAM(S): 5 TABLET ORAL at 05:51

## 2021-03-19 RX ADMIN — FENTANYL CITRATE 1 PATCH: 50 INJECTION INTRAVENOUS at 10:26

## 2021-03-19 RX ADMIN — Medication 1 TABLET(S): at 12:25

## 2021-03-19 RX ADMIN — OXYCODONE HYDROCHLORIDE 20 MILLIGRAM(S): 5 TABLET ORAL at 12:25

## 2021-03-19 RX ADMIN — FENTANYL CITRATE 1 PATCH: 50 INJECTION INTRAVENOUS at 10:49

## 2021-03-19 RX ADMIN — ATORVASTATIN CALCIUM 20 MILLIGRAM(S): 80 TABLET, FILM COATED ORAL at 21:54

## 2021-03-19 RX ADMIN — Medication 3 MILLIGRAM(S): at 21:54

## 2021-03-19 RX ADMIN — OXYCODONE HYDROCHLORIDE 20 MILLIGRAM(S): 5 TABLET ORAL at 05:21

## 2021-03-19 RX ADMIN — FENTANYL CITRATE 1 PATCH: 50 INJECTION INTRAVENOUS at 08:19

## 2021-03-19 RX ADMIN — SENNA PLUS 2 TABLET(S): 8.6 TABLET ORAL at 21:54

## 2021-03-19 RX ADMIN — Medication 0.12 MILLIGRAM(S): at 12:25

## 2021-03-19 RX ADMIN — CYCLOBENZAPRINE HYDROCHLORIDE 5 MILLIGRAM(S): 10 TABLET, FILM COATED ORAL at 05:21

## 2021-03-19 RX ADMIN — OXYCODONE HYDROCHLORIDE 20 MILLIGRAM(S): 5 TABLET ORAL at 17:28

## 2021-03-19 RX ADMIN — FENTANYL CITRATE 1 PATCH: 50 INJECTION INTRAVENOUS at 19:55

## 2021-03-19 RX ADMIN — OXYCODONE HYDROCHLORIDE 20 MILLIGRAM(S): 5 TABLET ORAL at 23:59

## 2021-03-19 RX ADMIN — Medication 5 MILLIGRAM(S): at 09:00

## 2021-03-19 RX ADMIN — PANTOPRAZOLE SODIUM 20 MILLIGRAM(S): 20 TABLET, DELAYED RELEASE ORAL at 05:21

## 2021-03-19 RX ADMIN — POLYETHYLENE GLYCOL 3350 17 GRAM(S): 17 POWDER, FOR SOLUTION ORAL at 05:20

## 2021-03-19 RX ADMIN — OXYCODONE HYDROCHLORIDE 20 MILLIGRAM(S): 5 TABLET ORAL at 13:00

## 2021-03-19 RX ADMIN — Medication 40 MILLIGRAM(S): at 12:25

## 2021-03-19 NOTE — PROGRESS NOTE ADULT - ASSESSMENT
Patient 60 y/o F s/p T8-arthodesis, L1-L3 anterior column release with complex back closure 3/12.     - plan for next dressing change on Sun 3/21  - Primary management per NeuroSx, appreciate care   - Keep HV and UCHE drains to suction, monitor output very closely  - Will continue to monitor output, continue drains.       Plastic Surgery   [phone]

## 2021-03-19 NOTE — PROGRESS NOTE ADULT - SUBJECTIVE AND OBJECTIVE BOX
Patient seen and examined in bed.  Feels much better    OVERNIGHT: no acute events    Vital Signs Last 24 Hrs  T(C): 36.4 (19 Mar 2021 05:15), Max: 36.7 (18 Mar 2021 20:52)  T(F): 97.5 (19 Mar 2021 05:15), Max: 98 (18 Mar 2021 20:52)  HR: 66 (19 Mar 2021 05:15) (66 - 81)  BP: 107/56 (19 Mar 2021 05:15) (107/56 - 125/69)  BP(mean): --  RR: 18 (19 Mar 2021 05:15) (18 - 18)  SpO2: 95% (19 Mar 2021 05:15) (92% - 99%)    DRAINS: [x] UCHE (RIGHT 47cc/24hrs) ) [x] HMV (L 45cc/24h)    PHYSICAL EXAM:    General: No Acute Distress     Neurological: Awake, alert oriented to person, place and time, Following Commands, PERRL, EOMI, Face Symmetrical, Speech Fluent, Moving all extremities, Muscle Strength normal in all four extremities in bed, pain limited at times, No Drift, Sensation to Light Touch Intact  Pulmonary: Clear to Auscultation, No Rales, No Rhonchi, No Wheezes   Cardiovascular: S1, S2, Regular Rate and Rhythm   Gastrointestinal: Soft, Nontender, Nondistended     Incision: +aqucel AG intact (changed by plastics 3/18), LT HMV RT UCHE drains remain (per plastics)    LABS:    No new labs today.       MEDICATIONS  (STANDING):  atorvastatin 20 milliGRAM(s) Oral at bedtime  cyclobenzaprine 5 milliGRAM(s) Oral three times a day  enoxaparin Injectable 40 milliGRAM(s) SubCutaneous daily  fentaNYL   Patch  75 MICROgram(s)/Hr 1 Patch Transdermal every 72 hours  hyoscyamine SL 0.125 milliGRAM(s) SubLingual daily  levothyroxine 200 MICROGram(s) Oral daily  melatonin 3 milliGRAM(s) Oral at bedtime  multivitamin 1 Tablet(s) Oral daily  pantoprazole    Tablet 20 milliGRAM(s) Oral before breakfast  PARoxetine 40 milliGRAM(s) Oral daily  polyethylene glycol 3350 17 Gram(s) Oral two times a day  senna 2 Tablet(s) Oral at bedtime    MEDICATIONS  (PRN):  acetaminophen   Tablet .. 650 milliGRAM(s) Oral every 6 hours PRN Temp greater or equal to 38C (100.4F), Mild Pain (1 - 3)  acetaminophen  IVPB .. 1000 milliGRAM(s) IV Intermittent once PRN Severe Pain (7 - 10)  bisacodyl Suppository 10 milliGRAM(s) Rectal daily PRN Constipation  diazepam    Tablet 5 milliGRAM(s) Oral every 8 hours PRN severe spasms  naloxone Injectable 0.1 milliGRAM(s) IV Push every 3 minutes PRN For ANY of the following changes in patient status:  A. RR LESS THAN 10 breaths per minute, B. Oxygen saturation LESS THAN 90%, C. Sedation score of 6  ondansetron Injectable 4 milliGRAM(s) IV Push every 6 hours PRN Nausea  oxyCODONE    IR 20 milliGRAM(s) Oral every 4 hours PRN Moderate Pain (4 - 6)      DIET: [x] Regular [] CCD [] Renal [] Puree [] Dysphagia [] Tube Feeds:     IMAGING:     < from: Xray Spine Entire Thoracolumbar 2 or 3 Views (03.18.21 @ 11:45) >  IMPRESSION:    Status post interval posterior instrumented spinal fusion from T8 to L5 with bilateral pedicle screws (aside from at T12 where there is only a left-sided pedicle screw) and posterior interconnecting rods. There are also bilateral stabilizing iliac bolts. There are interbody spacers at L2-L3, L3-L4, L4-L5, and L5-S1. There is a mild thoracolumbar dextroscoliosis. There is grade 1 anterolisthesis at L5-S1. The imaged vertebral body heights are maintained. There is multilevel disc space narrowing and disc margin osteophytosis. A surgical drain is present. There are skin staples along the right lateral abdominal wall. There is been resection of portions of the right sided T11 and T12 ribs.    There is a partially imaged right totalhip arthroplasty. There is partially imaged lower cervical spinal fusion hardware. There is linear atelectasis at the lung bases bilaterally.    < end of copied text >

## 2021-03-19 NOTE — DIETITIAN INITIAL EVALUATION ADULT. - PERTINENT MEDS FT
MEDICATIONS  (STANDING):  atorvastatin 20 milliGRAM(s) Oral at bedtime  cyclobenzaprine 5 milliGRAM(s) Oral three times a day  enoxaparin Injectable 40 milliGRAM(s) SubCutaneous daily  fentaNYL   Patch  75 MICROgram(s)/Hr 1 Patch Transdermal every 72 hours  hyoscyamine SL 0.125 milliGRAM(s) SubLingual daily  levothyroxine 200 MICROGram(s) Oral daily  melatonin 3 milliGRAM(s) Oral at bedtime  multivitamin 1 Tablet(s) Oral daily  pantoprazole    Tablet 20 milliGRAM(s) Oral before breakfast  PARoxetine 40 milliGRAM(s) Oral daily  polyethylene glycol 3350 17 Gram(s) Oral two times a day  senna 2 Tablet(s) Oral at bedtime    MEDICATIONS  (PRN):  acetaminophen   Tablet .. 650 milliGRAM(s) Oral every 6 hours PRN Temp greater or equal to 38C (100.4F), Mild Pain (1 - 3)  acetaminophen  IVPB .. 1000 milliGRAM(s) IV Intermittent once PRN Severe Pain (7 - 10)  bisacodyl Suppository 10 milliGRAM(s) Rectal daily PRN Constipation  diazepam    Tablet 5 milliGRAM(s) Oral every 8 hours PRN severe spasms  naloxone Injectable 0.1 milliGRAM(s) IV Push every 3 minutes PRN For ANY of the following changes in patient status:  A. RR LESS THAN 10 breaths per minute, B. Oxygen saturation LESS THAN 90%, C. Sedation score of 6  ondansetron Injectable 4 milliGRAM(s) IV Push every 6 hours PRN Nausea  oxyCODONE    IR 20 milliGRAM(s) Oral every 4 hours PRN Moderate Pain (4 - 6)

## 2021-03-19 NOTE — DIETITIAN INITIAL EVALUATION ADULT. - PERTINENT LABORATORY DATA
03-19 @ 07:34: Na 137, BUN 6<L>, Cr 0.67, <H>, K+ 3.9, Phos --, Mg --, Alk Phos --, ALT/SGPT --, AST/SGOT --, HbA1c --

## 2021-03-19 NOTE — DIETITIAN INITIAL EVALUATION ADULT. - PHYSCIAL ASSESSMENT
no noted pressure injuries as per documentation.  Pt appears well nourished with no overt fat or muscle depletions. well nourished

## 2021-03-19 NOTE — DIETITIAN INITIAL EVALUATION ADULT. - ADD RECOMMEND
Continue multivitamin. Reinforce nutrition education as needed - RD remains available. Provide encouragement with PO intake, menu selections, and assistance with meals as needed. Continue to monitor PO intake, labs, weights, BM, skin, clinical course.

## 2021-03-19 NOTE — DIETITIAN INITIAL EVALUATION ADULT. - OTHER INFO
Pt reports intentional 65lb wt loss x ~1 year. Estimates wt currently ~173lbs. Noted dosing wt of 171.9lbs. Pt reports wt loss was achieved primarily through portion control as she has limited mobility. Per chart, pt 182lbs in 1/2020 - ?accuracy of reported weight loss.    Pt denies any N/V, last BM 3/16 - bowel regimen ordered. Pt reports decreased appetite after surgery that is now improving. Provided PO encouragement with emphasis on protein foods to promote healing in post-op phase. Reviewed protein food sources. Pt expressed understanding.

## 2021-03-19 NOTE — PROGRESS NOTE ADULT - SUBJECTIVE AND OBJECTIVE BOX
SUBJECTIVE:  Doing well.   No overnight events.     OBJECTIVE:     ** VITAL SIGNS / I&O's **    T(C): 36.4 (03-19-21 @ 05:15), Max: 36.7 (03-18-21 @ 20:52)  T(F): 97.5 (03-19-21 @ 05:15), Max: 98 (03-18-21 @ 20:52)  HR: 66 (03-19-21 @ 05:15) (66 - 81)  BP: 107/56 (03-19-21 @ 05:15) (107/56 - 125/69)  RR: 18 (03-19-21 @ 05:15) (18 - 18)  SpO2: 95% (03-19-21 @ 05:15) (92% - 99%)      18 Mar 2021 07:01  -  19 Mar 2021 07:00  --------------------------------------------------------  IN:    Oral Fluid: 640 mL  Total IN: 640 mL    OUT:    Accordian (mL): 45 mL    Bulb (mL): 47 mL    Voided (mL): 1500 mL  Total OUT: 1592 mL    Total NET: -952 mL          ** PHYSICAL EXAM **    -- CONSTITUTIONAL: AOx3. NAD.   -- RESPIRATORY: Nonlabored respirations  -- BACK: Dressing clean. Incision intact. Back soft, no collections. Drains serosanguinous.    ** LABS **                          9.2    9.30  )-----------( 374      ( 18 Mar 2021 06:14 )             29.5     03-18    134<L>  |  93<L>  |  6<L>  ----------------------------<  116<H>  3.7   |  34<H>  |  0.59    Ca    9.3      18 Mar 2021 06:14

## 2021-03-19 NOTE — PROVIDER CONTACT NOTE (OTHER) - ASSESSMENT
Pt resting comfortably in the chair. Attempted to wean off 2L NC placed overnight. O2 sat around 88-90% w/o O2, 2L NC placed on pt, encouraged incentive spirometer and coughing, GRACE Sebastian made aware.

## 2021-03-19 NOTE — DIETITIAN INITIAL EVALUATION ADULT. - ORAL INTAKE PTA/DIET HISTORY
PTA pt reports good appetite and PO intake, pt had been monitoring portion sizes in order to lose weight. Pt with hx of gastritis and dysphagia - states this does not heavily impact her intake, pt actively avoids spicy and acidic foods, on PPI therapy. Endorses intake of multivitamin and biotin supplements at home. NKFA. Pt reports lactose intolerance. No difficulty chewing.

## 2021-03-20 RX ORDER — SODIUM CHLORIDE 9 MG/ML
1000 INJECTION INTRAMUSCULAR; INTRAVENOUS; SUBCUTANEOUS ONCE
Refills: 0 | Status: COMPLETED | OUTPATIENT
Start: 2021-03-20 | End: 2021-03-20

## 2021-03-20 RX ORDER — JNJ-78436735 50000000000 [PFU]/.5ML
0.5 SUSPENSION INTRAMUSCULAR ONCE
Refills: 0 | Status: COMPLETED | OUTPATIENT
Start: 2021-03-21 | End: 2021-03-21

## 2021-03-20 RX ADMIN — OXYCODONE HYDROCHLORIDE 20 MILLIGRAM(S): 5 TABLET ORAL at 05:30

## 2021-03-20 RX ADMIN — CYCLOBENZAPRINE HYDROCHLORIDE 5 MILLIGRAM(S): 10 TABLET, FILM COATED ORAL at 13:29

## 2021-03-20 RX ADMIN — OXYCODONE HYDROCHLORIDE 20 MILLIGRAM(S): 5 TABLET ORAL at 22:31

## 2021-03-20 RX ADMIN — Medication 3 MILLIGRAM(S): at 21:34

## 2021-03-20 RX ADMIN — OXYCODONE HYDROCHLORIDE 20 MILLIGRAM(S): 5 TABLET ORAL at 14:00

## 2021-03-20 RX ADMIN — ATORVASTATIN CALCIUM 20 MILLIGRAM(S): 80 TABLET, FILM COATED ORAL at 21:34

## 2021-03-20 RX ADMIN — OXYCODONE HYDROCHLORIDE 20 MILLIGRAM(S): 5 TABLET ORAL at 00:20

## 2021-03-20 RX ADMIN — SODIUM CHLORIDE 500 MILLILITER(S): 9 INJECTION INTRAMUSCULAR; INTRAVENOUS; SUBCUTANEOUS at 18:19

## 2021-03-20 RX ADMIN — ENOXAPARIN SODIUM 40 MILLIGRAM(S): 100 INJECTION SUBCUTANEOUS at 13:28

## 2021-03-20 RX ADMIN — CYCLOBENZAPRINE HYDROCHLORIDE 5 MILLIGRAM(S): 10 TABLET, FILM COATED ORAL at 21:34

## 2021-03-20 RX ADMIN — Medication 0.12 MILLIGRAM(S): at 13:28

## 2021-03-20 RX ADMIN — FENTANYL CITRATE 1 PATCH: 50 INJECTION INTRAVENOUS at 17:58

## 2021-03-20 RX ADMIN — OXYCODONE HYDROCHLORIDE 20 MILLIGRAM(S): 5 TABLET ORAL at 13:33

## 2021-03-20 RX ADMIN — OXYCODONE HYDROCHLORIDE 20 MILLIGRAM(S): 5 TABLET ORAL at 06:00

## 2021-03-20 RX ADMIN — OXYCODONE HYDROCHLORIDE 20 MILLIGRAM(S): 5 TABLET ORAL at 23:00

## 2021-03-20 RX ADMIN — PANTOPRAZOLE SODIUM 20 MILLIGRAM(S): 20 TABLET, DELAYED RELEASE ORAL at 06:30

## 2021-03-20 RX ADMIN — POLYETHYLENE GLYCOL 3350 17 GRAM(S): 17 POWDER, FOR SOLUTION ORAL at 18:21

## 2021-03-20 RX ADMIN — Medication 200 MICROGRAM(S): at 05:30

## 2021-03-20 RX ADMIN — Medication 1 TABLET(S): at 13:28

## 2021-03-20 RX ADMIN — Medication 40 MILLIGRAM(S): at 13:29

## 2021-03-20 RX ADMIN — POLYETHYLENE GLYCOL 3350 17 GRAM(S): 17 POWDER, FOR SOLUTION ORAL at 05:30

## 2021-03-20 RX ADMIN — Medication 5 MILLIGRAM(S): at 03:28

## 2021-03-20 RX ADMIN — CYCLOBENZAPRINE HYDROCHLORIDE 5 MILLIGRAM(S): 10 TABLET, FILM COATED ORAL at 05:30

## 2021-03-20 NOTE — PROGRESS NOTE ADULT - ASSESSMENT
HPI:  This is a 60 y/o female PMH esophageal spasms, usually while lying down, dysphagia, gastritis, is followed by GI, had an endoscopy in October, no interventions performed, takes pantoprazole, chronic lumbar and cervical spine disc disease, S/P cervical spinal fusion in 2020, S/P lumbar fusion 2003, continues to have low back pain and bilateral sciatica.  Presents today for stage 1 L1-L2, L2-3 lateral interbody fusion, stage 2 posterior T8-pelvis instrumented fusion with Mazor robot plastic closure, both to be done on 3/12/21. (26 Feb 2021 15:12)    PAST MEDICAL & SURGICAL HISTORY:  Spondylogenic compression of cervical spinal cord    Fibromyalgia    Degenerative tear of acetabular labrum of left hip    Achilles tendon tear  bilateral    Depression    History of chronic pain  on meds for 16 yrs    Dyskinesia of esophagus    History of dysphagia  resolved after surgery in 2019    H/O gastritis  - on pantoprazole    Osteoarthritis    Spondylolisthesis, cervical region    Cervical herniated disc    Hypothyroidism    S/P cervical spinal fusion  cage 2019    S/P arthroscopy of left knee  x 4    S/P laminectomy with spinal fusion  lumbar spine    S/P hip replacement, right    S/P total knee replacement, left    PROCEDURE: 3/12/21 s/p T8-PELVIS ARTHRODESIS, L1-3 ANTERIOR COLUMN RELEASE (RIGHT LATERAL APPROACH), with plastic closure for spinal stenosis with radiculopathy.  POD#8    PLAN:  Neuro: cont fentanyl patch to 75mcg/72hrs- patient reportedly was on 100mcg approx 1 year ago, cont home oxycodone 20mg PRN pain, flexeril 5mg q8hrs with valium 5mg PRN severe spasms; cont melatonin to help with sleep  ([facility] ref #[id number] confirms duragesic 50mcg, oxycodone 20mg at home)  continue drains and monitor output, managed per plastics; output needs to be <30cc/24hrs for removal   Respiratory: patient instructed on incentive spirometer  CV: stable, no meds  HEME: monitor H/H and trend, clinically stable       DVT ppx: [] SQH [x] SQL and venodynes bilaterally  Renal: d/c moran and TOV today  ID: afebrile   GI: last BM 3/19, bowel regimen  PT/OT/PMR: acute rehab upon d/c once drains removed  patient consented for COVID J&J vaccine prior to d/c    Will discuss with Dr Acevedo  [facility] # [id number]

## 2021-03-20 NOTE — PROGRESS NOTE ADULT - SUBJECTIVE AND OBJECTIVE BOX
SUBJECTIVE:   Seen and examined at bedside in the am. no acute events overnight.      OBJECTIVE: T(C): 36.9 (03-20-21 @ 08:50), Max: 36.9 (03-20-21 @ 04:25)  HR: 72 (03-20-21 @ 08:50) (72 - 78)  BP: 107/64 (03-20-21 @ 08:50) (93/52 - 107/64)  RR: 18 (03-20-21 @ 08:50) (18 - 18)  SpO2: 94% (03-20-21 @ 08:50) (90% - 95%)  Wt(kg): --  I&O's Summary    19 Mar 2021 07:01  -  20 Mar 2021 07:00  --------------------------------------------------------  IN: 480 mL / OUT: 600 mL / NET: -120 mL      I&O's Detail    19 Mar 2021 07:01  -  20 Mar 2021 07:00  --------------------------------------------------------  IN:    Oral Fluid: 480 mL  Total IN: 480 mL    OUT:    Accordian (mL): 60 mL    Bulb (mL): 40 mL    Voided (mL): 500 mL  Total OUT: 600 mL    Total NET: -120 mL        PHYSICAL EXAM  -- CONSTITUTIONAL: AOx3. NAD.   -- RESPIRATORY: Nonlabored respirations  -- BACK: Dressing clean. Incision intact. Back soft, no collections. Drains serosanguinous.    MEDICATIONS  (STANDING):  atorvastatin 20 milliGRAM(s) Oral at bedtime  cyclobenzaprine 5 milliGRAM(s) Oral three times a day  enoxaparin Injectable 40 milliGRAM(s) SubCutaneous daily  fentaNYL   Patch  75 MICROgram(s)/Hr 1 Patch Transdermal every 72 hours  hyoscyamine SL 0.125 milliGRAM(s) SubLingual daily  levothyroxine 200 MICROGram(s) Oral daily  melatonin 3 milliGRAM(s) Oral at bedtime  multivitamin 1 Tablet(s) Oral daily  pantoprazole    Tablet 20 milliGRAM(s) Oral before breakfast  PARoxetine 40 milliGRAM(s) Oral daily  polyethylene glycol 3350 17 Gram(s) Oral two times a day  senna 2 Tablet(s) Oral at bedtime    MEDICATIONS  (PRN):  acetaminophen   Tablet .. 650 milliGRAM(s) Oral every 6 hours PRN Temp greater or equal to 38C (100.4F), Mild Pain (1 - 3)  acetaminophen  IVPB .. 1000 milliGRAM(s) IV Intermittent once PRN Severe Pain (7 - 10)  bisacodyl Suppository 10 milliGRAM(s) Rectal daily PRN Constipation  diazepam    Tablet 5 milliGRAM(s) Oral every 8 hours PRN severe spasms  naloxone Injectable 0.1 milliGRAM(s) IV Push every 3 minutes PRN For ANY of the following changes in patient status:  A. RR LESS THAN 10 breaths per minute, B. Oxygen saturation LESS THAN 90%, C. Sedation score of 6  ondansetron Injectable 4 milliGRAM(s) IV Push every 6 hours PRN Nausea  oxyCODONE    IR 20 milliGRAM(s) Oral every 4 hours PRN Moderate Pain (4 - 6)      LABS:    03-19    137  |  95<L>  |  6<L>  ----------------------------<  114<H>  3.9   |  30  |  0.67    Ca    9.5      19 Mar 2021 07:34

## 2021-03-20 NOTE — PROGRESS NOTE ADULT - SUBJECTIVE AND OBJECTIVE BOX
CHIEF COMPLAINT: patient reports pain much better controlled, doing well awaiting discharge to rehab pending drain output/removal    Vital Signs Last 24 Hrs  T(C): 36.8 (20 Mar 2021 14:20), Max: 36.9 (20 Mar 2021 04:25)  T(F): 98.3 (20 Mar 2021 14:20), Max: 98.5 (20 Mar 2021 04:25)  HR: 80 (20 Mar 2021 14:20) (72 - 80)  BP: 103/59 (20 Mar 2021 14:20) (93/52 - 107/64)  BP(mean): --  RR: 18 (20 Mar 2021 14:20) (18 - 18)  SpO2: 95% (20 Mar 2021 14:20) (93% - 95%)    DRAINS: [x] UCHE (R 40cc/24hrs) ) [x] HMV (L 60cc/24h)    PHYSICAL EXAM:    General: No Acute Distress     Neurological: Awake, alert oriented to person, place and time, Following Commands, PERRL, EOMI, Face Symmetrical, Speech Fluent, Moving all extremities, Muscle Strength normal in all four extremities in bed, pain limited at times, No Drift, Sensation to Light Touch Intact    Pulmonary: Clear to Auscultation, No Rales, No Rhonchi, No Wheezes     Cardiovascular: S1, S2, Regular Rate and Rhythm     Gastrointestinal: Soft, Nontender, Nondistended     Incision: +aqucel AG intact (to be changed by plastics tomorrow), drains x 2 in place (plastics managing)    LABS:                 03-19    137  |  95<L>  |  6<L>  ----------------------------<  114<H>  3.9   |  30  |  0.67    Ca    9.5      19 Mar 2021 07:34       MEDICATIONS  (STANDING):  atorvastatin 20 milliGRAM(s) Oral at bedtime  cyclobenzaprine 5 milliGRAM(s) Oral three times a day  enoxaparin Injectable 40 milliGRAM(s) SubCutaneous daily  fentaNYL   Patch  75 MICROgram(s)/Hr 1 Patch Transdermal every 72 hours  hyoscyamine SL 0.125 milliGRAM(s) SubLingual daily  levothyroxine 200 MICROGram(s) Oral daily  melatonin 3 milliGRAM(s) Oral at bedtime  multivitamin 1 Tablet(s) Oral daily  pantoprazole    Tablet 20 milliGRAM(s) Oral before breakfast  PARoxetine 40 milliGRAM(s) Oral daily  polyethylene glycol 3350 17 Gram(s) Oral two times a day  senna 2 Tablet(s) Oral at bedtime    MEDICATIONS  (PRN):  acetaminophen   Tablet .. 650 milliGRAM(s) Oral every 6 hours PRN Temp greater or equal to 38C (100.4F), Mild Pain (1 - 3)  acetaminophen  IVPB .. 1000 milliGRAM(s) IV Intermittent once PRN Severe Pain (7 - 10)  bisacodyl Suppository 10 milliGRAM(s) Rectal daily PRN Constipation  diazepam    Tablet 5 milliGRAM(s) Oral every 8 hours PRN severe spasms  naloxone Injectable 0.1 milliGRAM(s) IV Push every 3 minutes PRN For ANY of the following changes in patient status:  A. RR LESS THAN 10 breaths per minute, B. Oxygen saturation LESS THAN 90%, C. Sedation score of 6  ondansetron Injectable 4 milliGRAM(s) IV Push every 6 hours PRN Nausea  oxyCODONE    IR 20 milliGRAM(s) Oral every 4 hours PRN Moderate Pain (4 - 6)    DIET: [x] Regular [] CCD [] Renal [] Puree [] Dysphagia [] Tube Feeds:     IMAGING: standing xrays completed

## 2021-03-20 NOTE — PROGRESS NOTE ADULT - ASSESSMENT
Assessment:  Patient 60 y/o F s/p T8-arthodesis, L1-L3 anterior column release with complex back closure 3/12.     Plan:  - plan for next dressing change on Sun 3/21  - Primary management per NeuroSx, appreciate care   - Keep HV and UCHE drains to suction, monitor output very closely  - Will continue to monitor output, continue drains.       Plastic Surgery   [phone]

## 2021-03-21 ENCOUNTER — TRANSCRIPTION ENCOUNTER (OUTPATIENT)
Age: 61
End: 2021-03-21

## 2021-03-21 VITALS
HEART RATE: 78 BPM | RESPIRATION RATE: 18 BRPM | OXYGEN SATURATION: 94 % | TEMPERATURE: 98 F | SYSTOLIC BLOOD PRESSURE: 104 MMHG | DIASTOLIC BLOOD PRESSURE: 55 MMHG

## 2021-03-21 PROCEDURE — 84484 ASSAY OF TROPONIN QUANT: CPT

## 2021-03-21 PROCEDURE — 83605 ASSAY OF LACTIC ACID: CPT

## 2021-03-21 PROCEDURE — 76000 FLUOROSCOPY <1 HR PHYS/QHP: CPT

## 2021-03-21 PROCEDURE — 85025 COMPLETE CBC W/AUTO DIFF WBC: CPT

## 2021-03-21 PROCEDURE — 85027 COMPLETE CBC AUTOMATED: CPT

## 2021-03-21 PROCEDURE — 72131 CT LUMBAR SPINE W/O DYE: CPT

## 2021-03-21 PROCEDURE — 82947 ASSAY GLUCOSE BLOOD QUANT: CPT

## 2021-03-21 PROCEDURE — U0005: CPT

## 2021-03-21 PROCEDURE — U0003: CPT

## 2021-03-21 PROCEDURE — 84295 ASSAY OF SERUM SODIUM: CPT

## 2021-03-21 PROCEDURE — 97162 PT EVAL MOD COMPLEX 30 MIN: CPT

## 2021-03-21 PROCEDURE — C1713: CPT

## 2021-03-21 PROCEDURE — 82962 GLUCOSE BLOOD TEST: CPT

## 2021-03-21 PROCEDURE — 97110 THERAPEUTIC EXERCISES: CPT

## 2021-03-21 PROCEDURE — 93005 ELECTROCARDIOGRAM TRACING: CPT

## 2021-03-21 PROCEDURE — 85018 HEMOGLOBIN: CPT

## 2021-03-21 PROCEDURE — C1889: CPT

## 2021-03-21 PROCEDURE — 82803 BLOOD GASES ANY COMBINATION: CPT

## 2021-03-21 PROCEDURE — 97530 THERAPEUTIC ACTIVITIES: CPT

## 2021-03-21 PROCEDURE — 97166 OT EVAL MOD COMPLEX 45 MIN: CPT

## 2021-03-21 PROCEDURE — 84132 ASSAY OF SERUM POTASSIUM: CPT

## 2021-03-21 PROCEDURE — 82330 ASSAY OF CALCIUM: CPT

## 2021-03-21 PROCEDURE — 97116 GAIT TRAINING THERAPY: CPT

## 2021-03-21 PROCEDURE — 0031A: CPT

## 2021-03-21 PROCEDURE — 72128 CT CHEST SPINE W/O DYE: CPT

## 2021-03-21 PROCEDURE — 83036 HEMOGLOBIN GLYCOSYLATED A1C: CPT

## 2021-03-21 PROCEDURE — 72082 X-RAY EXAM ENTIRE SPI 2/3 VW: CPT

## 2021-03-21 PROCEDURE — 80048 BASIC METABOLIC PNL TOTAL CA: CPT

## 2021-03-21 PROCEDURE — 72020 X-RAY EXAM OF SPINE 1 VIEW: CPT

## 2021-03-21 PROCEDURE — C1769: CPT

## 2021-03-21 PROCEDURE — 93970 EXTREMITY STUDY: CPT

## 2021-03-21 PROCEDURE — 85014 HEMATOCRIT: CPT

## 2021-03-21 PROCEDURE — 82565 ASSAY OF CREATININE: CPT

## 2021-03-21 PROCEDURE — 82435 ASSAY OF BLOOD CHLORIDE: CPT

## 2021-03-21 RX ORDER — ACETAMINOPHEN 500 MG
2 TABLET ORAL
Qty: 0 | Refills: 0 | DISCHARGE
Start: 2021-03-21

## 2021-03-21 RX ORDER — LANOLIN ALCOHOL/MO/W.PET/CERES
1 CREAM (GRAM) TOPICAL
Qty: 0 | Refills: 0 | DISCHARGE
Start: 2021-03-21

## 2021-03-21 RX ORDER — ENOXAPARIN SODIUM 100 MG/ML
40 INJECTION SUBCUTANEOUS
Qty: 0 | Refills: 0 | DISCHARGE
Start: 2021-03-21

## 2021-03-21 RX ORDER — OXYCODONE HYDROCHLORIDE 5 MG/1
2 TABLET ORAL
Qty: 0 | Refills: 0 | DISCHARGE

## 2021-03-21 RX ORDER — CYCLOBENZAPRINE HYDROCHLORIDE 10 MG/1
1 TABLET, FILM COATED ORAL
Qty: 0 | Refills: 0 | DISCHARGE
Start: 2021-03-21

## 2021-03-21 RX ORDER — OXYCODONE HYDROCHLORIDE 5 MG/1
1 TABLET ORAL
Qty: 0 | Refills: 0 | DISCHARGE
Start: 2021-03-21

## 2021-03-21 RX ORDER — DIAZEPAM 5 MG
1 TABLET ORAL
Qty: 0 | Refills: 0 | DISCHARGE
Start: 2021-03-21

## 2021-03-21 RX ORDER — POLYETHYLENE GLYCOL 3350 17 G/17G
17 POWDER, FOR SOLUTION ORAL
Qty: 0 | Refills: 0 | DISCHARGE
Start: 2021-03-21

## 2021-03-21 RX ORDER — FENTANYL CITRATE 50 UG/ML
1 INJECTION INTRAVENOUS
Qty: 0 | Refills: 0 | DISCHARGE
Start: 2021-03-21

## 2021-03-21 RX ORDER — OXYCODONE HYDROCHLORIDE 5 MG/1
1 TABLET ORAL
Qty: 0 | Refills: 0 | DISCHARGE

## 2021-03-21 RX ORDER — FENTANYL CITRATE 50 UG/ML
1 INJECTION INTRAVENOUS
Qty: 0 | Refills: 0 | DISCHARGE

## 2021-03-21 RX ORDER — SENNA PLUS 8.6 MG/1
2 TABLET ORAL
Qty: 0 | Refills: 0 | DISCHARGE
Start: 2021-03-21

## 2021-03-21 RX ADMIN — Medication 1000 MILLIGRAM(S): at 08:00

## 2021-03-21 RX ADMIN — PANTOPRAZOLE SODIUM 20 MILLIGRAM(S): 20 TABLET, DELAYED RELEASE ORAL at 06:03

## 2021-03-21 RX ADMIN — Medication 200 MICROGRAM(S): at 06:02

## 2021-03-21 RX ADMIN — Medication 40 MILLIGRAM(S): at 13:01

## 2021-03-21 RX ADMIN — Medication 0.12 MILLIGRAM(S): at 13:00

## 2021-03-21 RX ADMIN — FENTANYL CITRATE 1 PATCH: 50 INJECTION INTRAVENOUS at 06:26

## 2021-03-21 RX ADMIN — Medication 1 TABLET(S): at 13:01

## 2021-03-21 RX ADMIN — JNJ-78436735 0.5 MILLILITER(S): 50000000000 SUSPENSION INTRAMUSCULAR at 12:57

## 2021-03-21 RX ADMIN — ENOXAPARIN SODIUM 40 MILLIGRAM(S): 100 INJECTION SUBCUTANEOUS at 13:00

## 2021-03-21 RX ADMIN — OXYCODONE HYDROCHLORIDE 20 MILLIGRAM(S): 5 TABLET ORAL at 06:01

## 2021-03-21 RX ADMIN — Medication 400 MILLIGRAM(S): at 07:13

## 2021-03-21 RX ADMIN — OXYCODONE HYDROCHLORIDE 20 MILLIGRAM(S): 5 TABLET ORAL at 06:26

## 2021-03-21 NOTE — DISCHARGE NOTE NURSING/CASE MANAGEMENT/SOCIAL WORK - PATIENT PORTAL LINK FT
You can access the FollowMyHealth Patient Portal offered by [facility] by registering at the following website: [email or web address]. By joining [facility]’s FollowMyHealth portal, you will also be able to view your health information using other applications (apps) compatible with our system.

## 2021-03-21 NOTE — DISCHARGE NOTE PROVIDER - HOSPITAL COURSE
HPI:  This is a 60 y/o female PMH esophageal spasms, usually while lying down, dysphagia, gastritis, is followed by GI, had an endoscopy in October, no interventions performed, takes pantoprazole, chronic lumbar and cervical spine disc disease, S/P cervical spinal fusion in 2020, S/P lumbar fusion 2003, continues to have low back pain and bilateral sciatica.  Presents today for stage 1 L1-L2, L2-3 lateral interbody fusion, stage 2 posterior T8-pelvis instrumented fusion with Mazor robot plastic closure, both to be done on 3/12/21. (26 Feb 2021 15:12)    3/12/21 s/p T8-PELVIS ARTHRODESIS, L1-3 ANTERIOR COLUMN RELEASE (RIGHT LATERAL APPROACH), with plastic closure for spinal stenosis with radiculopathy. Post procedure she did well.   2 Surgical drains were managed and removed by plastics prior to discharge. Remaining UCHE to be removed once output to <30cc/24hrs. PT/OT/PMR- evaluated her and recommended acute rehab. Pain control was challenging given history of chronic pain but improved with increase of medications. She received the COVID J&J vaccine prior to discharge.  On the day of discharge she was medically and neurologically stable for discharge to rehab.

## 2021-03-21 NOTE — DISCHARGE NOTE PROVIDER - REASON FOR ADMISSION
3/12/21 s/p T8-PELVIS ARTHRODESIS, L1-3 ANTERIOR COLUMN RELEASE (RIGHT LATERAL APPROACH), with plastic closure for spinal stenosis with radiculopathy.

## 2021-03-21 NOTE — PROGRESS NOTE ADULT - SUBJECTIVE AND OBJECTIVE BOX
Plastic Surgery Progress Note (pg [facility]: [id number], [facility]: [phone])    SUBJECTIVE  The patient was seen and examined. Dressing changed on AM rounds. No acute events overnight.    OBJECTIVE  ___________________________________________________  VITAL SIGNS / I&O's   Vital Signs Last 24 Hrs  T(C): 36.4 (21 Mar 2021 07:54), Max: 36.8 (20 Mar 2021 14:20)  T(F): 97.6 (21 Mar 2021 07:54), Max: 98.3 (20 Mar 2021 14:20)  HR: 70 (21 Mar 2021 07:54) (64 - 84)  BP: 106/55 (21 Mar 2021 07:54) (94/57 - 106/55)  BP(mean): --  RR: 18 (21 Mar 2021 07:54) (18 - 18)  SpO2: 94% (21 Mar 2021 07:54) (93% - 95%)      20 Mar 2021 07:01  -  21 Mar 2021 07:00  --------------------------------------------------------  IN:    Oral Fluid: 880 mL  Total IN: 880 mL    OUT:    Accordian (mL): 25 mL    Bulb (mL): 20 mL    Voided (mL): 1001 mL  Total OUT: 1046 mL    Total NET: -166 mL      21 Mar 2021 07:01  -  21 Mar 2021 09:23  --------------------------------------------------------  IN:    IV PiggyBack: 100 mL    Oral Fluid: 240 mL  Total IN: 340 mL    OUT:    Voided (mL): 200 mL  Total OUT: 200 mL    Total NET: 140 mL        ___________________________________________________  PHYSICAL EXAM    -- CONSTITUTIONAL: AOx3. NAD.   -- RESPIRATORY: Nonlabored respirations  -- BACK: Dressing changed. Incision intact. Back soft, no collections. hemovac removed, UCHE serosanguinous.  ___________________________________________________  MEDICATIONS  (STANDING):  atorvastatin 20 milliGRAM(s) Oral at bedtime  coronavirus (EUA) Vaccine ([employer]) 0.5 milliLiter(s) IntraMuscular once  cyclobenzaprine 5 milliGRAM(s) Oral three times a day  enoxaparin Injectable 40 milliGRAM(s) SubCutaneous daily  fentaNYL   Patch  75 MICROgram(s)/Hr 1 Patch Transdermal every 72 hours  hyoscyamine SL 0.125 milliGRAM(s) SubLingual daily  levothyroxine 200 MICROGram(s) Oral daily  melatonin 3 milliGRAM(s) Oral at bedtime  multivitamin 1 Tablet(s) Oral daily  pantoprazole    Tablet 20 milliGRAM(s) Oral before breakfast  PARoxetine 40 milliGRAM(s) Oral daily  polyethylene glycol 3350 17 Gram(s) Oral two times a day  senna 2 Tablet(s) Oral at bedtime    MEDICATIONS  (PRN):  acetaminophen   Tablet .. 650 milliGRAM(s) Oral every 6 hours PRN Temp greater or equal to 38C (100.4F), Mild Pain (1 - 3)  bisacodyl Suppository 10 milliGRAM(s) Rectal daily PRN Constipation  diazepam    Tablet 5 milliGRAM(s) Oral every 8 hours PRN severe spasms  naloxone Injectable 0.1 milliGRAM(s) IV Push every 3 minutes PRN For ANY of the following changes in patient status:  A. RR LESS THAN 10 breaths per minute, B. Oxygen saturation LESS THAN 90%, C. Sedation score of 6  ondansetron Injectable 4 milliGRAM(s) IV Push every 6 hours PRN Nausea  oxyCODONE    IR 20 milliGRAM(s) Oral every 4 hours PRN Moderate Pain (4 - 6)

## 2021-03-21 NOTE — PROGRESS NOTE ADULT - THIS PATIENT HAS THE FOLLOWING CONDITION(S)/DIAGNOSES ON THIS ADMISSION:
None
clinically stable, will trend/Acute Blood Loss Anemia
None
clinically stable, will trend/Acute Blood Loss Anemia
Bigeminy
None
clinically stable, will trend/Acute Blood Loss Anemia

## 2021-03-21 NOTE — DISCHARGE NOTE PROVIDER - NSDCFUADDAPPT_GEN_ALL_CORE_FT
UCHE to be removed once output to <30cc/24hrs. May remove aquacel AG on 3/26/21 and leave open to air.   On day of discharge 3/21/21 patient was screened and qualified for the [employer] COVID-19 vaccine, patient was informed that this is an FDA emergency use authorized vaccine for COVID-19 and informed risks and benefits. Patient was provided a fact sheet regarding the vaccine. Patient consented and signed, and Dr. Acevedo aware that patient received vaccination.

## 2021-03-21 NOTE — DISCHARGE NOTE PROVIDER - NSDCMRMEDTOKEN_GEN_ALL_CORE_FT
acetaminophen 325 mg oral tablet: 2 tab(s) orally every 6 hours, As needed, Temp greater or equal to 38C (100.4F), Mild Pain (1 - 3)  bisacodyl 10 mg rectal suppository: 1 suppository(ies) rectal once a day, As needed, Constipation  cyclobenzaprine 5 mg oral tablet: 1 tab(s) orally 3 times a day  diazePAM 5 mg oral tablet: 1 tab(s) orally every 8 hours, As needed, severe spasms  enoxaparin: 40 milligram(s) subcutaneous once a day (at bedtime)  fentaNYL 75 mcg/hr transdermal film, extended release: 1 patch transdermal every 72 hours  hyoscyamine: 0.25 milligram(s) orally once a day, As Needed  levothyroxine 200 mcg (0.2 mg) oral tablet: 1 tab(s) orally once a day  melatonin 3 mg oral tablet: 1 tab(s) orally once a day (at bedtime)  Multiple Vitamins oral tablet: 1 tab(s) orally once a day  ondansetron 4 mg oral tablet: 1 tab(s) orally every 8 hours, As Needed - for nausea  oxyCODONE 20 mg oral tablet: 1 tab(s) orally every 4 hours, As needed, Moderate Pain (4 - 6)  pantoprazole 20 mg oral delayed release tablet: 1 tab(s) orally once a day  PARoxetine 40 mg oral tablet: 1 tab(s) orally once a day  polyethylene glycol 3350 oral powder for reconstitution: 17 gram(s) orally 2 times a day  rosuvastatin 5 mg oral tablet: 1 tab(s) orally once a day  senna oral tablet: 2 tab(s) orally once a day (at bedtime)  Systane ophthalmic gel forming solution: 1 drop(s) to each affected eye 4 times a day

## 2021-03-21 NOTE — DISCHARGE NOTE PROVIDER - NSDCCPCAREPLAN_GEN_ALL_CORE_FT
PRINCIPAL DISCHARGE DIAGNOSIS  Diagnosis: Spinal stenosis of lumbar region with radiculopathy  Assessment and Plan of Treatment: 3/12/21 s/p T8-PELVIS ARTHRODESIS, L1-3 ANTERIOR COLUMN RELEASE (RIGHT LATERAL APPROACH), with plastic closure for spinal stenosis with radiculopathy.  Dr Acevedo- neurosurgeon- please call office for appointment upon discharge from rehab.   Dr Fournier- plastic surgeon- please call office for appointment upon discharge from rehab. Please remove UCHE drain when output <30cc/24hrs.

## 2021-03-21 NOTE — PROGRESS NOTE ADULT - REASON FOR ADMISSION
T8-PELVIS ARTHRODESIS, L1-3 ANTERIOR COLUMN RELEASE (RIGHT LATERAL APPROACH),
3/12/21 s/p T8-PELVIS ARTHRODESIS, L1-3 ANTERIOR COLUMN RELEASE (RIGHT LATERAL APPROACH), with plastic closure for spinal stenosis with radiculopathy.
T8-PELVIS ARTHRODESIS, L1-3 ANTERIOR COLUMN RELEASE (RIGHT LATERAL APPROACH),
3/12/21 s/p T8-PELVIS ARTHRODESIS, L1-3 ANTERIOR COLUMN RELEASE (RIGHT LATERAL APPROACH), with plastic closure for spinal stenosis with radiculopathy.
3/12/21 s/p T8-PELVIS ARTHRODESIS, L1-3 ANTERIOR COLUMN RELEASE (RIGHT LATERAL APPROACH), with plastic closure for spinal stenosis with radiculopathy.
Patient 60 y/o F s/p T8-arthodesis, L1-L3 anterior column release with complex back closure 3/12.
Patient 62 y/o F s/p T8-arthodesis, L1-L3 anterior column release with complex back closure 3/12.
Patient is a 62 y/o F s/p T8-arthodesis, L1-L3 anterior column release with complex back closure 3/12.
3/12/21 s/p T8-PELVIS ARTHRODESIS, L1-3 ANTERIOR COLUMN RELEASE (RIGHT LATERAL APPROACH), with plastic closure for spinal stenosis with radiculopathy.
T8-PELVIS ARTHRODESIS, L1-3 ANTERIOR COLUMN RELEASE (RIGHT LATERAL APPROACH),

## 2021-03-21 NOTE — DISCHARGE NOTE NURSING/CASE MANAGEMENT/SOCIAL WORK - NSDCVIVACCINE_GEN_ALL_CORE_FT
Severe acute respiratory syndrome coronavirus 2 (SARS-CoV-2) (Coronavirus disease [COVID-19]) vaccine , 2021/3/21 12:57 , Nithya Choe (NANNETTE)

## 2021-03-21 NOTE — DISCHARGE NOTE NURSING/CASE MANAGEMENT/SOCIAL WORK - NSDCFUADDAPPT_GEN_ALL_CORE_FT
UCHE to be removed once output to <30cc/24hrs. May remove aquacel AG on 3/26/21 and leave open to air.   On day of discharge 3/21/21 patient was screened and qualified for the [employer] COVID-19 vaccine, patient was informed that this is an FDA emergency use authorized vaccine for COVID-19 and informed risks and benefits. Patient was provided a fact sheet regarding the vaccine. Patient consented and signed, and Dr. Acevedo aware that patient received vaccination.

## 2021-03-21 NOTE — DISCHARGE NOTE PROVIDER - NSDCFUADDINST_GEN_ALL_CORE_FT
please notify physician if fevers, bleeding, swelling, pain not relieved by medication, increased sluggishness or irritability, increased nausea or vomiting, inability to tolerate foods or liquids, new or increasing weakness/numbness/tingling.   please do not engage in strenuous activity, heavy lifting, drive, or return to work or school until cleared by surgeon.   please keep incision clean and dry, do not submerge wound in water for prolonged periods of time, pat dry after showering, and do not use any creams or ointments to incision.   Please do not take NSAIDs- ie. advil, motrin, ibuprofen, aleve, aspirin, naproxen, etc. Tylenol/ acetaminophen ok to take.

## 2021-03-21 NOTE — PROGRESS NOTE ADULT - ASSESSMENT
HPI:  This is a 60 y/o female PMH esophageal spasms, usually while lying down, dysphagia, gastritis, is followed by GI, had an endoscopy in October, no interventions performed, takes pantoprazole, chronic lumbar and cervical spine disc disease, S/P cervical spinal fusion in 2020, S/P lumbar fusion 2003, continues to have low back pain and bilateral sciatica.  Presents today for stage 1 L1-L2, L2-3 lateral interbody fusion, stage 2 posterior T8-pelvis instrumented fusion with Mazor robot plastic closure, both to be done on 3/12/21. (26 Feb 2021 15:12)    PAST MEDICAL & SURGICAL HISTORY:  Spondylogenic compression of cervical spinal cord    Fibromyalgia    Degenerative tear of acetabular labrum of left hip    Achilles tendon tear  bilateral    Depression    History of chronic pain  on meds for 16 yrs    Dyskinesia of esophagus    History of dysphagia  resolved after surgery in 2019    H/O gastritis  - on pantoprazole    Osteoarthritis    Spondylolisthesis, cervical region    Cervical herniated disc    Hypothyroidism    S/P cervical spinal fusion  cage 2019    S/P arthroscopy of left knee  x 4    S/P laminectomy with spinal fusion  lumbar spine    S/P hip replacement, right    S/P total knee replacement, left    PROCEDURE: 3/12/21 s/p T8-PELVIS ARTHRODESIS, L1-3 ANTERIOR COLUMN RELEASE (RIGHT LATERAL APPROACH), with plastic closure for spinal stenosis with radiculopathy.  POD#9    PLAN:  Neuro: cont fentanyl patch to 75mcg/72hrs- patient reportedly was on 100mcg approx 1 year ago, cont home oxycodone 20mg PRN pain, flexeril 5mg q8hrs with valium 5mg PRN severe spasms; cont melatonin to help with sleep  ([facility] ref #[id number] confirms duragesic 50mcg, oxycodone 20mg at home)  continue drains and monitor output, managed per plastics; output needs to be <30cc/24hrs for removal of last UCHE   Respiratory: patient instructed on incentive spirometer  CV: stable, no meds  HEME: monitor H/H and trend, clinically stable       DVT ppx: [] SQH [x] SQL and venodynes bilaterally  Renal: IVL  ID: afebrile   GI: last BM 3/19, bowel regimen  PT/OT/PMR: acute rehab today  patient received COVID J&J vaccine prior to d/c today  d/c to rehab today with 1 UCHE in place.     Will discuss with Dr Acevedo  [facility] # [id number]

## 2021-03-21 NOTE — PROGRESS NOTE ADULT - SUBJECTIVE AND OBJECTIVE BOX
CHIEF COMPLAINT: patient reports pain much better controlled, doing well awaiting discharge to rehab.     Vital Signs Last 24 Hrs  T(C): 36.4 (21 Mar 2021 07:54), Max: 36.8 (20 Mar 2021 14:20)  T(F): 97.6 (21 Mar 2021 07:54), Max: 98.3 (20 Mar 2021 14:20)  HR: 70 (21 Mar 2021 07:54) (64 - 84)  BP: 106/55 (21 Mar 2021 07:54) (94/57 - 106/55)  BP(mean): --  RR: 18 (21 Mar 2021 07:54) (18 - 18)  SpO2: 94% (21 Mar 2021 07:54) (93% - 95%)    DRAINS: [x] UCHE (R 20cc/24hrs) ) [x] HMV (L 25cc/24h)    PHYSICAL EXAM:    General: No Acute Distress     Neurological: Awake, alert oriented to person, place and time, Following Commands, PERRL, EOMI, Face Symmetrical, Speech Fluent, Moving all extremities, Muscle Strength normal in all four extremities in bed, pain limited at times, No Drift, Sensation to Light Touch Intact    Pulmonary: Clear to Auscultation, No Rales, No Rhonchi, No Wheezes     Cardiovascular: S1, S2, Regular Rate and Rhythm     Gastrointestinal: Soft, Nontender, Nondistended     Incision: +aqucel AG intact (changed by plastics today), UCHE drains x a in place (plastics managing, HMVC removed today)    LABS: no new labs             MEDICATIONS  (STANDING):  atorvastatin 20 milliGRAM(s) Oral at bedtime  cyclobenzaprine 5 milliGRAM(s) Oral three times a day  enoxaparin Injectable 40 milliGRAM(s) SubCutaneous daily  fentaNYL   Patch  75 MICROgram(s)/Hr 1 Patch Transdermal every 72 hours  hyoscyamine SL 0.125 milliGRAM(s) SubLingual daily  levothyroxine 200 MICROGram(s) Oral daily  melatonin 3 milliGRAM(s) Oral at bedtime  multivitamin 1 Tablet(s) Oral daily  pantoprazole    Tablet 20 milliGRAM(s) Oral before breakfast  PARoxetine 40 milliGRAM(s) Oral daily  polyethylene glycol 3350 17 Gram(s) Oral two times a day  senna 2 Tablet(s) Oral at bedtime    MEDICATIONS  (PRN):  acetaminophen   Tablet .. 650 milliGRAM(s) Oral every 6 hours PRN Temp greater or equal to 38C (100.4F), Mild Pain (1 - 3)  acetaminophen  IVPB .. 1000 milliGRAM(s) IV Intermittent once PRN Severe Pain (7 - 10)  bisacodyl Suppository 10 milliGRAM(s) Rectal daily PRN Constipation  diazepam    Tablet 5 milliGRAM(s) Oral every 8 hours PRN severe spasms  naloxone Injectable 0.1 milliGRAM(s) IV Push every 3 minutes PRN For ANY of the following changes in patient status:  A. RR LESS THAN 10 breaths per minute, B. Oxygen saturation LESS THAN 90%, C. Sedation score of 6  ondansetron Injectable 4 milliGRAM(s) IV Push every 6 hours PRN Nausea  oxyCODONE    IR 20 milliGRAM(s) Oral every 4 hours PRN Moderate Pain (4 - 6)    DIET: [x] Regular [] CCD [] Renal [] Puree [] Dysphagia [] Tube Feeds:     IMAGING: standing xrays completed

## 2021-03-21 NOTE — PROGRESS NOTE ADULT - ASSESSMENT
Assessment:  Patient 62 y/o F s/p T8-arthodesis, L1-L3 anterior column release with complex back closure 3/12.     Plan:  - Primary management per NeuroSx, appreciate care   - HV dc'd, Keep UCHE drains to suction, monitor output very closely  - Will continue to monitor output, continue drains.       Plastic Surgery   [phone] Assessment:  Patient 60 y/o F s/p T8-arthodesis, L1-L3 anterior column release with complex back closure 3/12.     Plan:  - Primary management per NeuroSx, appreciate care   - HV dc'd, Keep UCHE drain to suction, monitor output very closely  - UCHE to be dc'd when output <30cc over 24 hours  - Dispo planning to rehab      Plastic Surgery   [phone]

## 2021-03-21 NOTE — PROGRESS NOTE ADULT - PROVIDER SPECIALTY LIST ADULT
Neurosurgery
NSICU
NSICU
Neurosurgery
Plastic Surgery
Anesthesia
Anesthesia
NSICU
Neurosurgery
Plastic Surgery
Neurosurgery
Plastic Surgery
Neurosurgery

## 2021-03-21 NOTE — DISCHARGE NOTE PROVIDER - CARE PROVIDER_API CALL
Ventura Acevedo)  Neurosurgery  General  [address], [address]  [place] [address]  Phone: [phone]  Fax: [phone]  Follow Up Time:     Monica Fouriner)  Plastic Surgery  [address] [place] [address]  Phone: [phone]  Fax: [phone]  Follow Up Time:

## 2021-03-22 ENCOUNTER — APPOINTMENT (OUTPATIENT)
Dept: MRI IMAGING | Facility: CLINIC | Age: 61
End: 2021-03-22

## 2021-03-24 ENCOUNTER — APPOINTMENT (OUTPATIENT)
Dept: NEUROSURGERY | Facility: CLINIC | Age: 61
End: 2021-03-24

## 2021-03-30 ENCOUNTER — INPATIENT (INPATIENT)
Facility: HOSPITAL | Age: 61
LOS: 7 days | Discharge: HOME | DRG: 92 | End: 2021-04-07
Attending: HOSPITALIST | Admitting: STUDENT IN AN ORGANIZED HEALTH CARE EDUCATION/TRAINING PROGRAM
Payer: COMMERCIAL

## 2021-03-30 VITALS
OXYGEN SATURATION: 98 % | WEIGHT: 171.96 LBS | RESPIRATION RATE: 20 BRPM | TEMPERATURE: 98 F | DIASTOLIC BLOOD PRESSURE: 71 MMHG | HEIGHT: 64 IN | HEART RATE: 78 BPM | SYSTOLIC BLOOD PRESSURE: 108 MMHG

## 2021-03-30 DIAGNOSIS — Z98.1 ARTHRODESIS STATUS: Chronic | ICD-10-CM

## 2021-03-30 DIAGNOSIS — Z98.890 OTHER SPECIFIED POSTPROCEDURAL STATES: Chronic | ICD-10-CM

## 2021-03-30 DIAGNOSIS — Z29.9 ENCOUNTER FOR PROPHYLACTIC MEASURES, UNSPECIFIED: ICD-10-CM

## 2021-03-30 DIAGNOSIS — Z96.652 PRESENCE OF LEFT ARTIFICIAL KNEE JOINT: Chronic | ICD-10-CM

## 2021-03-30 DIAGNOSIS — Z87.898 PERSONAL HISTORY OF OTHER SPECIFIED CONDITIONS: ICD-10-CM

## 2021-03-30 DIAGNOSIS — Z87.19 PERSONAL HISTORY OF OTHER DISEASES OF THE DIGESTIVE SYSTEM: ICD-10-CM

## 2021-03-30 DIAGNOSIS — F32.9 MAJOR DEPRESSIVE DISORDER, SINGLE EPISODE, UNSPECIFIED: ICD-10-CM

## 2021-03-30 DIAGNOSIS — E03.9 HYPOTHYROIDISM, UNSPECIFIED: ICD-10-CM

## 2021-03-30 DIAGNOSIS — Z96.641 PRESENCE OF RIGHT ARTIFICIAL HIP JOINT: Chronic | ICD-10-CM

## 2021-03-30 DIAGNOSIS — G96.00 CEREBROSPINAL FLUID LEAK, UNSPECIFIED: ICD-10-CM

## 2021-03-30 LAB
ALBUMIN SERPL ELPH-MCNC: 3.1 G/DL — LOW (ref 3.3–5)
ALP SERPL-CCNC: 75 U/L — SIGNIFICANT CHANGE UP (ref 40–120)
ALT FLD-CCNC: 11 U/L — SIGNIFICANT CHANGE UP (ref 10–45)
ANION GAP SERPL CALC-SCNC: 8 MMOL/L — SIGNIFICANT CHANGE UP (ref 5–17)
APTT BLD: 36.9 SEC — HIGH (ref 27.5–35.5)
AST SERPL-CCNC: 17 U/L — SIGNIFICANT CHANGE UP (ref 10–40)
BASOPHILS # BLD AUTO: 0.03 K/UL — SIGNIFICANT CHANGE UP (ref 0–0.2)
BASOPHILS NFR BLD AUTO: 0.4 % — SIGNIFICANT CHANGE UP (ref 0–2)
BILIRUB SERPL-MCNC: 0.2 MG/DL — SIGNIFICANT CHANGE UP (ref 0.2–1.2)
BLD GP AB SCN SERPL QL: NEGATIVE — SIGNIFICANT CHANGE UP
BUN SERPL-MCNC: 6 MG/DL — LOW (ref 7–23)
CALCIUM SERPL-MCNC: 9.1 MG/DL — SIGNIFICANT CHANGE UP (ref 8.4–10.5)
CHLORIDE SERPL-SCNC: 100 MMOL/L — SIGNIFICANT CHANGE UP (ref 96–108)
CO2 SERPL-SCNC: 31 MMOL/L — SIGNIFICANT CHANGE UP (ref 22–31)
CREAT SERPL-MCNC: 0.69 MG/DL — SIGNIFICANT CHANGE UP (ref 0.5–1.3)
EOSINOPHIL # BLD AUTO: 0.13 K/UL — SIGNIFICANT CHANGE UP (ref 0–0.5)
EOSINOPHIL NFR BLD AUTO: 1.7 % — SIGNIFICANT CHANGE UP (ref 0–6)
GLUCOSE BLDC GLUCOMTR-MCNC: 111 MG/DL — HIGH (ref 70–99)
GLUCOSE SERPL-MCNC: 105 MG/DL — HIGH (ref 70–99)
HCT VFR BLD CALC: 26.5 % — LOW (ref 34.5–45)
HGB BLD-MCNC: 8.4 G/DL — LOW (ref 11.5–15.5)
IMM GRANULOCYTES NFR BLD AUTO: 0.3 % — SIGNIFICANT CHANGE UP (ref 0–1.5)
INR BLD: 1.22 RATIO — HIGH (ref 0.88–1.16)
LYMPHOCYTES # BLD AUTO: 2.32 K/UL — SIGNIFICANT CHANGE UP (ref 1–3.3)
LYMPHOCYTES # BLD AUTO: 30.5 % — SIGNIFICANT CHANGE UP (ref 13–44)
MCHC RBC-ENTMCNC: 30.3 PG — SIGNIFICANT CHANGE UP (ref 27–34)
MCHC RBC-ENTMCNC: 31.7 GM/DL — LOW (ref 32–36)
MCV RBC AUTO: 95.7 FL — SIGNIFICANT CHANGE UP (ref 80–100)
MONOCYTES # BLD AUTO: 0.73 K/UL — SIGNIFICANT CHANGE UP (ref 0–0.9)
MONOCYTES NFR BLD AUTO: 9.6 % — SIGNIFICANT CHANGE UP (ref 2–14)
NEUTROPHILS # BLD AUTO: 4.37 K/UL — SIGNIFICANT CHANGE UP (ref 1.8–7.4)
NEUTROPHILS NFR BLD AUTO: 57.5 % — SIGNIFICANT CHANGE UP (ref 43–77)
NRBC # BLD: 0 /100 WBCS — SIGNIFICANT CHANGE UP (ref 0–0)
PLATELET # BLD AUTO: 549 K/UL — HIGH (ref 150–400)
POTASSIUM SERPL-MCNC: 4.2 MMOL/L — SIGNIFICANT CHANGE UP (ref 3.5–5.3)
POTASSIUM SERPL-SCNC: 4.2 MMOL/L — SIGNIFICANT CHANGE UP (ref 3.5–5.3)
PROT SERPL-MCNC: 6.3 G/DL — SIGNIFICANT CHANGE UP (ref 6–8.3)
PROTHROM AB SERPL-ACNC: 14.5 SEC — HIGH (ref 10.6–13.6)
RBC # BLD: 2.77 M/UL — LOW (ref 3.8–5.2)
RBC # FLD: 14.2 % — SIGNIFICANT CHANGE UP (ref 10.3–14.5)
RH IG SCN BLD-IMP: POSITIVE — SIGNIFICANT CHANGE UP
SARS-COV-2 RNA SPEC QL NAA+PROBE: SIGNIFICANT CHANGE UP
SODIUM SERPL-SCNC: 139 MMOL/L — SIGNIFICANT CHANGE UP (ref 135–145)
WBC # BLD: 7.6 K/UL — SIGNIFICANT CHANGE UP (ref 3.8–10.5)
WBC # FLD AUTO: 7.6 K/UL — SIGNIFICANT CHANGE UP (ref 3.8–10.5)

## 2021-03-30 PROCEDURE — 93010 ELECTROCARDIOGRAM REPORT: CPT

## 2021-03-30 PROCEDURE — 71045 X-RAY EXAM CHEST 1 VIEW: CPT | Mod: 26

## 2021-03-30 PROCEDURE — 99223 1ST HOSP IP/OBS HIGH 75: CPT

## 2021-03-30 PROCEDURE — 99285 EMERGENCY DEPT VISIT HI MDM: CPT

## 2021-03-30 RX ORDER — INSULIN LISPRO 100/ML
VIAL (ML) SUBCUTANEOUS AT BEDTIME
Refills: 0 | Status: ACTIVE | OUTPATIENT
Start: 2021-03-30 | End: 2022-02-26

## 2021-03-30 RX ORDER — SODIUM CHLORIDE 9 MG/ML
1000 INJECTION INTRAMUSCULAR; INTRAVENOUS; SUBCUTANEOUS
Refills: 0 | Status: ACTIVE | OUTPATIENT
Start: 2021-03-30 | End: 2022-02-26

## 2021-03-30 RX ORDER — GLUCAGON INJECTION, SOLUTION 0.5 MG/.1ML
1 INJECTION, SOLUTION SUBCUTANEOUS ONCE
Refills: 0 | Status: DISCONTINUED | OUTPATIENT
Start: 2021-03-30 | End: 2021-04-07

## 2021-03-30 RX ORDER — LEVOTHYROXINE SODIUM 125 MCG
200 TABLET ORAL DAILY
Refills: 0 | Status: DISCONTINUED | OUTPATIENT
Start: 2021-03-30 | End: 2021-04-07

## 2021-03-30 RX ORDER — DEXTROSE 50 % IN WATER 50 %
15 SYRINGE (ML) INTRAVENOUS ONCE
Refills: 0 | Status: ACTIVE | OUTPATIENT
Start: 2021-03-30 | End: 2022-02-26

## 2021-03-30 RX ORDER — SODIUM CHLORIDE 9 MG/ML
1000 INJECTION, SOLUTION INTRAVENOUS
Refills: 0 | Status: ACTIVE | OUTPATIENT
Start: 2021-03-30 | End: 2022-02-26

## 2021-03-30 RX ORDER — ATORVASTATIN CALCIUM 80 MG/1
20 TABLET, FILM COATED ORAL AT BEDTIME
Refills: 0 | Status: DISCONTINUED | OUTPATIENT
Start: 2021-03-30 | End: 2021-04-03

## 2021-03-30 RX ORDER — FENTANYL CITRATE 50 UG/ML
1 INJECTION INTRAVENOUS
Refills: 0 | Status: DISCONTINUED | OUTPATIENT
Start: 2021-03-30 | End: 2021-04-04

## 2021-03-30 RX ORDER — LANOLIN ALCOHOL/MO/W.PET/CERES
3 CREAM (GRAM) TOPICAL AT BEDTIME
Refills: 0 | Status: DISCONTINUED | OUTPATIENT
Start: 2021-03-30 | End: 2021-04-07

## 2021-03-30 RX ORDER — SODIUM CHLORIDE 9 MG/ML
1000 INJECTION INTRAMUSCULAR; INTRAVENOUS; SUBCUTANEOUS ONCE
Refills: 0 | Status: COMPLETED | OUTPATIENT
Start: 2021-03-30 | End: 2021-03-30

## 2021-03-30 RX ORDER — POLYETHYLENE GLYCOL 3350 17 G/17G
17 POWDER, FOR SOLUTION ORAL
Refills: 0 | Status: DISCONTINUED | OUTPATIENT
Start: 2021-03-30 | End: 2021-04-07

## 2021-03-30 RX ORDER — DEXTROSE 50 % IN WATER 50 %
12.5 SYRINGE (ML) INTRAVENOUS ONCE
Refills: 0 | Status: ACTIVE | OUTPATIENT
Start: 2021-03-30

## 2021-03-30 RX ORDER — SENNA PLUS 8.6 MG/1
2 TABLET ORAL AT BEDTIME
Refills: 0 | Status: DISCONTINUED | OUTPATIENT
Start: 2021-03-30 | End: 2021-04-07

## 2021-03-30 RX ORDER — OXYCODONE HYDROCHLORIDE 5 MG/1
5 TABLET ORAL EVERY 4 HOURS
Refills: 0 | Status: DISCONTINUED | OUTPATIENT
Start: 2021-03-30 | End: 2021-04-06

## 2021-03-30 RX ORDER — ACETAMINOPHEN 500 MG
650 TABLET ORAL EVERY 6 HOURS
Refills: 0 | Status: DISCONTINUED | OUTPATIENT
Start: 2021-03-30 | End: 2021-04-03

## 2021-03-30 RX ORDER — CYCLOBENZAPRINE HYDROCHLORIDE 10 MG/1
5 TABLET, FILM COATED ORAL THREE TIMES A DAY
Refills: 0 | Status: DISCONTINUED | OUTPATIENT
Start: 2021-03-30 | End: 2021-04-07

## 2021-03-30 RX ORDER — INSULIN LISPRO 100/ML
VIAL (ML) SUBCUTANEOUS
Refills: 0 | Status: ACTIVE | OUTPATIENT
Start: 2021-03-30 | End: 2022-02-26

## 2021-03-30 RX ORDER — DIAZEPAM 5 MG
5 TABLET ORAL EVERY 8 HOURS
Refills: 0 | Status: DISCONTINUED | OUTPATIENT
Start: 2021-03-30 | End: 2021-04-04

## 2021-03-30 RX ORDER — MORPHINE SULFATE 50 MG/1
4 CAPSULE, EXTENDED RELEASE ORAL ONCE
Refills: 0 | Status: DISCONTINUED | OUTPATIENT
Start: 2021-03-30 | End: 2021-03-30

## 2021-03-30 RX ORDER — OXYCODONE HYDROCHLORIDE 5 MG/1
10 TABLET ORAL EVERY 4 HOURS
Refills: 0 | Status: DISCONTINUED | OUTPATIENT
Start: 2021-03-30 | End: 2021-04-06

## 2021-03-30 RX ORDER — DEXTROSE 50 % IN WATER 50 %
25 SYRINGE (ML) INTRAVENOUS ONCE
Refills: 0 | Status: ACTIVE | OUTPATIENT
Start: 2021-03-30

## 2021-03-30 RX ORDER — ONDANSETRON 8 MG/1
4 TABLET, FILM COATED ORAL EVERY 8 HOURS
Refills: 0 | Status: DISCONTINUED | OUTPATIENT
Start: 2021-03-30 | End: 2021-04-03

## 2021-03-30 RX ADMIN — MORPHINE SULFATE 4 MILLIGRAM(S): 50 CAPSULE, EXTENDED RELEASE ORAL at 17:06

## 2021-03-30 RX ADMIN — OXYCODONE HYDROCHLORIDE 5 MILLIGRAM(S): 5 TABLET ORAL at 23:18

## 2021-03-30 RX ADMIN — SODIUM CHLORIDE 1000 MILLILITER(S): 9 INJECTION INTRAMUSCULAR; INTRAVENOUS; SUBCUTANEOUS at 19:00

## 2021-03-30 RX ADMIN — Medication 3 MILLIGRAM(S): at 21:13

## 2021-03-30 RX ADMIN — SENNA PLUS 2 TABLET(S): 8.6 TABLET ORAL at 21:13

## 2021-03-30 RX ADMIN — OXYCODONE HYDROCHLORIDE 5 MILLIGRAM(S): 5 TABLET ORAL at 22:48

## 2021-03-30 RX ADMIN — Medication 5 MILLIGRAM(S): at 17:06

## 2021-03-30 RX ADMIN — ATORVASTATIN CALCIUM 20 MILLIGRAM(S): 80 TABLET, FILM COATED ORAL at 22:34

## 2021-03-30 RX ADMIN — SODIUM CHLORIDE 75 MILLILITER(S): 9 INJECTION INTRAMUSCULAR; INTRAVENOUS; SUBCUTANEOUS at 22:34

## 2021-03-30 RX ADMIN — POLYETHYLENE GLYCOL 3350 17 GRAM(S): 17 POWDER, FOR SOLUTION ORAL at 21:13

## 2021-03-30 NOTE — ED PROVIDER NOTE - OBJECTIVE STATEMENT
61y F pmhx esophageal spasms, dysphagia, gastritis (followed by GI, had an endoscopy in October, no interventions performed, takes pantoprazole), chronic lumbar and cervical spine disc disease, S/P cervical spinal fusion in 2020, S/P lumbar fusion 2003, s/p T8-pelvis arthrodesis, L1-3 anterior column release 3/12/21 Dr. Acevedo, presents to ED BIBEMS for Neurosx admission c/o of orthostatic dizziness x 6 days w/ known CSF leak on MRI yesterday. Pt reports severe dizziness when sitting up or standing from a supine position w/ intermittent nausea. Reports near syncope 3 days ago. Denies f/c, extremity numbness/weakness, urinary incontinence or retention. Last BM 3 days ago, passing flatus.  Pt presented to ED with [facility] imaging discs

## 2021-03-30 NOTE — CONSULT NOTE ADULT - PROBLEM SELECTOR RECOMMENDATION 2
home regimen: fentanyl 75 mg q3d(previously 50 mg, but recently increased to 75 mg at rehab, currently w/ 75 mg patch on; last placed on 3/28) and oxycodone 20 mg q6h prn  - current regimen: oxycodone IR 5 mg q4h prn moderate pain, 10 mg q4h prn severe pain  -bowel regimen: c/w senna and miralax

## 2021-03-30 NOTE — CONSULT NOTE ADULT - ASSESSMENT
PING BROWN is a 61yFemale pw Patient is a 61y old  Female who presents with a chief complaint of orthostatic dizziness. Concern for CSF leak vs seroma. Consulted for medical comanagement.

## 2021-03-30 NOTE — CONSULT NOTE ADULT - SUBJECTIVE AND OBJECTIVE BOX
Plastic Surgery Consult Note  (pg [facility]: [id number], [facility]: [phone])    HPI:  61y F pmhx esophageal spasms, dysphagia, gastritis, chronic lumbar and cervical spine disc disease, S/P cervical spinal fusion in 2020, S/P lumbar fusion 2003, s/p T8-pelvis arthrodesis, L1-3 anterior column release 3/12/21 Dr. Acevedo, sent to ED from rehab per neurosurgery c/o of orthostatic dizziness x 6 days. Pt reports severe dizziness when sitting up or standing from a supine position w/ intermittent nausea. Pt presented to ED with [facility] imaging discs, patient received an MRI of back at outside facility and was told she has a "CSF leak". The patient is currently pending repeat imaging in ED. No other complaints, no elevated WBC, vital signs WNL.     PAST MEDICAL & SURGICAL HISTORY:  Hypothyroidism    Cervical herniated disc    Spondylolisthesis, cervical region    Osteoarthritis    H/O gastritis  - on pantoprazole    History of dysphagia  resolved after surgery in 2019    Dyskinesia of esophagus    History of chronic pain  on meds for 16 yrs    Depression    Achilles tendon tear  bilateral    Degenerative tear of acetabular labrum of left hip    Fibromyalgia    Spondylogenic compression of cervical spinal cord    S/P total knee replacement, left    S/P hip replacement, right    S/P laminectomy with spinal fusion  lumbar spine    S/P arthroscopy of left knee  x 4    S/P cervical spinal fusion  cage 2019      Allergies    No Known Allergies    Intolerances      Home Medications:  acetaminophen 325 mg oral tablet: 2 tab(s) orally every 6 hours, As needed, Temp greater or equal to 38C (100.4F), Mild Pain (1 - 3) (21 Mar 2021 10:57)  bisacodyl 10 mg rectal suppository: 1 suppository(ies) rectal once a day, As needed, Constipation (21 Mar 2021 10:57)  cyclobenzaprine 5 mg oral tablet: 1 tab(s) orally 3 times a day (21 Mar 2021 10:57)  diazePAM 5 mg oral tablet: 1 tab(s) orally every 8 hours, As needed, severe spasms (21 Mar 2021 10:57)  enoxaparin: 40 milligram(s) subcutaneous once a day (at bedtime) (21 Mar 2021 10:57)  fentaNYL 75 mcg/hr transdermal film, extended release: 1 patch transdermal every 72 hours (21 Mar 2021 10:57)  hyoscyamine: 0.25 milligram(s) orally once a day, As Needed (12 Mar 2021 05:59)  levothyroxine 200 mcg (0.2 mg) oral tablet: 1 tab(s) orally once a day (12 Mar 2021 05:59)  melatonin 3 mg oral tablet: 1 tab(s) orally once a day (at bedtime) (21 Mar 2021 10:57)  Multiple Vitamins oral tablet: 1 tab(s) orally once a day (21 Mar 2021 10:57)  ondansetron 4 mg oral tablet: 1 tab(s) orally every 8 hours, As Needed - for nausea (12 Mar 2021 05:59)  oxyCODONE 20 mg oral tablet: 1 tab(s) orally every 4 hours, As needed, Moderate Pain (4 - 6) (21 Mar 2021 10:57)  pantoprazole 20 mg oral delayed release tablet: 1 tab(s) orally once a day (12 Mar 2021 05:59)  PARoxetine 40 mg oral tablet: 1 tab(s) orally once a day (21 Mar 2021 10:57)  polyethylene glycol 3350 oral powder for reconstitution: 17 gram(s) orally 2 times a day (21 Mar 2021 10:57)  rosuvastatin 5 mg oral tablet: 1 tab(s) orally once a day (12 Mar 2021 05:59)  senna oral tablet: 2 tab(s) orally once a day (at bedtime) (21 Mar 2021 10:57)  Systane ophthalmic gel forming solution: 1 drop(s) to each affected eye 4 times a day (12 Mar 2021 05:59)    MEDICATIONS  (STANDING):  atorvastatin 20 milliGRAM(s) Oral at bedtime  dextrose 40% Gel 15 Gram(s) Oral once  dextrose 5%. 1000 milliLiter(s) (50 mL/Hr) IV Continuous <Continuous>  dextrose 5%. 1000 milliLiter(s) (100 mL/Hr) IV Continuous <Continuous>  dextrose 50% Injectable 25 Gram(s) IV Push once  dextrose 50% Injectable 12.5 Gram(s) IV Push once  dextrose 50% Injectable 25 Gram(s) IV Push once  glucagon  Injectable 1 milliGRAM(s) IntraMuscular once  insulin lispro (ADMELOG) corrective regimen sliding scale   SubCutaneous three times a day before meals  insulin lispro (ADMELOG) corrective regimen sliding scale   SubCutaneous at bedtime  levothyroxine 200 MICROGram(s) Oral daily  melatonin 3 milliGRAM(s) Oral at bedtime  multivitamin 1 Tablet(s) Oral daily  PARoxetine 30 milliGRAM(s) Oral daily  polyethylene glycol 3350 17 Gram(s) Oral two times a day  senna 2 Tablet(s) Oral at bedtime      SOCIAL HISTORY:  FAMILY HISTORY:  Family history of DVT  mother / brother        ___________________________________________  OBJECTIVE:  Vital Signs Last 24 Hrs  T(C): 36.9 (30 Mar 2021 14:30), Max: 36.9 (30 Mar 2021 14:30)  T(F): 98.4 (30 Mar 2021 14:30), Max: 98.4 (30 Mar 2021 14:30)  HR: 83 (30 Mar 2021 14:30) (78 - 83)  BP: 123/66 (30 Mar 2021 14:30) (108/71 - 123/66)  BP(mean): --  RR: 18 (30 Mar 2021 14:30) (18 - 20)  SpO2: 95% (30 Mar 2021 14:30) (95% - 98%)CAPILLARY BLOOD GLUCOSE        I&O's Detail    General: Well developed, well nourished, NAD  Neuro: Alert and oriented, no focal deficits, moves all extremities spontaneously  HEENT: NCAT, EOMI, anicteric, mucosa moist  Respiratory: Airway patent, respirations unlabored  CVS: Regular rate and rhythm  Back: Back is flat with no palpable collections, incision is clean dry and intact, nylons in place. UCHE in place with serous drainage.   Abdomen: Soft, nontender, nondistended  Extremities: No edema, sensation and movement grossly intact  Skin: Warm, dry, appropriate color  ____________________________________________  LABS:  CBC Full  -  ( 30 Mar 2021 15:47 )  WBC Count : 7.60 K/uL  RBC Count : 2.77 M/uL  Hemoglobin : 8.4 g/dL  Hematocrit : 26.5 %  Platelet Count - Automated : 549 K/uL  Mean Cell Volume : 95.7 fl  Mean Cell Hemoglobin : 30.3 pg  Mean Cell Hemoglobin Concentration : 31.7 gm/dL  Auto Neutrophil # : 4.37 K/uL  Auto Lymphocyte # : 2.32 K/uL  Auto Monocyte # : 0.73 K/uL  Auto Eosinophil # : 0.13 K/uL  Auto Basophil # : 0.03 K/uL  Auto Neutrophil % : 57.5 %  Auto Lymphocyte % : 30.5 %  Auto Monocyte % : 9.6 %  Auto Eosinophil % : 1.7 %  Auto Basophil % : 0.4 %    03-30    139  |  100  |  6<L>  ----------------------------<  105<H>  4.2   |  31  |  0.69    Ca    9.1      30 Mar 2021 15:47    TPro  6.3  /  Alb  3.1<L>  /  TBili  0.2  /  DBili  x   /  AST  17  /  ALT  11  /  AlkPhos  75  03-30    LIVER FUNCTIONS - ( 30 Mar 2021 15:47 )  Alb: 3.1 g/dL / Pro: 6.3 g/dL / ALK PHOS: 75 U/L / ALT: 11 U/L / AST: 17 U/L / GGT: x           PT/INR - ( 30 Mar 2021 15:47 )   PT: 14.5 sec;   INR: 1.22 ratio         PTT - ( 30 Mar 2021 15:47 )  PTT:36.9 sec          ____________________________________________  MICRO:  RECENT CULTURES:    ____________________________________________  RADIOLOGY:

## 2021-03-30 NOTE — CONSULT NOTE ADULT - SUBJECTIVE AND OBJECTIVE BOX
MRN [id number]  Outpt Providers: Dr Acevedo    CC: Orthostatic dizziness  61y F with PMH of esophageal spasms, dysphagia, gastritis (followed by GI, had an endoscopy in October, no interventions performed, takes pantoprazole), chronic lumbar and cervical spine disc disease, S/P cervical spinal fusion in 2020, S/P lumbar fusion 2003, s/p T8-pelvis arthrodesis, L1-3 anterior column release 3/12/21 Dr. Acevedo, with c/o of orthostatic dizziness x 6 days.   She notes that since Thursday she has felt lightheaded with sitting and standing up. Denies room spinning. Notes assocaited nausea that persists throughout day. Notes that on Friday she had a near syncopal episode while sitting on the toilet in the bathroom. Denies LOC. Notes the dizziness and nausea w/ changes in position has persisted since Thursday. Notes she had similar symptoms 20 years ago when she required a blood patch after a spinal procedure.   Patient received an MRI of back at outside facility and was told she has a "CSF leak". The patient is currently pending repeat imaging in ED.  Denies focal weakness or numbness/tingling. Endorses some paresthesias in R arm and R lateral thigh since surgery, describes as "shooting pain" sensation with specific movements.    Evaluated by Neurosurgery. Repeat imaging ordered. Concern for CSF leak vs seroma.        REVIEW OF SYSTEMS:  CONSTITUTIONAL: No weakness. No fevers. No chills. No rigors. No weight loss. No night sweats. No poor appetite.  EYES: No visual changes. No eye pain.  ENT: No hearing difficulty. No vertigo. No dysphagia. No sore throat. No Sinusitis/rhinorrhea.   NECK: No pain. No stiffness/rigidity.  CARDIAC: No chest pain. No palpitations. No lightheadedness.  RESPIRATORY: No cough. No SOB. No hemoptysis.  GASTROINTESTINAL: No abdominal pain. No nausea. No vomiting. No hematemesis. No diarrhea. No constipation. No melena. No hematochezia.  GENITOURINARY: No dysuria. No frequency. No hesitancy. No hematuria. No oliguria.  NEUROLOGICAL: See HPI. No focal weakness. No urinary or fecal incontinence. No headache. No unsteady gait.  BACK: No back pain. No flank pain.  EXTREMITIES: No lower extremity edema. Full ROM. No joint pain.  SKIN: No rashes. No itching. No other lesions.  PSYCHIATRIC: No depression. No anxiety. No SI/HI.  ALLERGIC: No lip swelling. No hives.  All other review of systems is negative unless indicated above.  Unless indicated above, unable to assess ROS 2/2     Social History:  Currently in Rehab  Current smoker, last cigarette 03/11  Denies etoh      Family History:  Hx of DVT/PE in mother      PMHx/PSHx:  -Hypothyroidism  -Cervical herniated disc  -Spondylolisthesis, cervical region  -Osteoarthritis  -H/O gastritis  - on pantoprazole  -History of dysphagia  resolved after surgery in 2019  -Dyskinesia of esophagus  -History of chronic pain  on meds for 16 yrs  -Depression  -Achilles tendon tear  bilateral  -Degenerative tear of acetabular labrum of left hip  -Fibromyalgia    PAST MEDICAL & SURGICAL HISTORY:  -Spondylogenic compression of cervical spinal cord  -S/P total knee replacement, left  -S/P hip replacement, right  -S/P laminectomy with spinal fusion  lumbar spine  -S/P arthroscopy of left knee  x 4  -S/P cervical spinal fusion  cage 2019    Allergies: No Known Allergies    MEDICATIONS  (STANDING):  atorvastatin 20 milliGRAM(s) Oral at bedtime  dextrose 40% Gel 15 Gram(s) Oral once  dextrose 5%. 1000 milliLiter(s) (50 mL/Hr) IV Continuous <Continuous>  dextrose 5%. 1000 milliLiter(s) (100 mL/Hr) IV Continuous <Continuous>  dextrose 50% Injectable 25 Gram(s) IV Push once  dextrose 50% Injectable 12.5 Gram(s) IV Push once  dextrose 50% Injectable 25 Gram(s) IV Push once  glucagon  Injectable 1 milliGRAM(s) IntraMuscular once  insulin lispro (ADMELOG) corrective regimen sliding scale   SubCutaneous three times a day before meals  insulin lispro (ADMELOG) corrective regimen sliding scale   SubCutaneous at bedtime  levothyroxine 200 MICROGram(s) Oral daily  melatonin 3 milliGRAM(s) Oral at bedtime  multivitamin 1 Tablet(s) Oral daily  PARoxetine 30 milliGRAM(s) Oral daily  polyethylene glycol 3350 17 Gram(s) Oral two times a day  senna 2 Tablet(s) Oral at bedtime    MEDICATIONS  (PRN):  acetaminophen   Tablet .. 650 milliGRAM(s) Oral every 6 hours PRN Temp greater or equal to 38C (100.4F), Mild Pain (1 - 3)  bisacodyl Suppository 10 milliGRAM(s) Rectal daily PRN Constipation  cyclobenzaprine 5 milliGRAM(s) Oral three times a day PRN Muscle Spasm  diazepam    Tablet 5 milliGRAM(s) Oral every 8 hours PRN severe spasms  ondansetron    Tablet 4 milliGRAM(s) Oral every 8 hours PRN for nausea  oxyCODONE    IR 10 milliGRAM(s) Oral every 4 hours PRN Severe Pain (7 - 10)  oxyCODONE    IR 5 milliGRAM(s) Oral every 4 hours PRN Moderate Pain (4 - 6)      Vital Signs: T(C): 36.7 (03-30-21 @ 20:56), Max: 36.9 (03-30-21 @ 14:30)  HR: 75 (03-30-21 @ 20:56) (69 - 83)  BP: 102/57 (03-30-21 @ 20:56) (98/59 - 123/66)  RR: 18 (03-30-21 @ 20:56) (16 - 20)  SpO2: 93% (03-30-21 @ 20:56) (93% - 98%)  Wt(kg): --Vital Signs Last 24 Hrs  T(C): 36.7 (30 Mar 2021 20:56), Max: 36.9 (30 Mar 2021 14:30)  T(F): 98.1 (30 Mar 2021 20:56), Max: 98.4 (30 Mar 2021 14:30)  HR: 75 (30 Mar 2021 20:56) (69 - 83)  BP: 102/57 (30 Mar 2021 20:56) (98/59 - 123/66)  BP(mean): 61 (30 Mar 2021 19:38) (61 - 61)  RR: 18 (30 Mar 2021 20:56) (16 - 20)  SpO2: 93% (30 Mar 2021 20:56) (93% - 98%)  I&O:     PHYSICAL EXAM:   GENERAL: Alert. Not confused. No acute distress. Not thin. Not cachectic. Not obese.  HEAD:  Atraumatic. Normocephalic.  EYES: EOMI. PERRLA. Normal conjunctiva/sclera.  ENT: Neck supple. No JVD. Moist oral mucosa. Not edentulous. No thrush.  LYMPH: Normal supraclavicular/cervical lymph nodes.   CARDIAC: Regular rate. Regular rhythm. Not irregularly irregular. S1. S2. No murmur. No rub. No distant heart sounds.  LUNG/CHEST: CTAB. BS equal bilaterally. No wheezes. No rales. No rhonchi.  ABDOMEN: Soft. No tenderness. No distension. No fluid wave. Normal bowel sounds.  BACK: No midline/vertebral tenderness. No flank tenderness.  VASCULAR: +2 b/l radial pulses. Palpable DP pulses.  EXTREMITIES:  No clubbing. No cyanosis. No edema. Moving all 4.  NEUROLOGY: A&Ox3. Non-focal exam. Cranial nerves intact. Normal speech. Sensation intact.  PSYCH: Normal behavior. Normal affect.  SKIN: No jaundice. No erythema. No rash/lesion.  Vascular Access:     Personally reviewed imaging, labs, EKG.    LABS:                        8.4    7.60  )-----------( 549      ( 30 Mar 2021 15:47 )             26.5     139  |  100  |  6<L>  ----------------------------<  105<H>  4.2   |  31  |  0.69    Ca    9.1      30 Mar 2021 15:47  TPro  6.3  /  Alb  3.1<L>  /  TBili  0.2  /  DBili  x   /  AST  17  /  ALT  11  /  AlkPhos  75  03-30  PT/INR - ( 30 Mar 2021 15:47 )   PT: 14.5 sec;   INR: 1.22 ratio    PTT - ( 30 Mar 2021 15:47 )  PTT:36.9 sec    RADIOLOGY & ADDITIONAL TESTS:  Imaging Personally Reviewed:  [ ] YES  [ ] NO    Consultant(s) Notes Reviewed:  [ x ] YES  [  ] NO  Care Discussed with Consultants/Primary Team [ x ] YES  [  ] NO    Assessment:  PING BROWN is a 61yFemale pw Patient is a 61y old  Female who presents with a chief complaint of orthostatic dizziness.      MRN [id number]  Outpt Providers: Dr Acevedo    CC: Orthostatic dizziness  61y F with PMH of esophageal spasms, dysphagia, gastritis (followed by GI, had an endoscopy in October, no interventions performed, takes pantoprazole), chronic lumbar and cervical spine disc disease, S/P cervical spinal fusion in 2020, S/P lumbar fusion 2003, s/p T8-pelvis arthrodesis, L1-3 anterior column release 3/12/21 Dr. Acevedo, with c/o of orthostatic dizziness x 6 days.   She notes that since Thursday she has felt lightheaded with sitting and standing up. Denies room spinning. Notes assocaited nausea that persists throughout day. Notes that on Friday she had a near syncopal episode while sitting on the toilet in the bathroom. Denies LOC. Notes the dizziness and nausea w/ changes in position has persisted since Thursday. Notes she had similar symptoms 20 years ago when she required a blood patch after a spinal procedure.   Patient received an MRI of back at outside facility and was told she has a "CSF leak". The patient is currently pending repeat imaging in ED.  Denies focal weakness or numbness/tingling. Endorses some paresthesias in R arm and R lateral thigh since surgery, describes as "shooting pain" sensation with specific movements.    Evaluated by Neurosurgery. Repeat imaging ordered. Concern for CSF leak vs seroma.        REVIEW OF SYSTEMS:  CONSTITUTIONAL: No weakness. No fevers. No chills. No rigors. No weight loss. No night sweats. No poor appetite.  EYES: No visual changes. No eye pain.  ENT: No hearing difficulty. No vertigo. No dysphagia. No sore throat. No Sinusitis/rhinorrhea.   NECK: No pain. No stiffness/rigidity.  CARDIAC: No chest pain. No palpitations. No lightheadedness.  RESPIRATORY: No cough. No SOB. No hemoptysis.  GASTROINTESTINAL: No abdominal pain. No nausea. No vomiting. No hematemesis. No diarrhea. No constipation. No melena. No hematochezia.  GENITOURINARY: No dysuria. No frequency. No hesitancy. No hematuria. No oliguria.  NEUROLOGICAL: See HPI. No focal weakness. No urinary or fecal incontinence. No headache. No unsteady gait.  BACK: No back pain. No flank pain.  EXTREMITIES: No lower extremity edema. Full ROM. No joint pain.  SKIN: No rashes. No itching. No other lesions.  PSYCHIATRIC: No depression. No anxiety. No SI/HI.  ALLERGIC: No lip swelling. No hives.  All other review of systems is negative unless indicated above.  Unless indicated above, unable to assess ROS 2/2     Social History:  Currently in Rehab  Current smoker, last cigarette 03/11  Denies etoh      Family History:  Hx of DVT/PE in mother      PMHx/PSHx:  -Hypothyroidism  -Cervical herniated disc  -Spondylolisthesis, cervical region  -Osteoarthritis  -H/O gastritis  - on pantoprazole  -History of dysphagia  resolved after surgery in 2019  -Dyskinesia of esophagus  -History of chronic pain  on meds for 16 yrs  -Depression  -Achilles tendon tear  bilateral  -Degenerative tear of acetabular labrum of left hip  -Fibromyalgia    PAST MEDICAL & SURGICAL HISTORY:  -Spondylogenic compression of cervical spinal cord  -S/P total knee replacement, left  -S/P hip replacement, right  -S/P laminectomy with spinal fusion  lumbar spine  -S/P arthroscopy of left knee  x 4  -S/P cervical spinal fusion  cage 2019    Allergies: No Known Allergies    MEDICATIONS  (STANDING):  atorvastatin 20 milliGRAM(s) Oral at bedtime  dextrose 40% Gel 15 Gram(s) Oral once  dextrose 5%. 1000 milliLiter(s) (50 mL/Hr) IV Continuous <Continuous>  dextrose 5%. 1000 milliLiter(s) (100 mL/Hr) IV Continuous <Continuous>  dextrose 50% Injectable 25 Gram(s) IV Push once  dextrose 50% Injectable 12.5 Gram(s) IV Push once  dextrose 50% Injectable 25 Gram(s) IV Push once  glucagon  Injectable 1 milliGRAM(s) IntraMuscular once  insulin lispro (ADMELOG) corrective regimen sliding scale   SubCutaneous three times a day before meals  insulin lispro (ADMELOG) corrective regimen sliding scale   SubCutaneous at bedtime  levothyroxine 200 MICROGram(s) Oral daily  melatonin 3 milliGRAM(s) Oral at bedtime  multivitamin 1 Tablet(s) Oral daily  PARoxetine 30 milliGRAM(s) Oral daily  polyethylene glycol 3350 17 Gram(s) Oral two times a day  senna 2 Tablet(s) Oral at bedtime    MEDICATIONS  (PRN):  acetaminophen   Tablet .. 650 milliGRAM(s) Oral every 6 hours PRN Temp greater or equal to 38C (100.4F), Mild Pain (1 - 3)  bisacodyl Suppository 10 milliGRAM(s) Rectal daily PRN Constipation  cyclobenzaprine 5 milliGRAM(s) Oral three times a day PRN Muscle Spasm  diazepam    Tablet 5 milliGRAM(s) Oral every 8 hours PRN severe spasms  ondansetron    Tablet 4 milliGRAM(s) Oral every 8 hours PRN for nausea  oxyCODONE    IR 10 milliGRAM(s) Oral every 4 hours PRN Severe Pain (7 - 10)  oxyCODONE    IR 5 milliGRAM(s) Oral every 4 hours PRN Moderate Pain (4 - 6)      Vital Signs: T(C): 36.7 (03-30-21 @ 20:56), Max: 36.9 (03-30-21 @ 14:30)  HR: 75 (03-30-21 @ 20:56) (69 - 83)  BP: 102/57 (03-30-21 @ 20:56) (98/59 - 123/66)  RR: 18 (03-30-21 @ 20:56) (16 - 20)  SpO2: 93% (03-30-21 @ 20:56) (93% - 98%)  Wt(kg): --Vital Signs Last 24 Hrs  T(C): 36.7 (30 Mar 2021 20:56), Max: 36.9 (30 Mar 2021 14:30)  T(F): 98.1 (30 Mar 2021 20:56), Max: 98.4 (30 Mar 2021 14:30)  HR: 75 (30 Mar 2021 20:56) (69 - 83)  BP: 102/57 (30 Mar 2021 20:56) (98/59 - 123/66)  BP(mean): 61 (30 Mar 2021 19:38) (61 - 61)  RR: 18 (30 Mar 2021 20:56) (16 - 20)  SpO2: 93% (30 Mar 2021 20:56) (93% - 98%)  I&O:     PHYSICAL EXAM:   GENERAL: Alert. Not confused. No acute distress. Not thin. Not cachectic. Not obese.  HEAD:  Atraumatic. Normocephalic.  EYES: EOMI. PERRLA. Normal conjunctiva/sclera.  ENT: Neck supple. No JVD. Moist oral mucosa. Not edentulous. No thrush.  LYMPH: Normal supraclavicular/cervical lymph nodes.   CARDIAC: Regular rate. Regular rhythm. Not irregularly irregular. S1. S2. No murmur. No rub. No distant heart sounds.  LUNG/CHEST: CTAB. BS equal bilaterally. No wheezes. No rales. No rhonchi.  ABDOMEN: Soft. No tenderness. No distension. No fluid wave. Normal bowel sounds.  BACK: No midline/vertebral tenderness. No flank tenderness.  VASCULAR: +2 b/l radial pulses. Palpable DP pulses.  EXTREMITIES:  No clubbing. No cyanosis. No edema. Moving all 4.  NEUROLOGY: A&Ox3. Non-focal exam. Cranial nerves intact. Normal speech. Sensation intact.  PSYCH: Normal behavior. Normal affect.  SKIN: No jaundice. No erythema. No rash/lesion.  Vascular Access:     Personally reviewed imaging, labs, EKG.    LABS:                        8.4    7.60  )-----------( 549      ( 30 Mar 2021 15:47 )             26.5     139  |  100  |  6<L>  ----------------------------<  105<H>  4.2   |  31  |  0.69    Ca    9.1      30 Mar 2021 15:47  TPro  6.3  /  Alb  3.1<L>  /  TBili  0.2  /  DBili  x   /  AST  17  /  ALT  11  /  AlkPhos  75  03-30  PT/INR - ( 30 Mar 2021 15:47 )   PT: 14.5 sec;   INR: 1.22 ratio    PTT - ( 30 Mar 2021 15:47 )  PTT:36.9 sec    RADIOLOGY & ADDITIONAL TESTS:  Imaging Personally Reviewed:  [ ] YES  [ ] NO    Consultant(s) Notes Reviewed:  [ x ] YES  [  ] NO  Care Discussed with Consultants/Primary Team [ x ] YES  [  ] NO

## 2021-03-30 NOTE — ED PROVIDER NOTE - CLINICAL SUMMARY MEDICAL DECISION MAKING FREE TEXT BOX
Rik: 61 year old female with esophageal spasm, s/p cervical fusion in 2020, s/p lumbar spinal fusion 2003, L1-L3 anterior column release 3/12/21.  here with CSF leak on mri yesterday. will get labs, pain control, nsx consult, admit.

## 2021-03-30 NOTE — ED ADULT NURSE REASSESSMENT NOTE - NS ED NURSE REASSESS COMMENT FT1
Report received from NANNETTE Resendez. Pt AAOx4, NAD, resp nonlabored, skin warm/dry, resting comfortably in bed with call bell at bedside. Pt denies headache, dizziness, chest pain, palpitations, SOB, abd pain, n/v/d, urinary symptoms, fevers, chills, weakness at this time. Pt awaiting bed. Safety maintained.

## 2021-03-30 NOTE — H&P ADULT - ASSESSMENT
PING BROWN    62YO F PMHx esophageal spasm, gastritis, s/p recent T8-Pelvis w/ L1-3 ant column release w/ Dr. Acevedo now coming in with several days of orthostatics/dizziness and nausea when standing.   MR T spine done c/f csf leak vs seroma. Still has UCHE per plastics. Otherwise neuro intact.   - ADM 4 kitty   - MR Lumbar wwo contrast pending  - plastics consulted, UCHE drain off-suction, will evaluate - Per Irlanda do not remove drain until evaluated by plastics  - q4h neuro checks, pain control  - Preop labs done  - Sideris could be available for blood patch, may need OR pending imaging  - would need med clearance  - plans pending imaging and plastics eval  - bedrest for now pending imaging/corrales, flat as much as possible.

## 2021-03-30 NOTE — ED ADULT NURSE REASSESSMENT NOTE - NS ED NURSE REASSESS COMMENT FT1
As per Rafal EDWARDS, no interventions at this time- awaiting Neurosurgery consult and recommendations. Pt aware of plan of care. Will continue to monitor.

## 2021-03-30 NOTE — ED ADULT NURSE REASSESSMENT NOTE - NS ED NURSE REASSESS COMMENT FT1
Patient resting in bed comfortably in no acute distress, pending MRI, updated on plan of care, hypotensive 90s/50s, reports improvement in pain, denies dizziness and all other complaints, GRACE Lyles made aware. NANNETTE Ashley

## 2021-03-30 NOTE — ED PROVIDER NOTE - PROGRESS NOTE DETAILS
Young Lyles PA-C: Pt seen by neurosx at bedside, preops and covid for likely blood patch, imaging discs with nsx for upload.

## 2021-03-30 NOTE — CONSULT NOTE ADULT - ASSESSMENT
A/P:     60 y/o F s/p T8 - pelvis arthrodesis, L1-L3 anterior column release and closure 3/12/21, returned to ED with nausea and dizziness, imaging pending to rule out CSF leak:       - Follow up L spine MRI, CSF leak vs. seroma.   - UCHE drain in place draining, will continue and monitor output   - Continue dressing (changed 3/30/21) in ED upon arrival.   - Primary management per neurosurgery, appreciate recs and care.       - Discussed with attending Dr. Fournier     Plastic Surgery   [phone]

## 2021-03-30 NOTE — ED ADULT NURSE NOTE - OBJECTIVE STATEMENT
75mcg fentanyl patch on right chest wall. 61y female brought in by EMS from rehab for r/o CSF leak. A&Ox3 and VSS. Hx of hypothyroidism, depression (denies SI/HI), and recent spinal fusion 3/12/21. Pt was discharged to rehab after the spinal fusion. Over the past week, pt c/o dizziness upon walking. Pt also report intermittent tingling in right arm. Denies chest pain, sob, fever/chills, n/v/d. Denies loss of bowel/bladder function. Denies abdominal pain and urinary symptoms. Upon exam, respirations even and unlabored. Abdomen soft, nontender. 75mcg fentanyl patch on right chest wall. Spinal surgical site covered in intact dressing. UCHE drain from base of spinal surgical site with 25mL serosanguinous drainage. MAEx4. 61y female brought in by EMS from rehab for r/o CSF leak. A&Ox3 and VSS. Hx of hypothyroidism, depression (denies SI/HI), and recent spinal fusion 3/12/21. Pt was discharged to rehab after the spinal fusion. Over the past week, pt c/o dizziness upon walking. Pt also report intermittent tingling in right arm. Sent to ED for r/o CSF leak. Denies chest pain, sob, fever/chills, n/v/d. Denies loss of bowel/bladder function. Denies abdominal pain and urinary symptoms. Upon exam, respirations even and unlabored. Abdomen soft, nontender. 75mcg fentanyl patch on right chest wall. Spinal surgical site covered in intact dressing. UCHE drain from base of spinal surgical site with 25mL serosanguinous drainage. MAEx4.

## 2021-03-30 NOTE — H&P ADULT - HISTORY OF PRESENT ILLNESS
61y F pmhx esophageal spasms, dysphagia, gastritis (followed by GI, had an endoscopy in October, no interventions performed, takes pantoprazole), chronic lumbar and cervical spine disc disease, S/P cervical spinal fusion in 2020, S/P lumbar fusion 2003, s/p T8-pelvis arthrodesis, L1-3 anterior column release 3/12/21 Dr. Acevedo, presents to ED BIBEMS for Neurosx admission c/o of orthostatic dizziness x 6 days w/ known CSF leak on MRI yesterday. Pt reports severe dizziness when sitting up or standing from a supine position w/ intermittent nausea. Reports near syncope 3 days ago. Denies f/c, extremity numbness/weakness, urinary incontinence or retention. Last BM 3 days ago, passing flatus.  Pt presented to ED with [facility] imaging discs

## 2021-03-30 NOTE — CONSULT NOTE ADULT - PROBLEM SELECTOR RECOMMENDATION 9
concern for CSF leak (as noted on outside MRI) vs seroma  Primary management per neurosurgery-  MR Lumbar wwo contrast, pending results, possible plan for OR  - Neurochecks q4h  - bedrest, flat as much as possible. concern for CSF leak (as noted on outside MRI) vs seroma  Primary management per neurosurgery-  MR Lumbar w/wo contrast, pending results, possible plan for OR  - Neurochecks q4h  - bedrest, flat as much as possible.

## 2021-03-30 NOTE — ED PROVIDER NOTE - PHYSICAL EXAMINATION
GEN: Pt non-toxic in NAD, A&Ox3.  PSYCH: Affect and mood appropriate.  EYES: Sclera white w/o injection, EOMI, PERRLA.   ENT: MMM. Neck supple FROM. Airway patent.  RESP: CTA b/l, no wheezes, rales, or rhonchi.   CARDIAC: RRR, clear distinct S1, S2, no appreciable murmurs.  ABD: Abdomen soft, non-tender.  MSK: Moving all extremities.  NEURO: CN 2-12 grossly intact. No focal motor or sensory deficits. Strength 5/5 throughout.  VASC: Well perfused. No edema or calf tenderness.  SKIN: +Spinal dressing intact and dry, wound drain w/ ~25ml serosanguinous fluid. No rashes or lesions.

## 2021-03-31 DIAGNOSIS — D68.9 COAGULATION DEFECT, UNSPECIFIED: ICD-10-CM

## 2021-03-31 DIAGNOSIS — R42 DIZZINESS AND GIDDINESS: ICD-10-CM

## 2021-03-31 LAB
COVID-19 SPIKE DOMAIN AB INTERP: NEGATIVE — SIGNIFICANT CHANGE UP
COVID-19 SPIKE DOMAIN ANTIBODY RESULT: 0.4 U/ML — SIGNIFICANT CHANGE UP
GLUCOSE BLDC GLUCOMTR-MCNC: 104 MG/DL — HIGH (ref 70–99)
GLUCOSE BLDC GLUCOMTR-MCNC: 108 MG/DL — HIGH (ref 70–99)
GLUCOSE BLDC GLUCOMTR-MCNC: 131 MG/DL — HIGH (ref 70–99)
GLUCOSE BLDC GLUCOMTR-MCNC: 145 MG/DL — HIGH (ref 70–99)
HCV AB S/CO SERPL IA: 0.09 S/CO — SIGNIFICANT CHANGE UP (ref 0–0.99)
HCV AB SERPL-IMP: SIGNIFICANT CHANGE UP
SARS-COV-2 IGG+IGM SERPL QL IA: 0.4 U/ML — SIGNIFICANT CHANGE UP
SARS-COV-2 IGG+IGM SERPL QL IA: NEGATIVE — SIGNIFICANT CHANGE UP

## 2021-03-31 PROCEDURE — 72158 MRI LUMBAR SPINE W/O & W/DYE: CPT | Mod: 26

## 2021-03-31 PROCEDURE — 99222 1ST HOSP IP/OBS MODERATE 55: CPT

## 2021-03-31 PROCEDURE — 99233 SBSQ HOSP IP/OBS HIGH 50: CPT

## 2021-03-31 PROCEDURE — 93010 ELECTROCARDIOGRAM REPORT: CPT

## 2021-03-31 RX ORDER — METOCLOPRAMIDE HCL 10 MG
10 TABLET ORAL ONCE
Refills: 0 | Status: COMPLETED | OUTPATIENT
Start: 2021-03-31 | End: 2021-03-31

## 2021-03-31 RX ORDER — PHYTONADIONE (VIT K1) 5 MG
5 TABLET ORAL ONCE
Refills: 0 | Status: COMPLETED | OUTPATIENT
Start: 2021-03-31 | End: 2021-03-31

## 2021-03-31 RX ORDER — PANTOPRAZOLE SODIUM 20 MG/1
40 TABLET, DELAYED RELEASE ORAL
Refills: 0 | Status: DISCONTINUED | OUTPATIENT
Start: 2021-03-31 | End: 2021-04-07

## 2021-03-31 RX ADMIN — Medication 40 MILLIGRAM(S): at 05:41

## 2021-03-31 RX ADMIN — FENTANYL CITRATE 1 PATCH: 50 INJECTION INTRAVENOUS at 19:47

## 2021-03-31 RX ADMIN — SODIUM CHLORIDE 75 MILLILITER(S): 9 INJECTION INTRAMUSCULAR; INTRAVENOUS; SUBCUTANEOUS at 21:41

## 2021-03-31 RX ADMIN — OXYCODONE HYDROCHLORIDE 10 MILLIGRAM(S): 5 TABLET ORAL at 06:44

## 2021-03-31 RX ADMIN — OXYCODONE HYDROCHLORIDE 10 MILLIGRAM(S): 5 TABLET ORAL at 16:45

## 2021-03-31 RX ADMIN — Medication 200 MICROGRAM(S): at 05:40

## 2021-03-31 RX ADMIN — SENNA PLUS 2 TABLET(S): 8.6 TABLET ORAL at 21:35

## 2021-03-31 RX ADMIN — OXYCODONE HYDROCHLORIDE 10 MILLIGRAM(S): 5 TABLET ORAL at 11:00

## 2021-03-31 RX ADMIN — OXYCODONE HYDROCHLORIDE 10 MILLIGRAM(S): 5 TABLET ORAL at 21:01

## 2021-03-31 RX ADMIN — ATORVASTATIN CALCIUM 20 MILLIGRAM(S): 80 TABLET, FILM COATED ORAL at 21:35

## 2021-03-31 RX ADMIN — OXYCODONE HYDROCHLORIDE 10 MILLIGRAM(S): 5 TABLET ORAL at 11:30

## 2021-03-31 RX ADMIN — Medication 5 MILLIGRAM(S): at 02:23

## 2021-03-31 RX ADMIN — POLYETHYLENE GLYCOL 3350 17 GRAM(S): 17 POWDER, FOR SOLUTION ORAL at 05:40

## 2021-03-31 RX ADMIN — Medication 10 MILLIGRAM(S): at 06:10

## 2021-03-31 RX ADMIN — OXYCODONE HYDROCHLORIDE 10 MILLIGRAM(S): 5 TABLET ORAL at 20:19

## 2021-03-31 RX ADMIN — OXYCODONE HYDROCHLORIDE 10 MILLIGRAM(S): 5 TABLET ORAL at 16:13

## 2021-03-31 RX ADMIN — FENTANYL CITRATE 1 PATCH: 50 INJECTION INTRAVENOUS at 21:39

## 2021-03-31 RX ADMIN — Medication 3 MILLIGRAM(S): at 21:35

## 2021-03-31 RX ADMIN — OXYCODONE HYDROCHLORIDE 10 MILLIGRAM(S): 5 TABLET ORAL at 06:10

## 2021-03-31 RX ADMIN — FENTANYL CITRATE 1 PATCH: 50 INJECTION INTRAVENOUS at 06:11

## 2021-03-31 RX ADMIN — Medication 5 MILLIGRAM(S): at 17:39

## 2021-03-31 NOTE — PROGRESS NOTE ADULT - ASSESSMENT
A/P:   62 y/o F s/p T8 - pelvis arthrodesis, L1-L3 anterior column release and closure 3/12/21, returned to ED with nausea and dizziness, imaging pending to rule out CSF leak    - Follow up L spine MRI, CSF leak vs. seroma.   - UCHE drain in place draining, will continue and monitor output   - Primary management per neurosurgery, appreciate recs and care.       Plastic Surgery   [phone]

## 2021-03-31 NOTE — PROGRESS NOTE ADULT - SUBJECTIVE AND OBJECTIVE BOX
SUBJECTIVE:   The patient was seen and examined at bedside during AM rounds. No acute events overnight. Reports mild nausea.     OBJECTIVE: T(C): 36.7 (03-31-21 @ 05:32), Max: 37 (03-30-21 @ 23:40)  HR: 65 (03-31-21 @ 05:32) (65 - 83)  BP: 110/63 (03-31-21 @ 05:32) (92/53 - 123/66)  RR: 18 (03-31-21 @ 05:32) (16 - 20)  SpO2: 96% (03-31-21 @ 05:32) (93% - 98%)  Wt(kg): --  I&O's Summary    30 Mar 2021 07:01  -  31 Mar 2021 07:00  --------------------------------------------------------  IN: 795 mL / OUT: 40 mL / NET: 755 mL      I&O's Detail    30 Mar 2021 07:01  -  31 Mar 2021 07:00  --------------------------------------------------------  IN:    Oral Fluid: 120 mL    sodium chloride 0.9%: 675 mL  Total IN: 795 mL    OUT:    Bulb (mL): 40 mL  Total OUT: 40 mL    Total NET: 755 mL        General: NAD  Back: soft, incisions c/d/i, aquceal intact, no palpable collection appreciated, no skin changes or erythema. UCHE intact w/ thin serous output.     MEDICATIONS  (STANDING):  atorvastatin 20 milliGRAM(s) Oral at bedtime  dextrose 40% Gel 15 Gram(s) Oral once  dextrose 5%. 1000 milliLiter(s) (50 mL/Hr) IV Continuous <Continuous>  dextrose 5%. 1000 milliLiter(s) (100 mL/Hr) IV Continuous <Continuous>  dextrose 50% Injectable 25 Gram(s) IV Push once  dextrose 50% Injectable 12.5 Gram(s) IV Push once  dextrose 50% Injectable 25 Gram(s) IV Push once  fentaNYL   Patch  75 MICROgram(s)/Hr 1 Patch Transdermal every 72 hours  glucagon  Injectable 1 milliGRAM(s) IntraMuscular once  insulin lispro (ADMELOG) corrective regimen sliding scale   SubCutaneous three times a day before meals  insulin lispro (ADMELOG) corrective regimen sliding scale   SubCutaneous at bedtime  levothyroxine 200 MICROGram(s) Oral daily  LORazepam   Injectable 1 milliGRAM(s) IV Push once  melatonin 3 milliGRAM(s) Oral at bedtime  multivitamin 1 Tablet(s) Oral daily  PARoxetine 40 milliGRAM(s) Oral daily  polyethylene glycol 3350 17 Gram(s) Oral two times a day  senna 2 Tablet(s) Oral at bedtime  sodium chloride 0.9%. 1000 milliLiter(s) (75 mL/Hr) IV Continuous <Continuous>    MEDICATIONS  (PRN):  acetaminophen   Tablet .. 650 milliGRAM(s) Oral every 6 hours PRN Temp greater or equal to 38C (100.4F), Mild Pain (1 - 3)  bisacodyl Suppository 10 milliGRAM(s) Rectal daily PRN Constipation  cyclobenzaprine 5 milliGRAM(s) Oral three times a day PRN Muscle Spasm  diazepam    Tablet 5 milliGRAM(s) Oral every 8 hours PRN severe spasms  ondansetron    Tablet 4 milliGRAM(s) Oral every 8 hours PRN for nausea  oxyCODONE    IR 10 milliGRAM(s) Oral every 4 hours PRN Severe Pain (7 - 10)  oxyCODONE    IR 5 milliGRAM(s) Oral every 4 hours PRN Moderate Pain (4 - 6)      LABS:                        8.4    7.60  )-----------( 549      ( 30 Mar 2021 15:47 )             26.5     03-30    139  |  100  |  6<L>  ----------------------------<  105<H>  4.2   |  31  |  0.69    Ca    9.1      30 Mar 2021 15:47    TPro  6.3  /  Alb  3.1<L>  /  TBili  0.2  /  DBili  x   /  AST  17  /  ALT  11  /  AlkPhos  75  03-30    PT/INR - ( 30 Mar 2021 15:47 )   PT: 14.5 sec;   INR: 1.22 ratio         PTT - ( 30 Mar 2021 15:47 )  PTT:36.9 sec

## 2021-03-31 NOTE — PROGRESS NOTE ADULT - ASSESSMENT
PING BROWN is a 61yFemale pw Patient is a 61y old  Female who presents with a chief complaint of orthostatic dizziness. Concern for CSF leak vs seroma. Consulted for medical comanagement.     61y old  Female h/o esophageal spasms, smoker, COPD who presents with a chief complaint of orthostatic dizziness. Concern for CSF leak vs seroma. Consulted for medical comanagement.

## 2021-03-31 NOTE — PROGRESS NOTE ADULT - ASSESSMENT
HPI:  61y F pmhx esophageal spasms, dysphagia, gastritis (followed by GI, had an endoscopy in October, no interventions performed, takes pantoprazole), chronic lumbar and cervical spine disc disease, S/P cervical spinal fusion in 2020, S/P lumbar fusion 2003, s/p T8-pelvis arthrodesis, L1-3 anterior column release 3/12/21 Dr. Acevedo, presents to ED BIBEMS for Neurosx admission c/o of orthostatic dizziness x 6 days w/ known CSF leak on MRI yesterday. Pt reports severe dizziness when sitting up or standing from a supine position w/ intermittent nausea. Reports near syncope 3 days ago. Denies f/c, extremity numbness/weakness, urinary incontinence or retention. Last BM 3 days ago, passing flatus.  Pt presented to ED with [facility] imaging discs (30 Mar 2021 16:18)    PROCEDURE:    POD#  PAST MEDICAL & SURGICAL HISTORY:  Hypothyroidism    Cervical herniated disc    Spondylolisthesis, cervical region    Osteoarthritis    H/O gastritis  - on pantoprazole    History of dysphagia  resolved after surgery in 2019    Dyskinesia of esophagus    History of chronic pain  on meds for 16 yrs    Depression    Achilles tendon tear  bilateral    Degenerative tear of acetabular labrum of left hip    Fibromyalgia    Spondylogenic compression of cervical spinal cord    S/P total knee replacement, left    S/P hip replacement, right    S/P laminectomy with spinal fusion  lumbar spine    S/P arthroscopy of left knee  x 4    S/P cervical spinal fusion  cage 2019        Assessment:  Please Check When Present   []  GCS  E   V  M     Heart Failure: []Acute, [] acute on chronic , []chronic  Heart Failure:  [] Diastolic (HFpEF), [] Systolic (HFrEF), []Combined (HFpEF and HFrEF), [] RHF, [] Pulm HTN, [] Other    [] JEET, [] ATN, [] AIN, [] other  [] CKD1, [] CKD2, [] CKD 3, [] CKD 4, [] CKD 5, []ESRD    Encephalopathy: [] Metabolic, [] Hepatic, [] toxic, [] Neurological, [] Other    Abnormal Nurtitional Status: [] malnurtition (see nutrition note), [ ]underweight: BMI < 19, [] morbid obesity: BMI >40, [] Cachexia    [] Sepsis  [] hypovolemic shock,[] cardiogenic shock, [] hemorrhagic shock, [] neuogenic shock  [] Acute Respiratory Failure  []Cerebral edema, [] Brain compression/ herniation,   [] Functional quadriplegia  [] Acute blood loss anemia      PLAN:  Neuro: mri being done now, analgesia continue, continue drain  Respiratory: 3LPM NC O2, wean off as able.  cxr trace Left effusion/atx  CV: bp stable, continue atorvastatin  Endocrine: stable on SS insulin, continue synthroid  Heme/Onc:  stable           DVT ppx: will start SQL  Renal: NS @75cc/hr  ID: [email or web address]  GI:  reg diet, continue bowel regimen  PT/OT: [place]  Will discuss with ____      Spectralink # [id number]

## 2021-03-31 NOTE — PROGRESS NOTE ADULT - PROBLEM SELECTOR PLAN 1
per neurosurg per neurosurg. Has greater than 4 METS without sx. Previous EKG c flipped T waves in leads V2-V6. PMD office called to compare to old EKGs. new EKG ordered and if unchanged, pt would be medically optimized for planned procedure if coags improve with vitamin K per neurosurg. Has greater than 4 METS without sx. Previous EKG c flipped T waves in leads V2-V6, but now normalized in v3-v6. PMD office called to compare to old EKGs. If pt requires surgery, would call cards to evaluate the normalization of T wave changes from previous EKG.

## 2021-03-31 NOTE — CONSULT NOTE ADULT - SUBJECTIVE AND OBJECTIVE BOX
HPI:  61y F pmhx esophageal spasms, dysphagia, gastritis (followed by GI, had an endoscopy in October, no interventions performed, takes pantoprazole), chronic lumbar and cervical spine disc disease, S/P cervical spinal fusion in 2020, S/P lumbar fusion 2003, s/p T8-pelvis arthrodesis, L1-3 anterior column release 3/12/21 Dr. Acevedo, presents to ED [facility] for Neurosx admission c/o of orthostatic dizziness x 6 days w/ known CSF leak on MRI yesterday. Pt reports severe dizziness when sitting up or standing from a supine position w/ intermittent nausea. Reports near syncope 3 days ago. Denies f/c, extremity numbness/weakness, urinary incontinence or retention. Last BM 3 days ago, passing flatus.  Pt presented to ED with [facility] imaging discs (30 Mar 2021 16:18)    Patient was admitted on 3/30, from [facility]. She was participating with therapy in [place], but limited by dizziness, almost passed out, was put on hold. Today, she denies any weakness in upper or lower extremities, complains of lower back pain, dizziness when standing.       REVIEW OF SYSTEMS  Constitutional - No fever, No weight loss, No fatigue  HEENT - No eye pain, No visual disturbances, No difficulty hearing, No tinnitus, No vertigo, No neck pain  Respiratory - No cough, No wheezing, No shortness of breath  Cardiovascular - No chest pain, No palpitations  Gastrointestinal - No abdominal pain, No nausea, No vomiting, No diarrhea, + constipation  Genitourinary - No dysuria, No frequency, No hematuria, No incontinence  Neurological - No headaches, No memory loss, No loss of strength, No numbness, No tremors  Musculoskeletal - No joint pain, No joint swelling, No muscle pain  Psychiatric - No depression, No anxiety    VITALS  T(C): 36.5 (03-31-21 @ 12:28), Max: 37 (03-30-21 @ 23:40)  HR: 75 (03-31-21 @ 12:28) (65 - 83)  BP: 113/69 (03-31-21 @ 12:28) (92/53 - 123/74)  RR: 18 (03-31-21 @ 12:28) (16 - 18)  SpO2: 99% (03-31-21 @ 12:28) (93% - 99%)  Wt(kg): --    PAST MEDICAL & SURGICAL HISTORY  Hypothyroidism    Cervical herniated disc    Spondylolisthesis, cervical region    Osteoarthritis    H/O gastritis    History of dysphagia    Dyskinesia of esophagus    History of chronic pain    Depression    Achilles tendon tear    Degenerative tear of acetabular labrum of left hip    Fibromyalgia    Spondylogenic compression of cervical spinal cord    S/P total knee replacement, left    S/P hip replacement, right    S/P laminectomy with spinal fusion    S/P arthroscopy of left knee    S/P cervical spinal fusion        SOCIAL HISTORY  Smoking - Denied  EtOH - Denied   Drugs - Denied    FUNCTIONAL HISTORY  Lives alone, 3-4 steps to enter basement apt, has friends in area  Independent ADLs, used cane for amb    CURRENT FUNCTIONAL STATUS  PT/OT evals pending     FAMILY HISTORY   Family history of DVT        RECENT LABS/IMAGING  CBC Full  -  ( 30 Mar 2021 15:47 )  WBC Count : 7.60 K/uL  RBC Count : 2.77 M/uL  Hemoglobin : 8.4 g/dL  Hematocrit : 26.5 %  Platelet Count - Automated : 549 K/uL  Mean Cell Volume : 95.7 fl  Mean Cell Hemoglobin : 30.3 pg  Mean Cell Hemoglobin Concentration : 31.7 gm/dL  Auto Neutrophil # : 4.37 K/uL  Auto Lymphocyte # : 2.32 K/uL  Auto Monocyte # : 0.73 K/uL  Auto Eosinophil # : 0.13 K/uL  Auto Basophil # : 0.03 K/uL  Auto Neutrophil % : 57.5 %  Auto Lymphocyte % : 30.5 %  Auto Monocyte % : 9.6 %  Auto Eosinophil % : 1.7 %  Auto Basophil % : 0.4 %    03-30    139  |  100  |  6<L>  ----------------------------<  105<H>  4.2   |  31  |  0.69    Ca    9.1      30 Mar 2021 15:47    TPro  6.3  /  Alb  3.1<L>  /  TBili  0.2  /  DBili  x   /  AST  17  /  ALT  11  /  AlkPhos  75  03-30    < from: MR Lumbar Spine w/wo IV Cont (03.31.21 @ 09:46) >    IMPRESSION: Thoracolumbar fusion with large fluid collection subcutaneously extending from T11 through L2 which likely represents a CSF leak. No abnormal enhancement.      < end of copied text >      ALLERGIES  No Known Allergies      MEDICATIONS   acetaminophen   Tablet .. 650 milliGRAM(s) Oral every 6 hours PRN  atorvastatin 20 milliGRAM(s) Oral at bedtime  bisacodyl Suppository 10 milliGRAM(s) Rectal daily PRN  cyclobenzaprine 5 milliGRAM(s) Oral three times a day PRN  dextrose 40% Gel 15 Gram(s) Oral once  dextrose 5%. 1000 milliLiter(s) IV Continuous <Continuous>  dextrose 5%. 1000 milliLiter(s) IV Continuous <Continuous>  dextrose 50% Injectable 25 Gram(s) IV Push once  dextrose 50% Injectable 12.5 Gram(s) IV Push once  dextrose 50% Injectable 25 Gram(s) IV Push once  diazepam    Tablet 5 milliGRAM(s) Oral every 8 hours PRN  fentaNYL   Patch  75 MICROgram(s)/Hr 1 Patch Transdermal every 72 hours  glucagon  Injectable 1 milliGRAM(s) IntraMuscular once  insulin lispro (ADMELOG) corrective regimen sliding scale   SubCutaneous three times a day before meals  insulin lispro (ADMELOG) corrective regimen sliding scale   SubCutaneous at bedtime  levothyroxine 200 MICROGram(s) Oral daily  melatonin 3 milliGRAM(s) Oral at bedtime  multivitamin 1 Tablet(s) Oral daily  ondansetron    Tablet 4 milliGRAM(s) Oral every 8 hours PRN  oxyCODONE    IR 10 milliGRAM(s) Oral every 4 hours PRN  oxyCODONE    IR 5 milliGRAM(s) Oral every 4 hours PRN  pantoprazole    Tablet 40 milliGRAM(s) Oral before breakfast  PARoxetine 40 milliGRAM(s) Oral daily  polyethylene glycol 3350 17 Gram(s) Oral two times a day  senna 2 Tablet(s) Oral at bedtime  sodium chloride 0.9%. 1000 milliLiter(s) IV Continuous <Continuous>      ----------------------------------------------------------------------------------------  PHYSICAL EXAM  Constitutional - NAD, Comfortable, in bed   Chest - Breathing comfortably, No wheezing  Cardiovascular - S1S2   Abdomen - Soft   Extremities - No C/C/E, No calf tenderness   Neurologic Exam -                    Cognitive - Awake, Alert, AAO to self, place, date, year, situation     Communication - Fluent, No dysarthria     Motor - No focal deficits                    LEFT    UE - ShAB 5/5, EF 5/5, EE 5/5, WE 5/5,  5/5                    RIGHT UE - ShAB 5/5, EF 5/5, EE 5/5, WE 5/5,  5/5                    LEFT    LE - HF 5/5, KE 5/5, DF 5/5, PF 5/5                    RIGHT LE - HF 5/5, KE 5/5, DF 5/5, PF 5/5        Sensory - Intact to LT     Psychiatric - Mood stable, Affect WNL  ----------------------------------------------------------------------------------------  ASSESSMENT/PLAN  61 year old with functional deficits after lumbar fusion, possible CSF leak vs fluid collection on MRI  Pain - Tylenol, oxycodone, fentanyl patch, cyclobenzaprine/diazepam prn   DVT PPX - SCDs  Rehab - Will continue to follow for ongoing rehab needs and recommendations.  awaiting PT/OT chintan    Recommend ACUTE inpatient rehabilitation for the functional deficits consisting of 3 hours of therapy/day & 24 hour RN/daily PMR physician for comorbid medical management. Patient will be able to tolerate 3 hours a day.

## 2021-03-31 NOTE — PROGRESS NOTE ADULT - SUBJECTIVE AND OBJECTIVE BOX
HPI:  61y F pmhx esophageal spasms, dysphagia, gastritis (followed by GI, had an endoscopy in October, no interventions performed, takes pantoprazole), chronic lumbar and cervical spine disc disease, S/P cervical spinal fusion in 2020, S/P lumbar fusion 2003, s/p T8-pelvis arthrodesis, L1-3 anterior column release 3/12/21 Dr. Acevedo, [facility] c/o of orthostatic dizziness x 6 days w/ known CSF leak on MRI yesterday. Pt reports severe dizziness when sitting up or standing from a supine position w/ intermittent nausea. Reports near syncope 3 days ago. Denies f/c, extremity numbness/weakness, urinary incontinence or retention. Last BM 3 days ago, passing flatus.  Pt presented to ED with [facility] imaging discs (30 Mar 2021 16:18)      PAST MEDICAL & SURGICAL HISTORY:  Hypothyroidism    Cervical herniated disc    Spondylolisthesis, cervical region    Osteoarthritis    H/O gastritis  - on pantoprazole    History of dysphagia  resolved after surgery in 2019    Dyskinesia of esophagus    History of chronic pain  on meds for 16 yrs    Depression    Achilles tendon tear  bilateral    Degenerative tear of acetabular labrum of left hip    Fibromyalgia    Spondylogenic compression of cervical spinal cord    S/P total knee replacement, left    S/P hip replacement, right    S/P laminectomy with spinal fusion  lumbar spine    S/P arthroscopy of left knee  x 4    S/P cervical spinal fusion  cage 2019      Vital Signs Last 24 Hrs  T(C): 36.6 (31 Mar 2021 08:44), Max: 37 (30 Mar 2021 23:40)  T(F): 97.9 (31 Mar 2021 08:44), Max: 98.6 (30 Mar 2021 23:40)  HR: 73 (31 Mar 2021 08:44) (65 - 83)  BP: 123/74 (31 Mar 2021 08:44) (92/53 - 123/74)  BP(mean): 61 (30 Mar 2021 19:38) (61 - 61)  RR: 18 (31 Mar 2021 08:44) (16 - 20)  SpO2: 99% (31 Mar 2021 08:44) (93% - 99%)                          8.4    7.60  )-----------( 549      ( 30 Mar 2021 15:47 )             26.5    03-30    139  |  100  |  6<L>  ----------------------------<  105<H>  4.2   |  31  |  0.69    Ca    9.1      30 Mar 2021 15:47    TPro  6.3  /  Alb  3.1<L>  /  TBili  0.2  /  DBili  x   /  AST  17  /  ALT  11  /  AlkPhos  75  03-30  PT/INR - ( 30 Mar 2021 15:47 )   PT: 14.5 sec;   INR: 1.22 ratio         PTT - ( 30 Mar 2021 15:47 )  PTT:36.9 sec   DRAIN OUTPUT:  minimal thin serous fluid    NEUROIMAGING:     PHYSICAL EXAM:    Constitutional: No Acute Distress     Neurological: AOx3, Following Commands, Moving all Extremities     Motor exam:          Upper extremity                         Delt     Bicep     Tricep    HG                                                 R         5/5        5/5        5/5       5/5                                               L          5/5        5/5        5/5       5/5          Lower extremity                        HF         KF        KE       DF         PF                                                  R        5/5        5/5        5/5       5/5         5/5                                               L         5/5        5/5       5/5       5/5          5/5                                                 Sensation: [xxxxxxxxxxx] intact to light touch  [] decreased:     Pulmonary: Clear to Auscultation, No rales, No rhonchi, No wheezes     Cardiovascular: S1, S2, Regular rate and rhythm     Gastrointestinal: Soft, Non-tender, Non-distended     Extremities: No calf tenderness     Incision: cdi    MEDICATIONS:   Antibiotics:    Neuro:  acetaminophen   Tablet .. 650 milliGRAM(s) Oral every 6 hours PRN Temp greater or equal to 38C (100.4F), Mild Pain (1 - 3)  cyclobenzaprine 5 milliGRAM(s) Oral three times a day PRN Muscle Spasm  diazepam    Tablet 5 milliGRAM(s) Oral every 8 hours PRN severe spasms  fentaNYL   Patch  75 MICROgram(s)/Hr 1 Patch Transdermal every 72 hours  melatonin 3 milliGRAM(s) Oral at bedtime  ondansetron    Tablet 4 milliGRAM(s) Oral every 8 hours PRN for nausea  oxyCODONE    IR 10 milliGRAM(s) Oral every 4 hours PRN Severe Pain (7 - 10)  oxyCODONE    IR 5 milliGRAM(s) Oral every 4 hours PRN Moderate Pain (4 - 6)  PARoxetine 40 milliGRAM(s) Oral daily    Anticoagulation:    Cardiology:    Endo:   atorvastatin 20 milliGRAM(s) Oral at bedtime  dextrose 40% Gel 15 Gram(s) Oral once  dextrose 50% Injectable 25 Gram(s) IV Push once  dextrose 50% Injectable 12.5 Gram(s) IV Push once  dextrose 50% Injectable 25 Gram(s) IV Push once  glucagon  Injectable 1 milliGRAM(s) IntraMuscular once  insulin lispro (ADMELOG) corrective regimen sliding scale   SubCutaneous three times a day before meals  insulin lispro (ADMELOG) corrective regimen sliding scale   SubCutaneous at bedtime  levothyroxine 200 MICROGram(s) Oral daily    Pulm:    GI/:  bisacodyl Suppository 10 milliGRAM(s) Rectal daily PRN  polyethylene glycol 3350 17 Gram(s) Oral two times a day  senna 2 Tablet(s) Oral at bedtime    Other:  dextrose 5%. 1000 milliLiter(s) IV Continuous <Continuous>  dextrose 5%. 1000 milliLiter(s) IV Continuous <Continuous>  multivitamin 1 Tablet(s) Oral daily  sodium chloride 0.9%. 1000 milliLiter(s) IV Continuous <Continuous>

## 2021-03-31 NOTE — PROGRESS NOTE ADULT - SUBJECTIVE AND OBJECTIVE BOX
Sharad Landaverde   Pager [phone]  Office [phone]      CC: Patient is a 61y old  Female who presents with a chief complaint of dizziness, poss. spinal fluid leak thoraco-lumbar (31 Mar 2021 10:49)      SUBJECTIVE / OVERNIGHT EVENTS:    MEDICATIONS  (STANDING):  atorvastatin 20 milliGRAM(s) Oral at bedtime  dextrose 40% Gel 15 Gram(s) Oral once  dextrose 5%. 1000 milliLiter(s) (50 mL/Hr) IV Continuous <Continuous>  dextrose 5%. 1000 milliLiter(s) (100 mL/Hr) IV Continuous <Continuous>  dextrose 50% Injectable 25 Gram(s) IV Push once  dextrose 50% Injectable 12.5 Gram(s) IV Push once  dextrose 50% Injectable 25 Gram(s) IV Push once  fentaNYL   Patch  75 MICROgram(s)/Hr 1 Patch Transdermal every 72 hours  glucagon  Injectable 1 milliGRAM(s) IntraMuscular once  insulin lispro (ADMELOG) corrective regimen sliding scale   SubCutaneous three times a day before meals  insulin lispro (ADMELOG) corrective regimen sliding scale   SubCutaneous at bedtime  levothyroxine 200 MICROGram(s) Oral daily  melatonin 3 milliGRAM(s) Oral at bedtime  multivitamin 1 Tablet(s) Oral daily  PARoxetine 40 milliGRAM(s) Oral daily  polyethylene glycol 3350 17 Gram(s) Oral two times a day  senna 2 Tablet(s) Oral at bedtime  sodium chloride 0.9%. 1000 milliLiter(s) (75 mL/Hr) IV Continuous <Continuous>    MEDICATIONS  (PRN):  acetaminophen   Tablet .. 650 milliGRAM(s) Oral every 6 hours PRN Temp greater or equal to 38C (100.4F), Mild Pain (1 - 3)  bisacodyl Suppository 10 milliGRAM(s) Rectal daily PRN Constipation  cyclobenzaprine 5 milliGRAM(s) Oral three times a day PRN Muscle Spasm  diazepam    Tablet 5 milliGRAM(s) Oral every 8 hours PRN severe spasms  ondansetron    Tablet 4 milliGRAM(s) Oral every 8 hours PRN for nausea  oxyCODONE    IR 10 milliGRAM(s) Oral every 4 hours PRN Severe Pain (7 - 10)  oxyCODONE    IR 5 milliGRAM(s) Oral every 4 hours PRN Moderate Pain (4 - 6)      Vital Signs Last 24 Hrs  T(C): 36.5 (31 Mar 2021 12:28), Max: 37 (30 Mar 2021 23:40)  T(F): 97.7 (31 Mar 2021 12:28), Max: 98.6 (30 Mar 2021 23:40)  HR: 75 (31 Mar 2021 12:28) (65 - 83)  BP: 113/69 (31 Mar 2021 12:28) (92/53 - 123/74)  BP(mean): 61 (30 Mar 2021 19:38) (61 - 61)  RR: 18 (31 Mar 2021 12:28) (16 - 20)  SpO2: 99% (31 Mar 2021 12:28) (93% - 99%)  CAPILLARY BLOOD GLUCOSE      POCT Blood Glucose.: 145 mg/dL (31 Mar 2021 11:41)  POCT Blood Glucose.: 104 mg/dL (31 Mar 2021 08:02)  POCT Blood Glucose.: 111 mg/dL (30 Mar 2021 22:39)    I&O's Summary    30 Mar 2021 07:01  -  31 Mar 2021 07:00  --------------------------------------------------------  IN: 795 mL / OUT: 40 mL / NET: 755 mL    31 Mar 2021 07:01  -  31 Mar 2021 13:35  --------------------------------------------------------  IN: 540 mL / OUT: 0 mL / NET: 540 mL      tele:    PHYSICAL EXAM:    GENERAL: NAD   HEENT: EOMI, PERRL  PULM: Clear to auscultation bilaterally  CV: Regular rate and rhythm; nl S1, S2; No murmurs, rubs, or gallops  ABDOMEN: Soft, Nontender, Nondistended; Bowel sounds present  EXTREMITIES/MSK:  No edema, calf tenderness   PSYCH: AAOx3  NEUROLOGY: non-focal          LABS:                        8.4    7.60  )-----------( 549      ( 30 Mar 2021 15:47 )             26.5     03-30    139  |  100  |  6<L>  ----------------------------<  105<H>  4.2   |  31  |  0.69    Ca    9.1      30 Mar 2021 15:47    TPro  6.3  /  Alb  3.1<L>  /  TBili  0.2  /  DBili  x   /  AST  17  /  ALT  11  /  AlkPhos  75  03-30    PT/INR - ( 30 Mar 2021 15:47 )   PT: 14.5 sec;   INR: 1.22 ratio         PTT - ( 30 Mar 2021 15:47 )  PTT:36.9 sec            RADIOLOGY & ADDITIONAL TESTS:    Imaging Personally Reviewed:    Consultant(s) Notes Reviewed:      Care Discussed with Consultants/Other Providers:   Sharad Landaverde   Pager [phone]  Office [phone]      CC: Patient is a 61y old  Female who presents with a chief complaint of dizziness, poss. spinal fluid leak thoraco-lumbar (31 Mar 2021 10:49)      SUBJECTIVE / OVERNIGHT EVENTS: light-headed c standing. feels better lying down. no f/c/r. no cp/[place]. no n/d/abd pain. chronic back pains unchanged    MEDICATIONS  (STANDING):  atorvastatin 20 milliGRAM(s) Oral at bedtime  dextrose 40% Gel 15 Gram(s) Oral once  dextrose 5%. 1000 milliLiter(s) (50 mL/Hr) IV Continuous <Continuous>  dextrose 5%. 1000 milliLiter(s) (100 mL/Hr) IV Continuous <Continuous>  dextrose 50% Injectable 25 Gram(s) IV Push once  dextrose 50% Injectable 12.5 Gram(s) IV Push once  dextrose 50% Injectable 25 Gram(s) IV Push once  fentaNYL   Patch  75 MICROgram(s)/Hr 1 Patch Transdermal every 72 hours  glucagon  Injectable 1 milliGRAM(s) IntraMuscular once  insulin lispro (ADMELOG) corrective regimen sliding scale   SubCutaneous three times a day before meals  insulin lispro (ADMELOG) corrective regimen sliding scale   SubCutaneous at bedtime  levothyroxine 200 MICROGram(s) Oral daily  melatonin 3 milliGRAM(s) Oral at bedtime  multivitamin 1 Tablet(s) Oral daily  PARoxetine 40 milliGRAM(s) Oral daily  polyethylene glycol 3350 17 Gram(s) Oral two times a day  senna 2 Tablet(s) Oral at bedtime  sodium chloride 0.9%. 1000 milliLiter(s) (75 mL/Hr) IV Continuous <Continuous>    MEDICATIONS  (PRN):  acetaminophen   Tablet .. 650 milliGRAM(s) Oral every 6 hours PRN Temp greater or equal to 38C (100.4F), Mild Pain (1 - 3)  bisacodyl Suppository 10 milliGRAM(s) Rectal daily PRN Constipation  cyclobenzaprine 5 milliGRAM(s) Oral three times a day PRN Muscle Spasm  diazepam    Tablet 5 milliGRAM(s) Oral every 8 hours PRN severe spasms  ondansetron    Tablet 4 milliGRAM(s) Oral every 8 hours PRN for nausea  oxyCODONE    IR 10 milliGRAM(s) Oral every 4 hours PRN Severe Pain (7 - 10)  oxyCODONE    IR 5 milliGRAM(s) Oral every 4 hours PRN Moderate Pain (4 - 6)      Vital Signs Last 24 Hrs  T(C): 36.5 (31 Mar 2021 12:28), Max: 37 (30 Mar 2021 23:40)  T(F): 97.7 (31 Mar 2021 12:28), Max: 98.6 (30 Mar 2021 23:40)  HR: 75 (31 Mar 2021 12:28) (65 - 83)  BP: 113/69 (31 Mar 2021 12:28) (92/53 - 123/74)  BP(mean): 61 (30 Mar 2021 19:38) (61 - 61)  RR: 18 (31 Mar 2021 12:28) (16 - 20)  SpO2: 99% (31 Mar 2021 12:28) (93% - 99%)  CAPILLARY BLOOD GLUCOSE      POCT Blood Glucose.: 145 mg/dL (31 Mar 2021 11:41)  POCT Blood Glucose.: 104 mg/dL (31 Mar 2021 08:02)  POCT Blood Glucose.: 111 mg/dL (30 Mar 2021 22:39)    I&O's Summary    30 Mar 2021 07:01  -  31 Mar 2021 07:00  --------------------------------------------------------  IN: 795 mL / OUT: 40 mL / NET: 755 mL    31 Mar 2021 07:01  -  31 Mar 2021 13:35  --------------------------------------------------------  IN: 540 mL / OUT: 0 mL / NET: 540 mL          PHYSICAL EXAM:    GENERAL: NAD   HEENT: EOMI, PERRL  PULM: Clear to auscultation bilaterally  CV: Regular rate and rhythm; nl S1, S2; No murmurs, rubs, or gallops  ABDOMEN: Soft, Nontender, Nondistended; Bowel sounds present  EXTREMITIES/MSK:  No edema, calf tenderness   PSYCH: AAOx3  NEUROLOGY: non-focal          LABS:                        8.4    7.60  )-----------( 549      ( 30 Mar 2021 15:47 )             26.5     03-30    139  |  100  |  6<L>  ----------------------------<  105<H>  4.2   |  31  |  0.69    Ca    9.1      30 Mar 2021 15:47    TPro  6.3  /  Alb  3.1<L>  /  TBili  0.2  /  DBili  x   /  AST  17  /  ALT  11  /  AlkPhos  75  03-30    PT/INR - ( 30 Mar 2021 15:47 )   PT: 14.5 sec;   INR: 1.22 ratio         PTT - ( 30 Mar 2021 15:47 )  PTT:36.9 sec            RADIOLOGY & ADDITIONAL TESTS:    Imaging Personally Reviewed:    Consultant(s) Notes Reviewed:      Care Discussed with Consultants/Other Providers: neurosurg    Sharad Landaverde   Pager [phone]  Office [phone]      CC: Patient is a 61y old  Female who presents with a chief complaint of dizziness, poss. spinal fluid leak thoraco-lumbar (31 Mar 2021 10:49)      SUBJECTIVE / OVERNIGHT EVENTS: light-headed c standing. feels better lying down. no f/c/r. no cp/jaffe. no n/d/abd pain. chronic back pains unchanged. prior to recent surgery, pt was able to walk up more than 2 flights of stairs with no typical or atypical chest pain/fatigue/JAFFE.    MEDICATIONS  (STANDING):  atorvastatin 20 milliGRAM(s) Oral at bedtime  dextrose 40% Gel 15 Gram(s) Oral once  dextrose 5%. 1000 milliLiter(s) (50 mL/Hr) IV Continuous <Continuous>  dextrose 5%. 1000 milliLiter(s) (100 mL/Hr) IV Continuous <Continuous>  dextrose 50% Injectable 25 Gram(s) IV Push once  dextrose 50% Injectable 12.5 Gram(s) IV Push once  dextrose 50% Injectable 25 Gram(s) IV Push once  fentaNYL   Patch  75 MICROgram(s)/Hr 1 Patch Transdermal every 72 hours  glucagon  Injectable 1 milliGRAM(s) IntraMuscular once  insulin lispro (ADMELOG) corrective regimen sliding scale   SubCutaneous three times a day before meals  insulin lispro (ADMELOG) corrective regimen sliding scale   SubCutaneous at bedtime  levothyroxine 200 MICROGram(s) Oral daily  melatonin 3 milliGRAM(s) Oral at bedtime  multivitamin 1 Tablet(s) Oral daily  PARoxetine 40 milliGRAM(s) Oral daily  polyethylene glycol 3350 17 Gram(s) Oral two times a day  senna 2 Tablet(s) Oral at bedtime  sodium chloride 0.9%. 1000 milliLiter(s) (75 mL/Hr) IV Continuous <Continuous>    MEDICATIONS  (PRN):  acetaminophen   Tablet .. 650 milliGRAM(s) Oral every 6 hours PRN Temp greater or equal to 38C (100.4F), Mild Pain (1 - 3)  bisacodyl Suppository 10 milliGRAM(s) Rectal daily PRN Constipation  cyclobenzaprine 5 milliGRAM(s) Oral three times a day PRN Muscle Spasm  diazepam    Tablet 5 milliGRAM(s) Oral every 8 hours PRN severe spasms  ondansetron    Tablet 4 milliGRAM(s) Oral every 8 hours PRN for nausea  oxyCODONE    IR 10 milliGRAM(s) Oral every 4 hours PRN Severe Pain (7 - 10)  oxyCODONE    IR 5 milliGRAM(s) Oral every 4 hours PRN Moderate Pain (4 - 6)      Vital Signs Last 24 Hrs  T(C): 36.5 (31 Mar 2021 12:28), Max: 37 (30 Mar 2021 23:40)  T(F): 97.7 (31 Mar 2021 12:28), Max: 98.6 (30 Mar 2021 23:40)  HR: 75 (31 Mar 2021 12:28) (65 - 83)  BP: 113/69 (31 Mar 2021 12:28) (92/53 - 123/74)  BP(mean): 61 (30 Mar 2021 19:38) (61 - 61)  RR: 18 (31 Mar 2021 12:28) (16 - 20)  SpO2: 99% (31 Mar 2021 12:28) (93% - 99%)  CAPILLARY BLOOD GLUCOSE      POCT Blood Glucose.: 145 mg/dL (31 Mar 2021 11:41)  POCT Blood Glucose.: 104 mg/dL (31 Mar 2021 08:02)  POCT Blood Glucose.: 111 mg/dL (30 Mar 2021 22:39)    I&O's Summary    30 Mar 2021 07:01  -  31 Mar 2021 07:00  --------------------------------------------------------  IN: 795 mL / OUT: 40 mL / NET: 755 mL    31 Mar 2021 07:01  -  31 Mar 2021 13:35  --------------------------------------------------------  IN: 540 mL / OUT: 0 mL / NET: 540 mL          PHYSICAL EXAM:    GENERAL: NAD   HEENT: EOMI, PERRL  PULM: Clear to auscultation bilaterally  CV: Regular rate and rhythm; nl S1, S2; No murmurs, rubs, or gallops  ABDOMEN: Soft, Nontender, Nondistended; Bowel sounds present  EXTREMITIES/MSK:  No edema, calf tenderness   PSYCH: AAOx3  NEUROLOGY: non-focal          LABS:                        8.4    7.60  )-----------( 549      ( 30 Mar 2021 15:47 )             26.5     03-30    139  |  100  |  6<L>  ----------------------------<  105<H>  4.2   |  31  |  0.69    Ca    9.1      30 Mar 2021 15:47    TPro  6.3  /  Alb  3.1<L>  /  TBili  0.2  /  DBili  x   /  AST  17  /  ALT  11  /  AlkPhos  75  03-30    PT/INR - ( 30 Mar 2021 15:47 )   PT: 14.5 sec;   INR: 1.22 ratio         PTT - ( 30 Mar 2021 15:47 )  PTT:36.9 sec            RADIOLOGY & ADDITIONAL TESTS:    Imaging Personally Reviewed:    Consultant(s) Notes Reviewed:      Care Discussed with Consultants/Other Providers: neurosurg    Sharad Landaverde   Pager [phone]  Office [phone]      CC: Patient is a 61y old  Female who presents with a chief complaint of dizziness, poss. spinal fluid leak thoraco-lumbar (31 Mar 2021 10:49)      SUBJECTIVE / OVERNIGHT EVENTS: light-headed c standing. feels better lying down. no f/c/r. no cp/jaffe. no n/d/abd pain. chronic back pains unchanged. prior to recent surgery, pt was able to walk up more than 2 flights of stairs with no typical or atypical chest pain/fatigue/JAFFE.    MEDICATIONS  (STANDING):  atorvastatin 20 milliGRAM(s) Oral at bedtime  dextrose 40% Gel 15 Gram(s) Oral once  dextrose 5%. 1000 milliLiter(s) (50 mL/Hr) IV Continuous <Continuous>  dextrose 5%. 1000 milliLiter(s) (100 mL/Hr) IV Continuous <Continuous>  dextrose 50% Injectable 25 Gram(s) IV Push once  dextrose 50% Injectable 12.5 Gram(s) IV Push once  dextrose 50% Injectable 25 Gram(s) IV Push once  fentaNYL   Patch  75 MICROgram(s)/Hr 1 Patch Transdermal every 72 hours  glucagon  Injectable 1 milliGRAM(s) IntraMuscular once  insulin lispro (ADMELOG) corrective regimen sliding scale   SubCutaneous three times a day before meals  insulin lispro (ADMELOG) corrective regimen sliding scale   SubCutaneous at bedtime  levothyroxine 200 MICROGram(s) Oral daily  melatonin 3 milliGRAM(s) Oral at bedtime  multivitamin 1 Tablet(s) Oral daily  PARoxetine 40 milliGRAM(s) Oral daily  polyethylene glycol 3350 17 Gram(s) Oral two times a day  senna 2 Tablet(s) Oral at bedtime  sodium chloride 0.9%. 1000 milliLiter(s) (75 mL/Hr) IV Continuous <Continuous>    MEDICATIONS  (PRN):  acetaminophen   Tablet .. 650 milliGRAM(s) Oral every 6 hours PRN Temp greater or equal to 38C (100.4F), Mild Pain (1 - 3)  bisacodyl Suppository 10 milliGRAM(s) Rectal daily PRN Constipation  cyclobenzaprine 5 milliGRAM(s) Oral three times a day PRN Muscle Spasm  diazepam    Tablet 5 milliGRAM(s) Oral every 8 hours PRN severe spasms  ondansetron    Tablet 4 milliGRAM(s) Oral every 8 hours PRN for nausea  oxyCODONE    IR 10 milliGRAM(s) Oral every 4 hours PRN Severe Pain (7 - 10)  oxyCODONE    IR 5 milliGRAM(s) Oral every 4 hours PRN Moderate Pain (4 - 6)      Vital Signs Last 24 Hrs  T(C): 36.5 (31 Mar 2021 12:28), Max: 37 (30 Mar 2021 23:40)  T(F): 97.7 (31 Mar 2021 12:28), Max: 98.6 (30 Mar 2021 23:40)  HR: 75 (31 Mar 2021 12:28) (65 - 83)  BP: 113/69 (31 Mar 2021 12:28) (92/53 - 123/74)  BP(mean): 61 (30 Mar 2021 19:38) (61 - 61)  RR: 18 (31 Mar 2021 12:28) (16 - 20)  SpO2: 99% (31 Mar 2021 12:28) (93% - 99%)  CAPILLARY BLOOD GLUCOSE      POCT Blood Glucose.: 145 mg/dL (31 Mar 2021 11:41)  POCT Blood Glucose.: 104 mg/dL (31 Mar 2021 08:02)  POCT Blood Glucose.: 111 mg/dL (30 Mar 2021 22:39)    I&O's Summary    30 Mar 2021 07:01  -  31 Mar 2021 07:00  --------------------------------------------------------  IN: 795 mL / OUT: 40 mL / NET: 755 mL    31 Mar 2021 07:01  -  31 Mar 2021 13:35  --------------------------------------------------------  IN: 540 mL / OUT: 0 mL / NET: 540 mL          PHYSICAL EXAM:    GENERAL: NAD   HEENT: EOMI, PERRL  PULM: Clear to auscultation bilaterally  CV: Regular rate and rhythm; nl S1, S2; No murmurs, rubs, or gallops  ABDOMEN: Soft, Nontender, Nondistended; Bowel sounds present  EXTREMITIES/MSK:  No edema, calf tenderness   PSYCH: AAOx3  NEUROLOGY: non-focal          LABS:                        8.4    7.60  )-----------( 549      ( 30 Mar 2021 15:47 )             26.5     03-30    139  |  100  |  6<L>  ----------------------------<  105<H>  4.2   |  31  |  0.69    Ca    9.1      30 Mar 2021 15:47    TPro  6.3  /  Alb  3.1<L>  /  TBili  0.2  /  DBili  x   /  AST  17  /  ALT  11  /  AlkPhos  75  03-30    PT/INR - ( 30 Mar 2021 15:47 )   PT: 14.5 sec;   INR: 1.22 ratio         PTT - ( 30 Mar 2021 15:47 )  PTT:36.9 sec      EKG reviewed by me: NSR. no acute isch changes. Flip T waves from previous EKG have normalized in V3-V6.      RADIOLOGY & ADDITIONAL TESTS:    Imaging Personally Reviewed:    Consultant(s) Notes Reviewed:      Care Discussed with Consultants/Other Providers: neurosurg

## 2021-04-01 LAB
ALBUMIN SERPL ELPH-MCNC: 3 G/DL — LOW (ref 3.3–5)
ALP SERPL-CCNC: 73 U/L — SIGNIFICANT CHANGE UP (ref 40–120)
ALT FLD-CCNC: 9 U/L — LOW (ref 10–45)
ANION GAP SERPL CALC-SCNC: 7 MMOL/L — SIGNIFICANT CHANGE UP (ref 5–17)
AST SERPL-CCNC: 17 U/L — SIGNIFICANT CHANGE UP (ref 10–40)
BASOPHILS # BLD AUTO: 0.04 K/UL — SIGNIFICANT CHANGE UP (ref 0–0.2)
BASOPHILS NFR BLD AUTO: 0.6 % — SIGNIFICANT CHANGE UP (ref 0–2)
BILIRUB SERPL-MCNC: 0.2 MG/DL — SIGNIFICANT CHANGE UP (ref 0.2–1.2)
BUN SERPL-MCNC: 6 MG/DL — LOW (ref 7–23)
CALCIUM SERPL-MCNC: 9.3 MG/DL — SIGNIFICANT CHANGE UP (ref 8.4–10.5)
CHLORIDE SERPL-SCNC: 100 MMOL/L — SIGNIFICANT CHANGE UP (ref 96–108)
CO2 SERPL-SCNC: 31 MMOL/L — SIGNIFICANT CHANGE UP (ref 22–31)
CREAT SERPL-MCNC: 0.65 MG/DL — SIGNIFICANT CHANGE UP (ref 0.5–1.3)
EOSINOPHIL # BLD AUTO: 0.13 K/UL — SIGNIFICANT CHANGE UP (ref 0–0.5)
EOSINOPHIL NFR BLD AUTO: 2 % — SIGNIFICANT CHANGE UP (ref 0–6)
GLUCOSE BLDC GLUCOMTR-MCNC: 116 MG/DL — HIGH (ref 70–99)
GLUCOSE BLDC GLUCOMTR-MCNC: 125 MG/DL — HIGH (ref 70–99)
GLUCOSE BLDC GLUCOMTR-MCNC: 81 MG/DL — SIGNIFICANT CHANGE UP (ref 70–99)
GLUCOSE SERPL-MCNC: 105 MG/DL — HIGH (ref 70–99)
HCT VFR BLD CALC: 28 % — LOW (ref 34.5–45)
HGB BLD-MCNC: 8.6 G/DL — LOW (ref 11.5–15.5)
IMM GRANULOCYTES NFR BLD AUTO: 0.2 % — SIGNIFICANT CHANGE UP (ref 0–1.5)
LYMPHOCYTES # BLD AUTO: 2.42 K/UL — SIGNIFICANT CHANGE UP (ref 1–3.3)
LYMPHOCYTES # BLD AUTO: 37.9 % — SIGNIFICANT CHANGE UP (ref 13–44)
MCHC RBC-ENTMCNC: 29.6 PG — SIGNIFICANT CHANGE UP (ref 27–34)
MCHC RBC-ENTMCNC: 30.7 GM/DL — LOW (ref 32–36)
MCV RBC AUTO: 96.2 FL — SIGNIFICANT CHANGE UP (ref 80–100)
MONOCYTES # BLD AUTO: 0.76 K/UL — SIGNIFICANT CHANGE UP (ref 0–0.9)
MONOCYTES NFR BLD AUTO: 11.9 % — SIGNIFICANT CHANGE UP (ref 2–14)
NEUTROPHILS # BLD AUTO: 3.03 K/UL — SIGNIFICANT CHANGE UP (ref 1.8–7.4)
NEUTROPHILS NFR BLD AUTO: 47.4 % — SIGNIFICANT CHANGE UP (ref 43–77)
NRBC # BLD: 0 /100 WBCS — SIGNIFICANT CHANGE UP (ref 0–0)
PLATELET # BLD AUTO: 557 K/UL — HIGH (ref 150–400)
POTASSIUM SERPL-MCNC: 4.2 MMOL/L — SIGNIFICANT CHANGE UP (ref 3.5–5.3)
POTASSIUM SERPL-SCNC: 4.2 MMOL/L — SIGNIFICANT CHANGE UP (ref 3.5–5.3)
PROT SERPL-MCNC: 6.2 G/DL — SIGNIFICANT CHANGE UP (ref 6–8.3)
RBC # BLD: 2.91 M/UL — LOW (ref 3.8–5.2)
RBC # FLD: 14.2 % — SIGNIFICANT CHANGE UP (ref 10.3–14.5)
SODIUM SERPL-SCNC: 138 MMOL/L — SIGNIFICANT CHANGE UP (ref 135–145)
WBC # BLD: 6.39 K/UL — SIGNIFICANT CHANGE UP (ref 3.8–10.5)
WBC # FLD AUTO: 6.39 K/UL — SIGNIFICANT CHANGE UP (ref 3.8–10.5)

## 2021-04-01 PROCEDURE — 99232 SBSQ HOSP IP/OBS MODERATE 35: CPT

## 2021-04-01 RX ORDER — ENOXAPARIN SODIUM 100 MG/ML
40 INJECTION SUBCUTANEOUS AT BEDTIME
Refills: 0 | Status: DISCONTINUED | OUTPATIENT
Start: 2021-04-01 | End: 2021-04-03

## 2021-04-01 RX ADMIN — OXYCODONE HYDROCHLORIDE 10 MILLIGRAM(S): 5 TABLET ORAL at 11:49

## 2021-04-01 RX ADMIN — OXYCODONE HYDROCHLORIDE 10 MILLIGRAM(S): 5 TABLET ORAL at 15:20

## 2021-04-01 RX ADMIN — Medication 3 MILLIGRAM(S): at 21:26

## 2021-04-01 RX ADMIN — ATORVASTATIN CALCIUM 20 MILLIGRAM(S): 80 TABLET, FILM COATED ORAL at 21:26

## 2021-04-01 RX ADMIN — ENOXAPARIN SODIUM 40 MILLIGRAM(S): 100 INJECTION SUBCUTANEOUS at 23:00

## 2021-04-01 RX ADMIN — OXYCODONE HYDROCHLORIDE 10 MILLIGRAM(S): 5 TABLET ORAL at 11:19

## 2021-04-01 RX ADMIN — Medication 40 MILLIGRAM(S): at 05:04

## 2021-04-01 RX ADMIN — OXYCODONE HYDROCHLORIDE 5 MILLIGRAM(S): 5 TABLET ORAL at 03:50

## 2021-04-01 RX ADMIN — Medication 200 MICROGRAM(S): at 05:04

## 2021-04-01 RX ADMIN — POLYETHYLENE GLYCOL 3350 17 GRAM(S): 17 POWDER, FOR SOLUTION ORAL at 05:04

## 2021-04-01 RX ADMIN — CYCLOBENZAPRINE HYDROCHLORIDE 5 MILLIGRAM(S): 10 TABLET, FILM COATED ORAL at 14:04

## 2021-04-01 RX ADMIN — OXYCODONE HYDROCHLORIDE 5 MILLIGRAM(S): 5 TABLET ORAL at 04:20

## 2021-04-01 RX ADMIN — PANTOPRAZOLE SODIUM 40 MILLIGRAM(S): 20 TABLET, DELAYED RELEASE ORAL at 05:03

## 2021-04-01 RX ADMIN — OXYCODONE HYDROCHLORIDE 10 MILLIGRAM(S): 5 TABLET ORAL at 15:50

## 2021-04-01 NOTE — OCCUPATIONAL THERAPY INITIAL EVALUATION ADULT - IADL RETRAINING, OT EVAL
GOAL: Pt will maintain dynamic standing balance while reaching for items in order to complete 4 step cooking task in 4 weeks

## 2021-04-01 NOTE — PROGRESS NOTE ADULT - PROBLEM SELECTOR PLAN 1
per neurosurg. Has greater than 4 METS without sx. Previous EKG c flipped T waves in leads V2-V6, but now normalized in v3-v6. PMD office called to compare to old EKGs. If pt requires surgery, would call cards to evaluate the normalization of T wave changes from previous EKG.

## 2021-04-01 NOTE — PHYSICAL THERAPY INITIAL EVALUATION ADULT - ADDITIONAL COMMENTS
Pt lives alone in an apartment with 4 steps to enter and +tub shower. Pt was ambulating with straight cane, driving, cooking and performing all ADLs independently prior to admission.

## 2021-04-01 NOTE — PROGRESS NOTE ADULT - SUBJECTIVE AND OBJECTIVE BOX
HPI:  Patient is a 61 year old female with recent T8-pelvis arthrodesis, and L1-L3 anterior column release on 3/12/2021 that was discharged home.  Now returns for dizziness/orthostasis.      OVERNIGHT EVENTS:  No acute events overnight.  Patient feels much better, no headaches/dizziness today.  Tolerating diet.  Plastics following.    Vital Signs Last 24 Hrs  T(C): 36.8 (01 Apr 2021 09:10), Max: 37.3 (31 Mar 2021 15:59)  T(F): 98.2 (01 Apr 2021 09:10), Max: 99.1 (31 Mar 2021 15:59)  HR: 88 (01 Apr 2021 09:10) (69 - 90)  BP: 101/54 (01 Apr 2021 05:10) (100/57 - 113/69)  BP(mean): --  RR: 19 (01 Apr 2021 09:10) (17 - 20)  SpO2: 97% (01 Apr 2021 09:10) (92% - 99%)    I&O's Detail    31 Mar 2021 07:01  -  01 Apr 2021 07:00  --------------------------------------------------------  IN:    Oral Fluid: 540 mL    sodium chloride 0.9%: 900 mL  Total IN: 1440 mL    OUT:    Bulb (mL): 35 mL  Total OUT: 35 mL    Total NET: 1405 mL        I&O's Summary    31 Mar 2021 07:01  -  01 Apr 2021 07:00  --------------------------------------------------------  IN: 1440 mL / OUT: 35 mL / NET: 1405 mL        PHYSICAL EXAM:  Neurological: awake, alert, oriented x3, follows commands, speech clear and fluent, moves all extremities x4 w/ 5/5 strength throughout, sensation present, intact, equal throughout    Cardiovascular: +s1, s2  Respiratory: clear to auscultation b/l  Gastrointestinal: soft, non-distended, non-tender  Genitourinary: +voiding  Incision/Wound: posterior midline vertical incision dressing c/d/i w/ aquacel, no collections, kathy x1    TUBES/LINES:  [x] kathy x1 (35cc serosanguinous output over 24 hours)    DIET:  [x] regular      LABS:                        8.6    6.39  )-----------( 557      ( 01 Apr 2021 05:48 )             28.0     04-01    138  |  100  |  6<L>  ----------------------------<  105<H>  4.2   |  31  |  0.65    Ca    9.3      01 Apr 2021 05:48    TPro  6.2  /  Alb  3.0<L>  /  TBili  0.2  /  DBili  x   /  AST  17  /  ALT  9<L>  /  AlkPhos  73  04-01    PT/INR - ( 01 Apr 2021 05:48 )   PT: 13.4 sec;   INR: 1.12 ratio         PTT - ( 01 Apr 2021 05:48 )  PTT:32.6 sec        CAPILLARY BLOOD GLUCOSE      POCT Blood Glucose.: 99 mg/dL (01 Apr 2021 07:54)  POCT Blood Glucose.: 131 mg/dL (31 Mar 2021 21:40)  POCT Blood Glucose.: 108 mg/dL (31 Mar 2021 16:39)  POCT Blood Glucose.: 145 mg/dL (31 Mar 2021 11:41)      Allergies    No Known Allergies        MEDICATIONS:  Antibiotics:  none    Neuro:  acetaminophen   Tablet .. 650 milliGRAM(s) Oral every 6 hours PRN  cyclobenzaprine 5 milliGRAM(s) Oral three times a day PRN  diazepam    Tablet 5 milliGRAM(s) Oral every 8 hours PRN  fentaNYL   Patch  75 MICROgram(s)/Hr 1 Patch Transdermal every 72 hours  melatonin 3 milliGRAM(s) Oral at bedtime  ondansetron    Tablet 4 milliGRAM(s) Oral every 8 hours PRN  oxyCODONE    IR 10 milliGRAM(s) Oral every 4 hours PRN  oxyCODONE    IR 5 milliGRAM(s) Oral every 4 hours PRN  PARoxetine 40 milliGRAM(s) Oral daily    Anticoagulation:  lovenox 40mg subcutaneous at bedtime    OTHER:  atorvastatin 20 milliGRAM(s) Oral at bedtime  bisacodyl Suppository 10 milliGRAM(s) Rectal daily PRN  dextrose 40% Gel 15 Gram(s) Oral once  dextrose 50% Injectable 25 Gram(s) IV Push once  dextrose 50% Injectable 12.5 Gram(s) IV Push once  dextrose 50% Injectable 25 Gram(s) IV Push once  glucagon  Injectable 1 milliGRAM(s) IntraMuscular once  insulin lispro (ADMELOG) corrective regimen sliding scale   SubCutaneous three times a day before meals  insulin lispro (ADMELOG) corrective regimen sliding scale   SubCutaneous at bedtime  levothyroxine 200 MICROGram(s) Oral daily  pantoprazole    Tablet 40 milliGRAM(s) Oral before breakfast  polyethylene glycol 3350 17 Gram(s) Oral two times a day  senna 2 Tablet(s) Oral at bedtime    IVF:  dextrose 5%. 1000 milliLiter(s) IV Continuous <Continuous>  dextrose 5%. 1000 milliLiter(s) IV Continuous <Continuous>  multivitamin 1 Tablet(s) Oral daily  sodium chloride 0.9%. 1000 milliLiter(s) IV Continuous <Continuous>    CULTURES:  none    RADIOLOGY & ADDITIONAL TESTS:  no new imaging

## 2021-04-01 NOTE — PROGRESS NOTE ADULT - SUBJECTIVE AND OBJECTIVE BOX
Plastic Surgery Progress Note (pg [facility]: [id number], [facility]: [phone])    SUBJECTIVE  The patient was seen and examined. No acute events overnight. MRI L spine with fluid collection subcutaneously concerning for a CSF leak.     OBJECTIVE  ___________________________________________________  VITAL SIGNS / I&O's   Vital Signs Last 24 Hrs  T(C): 36.4 (01 Apr 2021 05:10), Max: 37.3 (31 Mar 2021 15:59)  T(F): 97.6 (01 Apr 2021 05:10), Max: 99.1 (31 Mar 2021 15:59)  HR: 69 (01 Apr 2021 05:10) (69 - 90)  BP: 101/54 (01 Apr 2021 05:10) (100/57 - 123/74)  BP(mean): --  RR: 20 (01 Apr 2021 05:10) (17 - 20)  SpO2: 98% (01 Apr 2021 05:10) (92% - 99%)      30 Mar 2021 07:01  -  31 Mar 2021 07:00  --------------------------------------------------------  IN:    Oral Fluid: 120 mL    sodium chloride 0.9%: 675 mL  Total IN: 795 mL    OUT:    Bulb (mL): 40 mL  Total OUT: 40 mL    Total NET: 755 mL      31 Mar 2021 07:01  -  01 Apr 2021 06:41  --------------------------------------------------------  IN:    Oral Fluid: 540 mL  Total IN: 540 mL    OUT:    Bulb (mL): 15 mL  Total OUT: 15 mL    Total NET: 525 mL        ___________________________________________________  PHYSICAL EXAM    General: NAD  Back: soft, incisions c/d/i, aquceal intact, no palpable collection appreciated, no skin changes or erythema. UCHE intact w/ thin serous output.     ___________________________________________________  LABS                        8.6    6.39  )-----------( 557      ( 01 Apr 2021 05:48 )             28.0     01 Apr 2021 05:48    138    |  100    |  6      ----------------------------<  105    4.2     |  31     |  0.65     Ca    9.3        01 Apr 2021 05:48    TPro  6.2    /  Alb  3.0    /  TBili  0.2    /  DBili  x      /  AST  17     /  ALT  9      /  AlkPhos  73     01 Apr 2021 05:48    PT/INR - ( 30 Mar 2021 15:47 )   PT: 14.5 sec;   INR: 1.22 ratio         PTT - ( 30 Mar 2021 15:47 )  PTT:36.9 sec  CAPILLARY BLOOD GLUCOSE      POCT Blood Glucose.: 131 mg/dL (31 Mar 2021 21:40)  POCT Blood Glucose.: 108 mg/dL (31 Mar 2021 16:39)  POCT Blood Glucose.: 145 mg/dL (31 Mar 2021 11:41)  POCT Blood Glucose.: 104 mg/dL (31 Mar 2021 08:02)          ___________________________________________________  MICRO  Recent Cultures:    ___________________________________________________  MEDICATIONS  (STANDING):  atorvastatin 20 milliGRAM(s) Oral at bedtime  dextrose 40% Gel 15 Gram(s) Oral once  dextrose 5%. 1000 milliLiter(s) (50 mL/Hr) IV Continuous <Continuous>  dextrose 5%. 1000 milliLiter(s) (100 mL/Hr) IV Continuous <Continuous>  dextrose 50% Injectable 25 Gram(s) IV Push once  dextrose 50% Injectable 12.5 Gram(s) IV Push once  dextrose 50% Injectable 25 Gram(s) IV Push once  fentaNYL   Patch  75 MICROgram(s)/Hr 1 Patch Transdermal every 72 hours  glucagon  Injectable 1 milliGRAM(s) IntraMuscular once  insulin lispro (ADMELOG) corrective regimen sliding scale   SubCutaneous three times a day before meals  insulin lispro (ADMELOG) corrective regimen sliding scale   SubCutaneous at bedtime  levothyroxine 200 MICROGram(s) Oral daily  melatonin 3 milliGRAM(s) Oral at bedtime  multivitamin 1 Tablet(s) Oral daily  pantoprazole    Tablet 40 milliGRAM(s) Oral before breakfast  PARoxetine 40 milliGRAM(s) Oral daily  polyethylene glycol 3350 17 Gram(s) Oral two times a day  senna 2 Tablet(s) Oral at bedtime  sodium chloride 0.9%. 1000 milliLiter(s) (75 mL/Hr) IV Continuous <Continuous>    MEDICATIONS  (PRN):  acetaminophen   Tablet .. 650 milliGRAM(s) Oral every 6 hours PRN Temp greater or equal to 38C (100.4F), Mild Pain (1 - 3)  bisacodyl Suppository 10 milliGRAM(s) Rectal daily PRN Constipation  cyclobenzaprine 5 milliGRAM(s) Oral three times a day PRN Muscle Spasm  diazepam    Tablet 5 milliGRAM(s) Oral every 8 hours PRN severe spasms  ondansetron    Tablet 4 milliGRAM(s) Oral every 8 hours PRN for nausea  oxyCODONE    IR 10 milliGRAM(s) Oral every 4 hours PRN Severe Pain (7 - 10)  oxyCODONE    IR 5 milliGRAM(s) Oral every 4 hours PRN Moderate Pain (4 - 6)

## 2021-04-01 NOTE — PHYSICAL THERAPY INITIAL EVALUATION ADULT - PRECAUTIONS/LIMITATIONS, REHAB EVAL
CONTINUED:  Pt presents to ED BIBEMS for neurosx admission c/o orthostatic dizziness x6days /c known CSF leak on MRI yesterday. Hospital course: UCHE drain in place, lay flat as much as possible. CONTINUED:  Pt presents to ED BIBEMS for neurosx admission c/o orthostatic dizziness x6days /c known CSF leak on MRI yesterday. Hospital course: UCHE drain in place, lay flat as much as possible./fall precautions

## 2021-04-01 NOTE — OCCUPATIONAL THERAPY INITIAL EVALUATION ADULT - STRENGTHENING, PT EVAL
GOAL: Pt will increase BUE/BLE strength to 5/5 to increase participation in functional tasks in 4 weeks

## 2021-04-01 NOTE — OCCUPATIONAL THERAPY INITIAL EVALUATION ADULT - PRECAUTIONS/LIMITATIONS, REHAB EVAL
CONT: CONTINUED:  Pt presents to ED BIBEMS for neurosx admission c/o orthostatic dizziness x6days /c known CSF leak on MRI yesterday. Hospital course: UCHE drain in place./fall precautions

## 2021-04-01 NOTE — PROGRESS NOTE ADULT - SUBJECTIVE AND OBJECTIVE BOX
Sharad Landaverde   Pager [phone]  Office [phone]      CC: Patient is a 61y old  Female who presents with a chief complaint of dizziness, poss. spinal fluid leak thoraco-lumbar (31 Mar 2021 10:49)      SUBJECTIVE / OVERNIGHT EVENTS:    MEDICATIONS  (STANDING):  atorvastatin 20 milliGRAM(s) Oral at bedtime  dextrose 40% Gel 15 Gram(s) Oral once  dextrose 5%. 1000 milliLiter(s) (50 mL/Hr) IV Continuous <Continuous>  dextrose 5%. 1000 milliLiter(s) (100 mL/Hr) IV Continuous <Continuous>  dextrose 50% Injectable 25 Gram(s) IV Push once  dextrose 50% Injectable 12.5 Gram(s) IV Push once  dextrose 50% Injectable 25 Gram(s) IV Push once  enoxaparin Injectable 40 milliGRAM(s) SubCutaneous at bedtime  fentaNYL   Patch  75 MICROgram(s)/Hr 1 Patch Transdermal every 72 hours  glucagon  Injectable 1 milliGRAM(s) IntraMuscular once  insulin lispro (ADMELOG) corrective regimen sliding scale   SubCutaneous three times a day before meals  insulin lispro (ADMELOG) corrective regimen sliding scale   SubCutaneous at bedtime  levothyroxine 200 MICROGram(s) Oral daily  melatonin 3 milliGRAM(s) Oral at bedtime  multivitamin 1 Tablet(s) Oral daily  pantoprazole    Tablet 40 milliGRAM(s) Oral before breakfast  PARoxetine 40 milliGRAM(s) Oral daily  polyethylene glycol 3350 17 Gram(s) Oral two times a day  senna 2 Tablet(s) Oral at bedtime    MEDICATIONS  (PRN):  acetaminophen   Tablet .. 650 milliGRAM(s) Oral every 6 hours PRN Temp greater or equal to 38C (100.4F), Mild Pain (1 - 3)  bisacodyl Suppository 10 milliGRAM(s) Rectal daily PRN Constipation  cyclobenzaprine 5 milliGRAM(s) Oral three times a day PRN Muscle Spasm  diazepam    Tablet 5 milliGRAM(s) Oral every 8 hours PRN severe spasms  ondansetron    Tablet 4 milliGRAM(s) Oral every 8 hours PRN for nausea  oxyCODONE    IR 10 milliGRAM(s) Oral every 4 hours PRN Severe Pain (7 - 10)  oxyCODONE    IR 5 milliGRAM(s) Oral every 4 hours PRN Moderate Pain (4 - 6)      Vital Signs Last 24 Hrs  T(C): 36.9 (01 Apr 2021 12:10), Max: 37.3 (31 Mar 2021 15:59)  T(F): 98.4 (01 Apr 2021 12:10), Max: 99.1 (31 Mar 2021 15:59)  HR: 77 (01 Apr 2021 12:10) (69 - 90)  BP: 108/65 (01 Apr 2021 12:10) (100/57 - 108/65)  BP(mean): --  RR: 19 (01 Apr 2021 12:10) (17 - 20)  SpO2: 96% (01 Apr 2021 12:10) (92% - 98%)  CAPILLARY BLOOD GLUCOSE      POCT Blood Glucose.: 81 mg/dL (01 Apr 2021 11:23)  POCT Blood Glucose.: 99 mg/dL (01 Apr 2021 07:54)  POCT Blood Glucose.: 131 mg/dL (31 Mar 2021 21:40)  POCT Blood Glucose.: 108 mg/dL (31 Mar 2021 16:39)    I&O's Summary    31 Mar 2021 07:01  -  01 Apr 2021 07:00  --------------------------------------------------------  IN: 1440 mL / OUT: 35 mL / NET: 1405 mL      tele:    PHYSICAL EXAM:    GENERAL: NAD   HEENT: EOMI, PERRL  PULM: Clear to auscultation bilaterally  CV: Regular rate and rhythm; nl S1, S2; No murmurs, rubs, or gallops  ABDOMEN: Soft, Nontender, Nondistended; Bowel sounds present  EXTREMITIES/MSK:  No edema, calf tenderness   PSYCH: AAOx3  NEUROLOGY: non-focal          LABS:                        8.6    6.39  )-----------( 557      ( 01 Apr 2021 05:48 )             28.0     04-01    138  |  100  |  6<L>  ----------------------------<  105<H>  4.2   |  31  |  0.65    Ca    9.3      01 Apr 2021 05:48    TPro  6.2  /  Alb  3.0<L>  /  TBili  0.2  /  DBili  x   /  AST  17  /  ALT  9<L>  /  AlkPhos  73  04-01    PT/INR - ( 01 Apr 2021 05:48 )   PT: 13.4 sec;   INR: 1.12 ratio         PTT - ( 01 Apr 2021 05:48 )  PTT:32.6 sec            RADIOLOGY & ADDITIONAL TESTS:    Imaging Personally Reviewed:    Consultant(s) Notes Reviewed:      Care Discussed with Consultants/Other Providers:   Sharad Landaverde   Pager [phone]  Office [phone]      CC: Patient is a 61y old  Female who presents with a chief complaint of dizziness, poss. spinal fluid leak thoraco-lumbar (31 Mar 2021 10:49)      SUBJECTIVE / OVERNIGHT EVENTS: feels better overall. no light-headedness. no f/c/r. no cp/pan[place].     MEDICATIONS  (STANDING):  atorvastatin 20 milliGRAM(s) Oral at bedtime  dextrose 40% Gel 15 Gram(s) Oral once  dextrose 5%. 1000 milliLiter(s) (50 mL/Hr) IV Continuous <Continuous>  dextrose 5%. 1000 milliLiter(s) (100 mL/Hr) IV Continuous <Continuous>  dextrose 50% Injectable 25 Gram(s) IV Push once  dextrose 50% Injectable 12.5 Gram(s) IV Push once  dextrose 50% Injectable 25 Gram(s) IV Push once  enoxaparin Injectable 40 milliGRAM(s) SubCutaneous at bedtime  fentaNYL   Patch  75 MICROgram(s)/Hr 1 Patch Transdermal every 72 hours  glucagon  Injectable 1 milliGRAM(s) IntraMuscular once  insulin lispro (ADMELOG) corrective regimen sliding scale   SubCutaneous three times a day before meals  insulin lispro (ADMELOG) corrective regimen sliding scale   SubCutaneous at bedtime  levothyroxine 200 MICROGram(s) Oral daily  melatonin 3 milliGRAM(s) Oral at bedtime  multivitamin 1 Tablet(s) Oral daily  pantoprazole    Tablet 40 milliGRAM(s) Oral before breakfast  PARoxetine 40 milliGRAM(s) Oral daily  polyethylene glycol 3350 17 Gram(s) Oral two times a day  senna 2 Tablet(s) Oral at bedtime    MEDICATIONS  (PRN):  acetaminophen   Tablet .. 650 milliGRAM(s) Oral every 6 hours PRN Temp greater or equal to 38C (100.4F), Mild Pain (1 - 3)  bisacodyl Suppository 10 milliGRAM(s) Rectal daily PRN Constipation  cyclobenzaprine 5 milliGRAM(s) Oral three times a day PRN Muscle Spasm  diazepam    Tablet 5 milliGRAM(s) Oral every 8 hours PRN severe spasms  ondansetron    Tablet 4 milliGRAM(s) Oral every 8 hours PRN for nausea  oxyCODONE    IR 10 milliGRAM(s) Oral every 4 hours PRN Severe Pain (7 - 10)  oxyCODONE    IR 5 milliGRAM(s) Oral every 4 hours PRN Moderate Pain (4 - 6)      Vital Signs Last 24 Hrs. 95% on RA, with good waveform, taken by me  T(C): 36.9 (01 Apr 2021 12:10), Max: 37.3 (31 Mar 2021 15:59)  T(F): 98.4 (01 Apr 2021 12:10), Max: 99.1 (31 Mar 2021 15:59)  HR: 77 (01 Apr 2021 12:10) (69 - 90)  BP: 108/65 (01 Apr 2021 12:10) (100/57 - 108/65)  BP(mean): --  RR: 19 (01 Apr 2021 12:10) (17 - 20)  SpO2: 96% (01 Apr 2021 12:10) (92% - 98%)  CAPILLARY BLOOD GLUCOSE      POCT Blood Glucose.: 81 mg/dL (01 Apr 2021 11:23)  POCT Blood Glucose.: 99 mg/dL (01 Apr 2021 07:54)  POCT Blood Glucose.: 131 mg/dL (31 Mar 2021 21:40)  POCT Blood Glucose.: 108 mg/dL (31 Mar 2021 16:39)    I&O's Summary    31 Mar 2021 07:01  -  01 Apr 2021 07:00  --------------------------------------------------------  IN: 1440 mL / OUT: 35 mL / NET: 1405 mL          PHYSICAL EXAM:    GENERAL: NAD   HEENT: EOMI, PERRL  PULM: Clear to auscultation bilaterally  CV: Regular rate and rhythm; nl S1, S2; No murmurs, rubs, or gallops  ABDOMEN: Soft, Nontender, Nondistended; Bowel sounds present  EXTREMITIES/MSK:  No edema, calf tenderness   PSYCH: AAOx3  NEUROLOGY: non-focal          LABS:                        8.6    6.39  )-----------( 557      ( 01 Apr 2021 05:48 )             28.0     04-01    138  |  100  |  6<L>  ----------------------------<  105<H>  4.2   |  31  |  0.65    Ca    9.3      01 Apr 2021 05:48    TPro  6.2  /  Alb  3.0<L>  /  TBili  0.2  /  DBili  x   /  AST  17  /  ALT  9<L>  /  AlkPhos  73  04-01    PT/INR - ( 01 Apr 2021 05:48 )   PT: 13.4 sec;   INR: 1.12 ratio         PTT - ( 01 Apr 2021 05:48 )  PTT:32.6 sec            RADIOLOGY & ADDITIONAL TESTS:    Imaging Personally Reviewed:    Consultant(s) Notes Reviewed:      Care Discussed with Consultants/Other Providers: neurosurg regard oxygen requirement

## 2021-04-01 NOTE — PROGRESS NOTE ADULT - ASSESSMENT
A/P:   60 y/o F s/p T8 - pelvis arthrodesis, L1-L3 anterior column release and closure 3/12/21, returned to ED with nausea and dizziness, imaging pending to rule out CSF leak    - UCHE drain in place draining, will continue and monitor output   - Primary management per neurosurgery, appreciate recs and care.       Plastic Surgery   [phone]

## 2021-04-01 NOTE — PHYSICAL THERAPY INITIAL EVALUATION ADULT - PERTINENT HX OF CURRENT PROBLEM, REHAB EVAL
Pt is a 60 y/o female admitted with CSF leak. PMH: esophageal spasms, dysphagia, gastritis (followed by GI had an endoscopy in October, chronic lumbar and cervical spine disc disease, hx of cervical spine fusion (2020), and lumbar fusion (2003), T8-pelvis arthodesis, L1-L3 anterior column release (3/12/2021).

## 2021-04-01 NOTE — PROGRESS NOTE ADULT - ASSESSMENT
61y old  Female h/o esophageal spasms, smoker, COPD who presents with a chief complaint of orthostatic dizziness. Concern for CSF leak vs seroma. Consulted for medical comanagement.

## 2021-04-01 NOTE — OCCUPATIONAL THERAPY INITIAL EVALUATION ADULT - PERTINENT HX OF CURRENT PROBLEM, REHAB EVAL
61F admitted with CSF leak. PMH: esophageal spasms, dysphagia, gastritis (followed by GI had an endoscopy in October, chronic lumbar and cervical spine disc disease, hx of cervical spine fusion (2020), and lumbar fusion (2003), T8-pelvis arthodesis, L1-L3 anterior column release (3/12/2021).

## 2021-04-01 NOTE — PROGRESS NOTE ADULT - ASSESSMENT
HPI:  Patient is a 61 year old female with recent T8-pelvis arthrodesis, and L1-L3 anterior column release on 3/12/2021 that was discharged home.  Now returns for dizziness/orthostasis.    PROCEDURE: s/p T8-pelvis arthrodesis, and L1-L3 anterior column release on 3/12/2021  POD#20    PLAN:  Neuro:   -q4 hour neuro checks  -oxycodone and fentanyl patch for pain control  -flexeril and valium for muscle spasms  -continue paxil  -continue kathy drain, monitor output  -out of bed with assistance  -pt/ot evals pending    Respiratory:   -keep o2 sat > 94%  -on room air    CV:  -keep sbp 100-140  -atorvastatin for lipid control    Endocrine:   -levothyroxine for hypothyroid  -HISS for glucose control  -keep fingersticks 100-180    Heme/Onc:    -lovenox and SCDs for DVT prophylaxis    Renal:   -ivf for orthostasis  -voiding    ID:   -afebrile    GI:   -regular diet  -senna, miralax, and dulcolax for bowel regimen  -protonix for gi prophylaxis      Spectra #[id number]   HPI:  Patient is a 61 year old female with recent T8-pelvis arthrodesis, and L1-L3 anterior column release on 3/12/2021 that was discharged home.  Now returns for dizziness/orthostasis.    PROCEDURE: s/p T8-pelvis arthrodesis, and L1-L3 anterior column release on 3/12/2021  POD#20    PLAN:  Neuro:   -q4 hour neuro checks  -oxycodone and fentanyl patch for pain control  -flexeril and valium for muscle spasms  -continue paxil  -continue kathy drain, monitor output  -out of bed with assistance  -pt/ot evals pending    Respiratory:   -keep o2 sat > 94%  -on room air    CV:  -keep sbp 100-140  -atorvastatin for lipid control    Endocrine:   -levothyroxine for hypothyroid  -HISS for glucose control  -keep fingersticks 100-180    Heme/Onc:    -lovenox and SCDs for DVT prophylaxis    Renal:   -iv lock  -voiding    ID:   -afebrile    GI:   -regular diet  -senna, miralax, and dulcolax for bowel regimen  -protonix for gi prophylaxis      Spectra #[id number]

## 2021-04-02 DIAGNOSIS — Z02.9 ENCOUNTER FOR ADMINISTRATIVE EXAMINATIONS, UNSPECIFIED: ICD-10-CM

## 2021-04-02 LAB
ALBUMIN SERPL ELPH-MCNC: 3.4 G/DL — SIGNIFICANT CHANGE UP (ref 3.3–5)
ALP SERPL-CCNC: 306 U/L — HIGH (ref 40–120)
ALT FLD-CCNC: 172 U/L — HIGH (ref 10–45)
AST SERPL-CCNC: 568 U/L — HIGH (ref 10–40)
BILIRUB DIRECT SERPL-MCNC: 0.1 MG/DL — SIGNIFICANT CHANGE UP (ref 0–0.2)
BILIRUB INDIRECT FLD-MCNC: 0.3 MG/DL — SIGNIFICANT CHANGE UP (ref 0.2–1)
BILIRUB SERPL-MCNC: 0.4 MG/DL — SIGNIFICANT CHANGE UP (ref 0.2–1.2)
GLUCOSE BLDC GLUCOMTR-MCNC: 105 MG/DL — HIGH (ref 70–99)
GLUCOSE BLDC GLUCOMTR-MCNC: 134 MG/DL — HIGH (ref 70–99)
GLUCOSE BLDC GLUCOMTR-MCNC: 84 MG/DL — SIGNIFICANT CHANGE UP (ref 70–99)
GLUCOSE BLDC GLUCOMTR-MCNC: 95 MG/DL — SIGNIFICANT CHANGE UP (ref 70–99)
PROT SERPL-MCNC: 6.7 G/DL — SIGNIFICANT CHANGE UP (ref 6–8.3)
TROPONIN T, HIGH SENSITIVITY RESULT: 32 NG/L — SIGNIFICANT CHANGE UP (ref 0–51)
TROPONIN T, HIGH SENSITIVITY RESULT: 36 NG/L — SIGNIFICANT CHANGE UP (ref 0–51)

## 2021-04-02 PROCEDURE — 93306 TTE W/DOPPLER COMPLETE: CPT | Mod: 26

## 2021-04-02 PROCEDURE — 93010 ELECTROCARDIOGRAM REPORT: CPT

## 2021-04-02 PROCEDURE — 99233 SBSQ HOSP IP/OBS HIGH 50: CPT

## 2021-04-02 RX ORDER — FAMOTIDINE 10 MG/ML
20 INJECTION INTRAVENOUS ONCE
Refills: 0 | Status: COMPLETED | OUTPATIENT
Start: 2021-04-02 | End: 2021-04-02

## 2021-04-02 RX ORDER — HYDROMORPHONE HYDROCHLORIDE 2 MG/ML
0.5 INJECTION INTRAMUSCULAR; INTRAVENOUS; SUBCUTANEOUS ONCE
Refills: 0 | Status: DISCONTINUED | OUTPATIENT
Start: 2021-04-02 | End: 2021-04-02

## 2021-04-02 RX ORDER — METOCLOPRAMIDE HCL 10 MG
10 TABLET ORAL ONCE
Refills: 0 | Status: COMPLETED | OUTPATIENT
Start: 2021-04-02 | End: 2021-04-02

## 2021-04-02 RX ORDER — ASPIRIN/CALCIUM CARB/MAGNESIUM 324 MG
162 TABLET ORAL ONCE
Refills: 0 | Status: COMPLETED | OUTPATIENT
Start: 2021-04-02 | End: 2021-04-02

## 2021-04-02 RX ADMIN — HYDROMORPHONE HYDROCHLORIDE 0.5 MILLIGRAM(S): 2 INJECTION INTRAMUSCULAR; INTRAVENOUS; SUBCUTANEOUS at 17:40

## 2021-04-02 RX ADMIN — Medication 40 MILLIGRAM(S): at 05:03

## 2021-04-02 RX ADMIN — FENTANYL CITRATE 1 PATCH: 50 INJECTION INTRAVENOUS at 07:00

## 2021-04-02 RX ADMIN — ONDANSETRON 4 MILLIGRAM(S): 8 TABLET, FILM COATED ORAL at 13:12

## 2021-04-02 RX ADMIN — Medication 1 TABLET(S): at 11:57

## 2021-04-02 RX ADMIN — Medication 162 MILLIGRAM(S): at 14:27

## 2021-04-02 RX ADMIN — FENTANYL CITRATE 1 PATCH: 50 INJECTION INTRAVENOUS at 21:20

## 2021-04-02 RX ADMIN — Medication 200 MICROGRAM(S): at 05:02

## 2021-04-02 RX ADMIN — FAMOTIDINE 20 MILLIGRAM(S): 10 INJECTION INTRAVENOUS at 16:45

## 2021-04-02 RX ADMIN — Medication 3 MILLIGRAM(S): at 21:28

## 2021-04-02 RX ADMIN — ATORVASTATIN CALCIUM 20 MILLIGRAM(S): 80 TABLET, FILM COATED ORAL at 21:29

## 2021-04-02 RX ADMIN — HYDROMORPHONE HYDROCHLORIDE 0.5 MILLIGRAM(S): 2 INJECTION INTRAMUSCULAR; INTRAVENOUS; SUBCUTANEOUS at 17:07

## 2021-04-02 RX ADMIN — PANTOPRAZOLE SODIUM 40 MILLIGRAM(S): 20 TABLET, DELAYED RELEASE ORAL at 05:02

## 2021-04-02 RX ADMIN — OXYCODONE HYDROCHLORIDE 10 MILLIGRAM(S): 5 TABLET ORAL at 03:58

## 2021-04-02 RX ADMIN — CYCLOBENZAPRINE HYDROCHLORIDE 5 MILLIGRAM(S): 10 TABLET, FILM COATED ORAL at 21:28

## 2021-04-02 RX ADMIN — Medication 10 MILLIGRAM(S): at 16:49

## 2021-04-02 RX ADMIN — OXYCODONE HYDROCHLORIDE 10 MILLIGRAM(S): 5 TABLET ORAL at 11:57

## 2021-04-02 RX ADMIN — OXYCODONE HYDROCHLORIDE 10 MILLIGRAM(S): 5 TABLET ORAL at 12:30

## 2021-04-02 NOTE — PROGRESS NOTE ADULT - SUBJECTIVE AND OBJECTIVE BOX
Sharad Landaverde   Pager [phone]  Office [phone]      CC: Patient is a 61y old  Female who presents with a chief complaint of dizziness, poss. spinal fluid leak thoraco-lumbar (31 Mar 2021 10:49)      SUBJECTIVE / OVERNIGHT EVENTS:    MEDICATIONS  (STANDING):  atorvastatin 20 milliGRAM(s) Oral at bedtime  dextrose 40% Gel 15 Gram(s) Oral once  dextrose 5%. 1000 milliLiter(s) (50 mL/Hr) IV Continuous <Continuous>  dextrose 5%. 1000 milliLiter(s) (100 mL/Hr) IV Continuous <Continuous>  dextrose 50% Injectable 25 Gram(s) IV Push once  dextrose 50% Injectable 12.5 Gram(s) IV Push once  dextrose 50% Injectable 25 Gram(s) IV Push once  enoxaparin Injectable 40 milliGRAM(s) SubCutaneous at bedtime  fentaNYL   Patch  75 MICROgram(s)/Hr 1 Patch Transdermal every 72 hours  glucagon  Injectable 1 milliGRAM(s) IntraMuscular once  insulin lispro (ADMELOG) corrective regimen sliding scale   SubCutaneous three times a day before meals  insulin lispro (ADMELOG) corrective regimen sliding scale   SubCutaneous at bedtime  levothyroxine 200 MICROGram(s) Oral daily  melatonin 3 milliGRAM(s) Oral at bedtime  multivitamin 1 Tablet(s) Oral daily  pantoprazole    Tablet 40 milliGRAM(s) Oral before breakfast  PARoxetine 40 milliGRAM(s) Oral daily  polyethylene glycol 3350 17 Gram(s) Oral two times a day  senna 2 Tablet(s) Oral at bedtime    MEDICATIONS  (PRN):  acetaminophen   Tablet .. 650 milliGRAM(s) Oral every 6 hours PRN Temp greater or equal to 38C (100.4F), Mild Pain (1 - 3)  bisacodyl Suppository 10 milliGRAM(s) Rectal daily PRN Constipation  cyclobenzaprine 5 milliGRAM(s) Oral three times a day PRN Muscle Spasm  diazepam    Tablet 5 milliGRAM(s) Oral every 8 hours PRN severe spasms  ondansetron    Tablet 4 milliGRAM(s) Oral every 8 hours PRN for nausea  oxyCODONE    IR 10 milliGRAM(s) Oral every 4 hours PRN Severe Pain (7 - 10)  oxyCODONE    IR 5 milliGRAM(s) Oral every 4 hours PRN Moderate Pain (4 - 6)      Vital Signs Last 24 Hrs  T(C): 36.3 (02 Apr 2021 08:22), Max: 37.5 (01 Apr 2021 20:02)  T(F): 97.4 (02 Apr 2021 08:22), Max: 99.5 (01 Apr 2021 20:02)  HR: 70 (02 Apr 2021 08:22) (65 - 81)  BP: 100/55 (02 Apr 2021 08:22) (92/49 - 108/65)  BP(mean): --  RR: 17 (02 Apr 2021 08:22) (16 - 19)  SpO2: 97% (02 Apr 2021 08:22) (95% - 97%)  CAPILLARY BLOOD GLUCOSE      POCT Blood Glucose.: 105 mg/dL (02 Apr 2021 08:21)  POCT Blood Glucose.: 116 mg/dL (01 Apr 2021 22:16)  POCT Blood Glucose.: 125 mg/dL (01 Apr 2021 18:42)  POCT Blood Glucose.: 81 mg/dL (01 Apr 2021 11:23)    I&O's Summary    01 Apr 2021 07:01  -  02 Apr 2021 07:00  --------------------------------------------------------  IN: 960 mL / OUT: 230 mL / NET: 730 mL    02 Apr 2021 07:01  -  02 Apr 2021 10:11  --------------------------------------------------------  IN: 600 mL / OUT: 0 mL / NET: 600 mL      tele:    PHYSICAL EXAM:    GENERAL: NAD   HEENT: EOMI, PERRL  PULM: Clear to auscultation bilaterally  CV: Regular rate and rhythm; nl S1, S2; No murmurs, rubs, or gallops  ABDOMEN: Soft, Nontender, Nondistended; Bowel sounds present  EXTREMITIES/MSK:  No edema, calf tenderness   PSYCH: AAOx3  NEUROLOGY: non-focal          LABS:                        8.6    6.39  )-----------( 557      ( 01 Apr 2021 05:48 )             28.0     04-01    138  |  100  |  6<L>  ----------------------------<  105<H>  4.2   |  31  |  0.65    Ca    9.3      01 Apr 2021 05:48    TPro  6.2  /  Alb  3.0<L>  /  TBili  0.2  /  DBili  x   /  AST  17  /  ALT  9<L>  /  AlkPhos  73  04-01    PT/INR - ( 01 Apr 2021 05:48 )   PT: 13.4 sec;   INR: 1.12 ratio         PTT - ( 01 Apr 2021 05:48 )  PTT:32.6 sec            RADIOLOGY & ADDITIONAL TESTS:    Imaging Personally Reviewed:    Consultant(s) Notes Reviewed:      Care Discussed with Consultants/Other Providers:   Sharad Landaverde   Pager [phone]  Office [phone]      CC: Patient is a 61y old  Female who presents with a chief complaint of dizziness, poss. spinal fluid leak thoraco-lumbar (31 Mar 2021 10:49)      SUBJECTIVE / OVERNIGHT EVENTS: feels good. no light-headedness. no cp. no paniagua. no n/v/d/abd pain. no dysuria    MEDICATIONS  (STANDING):  atorvastatin 20 milliGRAM(s) Oral at bedtime  dextrose 40% Gel 15 Gram(s) Oral once  dextrose 5%. 1000 milliLiter(s) (50 mL/Hr) IV Continuous <Continuous>  dextrose 5%. 1000 milliLiter(s) (100 mL/Hr) IV Continuous <Continuous>  dextrose 50% Injectable 25 Gram(s) IV Push once  dextrose 50% Injectable 12.5 Gram(s) IV Push once  dextrose 50% Injectable 25 Gram(s) IV Push once  enoxaparin Injectable 40 milliGRAM(s) SubCutaneous at bedtime  fentaNYL   Patch  75 MICROgram(s)/Hr 1 Patch Transdermal every 72 hours  glucagon  Injectable 1 milliGRAM(s) IntraMuscular once  insulin lispro (ADMELOG) corrective regimen sliding scale   SubCutaneous three times a day before meals  insulin lispro (ADMELOG) corrective regimen sliding scale   SubCutaneous at bedtime  levothyroxine 200 MICROGram(s) Oral daily  melatonin 3 milliGRAM(s) Oral at bedtime  multivitamin 1 Tablet(s) Oral daily  pantoprazole    Tablet 40 milliGRAM(s) Oral before breakfast  PARoxetine 40 milliGRAM(s) Oral daily  polyethylene glycol 3350 17 Gram(s) Oral two times a day  senna 2 Tablet(s) Oral at bedtime    MEDICATIONS  (PRN):  acetaminophen   Tablet .. 650 milliGRAM(s) Oral every 6 hours PRN Temp greater or equal to 38C (100.4F), Mild Pain (1 - 3)  bisacodyl Suppository 10 milliGRAM(s) Rectal daily PRN Constipation  cyclobenzaprine 5 milliGRAM(s) Oral three times a day PRN Muscle Spasm  diazepam    Tablet 5 milliGRAM(s) Oral every 8 hours PRN severe spasms  ondansetron    Tablet 4 milliGRAM(s) Oral every 8 hours PRN for nausea  oxyCODONE    IR 10 milliGRAM(s) Oral every 4 hours PRN Severe Pain (7 - 10)  oxyCODONE    IR 5 milliGRAM(s) Oral every 4 hours PRN Moderate Pain (4 - 6)      Vital Signs Last 24 Hrs  T(C): 36.3 (02 Apr 2021 08:22), Max: 37.5 (01 Apr 2021 20:02)  T(F): 97.4 (02 Apr 2021 08:22), Max: 99.5 (01 Apr 2021 20:02)  HR: 70 (02 Apr 2021 08:22) (65 - 81)  BP: 100/55 (02 Apr 2021 08:22) (92/49 - 108/65)  BP(mean): --  RR: 17 (02 Apr 2021 08:22) (16 - 19)  SpO2: 97% (02 Apr 2021 08:22) (95% - 97%)  CAPILLARY BLOOD GLUCOSE      POCT Blood Glucose.: 105 mg/dL (02 Apr 2021 08:21)  POCT Blood Glucose.: 116 mg/dL (01 Apr 2021 22:16)  POCT Blood Glucose.: 125 mg/dL (01 Apr 2021 18:42)  POCT Blood Glucose.: 81 mg/dL (01 Apr 2021 11:23)    I&O's Summary    01 Apr 2021 07:01  -  02 Apr 2021 07:00  --------------------------------------------------------  IN: 960 mL / OUT: 230 mL / NET: 730 mL    02 Apr 2021 07:01  -  02 Apr 2021 10:11  --------------------------------------------------------  IN: 600 mL / OUT: 0 mL / NET: 600 mL          PHYSICAL EXAM:    GENERAL: NAD   HEENT: EOMI, PERRL  PULM: Clear to auscultation bilaterally  CV: Regular rate and rhythm; nl S1, S2; No murmurs, rubs, or gallops  ABDOMEN: Soft, Nontender, Nondistended; Bowel sounds present  EXTREMITIES/MSK:  No edema, calf tenderness   PSYCH: AAOx3  NEUROLOGY: non-focal          LABS:                        8.6    6.39  )-----------( 557      ( 01 Apr 2021 05:48 )             28.0     04-01    138  |  100  |  6<L>  ----------------------------<  105<H>  4.2   |  31  |  0.65    Ca    9.3      01 Apr 2021 05:48    TPro  6.2  /  Alb  3.0<L>  /  TBili  0.2  /  DBili  x   /  AST  17  /  ALT  9<L>  /  AlkPhos  73  04-01    PT/INR - ( 01 Apr 2021 05:48 )   PT: 13.4 sec;   INR: 1.12 ratio         PTT - ( 01 Apr 2021 05:48 )  PTT:32.6 sec            RADIOLOGY & ADDITIONAL TESTS:    Imaging Personally Reviewed:    Consultant(s) Notes Reviewed:      Care Discussed with Consultants/Other Providers: neurosurg

## 2021-04-02 NOTE — PROGRESS NOTE ADULT - SUBJECTIVE AND OBJECTIVE BOX
Plastic Surgery Progress Note (pg [facility]: [id number], [facility]: [phone])    SUBJECTIVE  The patient was seen and examined. No acute events overnight.    OBJECTIVE  ___________________________________________________  VITAL SIGNS / I&O's   Vital Signs Last 24 Hrs  T(C): 36.9 (02 Apr 2021 04:46), Max: 37.5 (01 Apr 2021 20:02)  T(F): 98.4 (02 Apr 2021 04:46), Max: 99.5 (01 Apr 2021 20:02)  HR: 69 (02 Apr 2021 04:46) (65 - 88)  BP: 97/57 (02 Apr 2021 04:46) (92/49 - 108/65)  BP(mean): --  RR: 18 (02 Apr 2021 04:46) (16 - 19)  SpO2: 97% (02 Apr 2021 04:46) (95% - 97%)      01 Apr 2021 07:01  -  02 Apr 2021 07:00  --------------------------------------------------------  IN:    Oral Fluid: 960 mL  Total IN: 960 mL    OUT:    Bulb (mL): 30 mL    Voided (mL): 200 mL  Total OUT: 230 mL    Total NET: 730 mL        ___________________________________________________  PHYSICAL EXAM    General: NAD  Back: soft, incisions c/d/i, aquceal intact, no palpable collection appreciated, no skin changes or erythema. UCHE intact w/ thin serous output.       ___________________________________________________  LABS                        8.6    6.39  )-----------( 557      ( 01 Apr 2021 05:48 )             28.0     01 Apr 2021 05:48    138    |  100    |  6      ----------------------------<  105    4.2     |  31     |  0.65     Ca    9.3        01 Apr 2021 05:48    TPro  6.2    /  Alb  3.0    /  TBili  0.2    /  DBili  x      /  AST  17     /  ALT  9      /  AlkPhos  73     01 Apr 2021 05:48    PT/INR - ( 01 Apr 2021 05:48 )   PT: 13.4 sec;   INR: 1.12 ratio         PTT - ( 01 Apr 2021 05:48 )  PTT:32.6 sec  CAPILLARY BLOOD GLUCOSE      POCT Blood Glucose.: 116 mg/dL (01 Apr 2021 22:16)  POCT Blood Glucose.: 125 mg/dL (01 Apr 2021 18:42)  POCT Blood Glucose.: 81 mg/dL (01 Apr 2021 11:23)  POCT Blood Glucose.: 99 mg/dL (01 Apr 2021 07:54)          ___________________________________________________  MICRO  Recent Cultures:    ___________________________________________________  MEDICATIONS  (STANDING):  atorvastatin 20 milliGRAM(s) Oral at bedtime  dextrose 40% Gel 15 Gram(s) Oral once  dextrose 5%. 1000 milliLiter(s) (50 mL/Hr) IV Continuous <Continuous>  dextrose 5%. 1000 milliLiter(s) (100 mL/Hr) IV Continuous <Continuous>  dextrose 50% Injectable 25 Gram(s) IV Push once  dextrose 50% Injectable 12.5 Gram(s) IV Push once  dextrose 50% Injectable 25 Gram(s) IV Push once  enoxaparin Injectable 40 milliGRAM(s) SubCutaneous at bedtime  fentaNYL   Patch  75 MICROgram(s)/Hr 1 Patch Transdermal every 72 hours  glucagon  Injectable 1 milliGRAM(s) IntraMuscular once  insulin lispro (ADMELOG) corrective regimen sliding scale   SubCutaneous three times a day before meals  insulin lispro (ADMELOG) corrective regimen sliding scale   SubCutaneous at bedtime  levothyroxine 200 MICROGram(s) Oral daily  melatonin 3 milliGRAM(s) Oral at bedtime  multivitamin 1 Tablet(s) Oral daily  pantoprazole    Tablet 40 milliGRAM(s) Oral before breakfast  PARoxetine 40 milliGRAM(s) Oral daily  polyethylene glycol 3350 17 Gram(s) Oral two times a day  senna 2 Tablet(s) Oral at bedtime    MEDICATIONS  (PRN):  acetaminophen   Tablet .. 650 milliGRAM(s) Oral every 6 hours PRN Temp greater or equal to 38C (100.4F), Mild Pain (1 - 3)  bisacodyl Suppository 10 milliGRAM(s) Rectal daily PRN Constipation  cyclobenzaprine 5 milliGRAM(s) Oral three times a day PRN Muscle Spasm  diazepam    Tablet 5 milliGRAM(s) Oral every 8 hours PRN severe spasms  ondansetron    Tablet 4 milliGRAM(s) Oral every 8 hours PRN for nausea  oxyCODONE    IR 10 milliGRAM(s) Oral every 4 hours PRN Severe Pain (7 - 10)  oxyCODONE    IR 5 milliGRAM(s) Oral every 4 hours PRN Moderate Pain (4 - 6)

## 2021-04-02 NOTE — CONSULT NOTE ADULT - SUBJECTIVE AND OBJECTIVE BOX
CHIEF COMPLAINT:Patient is a 61y old  Female who presents with a chief complaint of dizziness, poss. spinal fluid leak thoraco-lumbar (31 Mar 2021 10:49)      HISTORY OF PRESENT ILLNESS:    61 female with history as below recent spine surgery   cardiology is called for abn EKG  pt denies any chest pain, sob, palpitation, dizziness or syncope.  has nausea and ruq pain     PAST MEDICAL & SURGICAL HISTORY:  Hypothyroidism    Cervical herniated disc    Spondylolisthesis, cervical region    Osteoarthritis    H/O gastritis  - on pantoprazole    History of dysphagia  resolved after surgery in 2019    Dyskinesia of esophagus    History of chronic pain  on meds for 16 yrs    Depression    Achilles tendon tear  bilateral    Degenerative tear of acetabular labrum of left hip    Fibromyalgia    Spondylogenic compression of cervical spinal cord    S/P total knee replacement, left    S/P hip replacement, right    S/P laminectomy with spinal fusion  lumbar spine    S/P arthroscopy of left knee  x 4    S/P cervical spinal fusion  cage 2019            MEDICATIONS:  enoxaparin Injectable 40 milliGRAM(s) SubCutaneous at bedtime        acetaminophen   Tablet .. 650 milliGRAM(s) Oral every 6 hours PRN  cyclobenzaprine 5 milliGRAM(s) Oral three times a day PRN  diazepam    Tablet 5 milliGRAM(s) Oral every 8 hours PRN  fentaNYL   Patch  75 MICROgram(s)/Hr 1 Patch Transdermal every 72 hours  melatonin 3 milliGRAM(s) Oral at bedtime  metoclopramide Injectable 10 milliGRAM(s) IV Push once  ondansetron    Tablet 4 milliGRAM(s) Oral every 8 hours PRN  oxyCODONE    IR 10 milliGRAM(s) Oral every 4 hours PRN  oxyCODONE    IR 5 milliGRAM(s) Oral every 4 hours PRN  PARoxetine 40 milliGRAM(s) Oral daily    bisacodyl Suppository 10 milliGRAM(s) Rectal daily PRN  famotidine Injectable 20 milliGRAM(s) IV Push once  pantoprazole    Tablet 40 milliGRAM(s) Oral before breakfast  polyethylene glycol 3350 17 Gram(s) Oral two times a day  senna 2 Tablet(s) Oral at bedtime    atorvastatin 20 milliGRAM(s) Oral at bedtime  dextrose 40% Gel 15 Gram(s) Oral once  dextrose 50% Injectable 25 Gram(s) IV Push once  dextrose 50% Injectable 12.5 Gram(s) IV Push once  dextrose 50% Injectable 25 Gram(s) IV Push once  glucagon  Injectable 1 milliGRAM(s) IntraMuscular once  insulin lispro (ADMELOG) corrective regimen sliding scale   SubCutaneous three times a day before meals  insulin lispro (ADMELOG) corrective regimen sliding scale   SubCutaneous at bedtime  levothyroxine 200 MICROGram(s) Oral daily    dextrose 5%. 1000 milliLiter(s) IV Continuous <Continuous>  dextrose 5%. 1000 milliLiter(s) IV Continuous <Continuous>  multivitamin 1 Tablet(s) Oral daily      FAMILY HISTORY:  Family history of DVT  mother / brother        Non-contributory    SOCIAL HISTORY:    No tobacco, drugs or etoh    Allergies    No Known Allergies    Intolerances    	    REVIEW OF SYSTEMS:  as above  The rest of the 14 points ROS reviewed and except above they are unremarkable.        PHYSICAL EXAM:  T(C): 36.4 (04-02-21 @ 13:30), Max: 37.5 (04-01-21 @ 20:02)  HR: 77 (04-02-21 @ 13:30) (65 - 89)  BP: 134/76 (04-02-21 @ 13:30) (92/49 - 134/76)  RR: 18 (04-02-21 @ 13:30) (16 - 18)  SpO2: 95% (04-02-21 @ 13:30) (94% - 97%)  Wt(kg): --  I&O's Summary    01 Apr 2021 07:01  -  02 Apr 2021 07:00  --------------------------------------------------------  IN: 960 mL / OUT: 230 mL / NET: 730 mL    02 Apr 2021 07:01  -  02 Apr 2021 16:43  --------------------------------------------------------  IN: 960 mL / OUT: 0 mL / NET: 960 mL        JVP: Normal  Neck: supple  Lung: clear   CV: S1 S2 , Murmur:  Abd: soft  Ext: No edema  neuro: Awake / alert  Psych: flat affect  Skin: normal    LABS/DATA:    TELEMETRY: 	    ECG:  	sinus , T wave abn    	  CARDIAC MARKERS:                        36 <<== 04-02-21 @ 13:56                              8.6    6.39  )-----------( 557      ( 01 Apr 2021 05:48 )             28.0     04-01    138  |  100  |  6<L>  ----------------------------<  105<H>  4.2   |  31  |  0.65    Ca    9.3      01 Apr 2021 05:48    TPro  6.2  /  Alb  3.0<L>  /  TBili  0.2  /  DBili  x   /  AST  17  /  ALT  9<L>  /  AlkPhos  73  04-01    proBNP:   Lipid Profile:   HgA1c:   TSH:

## 2021-04-02 NOTE — PROVIDER CONTACT NOTE (OTHER) - ASSESSMENT
134/76 HR 77, 95% 36.4. Pt complaining of R Rib pain below the breast, nausea, burping, sweating. complaining she needs to poop.

## 2021-04-02 NOTE — PROGRESS NOTE ADULT - ASSESSMENT
HPI:  Patient is a 61 year old female with recent T8-pelvis arthrodesis, and L1-L3 anterior column release on 3/12/2021 that was discharged home.  Now returns for dizziness/orthostasis.    PROCEDURE: s/p T8-pelvis arthrodesis, and L1-L3 anterior column release on 3/12/2021  POD#21    PLAN:  Neuro:   -q4 hour neuro checks  -oxycodone and fentanyl patch for pain control  -flexeril and valium for muscle spasms  -continue paxil  -continue kathy drain, monitor output  -out of bed with assistance  -pt/ot - acute rehab upon discharge    Respiratory:   -keep o2 sat > 94%  -on room air    CV:  -keep sbp 100-140  -atorvastatin for lipid control    Endocrine:   -levothyroxine for hypothyroid  -HISS for glucose control  -keep fingersticks 100-180    Heme/Onc:    -lovenox and SCDs for DVT prophylaxis    Renal:   -iv locked  -voiding    ID:   -afebrile    GI:   -regular diet  -senna, miralax, and dulcolax for bowel regimen  -protonix for gi prophylaxis      Spectra #[id number]

## 2021-04-02 NOTE — PROGRESS NOTE ADULT - SUBJECTIVE AND OBJECTIVE BOX
HPI:  61y F pmhx esophageal spasms, dysphagia, gastritis (followed by GI, had an endoscopy in October, no interventions performed, takes pantoprazole), chronic lumbar and cervical spine disc disease, S/P cervical spinal fusion in 2020, S/P lumbar fusion 2003, s/p T8-pelvis arthrodesis, L1-3 anterior column release 3/12/21 Dr. Acevedo, presents to ED [facility] admission c/o of orthostatic dizziness x 6 days w/ known CSF leak on MRI yesterday. Pt reports severe dizziness when sitting up or standing from a supine position w/ intermittent nausea. Reports near syncope 3 days ago. Denies f/c, extremity numbness/weakness, urinary incontinence or retention. Last BM 3 days ago, passing flatus.  Pt presented to ED with Nimbus Discovery imaging discs (30 Mar 2021 16:18)    OVERNIGHT EVENTS:  Vital Signs Last 24 Hrs  T(C): 36.3 (02 Apr 2021 08:22), Max: 37.5 (01 Apr 2021 20:02)  T(F): 97.4 (02 Apr 2021 08:22), Max: 99.5 (01 Apr 2021 20:02)  HR: 70 (02 Apr 2021 08:22) (65 - 81)  BP: 100/55 (02 Apr 2021 08:22) (92/49 - 108/65)  BP(mean): --  RR: 17 (02 Apr 2021 08:22) (16 - 19)  SpO2: 97% (02 Apr 2021 08:22) (95% - 97%)    I&O's Detail    01 Apr 2021 07:01  -  02 Apr 2021 07:00  --------------------------------------------------------  IN:    Oral Fluid: 960 mL  Total IN: 960 mL    OUT:    Bulb (mL): 30 mL    Voided (mL): 200 mL  Total OUT: 230 mL    Total NET: 730 mL      02 Apr 2021 07:01  -  02 Apr 2021 10:42  --------------------------------------------------------  IN:    Oral Fluid: 600 mL  Total IN: 600 mL    OUT:  Total OUT: 0 mL    Total NET: 600 mL        I&O's Summary    01 Apr 2021 07:01  -  02 Apr 2021 07:00  --------------------------------------------------------  IN: 960 mL / OUT: 230 mL / NET: 730 mL    02 Apr 2021 07:01  -  02 Apr 2021 10:42  --------------------------------------------------------  IN: 600 mL / OUT: 0 mL / NET: 600 mL        PHYSICAL EXAM:  Neurological: awake, alert, oriented x3, follows commands, speech clear and fluent, moves all extremities x4 w/ 5/5 strength throughout, sensation present, intact, equal throughout    Cardiovascular: +s1, s2  Respiratory: clear to auscultation b/l  Gastrointestinal: soft, non-distended, non-tender  Genitourinary: +voiding  Incision/Wound: posterior midline vertical incision dressing c/d/i w/ aquacel, right flank incision dressing c/d/i, w/ aquacel, no collections, kathy x1    TUBES/LINES:  [x] kathy x1 (30cc serosanguinous output over 24 hours)    DIET:  [x] regular        LABS:                        8.6    6.39  )-----------( 557      ( 01 Apr 2021 05:48 )             28.0     04-01    138  |  100  |  6<L>  ----------------------------<  105<H>  4.2   |  31  |  0.65    Ca    9.3      01 Apr 2021 05:48    TPro  6.2  /  Alb  3.0<L>  /  TBili  0.2  /  DBili  x   /  AST  17  /  ALT  9<L>  /  AlkPhos  73  04-01    PT/INR - ( 01 Apr 2021 05:48 )   PT: 13.4 sec;   INR: 1.12 ratio         PTT - ( 01 Apr 2021 05:48 )  PTT:32.6 sec        CAPILLARY BLOOD GLUCOSE      POCT Blood Glucose.: 105 mg/dL (02 Apr 2021 08:21)  POCT Blood Glucose.: 116 mg/dL (01 Apr 2021 22:16)  POCT Blood Glucose.: 125 mg/dL (01 Apr 2021 18:42)  POCT Blood Glucose.: 81 mg/dL (01 Apr 2021 11:23)          Allergies    No Known Allergies        MEDICATIONS:  Antibiotics:  none    Neuro:  acetaminophen   Tablet .. 650 milliGRAM(s) Oral every 6 hours PRN  cyclobenzaprine 5 milliGRAM(s) Oral three times a day PRN  diazepam    Tablet 5 milliGRAM(s) Oral every 8 hours PRN  fentaNYL   Patch  75 MICROgram(s)/Hr 1 Patch Transdermal every 72 hours  melatonin 3 milliGRAM(s) Oral at bedtime  ondansetron    Tablet 4 milliGRAM(s) Oral every 8 hours PRN  oxyCODONE    IR 10 milliGRAM(s) Oral every 4 hours PRN  oxyCODONE    IR 5 milliGRAM(s) Oral every 4 hours PRN  PARoxetine 40 milliGRAM(s) Oral daily    Anticoagulation:  enoxaparin Injectable 40 milliGRAM(s) SubCutaneous at bedtime    OTHER:  atorvastatin 20 milliGRAM(s) Oral at bedtime  bisacodyl Suppository 10 milliGRAM(s) Rectal daily PRN  dextrose 40% Gel 15 Gram(s) Oral once  dextrose 50% Injectable 25 Gram(s) IV Push once  dextrose 50% Injectable 12.5 Gram(s) IV Push once  dextrose 50% Injectable 25 Gram(s) IV Push once  glucagon  Injectable 1 milliGRAM(s) IntraMuscular once  insulin lispro (ADMELOG) corrective regimen sliding scale   SubCutaneous three times a day before meals  insulin lispro (ADMELOG) corrective regimen sliding scale   SubCutaneous at bedtime  levothyroxine 200 MICROGram(s) Oral daily  pantoprazole    Tablet 40 milliGRAM(s) Oral before breakfast  polyethylene glycol 3350 17 Gram(s) Oral two times a day  senna 2 Tablet(s) Oral at bedtime    IVF:  dextrose 5%. 1000 milliLiter(s) IV Continuous <Continuous>  dextrose 5%. 1000 milliLiter(s) IV Continuous <Continuous>  multivitamin 1 Tablet(s) Oral daily    CULTURES:  none    RADIOLOGY & ADDITIONAL TESTS:  no new imaging     HPI:  Patient is a 61 year old female with recent T8-pelvis arthrodesis, and L1-L3 anterior column release on 3/12/2021 that was discharged home.  Now returns for dizziness/orthostasis.    OVERNIGHT EVENTS: No acute events overnight.  Denies headaches, dizziness, lightheadedness, or any other new symptoms.  Tolerating diet.  Has been out of bed.    Vital Signs Last 24 Hrs  T(C): 36.3 (02 Apr 2021 08:22), Max: 37.5 (01 Apr 2021 20:02)  T(F): 97.4 (02 Apr 2021 08:22), Max: 99.5 (01 Apr 2021 20:02)  HR: 70 (02 Apr 2021 08:22) (65 - 81)  BP: 100/55 (02 Apr 2021 08:22) (92/49 - 108/65)  BP(mean): --  RR: 17 (02 Apr 2021 08:22) (16 - 19)  SpO2: 97% (02 Apr 2021 08:22) (95% - 97%)    I&O's Detail    01 Apr 2021 07:01  -  02 Apr 2021 07:00  --------------------------------------------------------  IN:    Oral Fluid: 960 mL  Total IN: 960 mL    OUT:    Bulb (mL): 30 mL    Voided (mL): 200 mL  Total OUT: 230 mL    Total NET: 730 mL      02 Apr 2021 07:01  -  02 Apr 2021 10:42  --------------------------------------------------------  IN:    Oral Fluid: 600 mL  Total IN: 600 mL    OUT:  Total OUT: 0 mL    Total NET: 600 mL        I&O's Summary    01 Apr 2021 07:01  -  02 Apr 2021 07:00  --------------------------------------------------------  IN: 960 mL / OUT: 230 mL / NET: 730 mL    02 Apr 2021 07:01  -  02 Apr 2021 10:42  --------------------------------------------------------  IN: 600 mL / OUT: 0 mL / NET: 600 mL        PHYSICAL EXAM:  Neurological: awake, alert, oriented x3, follows commands, speech clear and fluent, moves all extremities x4 w/ 5/5 strength throughout, sensation present, intact, equal throughout    Cardiovascular: +s1, s2  Respiratory: clear to auscultation b/l  Gastrointestinal: soft, non-distended, non-tender  Genitourinary: +voiding  Incision/Wound: posterior midline vertical incision dressing c/d/i w/ aquacel, right flank incision dressing c/d/i, w/ aquacel, no collections, kathy x1    TUBES/LINES:  [x] kathy x1 (30cc serosanguinous output over 24 hours)    DIET:  [x] regular        LABS:                        8.6    6.39  )-----------( 557      ( 01 Apr 2021 05:48 )             28.0     04-01    138  |  100  |  6<L>  ----------------------------<  105<H>  4.2   |  31  |  0.65    Ca    9.3      01 Apr 2021 05:48    TPro  6.2  /  Alb  3.0<L>  /  TBili  0.2  /  DBili  x   /  AST  17  /  ALT  9<L>  /  AlkPhos  73  04-01    PT/INR - ( 01 Apr 2021 05:48 )   PT: 13.4 sec;   INR: 1.12 ratio         PTT - ( 01 Apr 2021 05:48 )  PTT:32.6 sec        CAPILLARY BLOOD GLUCOSE      POCT Blood Glucose.: 105 mg/dL (02 Apr 2021 08:21)  POCT Blood Glucose.: 116 mg/dL (01 Apr 2021 22:16)  POCT Blood Glucose.: 125 mg/dL (01 Apr 2021 18:42)  POCT Blood Glucose.: 81 mg/dL (01 Apr 2021 11:23)          Allergies    No Known Allergies        MEDICATIONS:  Antibiotics:  none    Neuro:  acetaminophen   Tablet .. 650 milliGRAM(s) Oral every 6 hours PRN  cyclobenzaprine 5 milliGRAM(s) Oral three times a day PRN  diazepam    Tablet 5 milliGRAM(s) Oral every 8 hours PRN  fentaNYL   Patch  75 MICROgram(s)/Hr 1 Patch Transdermal every 72 hours  melatonin 3 milliGRAM(s) Oral at bedtime  ondansetron    Tablet 4 milliGRAM(s) Oral every 8 hours PRN  oxyCODONE    IR 10 milliGRAM(s) Oral every 4 hours PRN  oxyCODONE    IR 5 milliGRAM(s) Oral every 4 hours PRN  PARoxetine 40 milliGRAM(s) Oral daily    Anticoagulation:  enoxaparin Injectable 40 milliGRAM(s) SubCutaneous at bedtime    OTHER:  atorvastatin 20 milliGRAM(s) Oral at bedtime  bisacodyl Suppository 10 milliGRAM(s) Rectal daily PRN  dextrose 40% Gel 15 Gram(s) Oral once  dextrose 50% Injectable 25 Gram(s) IV Push once  dextrose 50% Injectable 12.5 Gram(s) IV Push once  dextrose 50% Injectable 25 Gram(s) IV Push once  glucagon  Injectable 1 milliGRAM(s) IntraMuscular once  insulin lispro (ADMELOG) corrective regimen sliding scale   SubCutaneous three times a day before meals  insulin lispro (ADMELOG) corrective regimen sliding scale   SubCutaneous at bedtime  levothyroxine 200 MICROGram(s) Oral daily  pantoprazole    Tablet 40 milliGRAM(s) Oral before breakfast  polyethylene glycol 3350 17 Gram(s) Oral two times a day  senna 2 Tablet(s) Oral at bedtime    IVF:  dextrose 5%. 1000 milliLiter(s) IV Continuous <Continuous>  dextrose 5%. 1000 milliLiter(s) IV Continuous <Continuous>  multivitamin 1 Tablet(s) Oral daily    CULTURES:  none    RADIOLOGY & ADDITIONAL TESTS:  no new imaging

## 2021-04-02 NOTE — CHART NOTE - NSCHARTNOTEFT_GEN_A_CORE
called to see pt for chest pain. started at 13:30, under right breast, while in bed, 6/10, pressure-like, improving now. assoc c nausea. no sob. not pleuritic. PE: vitals stable, PHYSICAL unchanged    ekg X 2: nsr c no isch changes. pt did have flipped T waves in v3-v6 2 weeks ago    rec: chewable asa 162 x 1 now. repeat CE in 3 hrs. tele. cards eval. if pain becomes pleuritic or pt becomes hypoxic or tachycardic would also r/o PE with CTA

## 2021-04-02 NOTE — CONSULT NOTE ADULT - ASSESSMENT
Abn EKG  anterior T wave abn   now normalized  agree with assessment  obtain echo , stress test     nausea  has ruq abd pain   obtain ruq sono rule out acute hugo     DM II  Monitor finger stick. Insulin coverage. Diabetic education and Diabetic diet. Consider nutrition consultation.    DVT proph  on lovenox

## 2021-04-02 NOTE — PROGRESS NOTE ADULT - ASSESSMENT
A/P:   60 y/o F s/p T8 - pelvis arthrodesis, L1-L3 anterior column release and closure 3/12/21, returned to ED with nausea and dizziness, imaging pending to rule out CSF leak    - UCHE drain in place draining, will continue and monitor output   - Primary management per neurosurgery, appreciate recs and care.       Plastic Surgery   [phone]  A/P:   60 y/o F s/p T8 - pelvis arthrodesis, L1-L3 anterior column release and closure 3/12/21, returned to ED with nausea and dizziness, imaging pending to rule out CSF leak    - UCHE drain in place draining, will continue and monitor output   - DC w/ UCHE  - F/U w/ Dr. Fournier as outpatient for UCHE drain removal   - Primary management per neurosurgery, appreciate recs and care.       Plastic Surgery   [phone]

## 2021-04-03 DIAGNOSIS — R74.01 ELEVATION OF LEVELS OF LIVER TRANSAMINASE LEVELS: ICD-10-CM

## 2021-04-03 DIAGNOSIS — R94.31 ABNORMAL ELECTROCARDIOGRAM [ECG] [EKG]: ICD-10-CM

## 2021-04-03 LAB
ALBUMIN SERPL ELPH-MCNC: 3.1 G/DL — LOW (ref 3.3–5)
ALP SERPL-CCNC: 276 U/L — HIGH (ref 40–120)
ALT FLD-CCNC: 190 U/L — HIGH (ref 10–45)
ANION GAP SERPL CALC-SCNC: 9 MMOL/L — SIGNIFICANT CHANGE UP (ref 5–17)
AST SERPL-CCNC: 353 U/L — HIGH (ref 10–40)
BILIRUB DIRECT SERPL-MCNC: <0.1 MG/DL — SIGNIFICANT CHANGE UP (ref 0–0.2)
BILIRUB INDIRECT FLD-MCNC: >0.2 MG/DL — SIGNIFICANT CHANGE UP (ref 0.2–1)
BILIRUB SERPL-MCNC: 0.3 MG/DL — SIGNIFICANT CHANGE UP (ref 0.2–1.2)
BUN SERPL-MCNC: 8 MG/DL — SIGNIFICANT CHANGE UP (ref 7–23)
CALCIUM SERPL-MCNC: 9.1 MG/DL — SIGNIFICANT CHANGE UP (ref 8.4–10.5)
CHLORIDE SERPL-SCNC: 99 MMOL/L — SIGNIFICANT CHANGE UP (ref 96–108)
CO2 SERPL-SCNC: 30 MMOL/L — SIGNIFICANT CHANGE UP (ref 22–31)
CREAT SERPL-MCNC: 0.71 MG/DL — SIGNIFICANT CHANGE UP (ref 0.5–1.3)
GLUCOSE BLDC GLUCOMTR-MCNC: 114 MG/DL — HIGH (ref 70–99)
GLUCOSE BLDC GLUCOMTR-MCNC: 117 MG/DL — HIGH (ref 70–99)
GLUCOSE BLDC GLUCOMTR-MCNC: 118 MG/DL — HIGH (ref 70–99)
GLUCOSE BLDC GLUCOMTR-MCNC: 118 MG/DL — HIGH (ref 70–99)
GLUCOSE SERPL-MCNC: 114 MG/DL — HIGH (ref 70–99)
HCT VFR BLD CALC: 29.6 % — LOW (ref 34.5–45)
HGB BLD-MCNC: 9.1 G/DL — LOW (ref 11.5–15.5)
LMWH PPP CHRO-ACNC: 0 IU/ML — LOW (ref 0.5–1.1)
MCHC RBC-ENTMCNC: 29.4 PG — SIGNIFICANT CHANGE UP (ref 27–34)
MCHC RBC-ENTMCNC: 30.7 GM/DL — LOW (ref 32–36)
MCV RBC AUTO: 95.5 FL — SIGNIFICANT CHANGE UP (ref 80–100)
NRBC # BLD: 0 /100 WBCS — SIGNIFICANT CHANGE UP (ref 0–0)
PLATELET # BLD AUTO: 531 K/UL — HIGH (ref 150–400)
POTASSIUM SERPL-MCNC: 4.2 MMOL/L — SIGNIFICANT CHANGE UP (ref 3.5–5.3)
POTASSIUM SERPL-SCNC: 4.2 MMOL/L — SIGNIFICANT CHANGE UP (ref 3.5–5.3)
PROT SERPL-MCNC: 6.2 G/DL — SIGNIFICANT CHANGE UP (ref 6–8.3)
RBC # BLD: 3.1 M/UL — LOW (ref 3.8–5.2)
RBC # FLD: 14.1 % — SIGNIFICANT CHANGE UP (ref 10.3–14.5)
SODIUM SERPL-SCNC: 138 MMOL/L — SIGNIFICANT CHANGE UP (ref 135–145)
WBC # BLD: 6.34 K/UL — SIGNIFICANT CHANGE UP (ref 3.8–10.5)
WBC # FLD AUTO: 6.34 K/UL — SIGNIFICANT CHANGE UP (ref 3.8–10.5)

## 2021-04-03 PROCEDURE — 99233 SBSQ HOSP IP/OBS HIGH 50: CPT

## 2021-04-03 RX ORDER — ENOXAPARIN SODIUM 100 MG/ML
40 INJECTION SUBCUTANEOUS AT BEDTIME
Refills: 0 | Status: DISCONTINUED | OUTPATIENT
Start: 2021-04-03 | End: 2021-04-07

## 2021-04-03 RX ORDER — ONDANSETRON 8 MG/1
4 TABLET, FILM COATED ORAL EVERY 6 HOURS
Refills: 0 | Status: DISCONTINUED | OUTPATIENT
Start: 2021-04-03 | End: 2021-04-07

## 2021-04-03 RX ORDER — HEPARIN SODIUM 5000 [USP'U]/ML
5000 INJECTION INTRAVENOUS; SUBCUTANEOUS EVERY 8 HOURS
Refills: 0 | Status: DISCONTINUED | OUTPATIENT
Start: 2021-04-03 | End: 2021-04-03

## 2021-04-03 RX ORDER — SODIUM CHLORIDE 9 MG/ML
500 INJECTION INTRAMUSCULAR; INTRAVENOUS; SUBCUTANEOUS ONCE
Refills: 0 | Status: COMPLETED | OUTPATIENT
Start: 2021-04-03 | End: 2021-04-03

## 2021-04-03 RX ORDER — SODIUM CHLORIDE 9 MG/ML
1000 INJECTION INTRAMUSCULAR; INTRAVENOUS; SUBCUTANEOUS
Refills: 0 | Status: DISCONTINUED | OUTPATIENT
Start: 2021-04-03 | End: 2021-04-04

## 2021-04-03 RX ADMIN — CYCLOBENZAPRINE HYDROCHLORIDE 5 MILLIGRAM(S): 10 TABLET, FILM COATED ORAL at 20:24

## 2021-04-03 RX ADMIN — ENOXAPARIN SODIUM 40 MILLIGRAM(S): 100 INJECTION SUBCUTANEOUS at 21:56

## 2021-04-03 RX ADMIN — CYCLOBENZAPRINE HYDROCHLORIDE 5 MILLIGRAM(S): 10 TABLET, FILM COATED ORAL at 05:06

## 2021-04-03 RX ADMIN — Medication 200 MICROGRAM(S): at 05:07

## 2021-04-03 RX ADMIN — FENTANYL CITRATE 1 PATCH: 50 INJECTION INTRAVENOUS at 06:10

## 2021-04-03 RX ADMIN — SENNA PLUS 2 TABLET(S): 8.6 TABLET ORAL at 21:55

## 2021-04-03 RX ADMIN — OXYCODONE HYDROCHLORIDE 10 MILLIGRAM(S): 5 TABLET ORAL at 15:39

## 2021-04-03 RX ADMIN — FENTANYL CITRATE 1 PATCH: 50 INJECTION INTRAVENOUS at 06:12

## 2021-04-03 RX ADMIN — SODIUM CHLORIDE 50 MILLILITER(S): 9 INJECTION INTRAMUSCULAR; INTRAVENOUS; SUBCUTANEOUS at 15:44

## 2021-04-03 RX ADMIN — FENTANYL CITRATE 1 PATCH: 50 INJECTION INTRAVENOUS at 19:56

## 2021-04-03 RX ADMIN — Medication 1 TABLET(S): at 15:43

## 2021-04-03 RX ADMIN — SODIUM CHLORIDE 1000 MILLILITER(S): 9 INJECTION INTRAMUSCULAR; INTRAVENOUS; SUBCUTANEOUS at 15:48

## 2021-04-03 RX ADMIN — Medication 3 MILLIGRAM(S): at 21:55

## 2021-04-03 RX ADMIN — Medication 5 MILLIGRAM(S): at 21:55

## 2021-04-03 RX ADMIN — Medication 40 MILLIGRAM(S): at 05:07

## 2021-04-03 RX ADMIN — PANTOPRAZOLE SODIUM 40 MILLIGRAM(S): 20 TABLET, DELAYED RELEASE ORAL at 05:06

## 2021-04-03 NOTE — PROGRESS NOTE ADULT - SUBJECTIVE AND OBJECTIVE BOX
DATE OF SERVICE: 04-03-21 @ 06:18    Subjective: Patient seen and examined. No new events except as noted.     SUBJECTIVE/ROS:  feels better  No chest pain, dyspnea, palpitation, or dizziness.       MEDICATIONS:  MEDICATIONS  (STANDING):  atorvastatin 20 milliGRAM(s) Oral at bedtime  dextrose 40% Gel 15 Gram(s) Oral once  dextrose 5%. 1000 milliLiter(s) (50 mL/Hr) IV Continuous <Continuous>  dextrose 5%. 1000 milliLiter(s) (100 mL/Hr) IV Continuous <Continuous>  dextrose 50% Injectable 25 Gram(s) IV Push once  dextrose 50% Injectable 12.5 Gram(s) IV Push once  dextrose 50% Injectable 25 Gram(s) IV Push once  enoxaparin Injectable 40 milliGRAM(s) SubCutaneous at bedtime  fentaNYL   Patch  75 MICROgram(s)/Hr 1 Patch Transdermal every 72 hours  glucagon  Injectable 1 milliGRAM(s) IntraMuscular once  insulin lispro (ADMELOG) corrective regimen sliding scale   SubCutaneous three times a day before meals  insulin lispro (ADMELOG) corrective regimen sliding scale   SubCutaneous at bedtime  levothyroxine 200 MICROGram(s) Oral daily  melatonin 3 milliGRAM(s) Oral at bedtime  multivitamin 1 Tablet(s) Oral daily  pantoprazole    Tablet 40 milliGRAM(s) Oral before breakfast  PARoxetine 40 milliGRAM(s) Oral daily  polyethylene glycol 3350 17 Gram(s) Oral two times a day  senna 2 Tablet(s) Oral at bedtime      PHYSICAL EXAM:  T(C): 36.7 (04-03-21 @ 05:14), Max: 37.1 (04-02-21 @ 12:15)  HR: 74 (04-03-21 @ 05:14) (70 - 89)  BP: 115/55 (04-03-21 @ 05:14) (100/55 - 134/76)  RR: 18 (04-03-21 @ 05:14) (17 - 18)  SpO2: 98% (04-03-21 @ 05:14) (94% - 98%)  Wt(kg): --  I&O's Summary    01 Apr 2021 07:01  -  02 Apr 2021 07:00  --------------------------------------------------------  IN: 960 mL / OUT: 230 mL / NET: 730 mL    02 Apr 2021 07:01  -  03 Apr 2021 06:18  --------------------------------------------------------  IN: 1200 mL / OUT: 50 mL / NET: 1150 mL            JVP: Normal  Neck: supple  Lung: clear   CV: S1 S2 , Murmur:  Abd: soft  Ext: No edema  neuro: Awake / alert  Psych: flat affect  Skin: normal``    LABS/DATA:    CARDIAC MARKERS:                TPro  6.7  /  Alb  3.4  /  TBili  0.4  /  DBili  0.1  /  AST  568<H>  /  ALT  172<H>  /  AlkPhos  306<H>  04-02    proBNP:   Lipid Profile:   HgA1c:   TSH:     TELE:  EKG:

## 2021-04-03 NOTE — PROGRESS NOTE ADULT - PROBLEM SELECTOR PLAN 2
neg for orthostatic hypotension.  however hypotensive to systolic 80s-90s today  IVF fluid bolus and start maintenance IVF

## 2021-04-03 NOTE — PROGRESS NOTE ADULT - PROBLEM SELECTOR PLAN 1
per neurosurg. Has greater than 4 METS without sx. Previous EKG c flipped T waves in leads V2-V6, but now normalized in v3-v6. PMD office called to compare to old EKGs. If pt requires surgery, would require cardiac clearance to evaluate the normalization of T wave changes from previous EKG.

## 2021-04-03 NOTE — PROGRESS NOTE ADULT - PROBLEM SELECTOR PLAN 4
history of "sludge" in gallbladder per patient. intermittent nausea  - f/u RUQ ultrasound  - trend LFTs

## 2021-04-03 NOTE — PROGRESS NOTE ADULT - PROBLEM SELECTOR PLAN 3
EKG with  flipped T waves in leads V2-V6, but now normalized in v3-v6.  - Cardiology consult recs appreciated  - f/u TTE and nuclear stress test  - added US AAA given pronounced AA pulse on exam. reviewed prior CT and MRI with radiology resident - possible mild bulge. will start with US AAA to better evaluate.

## 2021-04-03 NOTE — PROGRESS NOTE ADULT - ASSESSMENT
A/P:   60 y/o F s/p T8 - pelvis arthrodesis, L1-L3 anterior column release and closure 3/12/21, returned to ED with nausea and dizziness, imaging pending to rule out CSF leak    - UCHE drain in place draining, will continue and monitor output   - DC w/ UCHE  - F/U w/ Dr. Fournier as outpatient for UCHE drain removal   - Primary management per neurosurgery, appreciate recs and care.       Plastic Surgery   [phone]       Plastic Surgery   [facility]: [phone]

## 2021-04-03 NOTE — PROGRESS NOTE ADULT - SUBJECTIVE AND OBJECTIVE BOX
Plastic Surgery Progress Note (pg [facility]: [id number], [facility]: [phone])    SUBJECTIVE  The patient was seen and examined. No acute events overnight. No dizziness.     OBJECTIVE  ___________________________________________________  VITAL SIGNS / I&O's   Vital Signs Last 24 Hrs  T(C): 36.5 (03 Apr 2021 07:18), Max: 37.1 (02 Apr 2021 12:15)  T(F): 97.7 (03 Apr 2021 07:18), Max: 98.8 (02 Apr 2021 12:15)  HR: 66 (03 Apr 2021 07:18) (66 - 89)  BP: 112/76 (03 Apr 2021 07:18) (101/55 - 134/76)  BP(mean): --  RR: 18 (03 Apr 2021 07:18) (18 - 18)  SpO2: 97% (03 Apr 2021 07:18) (94% - 98%)      02 Apr 2021 07:01  -  03 Apr 2021 07:00  --------------------------------------------------------  IN:    Oral Fluid: 1200 mL  Total IN: 1200 mL    OUT:    Bulb (mL): 50 mL  Total OUT: 50 mL    Total NET: 1150 mL      03 Apr 2021 07:01  -  03 Apr 2021 09:18  --------------------------------------------------------  IN:    Oral Fluid: 240 mL  Total IN: 240 mL    OUT:  Total OUT: 0 mL    Total NET: 240 mL        ___________________________________________________  PHYSICAL EXAM    -- CONSTITUTIONAL: NAD, lying in bed  -- Back: soft, incisions c/d/i, aquceal intact, no palpable collection appreciated, no skin changes or erythema. UCHE intact w/ thin serous output.     ___________________________________________________  LABS                        9.1    6.34  )-----------( 531      ( 03 Apr 2021 07:04 )             29.6     03 Apr 2021 07:04    138    |  99     |  8      ----------------------------<  114    4.2     |  30     |  0.71     Ca    9.1        03 Apr 2021 07:04    TPro  6.2    /  Alb  3.1    /  TBili  0.3    /  DBili  <0.1   /  AST  353    /  ALT  190    /  AlkPhos  276    03 Apr 2021 07:04      CAPILLARY BLOOD GLUCOSE      POCT Blood Glucose.: 118 mg/dL (03 Apr 2021 07:38)  POCT Blood Glucose.: 95 mg/dL (02 Apr 2021 21:34)  POCT Blood Glucose.: 134 mg/dL (02 Apr 2021 17:18)  POCT Blood Glucose.: 84 mg/dL (02 Apr 2021 11:34)          ___________________________________________________  MICRO  Recent Cultures:    ___________________________________________________  MEDICATIONS  (STANDING):  dextrose 40% Gel 15 Gram(s) Oral once  dextrose 5%. 1000 milliLiter(s) (50 mL/Hr) IV Continuous <Continuous>  dextrose 5%. 1000 milliLiter(s) (100 mL/Hr) IV Continuous <Continuous>  dextrose 50% Injectable 25 Gram(s) IV Push once  dextrose 50% Injectable 12.5 Gram(s) IV Push once  dextrose 50% Injectable 25 Gram(s) IV Push once  enoxaparin Injectable 40 milliGRAM(s) SubCutaneous at bedtime  fentaNYL   Patch  75 MICROgram(s)/Hr 1 Patch Transdermal every 72 hours  glucagon  Injectable 1 milliGRAM(s) IntraMuscular once  insulin lispro (ADMELOG) corrective regimen sliding scale   SubCutaneous three times a day before meals  insulin lispro (ADMELOG) corrective regimen sliding scale   SubCutaneous at bedtime  levothyroxine 200 MICROGram(s) Oral daily  melatonin 3 milliGRAM(s) Oral at bedtime  multivitamin 1 Tablet(s) Oral daily  pantoprazole    Tablet 40 milliGRAM(s) Oral before breakfast  PARoxetine 40 milliGRAM(s) Oral daily  polyethylene glycol 3350 17 Gram(s) Oral two times a day  senna 2 Tablet(s) Oral at bedtime    MEDICATIONS  (PRN):  bisacodyl Suppository 10 milliGRAM(s) Rectal daily PRN Constipation  cyclobenzaprine 5 milliGRAM(s) Oral three times a day PRN Muscle Spasm  diazepam    Tablet 5 milliGRAM(s) Oral every 8 hours PRN severe spasms  ondansetron    Tablet 4 milliGRAM(s) Oral every 8 hours PRN for nausea  oxyCODONE    IR 10 milliGRAM(s) Oral every 4 hours PRN Severe Pain (7 - 10)  oxyCODONE    IR 5 milliGRAM(s) Oral every 4 hours PRN Moderate Pain (4 - 6)

## 2021-04-03 NOTE — PROGRESS NOTE ADULT - SUBJECTIVE AND OBJECTIVE BOX
HPI:  Patient is a 61 year old female with recent T8-pelvis arthrodesis, and L1-L3 anterior column release on 3/12/2021 that was discharged home.  Now returns for dizziness/orthostasis.    OVERNIGHT EVENTS: No acute events overnight.  Denies SOB/chest pains, headaches, lightheadedness, or any other new symptoms.  Tolerating diet.  Has been out of bed. reports occasional dizziness.   IVF bolus given and 1l INF overnight given soft BP    Vital Signs Last 24 Hrs  T(C): 36.7 (03 Apr 2021 12:41), Max: 37.1 (02 Apr 2021 17:21)  T(F): 98.1 (03 Apr 2021 12:41), Max: 98.8 (02 Apr 2021 17:21)  HR: 80 (03 Apr 2021 12:41) (66 - 80)  BP: 116/64 (03 Apr 2021 12:41) (101/55 - 116/64)  BP(mean): --  RR: 18 (03 Apr 2021 12:41) (18 - 18)  SpO2: 97% (03 Apr 2021 12:41) (95% - 98%)    Orthostatic VS    04-03-21 @ 12:44  Lying BP: 84/48 HR: 74   Sitting BP: 95/59 HR: 74  Standing BP: 95/59 HR: 67  Site: --   Mode: --    PHYSICAL EXAM:  Neurological: awake, alert, oriented x3, follows commands, speech clear and fluent, moves all extremities x4 w/ 5/5 strength throughout, sensation present, intact, equal throughout    Cardiovascular: +s1, s2  Respiratory: clear to auscultation b/l  Gastrointestinal: soft, non-distended, non-tender  Genitourinary: +voiding  Incision/Wound: posterior midline vertical incision dressing c/d/i w/ aquacel, right flank incision dressing c/d/i, w/ aquacel, no collections, kathy x1    TUBES/LINES:  [x] kathy x1 (50cc serosanguinous output over 24 hours)    DIET:  [x] regular    LABS:                          9.1    6.34  )-----------( 531      ( 03 Apr 2021 07:04 )             29.6   04-03    138  |  99  |  8   ----------------------------<  114<H>  4.2   |  30  |  0.71    Ca    9.1      03 Apr 2021 07:04    TPro  6.2  /  Alb  3.1<L>  /  TBili  0.3  /  DBili  <0.1  /  AST  353<H>  /  ALT  190<H>  /  AlkPhos  276<H>  04-03    Allergies    No Known Allergies    MEDICATIONS  (STANDING):  dextrose 40% Gel 15 Gram(s) Oral once  dextrose 5%. 1000 milliLiter(s) (50 mL/Hr) IV Continuous <Continuous>  dextrose 5%. 1000 milliLiter(s) (100 mL/Hr) IV Continuous <Continuous>  dextrose 50% Injectable 25 Gram(s) IV Push once  dextrose 50% Injectable 12.5 Gram(s) IV Push once  dextrose 50% Injectable 25 Gram(s) IV Push once  enoxaparin Injectable 40 milliGRAM(s) SubCutaneous at bedtime  fentaNYL   Patch  75 MICROgram(s)/Hr 1 Patch Transdermal every 72 hours  glucagon  Injectable 1 milliGRAM(s) IntraMuscular once  insulin lispro (ADMELOG) corrective regimen sliding scale   SubCutaneous three times a day before meals  insulin lispro (ADMELOG) corrective regimen sliding scale   SubCutaneous at bedtime  levothyroxine 200 MICROGram(s) Oral daily  melatonin 3 milliGRAM(s) Oral at bedtime  multivitamin 1 Tablet(s) Oral daily  pantoprazole    Tablet 40 milliGRAM(s) Oral before breakfast  PARoxetine 40 milliGRAM(s) Oral daily  polyethylene glycol 3350 17 Gram(s) Oral two times a day  senna 2 Tablet(s) Oral at bedtime  sodium chloride 0.9%. 1000 milliLiter(s) (50 mL/Hr) IV Continuous <Continuous>    MEDICATIONS  (PRN):  bisacodyl Suppository 10 milliGRAM(s) Rectal daily PRN Constipation  cyclobenzaprine 5 milliGRAM(s) Oral three times a day PRN Muscle Spasm  diazepam    Tablet 5 milliGRAM(s) Oral every 8 hours PRN severe spasms  ondansetron Injectable 4 milliGRAM(s) IV Push every 6 hours PRN Nausea and/or Vomiting  oxyCODONE    IR 10 milliGRAM(s) Oral every 4 hours PRN Severe Pain (7 - 10)  oxyCODONE    IR 5 milliGRAM(s) Oral every 4 hours PRN Moderate Pain (4 - 6)    RADIOLOGY & ADDITIONAL TESTS:    < from: Transthoracic Echocardiogram (04.02.21 @ 16:05) >  PROCEDURE: Transthoracic echocardiogram with 2-D, M-Mode  and complete spectral and color flow Doppler.  INDICATION: Chest pain, unspecified (R07.9)  ------------------------------------------------------------------------  Dimensions:    Normal Values:  LA:            2.0 - 4.0 cm  Ao:     3.2    2.0 - 3.8 cm  SEPTUM: 1.0    0.6 - 1.2 cm  PWT:    1.0    0.6 - 1.1 cm  LVIDd:  4.3    3.0 - 5.6 cm  LVIDs:  2.9    1.8 - 4.0 cm  Derived variables:  LVMI: 78 g/m2  RWT: 0.46  Fractional short: 33 %  EF (Victor Rule): 62 %  ------------------------------------------------------------------------  Observations:  Mitral Valve: Normal mitral valve. Minimal mitral  regurgitation.  Aortic Valve/Aorta: Thickened trileaflet aortic valve with  normal opening. No aortic valve regurgitation seen.  Aortic Root: 3.2 cm.  Left Atrium: Normal left atrium.  LA volume index = 29  cc/m2.  Left Ventricle: Normal left ventricular systolic function.  No segmental wall motion abnormalities. Normal left  ventricular internal dimensions and wall thickness. Normal  diastolic function  Right Heart: Normal right atrium. Normal right ventricular  size and function. Tricuspid valve not well visualized.  Minimal tricuspid regurgitation. Pulmonic valve not well  visualized. Minimal pulmonic regurgitation.  Pericardium/Pleura: Normal pericardium with no pericardial  effusion.  Hemodynamic: Estimated right atrial pressure is 8 mm Hg.  ------------------------------------------------------------------------  Conclusions:  1. Normal mitral valve. Minimal mitral regurgitation.  2. Thickened trileaflet aortic valve with normal opening.  No aortic valve regurgitation seen.  3. Normal left ventricular internal dimensions and wall  thickness.  4. Normal left ventricular systolic function. No segmental  wall motion abnormalities.  5. Normal diastolic function  6. Normal right ventricular size and function.  *** No previous Echo exam.    < end of copied text >

## 2021-04-03 NOTE — PROGRESS NOTE ADULT - SUBJECTIVE AND OBJECTIVE BOX
[facility] Division of Hospital Medicine  Claribel Meraz MD  Pager (M-F, 7M-7W): [phone]  Other Times:  [phone]      Patient is a 61y old  Female who presents with a chief complaint of dizziness, poss. spinal fluid leak thoraco-lumbar (31 Mar 2021 10:49)      SUBJECTIVE / OVERNIGHT EVENTS: no acute events overnight. no further episodes of R anterior chest/RUQ pain (below breast). still with intermittent nausea, but otherwise feels well. no fever, chills, chest pain, palpitations, vomiting, diarrhea, nor constipation.  ADDITIONAL REVIEW OF SYSTEMS:    MEDICATIONS  (STANDING):  dextrose 40% Gel 15 Gram(s) Oral once  dextrose 5%. 1000 milliLiter(s) (50 mL/Hr) IV Continuous <Continuous>  dextrose 5%. 1000 milliLiter(s) (100 mL/Hr) IV Continuous <Continuous>  dextrose 50% Injectable 25 Gram(s) IV Push once  dextrose 50% Injectable 12.5 Gram(s) IV Push once  dextrose 50% Injectable 25 Gram(s) IV Push once  enoxaparin Injectable 40 milliGRAM(s) SubCutaneous at bedtime  fentaNYL   Patch  75 MICROgram(s)/Hr 1 Patch Transdermal every 72 hours  glucagon  Injectable 1 milliGRAM(s) IntraMuscular once  insulin lispro (ADMELOG) corrective regimen sliding scale   SubCutaneous three times a day before meals  insulin lispro (ADMELOG) corrective regimen sliding scale   SubCutaneous at bedtime  levothyroxine 200 MICROGram(s) Oral daily  melatonin 3 milliGRAM(s) Oral at bedtime  multivitamin 1 Tablet(s) Oral daily  pantoprazole    Tablet 40 milliGRAM(s) Oral before breakfast  PARoxetine 40 milliGRAM(s) Oral daily  polyethylene glycol 3350 17 Gram(s) Oral two times a day  senna 2 Tablet(s) Oral at bedtime  sodium chloride 0.9% Bolus 500 milliLiter(s) IV Bolus once  sodium chloride 0.9%. 1000 milliLiter(s) (50 mL/Hr) IV Continuous <Continuous>    MEDICATIONS  (PRN):  bisacodyl Suppository 10 milliGRAM(s) Rectal daily PRN Constipation  cyclobenzaprine 5 milliGRAM(s) Oral three times a day PRN Muscle Spasm  diazepam    Tablet 5 milliGRAM(s) Oral every 8 hours PRN severe spasms  ondansetron Injectable 4 milliGRAM(s) IV Push every 6 hours PRN Nausea and/or Vomiting  oxyCODONE    IR 10 milliGRAM(s) Oral every 4 hours PRN Severe Pain (7 - 10)  oxyCODONE    IR 5 milliGRAM(s) Oral every 4 hours PRN Moderate Pain (4 - 6)      CAPILLARY BLOOD GLUCOSE      POCT Blood Glucose.: 117 mg/dL (03 Apr 2021 11:36)  POCT Blood Glucose.: 118 mg/dL (03 Apr 2021 07:38)  POCT Blood Glucose.: 95 mg/dL (02 Apr 2021 21:34)  POCT Blood Glucose.: 134 mg/dL (02 Apr 2021 17:18)    I&O's Summary    02 Apr 2021 07:01  -  03 Apr 2021 07:00  --------------------------------------------------------  IN: 1200 mL / OUT: 50 mL / NET: 1150 mL    03 Apr 2021 07:01  -  03 Apr 2021 14:45  --------------------------------------------------------  IN: 240 mL / OUT: 0 mL / NET: 240 mL        PHYSICAL EXAM:  Vital Signs Last 24 Hrs  T(C): 36.7 (03 Apr 2021 12:41), Max: 37.1 (02 Apr 2021 17:21)  T(F): 98.1 (03 Apr 2021 12:41), Max: 98.8 (02 Apr 2021 17:21)  HR: 80 (03 Apr 2021 12:41) (66 - 80)  BP: 116/64 (03 Apr 2021 12:41) (101/55 - 116/64)  BP(mean): --  RR: 18 (03 Apr 2021 12:41) (18 - 18)  SpO2: 97% (03 Apr 2021 12:41) (95% - 98%)    CONSTITUTIONAL: NAD, well-developed, well-groomed  EYES: PERRLA; conjunctiva and sclera clear  ENMT: Moist oral mucosa, no pharyngeal injection or exudates; normal dentition  NECK: Supple, no palpable masses; no thyromegaly  RESPIRATORY: Normal respiratory effort; lungs are clear to auscultation bilaterally  CARDIOVASCULAR: Regular rate and rhythm, normal S1 and S2, no murmur/rub/gallop; No lower extremity edema; Peripheral pulses are 2+ bilaterally, pronounced pulse of aorta  ABDOMEN: Soft, Nondistended,  Nontender to palpation, normoactive bowel sounds  MUSCULOSKELETAL:  No clubbing or cyanosis of digits; no joint swelling or tenderness to palpation  PSYCH: A+O to person, place, and time; affect appropriate  NEUROLOGY: CN 2-12 are intact and symmetric; no gross sensory deficits, 5/5 strength throughout  SKIN: No rashes; no palpable lesions    LABS:                        9.1    6.34  )-----------( 531      ( 03 Apr 2021 07:04 )             29.6     04-03    138  |  99  |  8   ----------------------------<  114<H>  4.2   |  30  |  0.71    Ca    9.1      03 Apr 2021 07:04    TPro  6.2  /  Alb  3.1<L>  /  TBili  0.3  /  DBili  <0.1  /  AST  353<H>  /  ALT  190<H>  /  AlkPhos  276<H>  04-03        RADIOLOGY & ADDITIONAL TESTS:  Results Reviewed: no leukocytosis, H/H stable, Cr within normal limits, LFTs still elevated but slightly improved  Imaging Personally Reviewed:  Electrocardiogram Personally Reviewed:    COORDINATION OF CARE:  Care Discussed with Consultants/Other Providers [Y]: Neurosurgery GRACE Scott  Prior or Outpatient Records Reviewed [Y]: Cardiology progress note

## 2021-04-03 NOTE — PROGRESS NOTE ADULT - ASSESSMENT
HPI:  Patient is a 61 year old female with recent T8-pelvis arthrodesis, and L1-L3 anterior column release on 3/12/2021 that was discharged home.  Now returns for dizziness/orthostasis.    PROCEDURE: s/p T8-pelvis arthrodesis, and L1-L3 anterior column release on 3/12/2021 (previous admission)    PLAN:  Neuro:   -q4 hour neuro checks  -oxycodone and fentanyl patch for pain control  -flexeril and valium for muscle spasms  -continue paxil  -continue kathy drain, monitor output per plastics  -out of bed with assistance  -pt/ot - acute rehab upon discharge    Respiratory:   -keep o2 sat > 94%  -on room air    CV:  -keep sbp 100-140  -atorvastatin for lipid control  - monitor for orthostasis  - on tele in NS  with occasional PVCs  -awaiting stress test- Dr Son following    Endocrine:   -levothyroxine for hypothyroid  -HISS for glucose control  -keep fingersticks 100-180    Heme/Onc:    -lovenox and SCDs for DVT prophylaxis    Renal:   -ivf given soft BP   -voiding    ID:   -afebrile    GI:   -regular diet  -senna, miralax, and dulcolax for bowel regimen  -protonix for gi prophylaxis  - elevated LFTs- RUQ sono and abdominal US for ?pulsatile mass    will discuss with Dr Acevedo  Spectra #[phone]   HPI:  Patient is a 61 year old female with recent T8-pelvis arthrodesis, and L1-L3 anterior column release on 3/12/2021 that was discharged home.  Now returns for dizziness/orthostasis.    PROCEDURE: s/p T8-pelvis arthrodesis, and L1-L3 anterior column release on 3/12/2021 (previous admission)    PLAN:  Neuro:   -q4 hour neuro checks  -oxycodone and fentanyl patch for pain control  -flexeril and valium for muscle spasms  -continue paxil  -continue kathy drain, monitor output per plastics  -out of bed with assistance  -pt/ot - acute rehab upon discharge    Respiratory:   -keep o2 sat > 94%  -on room air    CV:  -keep sbp 100-140  -atorvastatin for lipid control  - monitor for orthostasis  - on tele in NS  with occasional PVCs  -awaiting stress test- Dr Son following    Endocrine:   -levothyroxine for hypothyroid  -HISS for glucose control  -keep fingersticks 100-180    Heme/Onc:    -lovenox and SCDs for DVT prophylaxis    Renal:   -ivf given soft BP   -voiding    ID:   -afebrile    GI:   -regular diet  -senna, miralax, and dulcolax for bowel regimen  -protonix for gi prophylaxis  - elevated LFTs- RUQ sono and abdominal US for ?pulsatile mass    appreciate hospitalist follow up  will discuss with Dr Acevedo  Spectra #[id number]

## 2021-04-03 NOTE — PROGRESS NOTE ADULT - ASSESSMENT
Abn EKG  anterior T wave abn   now normalized  agree with assessment  obtain echo , stress test     nausea  has ruq abd pain   obtain ruq sono rule out acute hugo   Elevated LFT  recommend GI eval   DC statin   avoid hepatotoxic agents     DM II  Monitor finger stick. Insulin coverage. Diabetic education and Diabetic diet. Consider nutrition consultation.    DVT proph  on lovenox

## 2021-04-04 LAB
ALBUMIN SERPL ELPH-MCNC: 3.1 G/DL — LOW (ref 3.3–5)
ALP SERPL-CCNC: 214 U/L — HIGH (ref 40–120)
ALT FLD-CCNC: 108 U/L — HIGH (ref 10–45)
ANION GAP SERPL CALC-SCNC: 10 MMOL/L — SIGNIFICANT CHANGE UP (ref 5–17)
APTT BLD: 37 SEC — HIGH (ref 27.5–35.5)
AST SERPL-CCNC: 122 U/L — HIGH (ref 10–40)
BILIRUB SERPL-MCNC: 0.2 MG/DL — SIGNIFICANT CHANGE UP (ref 0.2–1.2)
BUN SERPL-MCNC: 6 MG/DL — LOW (ref 7–23)
CALCIUM SERPL-MCNC: 9.2 MG/DL — SIGNIFICANT CHANGE UP (ref 8.4–10.5)
CHLORIDE SERPL-SCNC: 101 MMOL/L — SIGNIFICANT CHANGE UP (ref 96–108)
CO2 SERPL-SCNC: 28 MMOL/L — SIGNIFICANT CHANGE UP (ref 22–31)
CREAT SERPL-MCNC: 0.63 MG/DL — SIGNIFICANT CHANGE UP (ref 0.5–1.3)
GLUCOSE BLDC GLUCOMTR-MCNC: 103 MG/DL — HIGH (ref 70–99)
GLUCOSE BLDC GLUCOMTR-MCNC: 106 MG/DL — HIGH (ref 70–99)
GLUCOSE BLDC GLUCOMTR-MCNC: 109 MG/DL — HIGH (ref 70–99)
GLUCOSE BLDC GLUCOMTR-MCNC: 123 MG/DL — HIGH (ref 70–99)
GLUCOSE SERPL-MCNC: 119 MG/DL — HIGH (ref 70–99)
HCT VFR BLD CALC: 30.6 % — LOW (ref 34.5–45)
HGB BLD-MCNC: 9.3 G/DL — LOW (ref 11.5–15.5)
INR BLD: 1.13 RATIO — SIGNIFICANT CHANGE UP (ref 0.88–1.16)
MCHC RBC-ENTMCNC: 29 PG — SIGNIFICANT CHANGE UP (ref 27–34)
MCHC RBC-ENTMCNC: 30.4 GM/DL — LOW (ref 32–36)
MCV RBC AUTO: 95.3 FL — SIGNIFICANT CHANGE UP (ref 80–100)
NRBC # BLD: 0 /100 WBCS — SIGNIFICANT CHANGE UP (ref 0–0)
PLATELET # BLD AUTO: 560 K/UL — HIGH (ref 150–400)
POTASSIUM SERPL-MCNC: 4 MMOL/L — SIGNIFICANT CHANGE UP (ref 3.5–5.3)
POTASSIUM SERPL-SCNC: 4 MMOL/L — SIGNIFICANT CHANGE UP (ref 3.5–5.3)
PROT SERPL-MCNC: 6.4 G/DL — SIGNIFICANT CHANGE UP (ref 6–8.3)
PROTHROM AB SERPL-ACNC: 13.5 SEC — SIGNIFICANT CHANGE UP (ref 10.6–13.6)
RBC # BLD: 3.21 M/UL — LOW (ref 3.8–5.2)
RBC # FLD: 14.5 % — SIGNIFICANT CHANGE UP (ref 10.3–14.5)
SODIUM SERPL-SCNC: 139 MMOL/L — SIGNIFICANT CHANGE UP (ref 135–145)
WBC # BLD: 6.78 K/UL — SIGNIFICANT CHANGE UP (ref 3.8–10.5)
WBC # FLD AUTO: 6.78 K/UL — SIGNIFICANT CHANGE UP (ref 3.8–10.5)

## 2021-04-04 PROCEDURE — 76705 ECHO EXAM OF ABDOMEN: CPT | Mod: 26,RT

## 2021-04-04 PROCEDURE — 99233 SBSQ HOSP IP/OBS HIGH 50: CPT

## 2021-04-04 PROCEDURE — 76706 US ABDL AORTA SCREEN AAA: CPT | Mod: 26

## 2021-04-04 RX ORDER — SODIUM CHLORIDE 9 MG/ML
1000 INJECTION INTRAMUSCULAR; INTRAVENOUS; SUBCUTANEOUS
Refills: 0 | Status: DISCONTINUED | OUTPATIENT
Start: 2021-04-04 | End: 2021-04-07

## 2021-04-04 RX ORDER — FENTANYL CITRATE 50 UG/ML
1 INJECTION INTRAVENOUS
Refills: 0 | Status: DISCONTINUED | OUTPATIENT
Start: 2021-04-04 | End: 2021-04-07

## 2021-04-04 RX ORDER — PROCHLORPERAZINE MALEATE 5 MG
10 TABLET ORAL ONCE
Refills: 0 | Status: DISCONTINUED | OUTPATIENT
Start: 2021-04-04 | End: 2021-04-07

## 2021-04-04 RX ORDER — SODIUM CHLORIDE 9 MG/ML
1000 INJECTION INTRAMUSCULAR; INTRAVENOUS; SUBCUTANEOUS ONCE
Refills: 0 | Status: COMPLETED | OUTPATIENT
Start: 2021-04-04 | End: 2021-04-04

## 2021-04-04 RX ADMIN — FENTANYL CITRATE 1 PATCH: 50 INJECTION INTRAVENOUS at 18:37

## 2021-04-04 RX ADMIN — OXYCODONE HYDROCHLORIDE 10 MILLIGRAM(S): 5 TABLET ORAL at 00:07

## 2021-04-04 RX ADMIN — OXYCODONE HYDROCHLORIDE 10 MILLIGRAM(S): 5 TABLET ORAL at 17:20

## 2021-04-04 RX ADMIN — PANTOPRAZOLE SODIUM 40 MILLIGRAM(S): 20 TABLET, DELAYED RELEASE ORAL at 05:45

## 2021-04-04 RX ADMIN — FENTANYL CITRATE 1 PATCH: 50 INJECTION INTRAVENOUS at 19:13

## 2021-04-04 RX ADMIN — Medication 200 MICROGRAM(S): at 05:44

## 2021-04-04 RX ADMIN — SODIUM CHLORIDE 75 MILLILITER(S): 9 INJECTION INTRAMUSCULAR; INTRAVENOUS; SUBCUTANEOUS at 22:10

## 2021-04-04 RX ADMIN — ONDANSETRON 4 MILLIGRAM(S): 8 TABLET, FILM COATED ORAL at 04:22

## 2021-04-04 RX ADMIN — OXYCODONE HYDROCHLORIDE 10 MILLIGRAM(S): 5 TABLET ORAL at 11:35

## 2021-04-04 RX ADMIN — OXYCODONE HYDROCHLORIDE 10 MILLIGRAM(S): 5 TABLET ORAL at 01:00

## 2021-04-04 RX ADMIN — ENOXAPARIN SODIUM 40 MILLIGRAM(S): 100 INJECTION SUBCUTANEOUS at 22:11

## 2021-04-04 RX ADMIN — OXYCODONE HYDROCHLORIDE 10 MILLIGRAM(S): 5 TABLET ORAL at 12:02

## 2021-04-04 RX ADMIN — SODIUM CHLORIDE 500 MILLILITER(S): 9 INJECTION INTRAMUSCULAR; INTRAVENOUS; SUBCUTANEOUS at 18:06

## 2021-04-04 RX ADMIN — OXYCODONE HYDROCHLORIDE 10 MILLIGRAM(S): 5 TABLET ORAL at 18:06

## 2021-04-04 RX ADMIN — Medication 3 MILLIGRAM(S): at 22:11

## 2021-04-04 RX ADMIN — OXYCODONE HYDROCHLORIDE 10 MILLIGRAM(S): 5 TABLET ORAL at 05:45

## 2021-04-04 RX ADMIN — ONDANSETRON 4 MILLIGRAM(S): 8 TABLET, FILM COATED ORAL at 12:19

## 2021-04-04 RX ADMIN — FENTANYL CITRATE 1 PATCH: 50 INJECTION INTRAVENOUS at 19:46

## 2021-04-04 RX ADMIN — Medication 40 MILLIGRAM(S): at 05:44

## 2021-04-04 NOTE — PROGRESS NOTE ADULT - ASSESSMENT
HPI:  Patient is a 61 year old female with recent T8-pelvis arthrodesis, and L1-L3 anterior column release on 3/12/2021 that was discharged home.  Now returns for dizziness/orthostasis.    PROCEDURE: s/p T8-pelvis arthrodesis, and L1-L3 anterior column release on 3/12/2021 (previous admission)    PLAN:  Neuro:   -q4 hour neuro checks  -oxycodone and decrease fentanyl patch to 50mcg for pain control  -flexeril prn for muscle spasms, valium stopped   -continue paxil  -continue kathy drain, monitor output per plastics  -out of bed with assistance  -pt/ot - acute rehab upon discharge    Respiratory:   -keep o2 sat > 94%  -on room air    CV:  -keep sbp 100-140  -atorvastatin for lipid control  - monitor orthostatics- slightly better with IVF  - on tele in NS  with occasional PVCs  -awaiting stress test- Dr Son following    Endocrine:   -levothyroxine for hypothyroid  -HISS for glucose control  -keep fingersticks 100-180    Heme/Onc:    -lovenox and SCDs for DVT prophylaxis    Renal:   -ivf given soft BP   -voiding    ID:   -afebrile    GI:   -regular diet  -senna, miralax, and dulcolax for bowel regimen  -protonix for gi prophylaxis  - elevated LFTs- RUQ sono and abdominal US- results above will order MRCP for now, discussed findings with patient    appreciate hospitalist follow up  will discuss with Dr Acevedo  Spectra #[id number]

## 2021-04-04 NOTE — PROGRESS NOTE ADULT - ASSESSMENT
Abn EKG  anterior T wave abn   now normalized  echo shows normal LV  function   stress test once Liver abn is resolved     nausea  has ruq abd pain   obtain ruq sono rule out acute hugo   Elevated LFT  recommend GI eval   off statin   avoid hepatotoxic agents     DM II  Monitor finger stick. Insulin coverage. Diabetic education and Diabetic diet. Consider nutrition consultation.    DVT proph  on lovenox

## 2021-04-04 NOTE — PROGRESS NOTE ADULT - SUBJECTIVE AND OBJECTIVE BOX
HPI:  Patient is a 61 year old female with recent T8-pelvis arthrodesis, and L1-L3 anterior column release on 3/12/2021 that was discharged home.  Now returns for dizziness/orthostasis.    OVERNIGHT EVENTS: No acute events overnight.  Reports pain controlled well enough to lower meds that we increased on last admission, remains orthostatic hypotension, Denies SOB/chest pains, headaches, lightheadedness unless she gets OOB.  Tolerating diet.  Has been out of bed. reports occasional dizziness.     Vital Signs Last 24 Hrs  T(C): 36.9 (04 Apr 2021 16:07), Max: 36.9 (04 Apr 2021 05:44)  T(F): 98.4 (04 Apr 2021 16:07), Max: 98.4 (04 Apr 2021 05:44)  HR: 74 (04 Apr 2021 16:07) (66 - 74)  BP: 110/62 (04 Apr 2021 16:07) (101/60 - 112/66)  BP(mean): --  RR: 17 (04 Apr 2021 16:07) (17 - 18)  SpO2: 94% (04 Apr 2021 16:07) (94% - 96%)    Orthostatic VS    04-04-21 @ 10:15  Lying BP: 113/63 HR: 77   Sitting BP: 98/60 HR: 80  Standing BP: 71/46 HR: 97  Site: --   Mode: electronic    04-03-21 @ 12:44  Lying BP: 84/48 HR: 74   Sitting BP: 95/59 HR: 74  Standing BP: 95/59 HR: 67  Site: --   Mode: --    PHYSICAL EXAM:  Neurological: awake, alert, oriented x3, follows commands, speech clear and fluent, moves all extremities x4 w/ 5/5 strength throughout, sensation present, intact, equal throughout  Cardiovascular: +s1, s2  Respiratory: clear to auscultation b/l  Gastrointestinal: soft, non-distended, non-tender  Genitourinary: +voiding  Incision/Wound: posterior midline vertical incision dressing c/d/i w/ aquacel, right flank incision dressing c/d/i, w/ aquacel, no collections, kathy x1    TUBES/LINES:  [x] kathy x1 (15cc serosanguinous output over 24 hours)    DIET:  [x] regular    LABS:                        9.3    6.78  )-----------( 560      ( 04 Apr 2021 06:58 )             30.6   04-04    139  |  101  |  6<L>  ----------------------------<  119<H>  4.0   |  28  |  0.63    Ca    9.2      04 Apr 2021 06:58    TPro  6.4  /  Alb  3.1<L>  /  TBili  0.2  /  DBili  x   /  AST  122<H>  /  ALT  108<H>  /  AlkPhos  214<H>  04-04    MEDICATIONS  (STANDING):  dextrose 40% Gel 15 Gram(s) Oral once  dextrose 5%. 1000 milliLiter(s) (50 mL/Hr) IV Continuous <Continuous>  dextrose 5%. 1000 milliLiter(s) (100 mL/Hr) IV Continuous <Continuous>  dextrose 50% Injectable 25 Gram(s) IV Push once  dextrose 50% Injectable 12.5 Gram(s) IV Push once  dextrose 50% Injectable 25 Gram(s) IV Push once  enoxaparin Injectable 40 milliGRAM(s) SubCutaneous at bedtime  fentaNYL   Patch  50 MICROgram(s)/Hr 1 Patch Transdermal every 72 hours  glucagon  Injectable 1 milliGRAM(s) IntraMuscular once  insulin lispro (ADMELOG) corrective regimen sliding scale   SubCutaneous three times a day before meals  insulin lispro (ADMELOG) corrective regimen sliding scale   SubCutaneous at bedtime  levothyroxine 200 MICROGram(s) Oral daily  melatonin 3 milliGRAM(s) Oral at bedtime  multivitamin 1 Tablet(s) Oral daily  pantoprazole    Tablet 40 milliGRAM(s) Oral before breakfast  PARoxetine 40 milliGRAM(s) Oral daily  polyethylene glycol 3350 17 Gram(s) Oral two times a day  senna 2 Tablet(s) Oral at bedtime  sodium chloride 0.9% Bolus 1000 milliLiter(s) IV Bolus once  sodium chloride 0.9%. 1000 milliLiter(s) (75 mL/Hr) IV Continuous <Continuous>    MEDICATIONS  (PRN):  bisacodyl Suppository 10 milliGRAM(s) Rectal daily PRN Constipation  cyclobenzaprine 5 milliGRAM(s) Oral three times a day PRN Muscle Spasm  ondansetron Injectable 4 milliGRAM(s) IV Push every 6 hours PRN Nausea and/or Vomiting  oxyCODONE    IR 10 milliGRAM(s) Oral every 4 hours PRN Severe Pain (7 - 10)  oxyCODONE    IR 5 milliGRAM(s) Oral every 4 hours PRN Moderate Pain (4 - 6)  prochlorperazine   Injectable 10 milliGRAM(s) IV Push every 8 hours PRN nausea      RADIOLOGY & ADDITIONAL TESTS:    < from: US Abdomen Upper Quadrant Right (04.04.21 @ 13:38) >  INTERPRETATION:  CLINICAL INFORMATION: Right upper quadrant pain.    COMPARISON: None available.    TECHNIQUE: Sonography of the right upper quadrant.    FINDINGS:    Liver: Within normal limits.  Bile ducts: Normal caliber. Common bile duct measures 6 mm.  Gallbladder: Nonmobile tumefactive sludge or mass in the gallbladder measuring 4.0 x 1.6 cm.  Pancreas: Visualized portions are within normallimits.  Right kidney: 10.6 cm. No hydronephrosis.  Ascites: None.  IVC: Visualized portions are within normal limits.    IMPRESSION:    4.0 cm area of tumefactive sludge or solid mass within the gallbladder. Recommend contrast-enhanced MRI of the abdomen for further evaluation.    < end of copied text >        < from: Transthoracic Echocardiogram (04.02.21 @ 16:05) >  PROCEDURE: Transthoracic echocardiogram with 2-D, M-Mode  and complete spectral and color flow Doppler.  INDICATION: Chest pain, unspecified (R07.9)  ------------------------------------------------------------------------  Dimensions:    Normal Values:  LA:            2.0 - 4.0 cm  Ao:     3.2    2.0 - 3.8 cm  SEPTUM: 1.0    0.6 - 1.2 cm  PWT:    1.0    0.6 - 1.1 cm  LVIDd:  4.3    3.0 - 5.6 cm  LVIDs:  2.9    1.8 - 4.0 cm  Derived variables:  LVMI: 78 g/m2  RWT: 0.46  Fractional short: 33 %  EF (Victor Rule): 62 %  ------------------------------------------------------------------------  Observations:  Mitral Valve: Normal mitral valve. Minimal mitral  regurgitation.  Aortic Valve/Aorta: Thickened trileaflet aortic valve with  normal opening. No aortic valve regurgitation seen.  Aortic Root: 3.2 cm.  Left Atrium: Normal left atrium.  LA volume index = 29  cc/m2.  Left Ventricle: Normal left ventricular systolic function.  No segmental wall motion abnormalities. Normal left  ventricular internal dimensions and wall thickness. Normal  diastolic function  Right Heart: Normal right atrium. Normal right ventricular  size and function. Tricuspid valve not well visualized.  Minimal tricuspid regurgitation. Pulmonic valve not well  visualized. Minimal pulmonic regurgitation.  Pericardium/Pleura: Normal pericardium with no pericardial  effusion.  Hemodynamic: Estimated right atrial pressure is 8 mm Hg.  ------------------------------------------------------------------------  Conclusions:  1. Normal mitral valve. Minimal mitral regurgitation.  2. Thickened trileaflet aortic valve with normal opening.  No aortic valve regurgitation seen.  3. Normal left ventricular internal dimensions and wall  thickness.  4. Normal left ventricular systolic function. No segmental  wall motion abnormalities.  5. Normal diastolic function  6. Normal right ventricular size and function.  *** No previous Echo exam.    < end of copied text >

## 2021-04-04 NOTE — PROGRESS NOTE ADULT - SUBJECTIVE AND OBJECTIVE BOX
DATE OF SERVICE: 04-04-21 @ 13:18    Subjective: Patient seen and examined. No new events except as noted.     SUBJECTIVE/ROS:    No chest pain, dyspnea, palpitation, or dizziness.     MEDICATIONS:  MEDICATIONS  (STANDING):  dextrose 40% Gel 15 Gram(s) Oral once  dextrose 5%. 1000 milliLiter(s) (50 mL/Hr) IV Continuous <Continuous>  dextrose 5%. 1000 milliLiter(s) (100 mL/Hr) IV Continuous <Continuous>  dextrose 50% Injectable 25 Gram(s) IV Push once  dextrose 50% Injectable 12.5 Gram(s) IV Push once  dextrose 50% Injectable 25 Gram(s) IV Push once  enoxaparin Injectable 40 milliGRAM(s) SubCutaneous at bedtime  fentaNYL   Patch  50 MICROgram(s)/Hr 1 Patch Transdermal every 72 hours  glucagon  Injectable 1 milliGRAM(s) IntraMuscular once  insulin lispro (ADMELOG) corrective regimen sliding scale   SubCutaneous three times a day before meals  insulin lispro (ADMELOG) corrective regimen sliding scale   SubCutaneous at bedtime  levothyroxine 200 MICROGram(s) Oral daily  melatonin 3 milliGRAM(s) Oral at bedtime  multivitamin 1 Tablet(s) Oral daily  pantoprazole    Tablet 40 milliGRAM(s) Oral before breakfast  PARoxetine 40 milliGRAM(s) Oral daily  polyethylene glycol 3350 17 Gram(s) Oral two times a day  senna 2 Tablet(s) Oral at bedtime  sodium chloride 0.9% Bolus 1000 milliLiter(s) IV Bolus once  sodium chloride 0.9%. 1000 milliLiter(s) (75 mL/Hr) IV Continuous <Continuous>      PHYSICAL EXAM:  T(C): 36.8 (04-04-21 @ 11:01), Max: 36.9 (04-04-21 @ 05:44)  HR: 68 (04-04-21 @ 11:01) (66 - 74)  BP: 110/63 (04-04-21 @ 11:01) (101/60 - 112/67)  RR: 18 (04-04-21 @ 11:01) (18 - 18)  SpO2: 94% (04-04-21 @ 11:01) (94% - 96%)  Wt(kg): --  I&O's Summary    03 Apr 2021 07:01  -  04 Apr 2021 07:00  --------------------------------------------------------  IN: 1140 mL / OUT: 15 mL / NET: 1125 mL            JVP: Normal  Neck: supple  Lung: clear   CV: S1 S2 , Murmur:  Abd: soft  Ext: No edema  neuro: Awake / alert  Psych: flat affect  Skin: normal``    LABS/DATA:    CARDIAC MARKERS:                                9.3    6.78  )-----------( 560      ( 04 Apr 2021 06:58 )             30.6     04-04    139  |  101  |  6<L>  ----------------------------<  119<H>  4.0   |  28  |  0.63    Ca    9.2      04 Apr 2021 06:58    TPro  6.4  /  Alb  3.1<L>  /  TBili  0.2  /  DBili  x   /  AST  122<H>  /  ALT  108<H>  /  AlkPhos  214<H>  04-04    proBNP:   Lipid Profile:   HgA1c:   TSH:     TELE:  EKG:

## 2021-04-04 NOTE — PROGRESS NOTE ADULT - ASSESSMENT
A/P:   62 y/o F s/p T8 - pelvis arthrodesis, L1-L3 anterior column release and closure 3/12/21, returned to ED with nausea and dizziness, imaging pending to rule out CSF leak    - UCHE drain in place draining, will continue and monitor output   - DC w/ UCHE  - F/U w/ Dr. Fournier as outpatient for UCHE drain removal   - Primary management per neurosurgery, appreciate recs and care.     Young Pham MD, PhD  Plastic Surgery Resident, PGY2  [facility]: [phone]  [facility]: [id number]

## 2021-04-04 NOTE — PROGRESS NOTE ADULT - PROBLEM SELECTOR PLAN 4
history of "sludge" in gallbladder per patient. intermittent nausea  - f/u RUQ ultrasound  - trend LFTs, downtrending history of "sludge" in gallbladder per patient. intermittent nausea  - RUQ with 4.0 cm area of tumefactive sludge or solid mass within the gallbladder  - MRCP with contrast to better evaluate  - trend LFTs, downtrending

## 2021-04-04 NOTE — PROGRESS NOTE ADULT - SUBJECTIVE AND OBJECTIVE BOX
Pt seen and examined. C/o dizziness and nausea. No acute events o/n.     T(C): 36.8 (04-04-21 @ 07:07), Max: 36.9 (04-04-21 @ 05:44)  T(F): 98.2 (04-04-21 @ 07:07), Max: 98.4 (04-04-21 @ 05:44)  HR: 74 (04-04-21 @ 07:07) (66 - 80)  BP: 106/65 (04-04-21 @ 07:07) (101/60 - 116/64)  RR: 18 (04-04-21 @ 07:07) (18 - 18)  SpO2: 96% (04-04-21 @ 07:07) (95% - 97%)      03 Apr 2021 07:01  -  04 Apr 2021 07:00  --------------------------------------------------------  IN:    Oral Fluid: 540 mL    sodium chloride 0.9%: 600 mL  Total IN: 1140 mL    OUT:    Bulb (mL): 15 mL  Total OUT: 15 mL    Total NET: 1125 mL          Exam:  Back soft  Dressing c/d/i  No palpable collections  UCHE serous, thin fluid, yellow in color.                           9.3    6.78  )-----------( 560      ( 04 Apr 2021 06:58 )             30.6     04-04    139  |  101  |  6<L>  ----------------------------<  119<H>  4.0   |  28  |  0.63    Ca    9.2      04 Apr 2021 06:58    TPro  6.4  /  Alb  3.1<L>  /  TBili  0.2  /  DBili  x   /  AST  122<H>  /  ALT  108<H>  /  AlkPhos  214<H>  04-04

## 2021-04-04 NOTE — PROGRESS NOTE ADULT - SUBJECTIVE AND OBJECTIVE BOX
[facility] Division of Hospital Medicine  Claribel Meraz MD  Pager (M-F, 2F-2L): [phone]  Other Times:  [phone]      Patient is a 61y old  Female who presents with a chief complaint of dizziness, poss. spinal fluid leak thoraco-lumbar (31 Mar 2021 10:49)      SUBJECTIVE / OVERNIGHT EVENTS: no acute events overnight. still has intermittent nausea, but no vomiting. no fever, chills, chest pain, cough, sob nor abd pain.   ADDITIONAL REVIEW OF SYSTEMS:    MEDICATIONS  (STANDING):  dextrose 40% Gel 15 Gram(s) Oral once  dextrose 5%. 1000 milliLiter(s) (50 mL/Hr) IV Continuous <Continuous>  dextrose 5%. 1000 milliLiter(s) (100 mL/Hr) IV Continuous <Continuous>  dextrose 50% Injectable 25 Gram(s) IV Push once  dextrose 50% Injectable 12.5 Gram(s) IV Push once  dextrose 50% Injectable 25 Gram(s) IV Push once  enoxaparin Injectable 40 milliGRAM(s) SubCutaneous at bedtime  fentaNYL   Patch  50 MICROgram(s)/Hr 1 Patch Transdermal every 72 hours  glucagon  Injectable 1 milliGRAM(s) IntraMuscular once  insulin lispro (ADMELOG) corrective regimen sliding scale   SubCutaneous three times a day before meals  insulin lispro (ADMELOG) corrective regimen sliding scale   SubCutaneous at bedtime  levothyroxine 200 MICROGram(s) Oral daily  melatonin 3 milliGRAM(s) Oral at bedtime  multivitamin 1 Tablet(s) Oral daily  pantoprazole    Tablet 40 milliGRAM(s) Oral before breakfast  PARoxetine 40 milliGRAM(s) Oral daily  polyethylene glycol 3350 17 Gram(s) Oral two times a day  senna 2 Tablet(s) Oral at bedtime  sodium chloride 0.9% Bolus 1000 milliLiter(s) IV Bolus once  sodium chloride 0.9%. 1000 milliLiter(s) (75 mL/Hr) IV Continuous <Continuous>    MEDICATIONS  (PRN):  bisacodyl Suppository 10 milliGRAM(s) Rectal daily PRN Constipation  cyclobenzaprine 5 milliGRAM(s) Oral three times a day PRN Muscle Spasm  ondansetron Injectable 4 milliGRAM(s) IV Push every 6 hours PRN Nausea and/or Vomiting  oxyCODONE    IR 10 milliGRAM(s) Oral every 4 hours PRN Severe Pain (7 - 10)  oxyCODONE    IR 5 milliGRAM(s) Oral every 4 hours PRN Moderate Pain (4 - 6)      CAPILLARY BLOOD GLUCOSE      POCT Blood Glucose.: 103 mg/dL (04 Apr 2021 11:11)  POCT Blood Glucose.: 109 mg/dL (04 Apr 2021 07:44)  POCT Blood Glucose.: 118 mg/dL (03 Apr 2021 21:45)  POCT Blood Glucose.: 114 mg/dL (03 Apr 2021 17:12)    I&O's Summary    03 Apr 2021 07:01  -  04 Apr 2021 07:00  --------------------------------------------------------  IN: 1140 mL / OUT: 15 mL / NET: 1125 mL        PHYSICAL EXAM:  Vital Signs Last 24 Hrs  T(C): 36.8 (04 Apr 2021 11:01), Max: 36.9 (04 Apr 2021 05:44)  T(F): 98.2 (04 Apr 2021 11:01), Max: 98.4 (04 Apr 2021 05:44)  HR: 68 (04 Apr 2021 11:01) (66 - 74)  BP: 110/63 (04 Apr 2021 11:01) (101/60 - 112/67)  BP(mean): --  RR: 18 (04 Apr 2021 11:01) (18 - 18)  SpO2: 94% (04 Apr 2021 11:01) (94% - 96%)    CONSTITUTIONAL: NAD, well-developed, well-groomed  EYES: PERRLA; conjunctiva and sclera clear  ENMT: Moist oral mucosa, no pharyngeal injection or exudates; normal dentition  NECK: Supple, no palpable masses; no thyromegaly  RESPIRATORY: Normal respiratory effort; lungs are clear to auscultation bilaterally  CARDIOVASCULAR: Regular rate and rhythm, normal S1 and S2, no murmur/rub/gallop; No lower extremity edema; Peripheral pulses are 2+ bilaterally  ABDOMEN: Soft, Nondistended,  Nontender to palpation, normoactive bowel sounds  MUSCULOSKELETAL:  No clubbing or cyanosis of digits; no joint swelling or tenderness to palpation  PSYCH: A+O to person, place, and time; affect appropriate  NEUROLOGY: CN 2-12 are intact and symmetric; no gross sensory deficits, 5/5 strength throughout  SKIN: No rashes; no palpable lesions    LABS:                        9.3    6.78  )-----------( 560      ( 04 Apr 2021 06:58 )             30.6     04-04    139  |  101  |  6<L>  ----------------------------<  119<H>  4.0   |  28  |  0.63    Ca    9.2      04 Apr 2021 06:58    TPro  6.4  /  Alb  3.1<L>  /  TBili  0.2  /  DBili  x   /  AST  122<H>  /  ALT  108<H>  /  AlkPhos  214<H>  04-04    PT/INR - ( 04 Apr 2021 06:58 )   PT: 13.5 sec;   INR: 1.13 ratio         PTT - ( 04 Apr 2021 06:58 )  PTT:37.0 sec        RADIOLOGY & ADDITIONAL TESTS:  Results Reviewed: no leukocytosis, H/H stable, LFTs downtrending  Imaging Personally Reviewed:  4/2/21 TTE:  Conclusions:  1. Normal mitral valve. Minimal mitral regurgitation.  2. Thickened trileaflet aortic valve with normal opening. No aortic valve regurgitation seen.  3. Normal left ventricular internal dimensions and wall thickness.  4. Normal left ventricular systolic function. No segmental wall motion abnormalities.  5. Normal diastolic function  6. Normal right ventricular size and function.  Electrocardiogram Personally Reviewed:    COORDINATION OF CARE:  Care Discussed with Consultants/Other Providers [Y]: Neurosurgery GRACE Scott  Prior or Outpatient Records Reviewed [Y]: Cardiology, plastics progress notes   [facility] Division of Hospital Medicine  Claribel Meraz MD  Pager (M-F, 8H-2L): [phone]  Other Times:  [phone]      Patient is a 61y old  Female who presents with a chief complaint of dizziness, poss. spinal fluid leak thoraco-lumbar (31 Mar 2021 10:49)      SUBJECTIVE / OVERNIGHT EVENTS: no acute events overnight. still has intermittent nausea, but no vomiting. no fever, chills, chest pain, cough, sob nor abd pain.   ADDITIONAL REVIEW OF SYSTEMS:    MEDICATIONS  (STANDING):  dextrose 40% Gel 15 Gram(s) Oral once  dextrose 5%. 1000 milliLiter(s) (50 mL/Hr) IV Continuous <Continuous>  dextrose 5%. 1000 milliLiter(s) (100 mL/Hr) IV Continuous <Continuous>  dextrose 50% Injectable 25 Gram(s) IV Push once  dextrose 50% Injectable 12.5 Gram(s) IV Push once  dextrose 50% Injectable 25 Gram(s) IV Push once  enoxaparin Injectable 40 milliGRAM(s) SubCutaneous at bedtime  fentaNYL   Patch  50 MICROgram(s)/Hr 1 Patch Transdermal every 72 hours  glucagon  Injectable 1 milliGRAM(s) IntraMuscular once  insulin lispro (ADMELOG) corrective regimen sliding scale   SubCutaneous three times a day before meals  insulin lispro (ADMELOG) corrective regimen sliding scale   SubCutaneous at bedtime  levothyroxine 200 MICROGram(s) Oral daily  melatonin 3 milliGRAM(s) Oral at bedtime  multivitamin 1 Tablet(s) Oral daily  pantoprazole    Tablet 40 milliGRAM(s) Oral before breakfast  PARoxetine 40 milliGRAM(s) Oral daily  polyethylene glycol 3350 17 Gram(s) Oral two times a day  senna 2 Tablet(s) Oral at bedtime  sodium chloride 0.9% Bolus 1000 milliLiter(s) IV Bolus once  sodium chloride 0.9%. 1000 milliLiter(s) (75 mL/Hr) IV Continuous <Continuous>    MEDICATIONS  (PRN):  bisacodyl Suppository 10 milliGRAM(s) Rectal daily PRN Constipation  cyclobenzaprine 5 milliGRAM(s) Oral three times a day PRN Muscle Spasm  ondansetron Injectable 4 milliGRAM(s) IV Push every 6 hours PRN Nausea and/or Vomiting  oxyCODONE    IR 10 milliGRAM(s) Oral every 4 hours PRN Severe Pain (7 - 10)  oxyCODONE    IR 5 milliGRAM(s) Oral every 4 hours PRN Moderate Pain (4 - 6)      CAPILLARY BLOOD GLUCOSE      POCT Blood Glucose.: 103 mg/dL (04 Apr 2021 11:11)  POCT Blood Glucose.: 109 mg/dL (04 Apr 2021 07:44)  POCT Blood Glucose.: 118 mg/dL (03 Apr 2021 21:45)  POCT Blood Glucose.: 114 mg/dL (03 Apr 2021 17:12)    I&O's Summary    03 Apr 2021 07:01  -  04 Apr 2021 07:00  --------------------------------------------------------  IN: 1140 mL / OUT: 15 mL / NET: 1125 mL        PHYSICAL EXAM:  Vital Signs Last 24 Hrs  T(C): 36.8 (04 Apr 2021 11:01), Max: 36.9 (04 Apr 2021 05:44)  T(F): 98.2 (04 Apr 2021 11:01), Max: 98.4 (04 Apr 2021 05:44)  HR: 68 (04 Apr 2021 11:01) (66 - 74)  BP: 110/63 (04 Apr 2021 11:01) (101/60 - 112/67)  BP(mean): --  RR: 18 (04 Apr 2021 11:01) (18 - 18)  SpO2: 94% (04 Apr 2021 11:01) (94% - 96%)    CONSTITUTIONAL: NAD, well-developed, well-groomed  EYES: PERRLA; conjunctiva and sclera clear  ENMT: Moist oral mucosa, no pharyngeal injection or exudates; normal dentition  NECK: Supple, no palpable masses; no thyromegaly  RESPIRATORY: Normal respiratory effort; lungs are clear to auscultation bilaterally  CARDIOVASCULAR: Regular rate and rhythm, normal S1 and S2, no murmur/rub/gallop; No lower extremity edema; Peripheral pulses are 2+ bilaterally  ABDOMEN: Soft, Nondistended,  Nontender to palpation, normoactive bowel sounds  MUSCULOSKELETAL:  No clubbing or cyanosis of digits; no joint swelling or tenderness to palpation  PSYCH: A+O to person, place, and time; affect appropriate  NEUROLOGY: CN 2-12 are intact and symmetric; no gross sensory deficits, 5/5 strength throughout  SKIN: No rashes; no palpable lesions    LABS:                        9.3    6.78  )-----------( 560      ( 04 Apr 2021 06:58 )             30.6     04-04    139  |  101  |  6<L>  ----------------------------<  119<H>  4.0   |  28  |  0.63    Ca    9.2      04 Apr 2021 06:58    TPro  6.4  /  Alb  3.1<L>  /  TBili  0.2  /  DBili  x   /  AST  122<H>  /  ALT  108<H>  /  AlkPhos  214<H>  04-04    PT/INR - ( 04 Apr 2021 06:58 )   PT: 13.5 sec;   INR: 1.13 ratio         PTT - ( 04 Apr 2021 06:58 )  PTT:37.0 sec        RADIOLOGY & ADDITIONAL TESTS:  Results Reviewed: no leukocytosis, H/H stable, LFTs downtrending  Imaging Personally Reviewed:  4/2/21 TTE:  Conclusions:  1. Normal mitral valve. Minimal mitral regurgitation.  2. Thickened trileaflet aortic valve with normal opening. No aortic valve regurgitation seen.  3. Normal left ventricular internal dimensions and wall thickness.  4. Normal left ventricular systolic function. No segmental wall motion abnormalities.  5. Normal diastolic function  6. Normal right ventricular size and function.    4/4/21 RUQ US:  IMPRESSION:    4.0 cm area of tumefactive sludge or solid mass within the gallbladder. Recommend contrast-enhanced MRI of the abdomen for further evaluation.  Electrocardiogram Personally Reviewed:    COORDINATION OF CARE:  Care Discussed with Consultants/Other Providers [Y]: Neurosurgery GRACE Scott  Prior or Outpatient Records Reviewed [Y]: Cardiology, plastics progress notes

## 2021-04-04 NOTE — PROGRESS NOTE ADULT - PROBLEM SELECTOR PLAN 2
+orthostatics today.  - c/w IVF boluses as needed  - may need to add back midodrine after stress test (was discharged on midodrine last admission) +orthostatics today.  - c/w IVF boluses as needed

## 2021-04-04 NOTE — PROGRESS NOTE ADULT - PROBLEM SELECTOR PLAN 3
EKG with  flipped T waves in leads V2-V6, but now normalized in v3-v6.  - Cardiology consult recs appreciated  - TTE grossly unremarkable  - f/u  nuclear stress test  - added US AAA given pronounced AA pulse on exam. reviewed prior CT and MRI with radiology resident - possible mild bulge. will start with US AAA to better evaluate. EKG with  flipped T waves in leads V2-V6, but now normalized in v3-v6.  - Cardiology consult recs appreciated  - TTE grossly unremarkable, minimal MR  - f/u  nuclear stress test  - added US AAA given pronounced AA pulse on exam. reviewed prior CT and MRI with radiology resident - possible mild bulge. will start with US AAA to better evaluate.

## 2021-04-05 LAB
ALBUMIN SERPL ELPH-MCNC: 3.2 G/DL — LOW (ref 3.3–5)
ALP SERPL-CCNC: 168 U/L — HIGH (ref 40–120)
ALT FLD-CCNC: 64 U/L — HIGH (ref 10–45)
ANION GAP SERPL CALC-SCNC: 9 MMOL/L — SIGNIFICANT CHANGE UP (ref 5–17)
AST SERPL-CCNC: 46 U/L — HIGH (ref 10–40)
BILIRUB SERPL-MCNC: 0.3 MG/DL — SIGNIFICANT CHANGE UP (ref 0.2–1.2)
BUN SERPL-MCNC: 5 MG/DL — LOW (ref 7–23)
CALCIUM SERPL-MCNC: 9.4 MG/DL — SIGNIFICANT CHANGE UP (ref 8.4–10.5)
CHLORIDE SERPL-SCNC: 102 MMOL/L — SIGNIFICANT CHANGE UP (ref 96–108)
CO2 SERPL-SCNC: 28 MMOL/L — SIGNIFICANT CHANGE UP (ref 22–31)
CREAT SERPL-MCNC: 0.58 MG/DL — SIGNIFICANT CHANGE UP (ref 0.5–1.3)
GLUCOSE SERPL-MCNC: 113 MG/DL — HIGH (ref 70–99)
POTASSIUM SERPL-MCNC: 4.5 MMOL/L — SIGNIFICANT CHANGE UP (ref 3.5–5.3)
POTASSIUM SERPL-SCNC: 4.5 MMOL/L — SIGNIFICANT CHANGE UP (ref 3.5–5.3)
PROT SERPL-MCNC: 6.6 G/DL — SIGNIFICANT CHANGE UP (ref 6–8.3)
SODIUM SERPL-SCNC: 139 MMOL/L — SIGNIFICANT CHANGE UP (ref 135–145)

## 2021-04-05 PROCEDURE — 74183 MRI ABD W/O CNTR FLWD CNTR: CPT | Mod: 26

## 2021-04-05 PROCEDURE — 99232 SBSQ HOSP IP/OBS MODERATE 35: CPT

## 2021-04-05 RX ORDER — ONDANSETRON 8 MG/1
4 TABLET, FILM COATED ORAL ONCE
Refills: 0 | Status: DISCONTINUED | OUTPATIENT
Start: 2021-04-05 | End: 2021-04-07

## 2021-04-05 RX ORDER — DIPHENHYDRAMINE HCL 50 MG
12.5 CAPSULE ORAL ONCE
Refills: 0 | Status: DISCONTINUED | OUTPATIENT
Start: 2021-04-05 | End: 2021-04-07

## 2021-04-05 RX ADMIN — FENTANYL CITRATE 1 PATCH: 50 INJECTION INTRAVENOUS at 17:58

## 2021-04-05 RX ADMIN — OXYCODONE HYDROCHLORIDE 10 MILLIGRAM(S): 5 TABLET ORAL at 17:08

## 2021-04-05 RX ADMIN — Medication 3 MILLIGRAM(S): at 21:44

## 2021-04-05 RX ADMIN — OXYCODONE HYDROCHLORIDE 10 MILLIGRAM(S): 5 TABLET ORAL at 05:48

## 2021-04-05 RX ADMIN — FENTANYL CITRATE 1 PATCH: 50 INJECTION INTRAVENOUS at 19:27

## 2021-04-05 RX ADMIN — Medication 200 MICROGRAM(S): at 05:18

## 2021-04-05 RX ADMIN — FENTANYL CITRATE 1 PATCH: 50 INJECTION INTRAVENOUS at 07:13

## 2021-04-05 RX ADMIN — OXYCODONE HYDROCHLORIDE 10 MILLIGRAM(S): 5 TABLET ORAL at 22:48

## 2021-04-05 RX ADMIN — OXYCODONE HYDROCHLORIDE 10 MILLIGRAM(S): 5 TABLET ORAL at 22:18

## 2021-04-05 RX ADMIN — Medication 40 MILLIGRAM(S): at 05:19

## 2021-04-05 RX ADMIN — Medication 1 TABLET(S): at 17:10

## 2021-04-05 RX ADMIN — OXYCODONE HYDROCHLORIDE 10 MILLIGRAM(S): 5 TABLET ORAL at 05:19

## 2021-04-05 RX ADMIN — ENOXAPARIN SODIUM 40 MILLIGRAM(S): 100 INJECTION SUBCUTANEOUS at 21:44

## 2021-04-05 NOTE — PROGRESS NOTE ADULT - ASSESSMENT
62 y/o F s/p T8 - pelvis arthrodesis, L1-L3 anterior column release and closure 3/12/21, returned to ED with nausea and dizziness, imaging pending to rule out CSF leak    - UCHE drain in place draining, will continue and monitor output   - DC w/ UCHE  - F/U w/ Dr. Fournier as outpatient for UCHE drain removal   - Primary management per neurosurgery, appreciate recs and care.

## 2021-04-05 NOTE — PROGRESS NOTE ADULT - PROBLEM SELECTOR PLAN 3
history of "sludge" in gallbladder per patient. intermittent nausea  - RUQ with 4.0 cm area of tumefactive sludge or solid mass within the gallbladder  - MRCP with contrast to better evaluate  - trend LFTs, downtrending  - GI consult  - hold statin

## 2021-04-05 NOTE — PROGRESS NOTE ADULT - SUBJECTIVE AND OBJECTIVE BOX
Sharad Landaverde   Pager [phone]  Office [phone]      CC: Patient is a 61y old  Female who presents with a chief complaint of dizziness, poss. spinal fluid leak thoraco-lumbar (31 Mar 2021 10:49)      SUBJECTIVE / OVERNIGHT EVENTS:    MEDICATIONS  (STANDING):  enoxaparin Injectable 40 milliGRAM(s) SubCutaneous at bedtime  fentaNYL   Patch  50 MICROgram(s)/Hr 1 Patch Transdermal every 72 hours  glucagon  Injectable 1 milliGRAM(s) IntraMuscular once  levothyroxine 200 MICROGram(s) Oral daily  melatonin 3 milliGRAM(s) Oral at bedtime  multivitamin 1 Tablet(s) Oral daily  pantoprazole    Tablet 40 milliGRAM(s) Oral before breakfast  PARoxetine 40 milliGRAM(s) Oral daily  polyethylene glycol 3350 17 Gram(s) Oral two times a day  senna 2 Tablet(s) Oral at bedtime  sodium chloride 0.9%. 1000 milliLiter(s) (75 mL/Hr) IV Continuous <Continuous>    MEDICATIONS  (PRN):  bisacodyl Suppository 10 milliGRAM(s) Rectal daily PRN Constipation  cyclobenzaprine 5 milliGRAM(s) Oral three times a day PRN Muscle Spasm  ondansetron Injectable 4 milliGRAM(s) IV Push every 6 hours PRN Nausea and/or Vomiting  oxyCODONE    IR 10 milliGRAM(s) Oral every 4 hours PRN Severe Pain (7 - 10)  oxyCODONE    IR 5 milliGRAM(s) Oral every 4 hours PRN Moderate Pain (4 - 6)  prochlorperazine   Injectable 10 milliGRAM(s) IV Push once PRN nausea      Vital Signs Last 24 Hrs  T(C): 36.8 (05 Apr 2021 07:08), Max: 36.9 (04 Apr 2021 16:07)  T(F): 98.2 (05 Apr 2021 07:08), Max: 98.4 (04 Apr 2021 16:07)  HR: 75 (05 Apr 2021 07:08) (74 - 75)  BP: 135/69 (05 Apr 2021 07:08) (101/58 - 135/69)  BP(mean): --  RR: 18 (05 Apr 2021 09:20) (17 - 18)  SpO2: 93% (05 Apr 2021 09:20) (92% - 94%)  CAPILLARY BLOOD GLUCOSE      POCT Blood Glucose.: 123 mg/dL (04 Apr 2021 21:42)  POCT Blood Glucose.: 106 mg/dL (04 Apr 2021 16:48)    I&O's Summary    04 Apr 2021 07:01  -  05 Apr 2021 07:00  --------------------------------------------------------  IN: 0 mL / OUT: 25 mL / NET: -25 mL      tele:    PHYSICAL EXAM:    GENERAL: NAD   HEENT: EOMI, PERRL  PULM: Clear to auscultation bilaterally  CV: Regular rate and rhythm; nl S1, S2; No murmurs, rubs, or gallops  ABDOMEN: Soft, Nontender, Nondistended; Bowel sounds present  EXTREMITIES/MSK:  No edema, calf tenderness   PSYCH: AAOx3  NEUROLOGY: non-focal          LABS:                        9.3    6.78  )-----------( 560      ( 04 Apr 2021 06:58 )             30.6     04-05    139  |  102  |  5<L>  ----------------------------<  113<H>  4.5   |  28  |  0.58    Ca    9.4      05 Apr 2021 10:48    TPro  6.6  /  Alb  3.2<L>  /  TBili  0.3  /  DBili  x   /  AST  46<H>  /  ALT  64<H>  /  AlkPhos  168<H>  04-05    PT/INR - ( 04 Apr 2021 06:58 )   PT: 13.5 sec;   INR: 1.13 ratio         PTT - ( 04 Apr 2021 06:58 )  PTT:37.0 sec            RADIOLOGY & ADDITIONAL TESTS:    Imaging Personally Reviewed:    Consultant(s) Notes Reviewed:      Care Discussed with Consultants/Other Providers:   Sharad Landaverde   Pager [phone]  Office [phone]      CC: Patient is a 61y old  Female who presents with a chief complaint of dizziness, poss. spinal fluid leak thoraco-lumbar (31 Mar 2021 10:49)      SUBJECTIVE / OVERNIGHT EVENTS: no further episodes of cp/nausea. +light-headedness c standing. no abd pain. no sob.    MEDICATIONS  (STANDING):  enoxaparin Injectable 40 milliGRAM(s) SubCutaneous at bedtime  fentaNYL   Patch  50 MICROgram(s)/Hr 1 Patch Transdermal every 72 hours  glucagon  Injectable 1 milliGRAM(s) IntraMuscular once  levothyroxine 200 MICROGram(s) Oral daily  melatonin 3 milliGRAM(s) Oral at bedtime  multivitamin 1 Tablet(s) Oral daily  pantoprazole    Tablet 40 milliGRAM(s) Oral before breakfast  PARoxetine 40 milliGRAM(s) Oral daily  polyethylene glycol 3350 17 Gram(s) Oral two times a day  senna 2 Tablet(s) Oral at bedtime  sodium chloride 0.9%. 1000 milliLiter(s) (75 mL/Hr) IV Continuous <Continuous>    MEDICATIONS  (PRN):  bisacodyl Suppository 10 milliGRAM(s) Rectal daily PRN Constipation  cyclobenzaprine 5 milliGRAM(s) Oral three times a day PRN Muscle Spasm  ondansetron Injectable 4 milliGRAM(s) IV Push every 6 hours PRN Nausea and/or Vomiting  oxyCODONE    IR 10 milliGRAM(s) Oral every 4 hours PRN Severe Pain (7 - 10)  oxyCODONE    IR 5 milliGRAM(s) Oral every 4 hours PRN Moderate Pain (4 - 6)  prochlorperazine   Injectable 10 milliGRAM(s) IV Push once PRN nausea      Vital Signs Last 24 Hrs  T(C): 36.8 (05 Apr 2021 07:08), Max: 36.9 (04 Apr 2021 16:07)  T(F): 98.2 (05 Apr 2021 07:08), Max: 98.4 (04 Apr 2021 16:07)  HR: 75 (05 Apr 2021 07:08) (74 - 75)  BP: 135/69 (05 Apr 2021 07:08) (101/58 - 135/69)  BP(mean): --  RR: 18 (05 Apr 2021 09:20) (17 - 18)  SpO2: 93% (05 Apr 2021 09:20) (92% - 94%)  CAPILLARY BLOOD GLUCOSE      POCT Blood Glucose.: 123 mg/dL (04 Apr 2021 21:42)  POCT Blood Glucose.: 106 mg/dL (04 Apr 2021 16:48)    I&O's Summary    04 Apr 2021 07:01  -  05 Apr 2021 07:00  --------------------------------------------------------  IN: 0 mL / OUT: 25 mL / NET: -25 mL          PHYSICAL EXAM:    GENERAL: NAD   HEENT: EOMI, PERRL  PULM: Clear to auscultation bilaterally  CV: Regular rate and rhythm; nl S1, S2; No murmurs, rubs, or gallops  ABDOMEN: Soft, Nontender, Nondistended; Bowel sounds present  EXTREMITIES/MSK:  No edema, calf tenderness   PSYCH: AAOx3  NEUROLOGY: non-focal          LABS:                        9.3    6.78  )-----------( 560      ( 04 Apr 2021 06:58 )             30.6     04-05    139  |  102  |  5<L>  ----------------------------<  113<H>  4.5   |  28  |  0.58    Ca    9.4      05 Apr 2021 10:48    TPro  6.6  /  Alb  3.2<L>  /  TBili  0.3  /  DBili  x   /  AST  46<H>  /  ALT  64<H>  /  AlkPhos  168<H>  04-05    PT/INR - ( 04 Apr 2021 06:58 )   PT: 13.5 sec;   INR: 1.13 ratio         PTT - ( 04 Apr 2021 06:58 )  PTT:37.0 sec            RADIOLOGY & ADDITIONAL TESTS:    Imaging Personally Reviewed:    Consultant(s) Notes Reviewed:      Care Discussed with Consultants/Other Providers: neurosurg regard increased LFTs

## 2021-04-05 NOTE — PROGRESS NOTE ADULT - SUBJECTIVE AND OBJECTIVE BOX
Plastic Surgery Progress Note (pg [facility]: [id number], [facility]: [phone])    SUBJECTIVE  The patient was seen and examined. No acute events overnight. Pt NPO for MRI     OBJECTIVE  ___________________________________________________  VITAL SIGNS / I&O's   Vital Signs Last 24 Hrs  T(C): 36.7 (05 Apr 2021 05:11), Max: 36.9 (04 Apr 2021 16:07)  T(F): 98.1 (05 Apr 2021 05:11), Max: 98.4 (04 Apr 2021 16:07)  HR: 75 (05 Apr 2021 05:11) (68 - 75)  BP: 101/58 (05 Apr 2021 05:11) (101/58 - 110/64)  BP(mean): --  RR: 17 (05 Apr 2021 05:11) (17 - 18)  SpO2: 92% (05 Apr 2021 05:11) (92% - 96%)      03 Apr 2021 07:01  -  04 Apr 2021 07:00  --------------------------------------------------------  IN:    Oral Fluid: 540 mL    sodium chloride 0.9%: 600 mL  Total IN: 1140 mL    OUT:    Bulb (mL): 15 mL  Total OUT: 15 mL    Total NET: 1125 mL      04 Apr 2021 07:01  -  05 Apr 2021 06:50  --------------------------------------------------------  IN:  Total IN: 0 mL    OUT:    Bulb (mL): 25 mL  Total OUT: 25 mL    Total NET: -25 mL        ___________________________________________________  PHYSICAL EXAM    NAD, lying in bed  Back soft  Dressing c/d/i  No palpable collections  UCHE serous, thin fluid, yellow in color, 25cc    ___________________________________________________  LABS                        9.3    6.78  )-----------( 560      ( 04 Apr 2021 06:58 )             30.6     04 Apr 2021 06:58    139    |  101    |  6      ----------------------------<  119    4.0     |  28     |  0.63     Ca    9.2        04 Apr 2021 06:58    TPro  6.4    /  Alb  3.1    /  TBili  0.2    /  DBili  x      /  AST  122    /  ALT  108    /  AlkPhos  214    04 Apr 2021 06:58    PT/INR - ( 04 Apr 2021 06:58 )   PT: 13.5 sec;   INR: 1.13 ratio         PTT - ( 04 Apr 2021 06:58 )  PTT:37.0 sec  CAPILLARY BLOOD GLUCOSE      POCT Blood Glucose.: 123 mg/dL (04 Apr 2021 21:42)  POCT Blood Glucose.: 106 mg/dL (04 Apr 2021 16:48)  POCT Blood Glucose.: 103 mg/dL (04 Apr 2021 11:11)  POCT Blood Glucose.: 109 mg/dL (04 Apr 2021 07:44)          ___________________________________________________  MICRO  Recent Cultures:    ___________________________________________________  MEDICATIONS  (STANDING):  dextrose 40% Gel 15 Gram(s) Oral once  dextrose 5%. 1000 milliLiter(s) (50 mL/Hr) IV Continuous <Continuous>  dextrose 5%. 1000 milliLiter(s) (100 mL/Hr) IV Continuous <Continuous>  dextrose 50% Injectable 25 Gram(s) IV Push once  dextrose 50% Injectable 12.5 Gram(s) IV Push once  dextrose 50% Injectable 25 Gram(s) IV Push once  enoxaparin Injectable 40 milliGRAM(s) SubCutaneous at bedtime  fentaNYL   Patch  50 MICROgram(s)/Hr 1 Patch Transdermal every 72 hours  glucagon  Injectable 1 milliGRAM(s) IntraMuscular once  insulin lispro (ADMELOG) corrective regimen sliding scale   SubCutaneous three times a day before meals  insulin lispro (ADMELOG) corrective regimen sliding scale   SubCutaneous at bedtime  levothyroxine 200 MICROGram(s) Oral daily  melatonin 3 milliGRAM(s) Oral at bedtime  multivitamin 1 Tablet(s) Oral daily  pantoprazole    Tablet 40 milliGRAM(s) Oral before breakfast  PARoxetine 40 milliGRAM(s) Oral daily  polyethylene glycol 3350 17 Gram(s) Oral two times a day  senna 2 Tablet(s) Oral at bedtime  sodium chloride 0.9%. 1000 milliLiter(s) (75 mL/Hr) IV Continuous <Continuous>    MEDICATIONS  (PRN):  bisacodyl Suppository 10 milliGRAM(s) Rectal daily PRN Constipation  cyclobenzaprine 5 milliGRAM(s) Oral three times a day PRN Muscle Spasm  ondansetron Injectable 4 milliGRAM(s) IV Push every 6 hours PRN Nausea and/or Vomiting  oxyCODONE    IR 10 milliGRAM(s) Oral every 4 hours PRN Severe Pain (7 - 10)  oxyCODONE    IR 5 milliGRAM(s) Oral every 4 hours PRN Moderate Pain (4 - 6)  prochlorperazine   Injectable 10 milliGRAM(s) IV Push once PRN nausea

## 2021-04-05 NOTE — PRE-ANESTHESIA EVALUATION ADULT - NSANTHADDINFOFT_GEN_ALL_CORE
D/w Dr Landaverde (internal medicine) and Dr Son (cardiology), pt is stable for GA  pt denies CP/SOB

## 2021-04-05 NOTE — PROGRESS NOTE ADULT - SUBJECTIVE AND OBJECTIVE BOX
HPI:  Patient is a 61 year old female with recent T8-pelvis arthrodesis, and L1-L3 anterior column release on 3/12/2021 that was discharged home.  Now returns for dizziness/orthostasis.    OVERNIGHT EVENTS:  No acute events overnight.  Planned for mrcp today.  Stress test pending.  Cardiology and hospitalist following.    Vital Signs Last 24 Hrs  T(C): 36.8 (05 Apr 2021 07:08), Max: 36.9 (04 Apr 2021 16:07)  T(F): 98.2 (05 Apr 2021 07:08), Max: 98.4 (04 Apr 2021 16:07)  HR: 75 (05 Apr 2021 07:08) (74 - 75)  BP: 135/69 (05 Apr 2021 07:08) (101/58 - 135/69)  BP(mean): --  RR: 18 (05 Apr 2021 09:20) (17 - 18)  SpO2: 93% (05 Apr 2021 09:20) (92% - 94%)    I&O's Detail    04 Apr 2021 07:01  -  05 Apr 2021 07:00  --------------------------------------------------------  IN:  Total IN: 0 mL    OUT:    Bulb (mL): 25 mL  Total OUT: 25 mL    Total NET: -25 mL        I&O's Summary    04 Apr 2021 07:01  -  05 Apr 2021 07:00  --------------------------------------------------------  IN: 0 mL / OUT: 25 mL / NET: -25 mL        PHYSICAL EXAM:  Neurological: awake, alert, oriented x3, follows commands, speech clear and fluent, moves all extremities x4 w/ 5/5 strength throughout, sensation present, intact, equal throughout    Cardiovascular: +s1, s2  Respiratory: clear to auscultation b/l  Gastrointestinal: soft, non-distended, non-tender  Genitourinary: +voiding  Incision/Wound: posterior midline vertical incision dressing c/d/i w/ aquacel, right flank incision dressing c/d/i, w/ aquacel, no collections, kathy x1    TUBES/LINES:  [x] kathy x1 (25cc serosanguinous output over 24 hours)    DIET:  [x] regular      LABS:                        9.3    6.78  )-----------( 560      ( 04 Apr 2021 06:58 )             30.6     04-05    139  |  102  |  5<L>  ----------------------------<  113<H>  4.5   |  28  |  0.58    Ca    9.4      05 Apr 2021 10:48    TPro  6.6  /  Alb  3.2<L>  /  TBili  0.3  /  DBili  x   /  AST  46<H>  /  ALT  64<H>  /  AlkPhos  168<H>  04-05    PT/INR - ( 04 Apr 2021 06:58 )   PT: 13.5 sec;   INR: 1.13 ratio         PTT - ( 04 Apr 2021 06:58 )  PTT:37.0 sec        CAPILLARY BLOOD GLUCOSE      POCT Blood Glucose.: 123 mg/dL (04 Apr 2021 21:42)  POCT Blood Glucose.: 106 mg/dL (04 Apr 2021 16:48)          Allergies    No Known Allergies          MEDICATIONS:  Antibiotics:  none    Neuro:  cyclobenzaprine 5 milliGRAM(s) Oral three times a day PRN  fentaNYL   Patch  50 MICROgram(s)/Hr 1 Patch Transdermal every 72 hours  melatonin 3 milliGRAM(s) Oral at bedtime  ondansetron Injectable 4 milliGRAM(s) IV Push every 6 hours PRN  oxyCODONE    IR 10 milliGRAM(s) Oral every 4 hours PRN  oxyCODONE    IR 5 milliGRAM(s) Oral every 4 hours PRN  PARoxetine 40 milliGRAM(s) Oral daily  prochlorperazine   Injectable 10 milliGRAM(s) IV Push once PRN    Anticoagulation:  enoxaparin Injectable 40 milliGRAM(s) SubCutaneous at bedtime    OTHER:  bisacodyl Suppository 10 milliGRAM(s) Rectal daily PRN  glucagon  Injectable 1 milliGRAM(s) IntraMuscular once  levothyroxine 200 MICROGram(s) Oral daily  pantoprazole    Tablet 40 milliGRAM(s) Oral before breakfast  polyethylene glycol 3350 17 Gram(s) Oral two times a day  senna 2 Tablet(s) Oral at bedtime    IVF:  multivitamin 1 Tablet(s) Oral daily  sodium chloride 0.9%. 1000 milliLiter(s) IV Continuous <Continuous>    CULTURES:  none    RADIOLOGY & ADDITIONAL TESTS:  no new imaging

## 2021-04-05 NOTE — PROGRESS NOTE ADULT - ASSESSMENT
HPI:  Patient is a 61 year old female with recent T8-pelvis arthrodesis, and L1-L3 anterior column release on 3/12/2021 that was discharged home.  Now returns for dizziness/orthostasis.    PROCEDURE: s/p T8-pelvis arthrodesis, and L1-L3 anterior column release on 3/12/2021  POD#24    PLAN:  Neuro:   -q4 hour neuro checks  -oxycodone and fentanyl patch for pain control  -flexeril and valium for muscle spasms  -continue paxil  -continue kathy drain, monitor output  -out of bed with assistance  -pt/ot - acute rehab upon discharge    Respiratory:   -keep o2 sat > 94%  -on room air    CV:  -keep sbp 100-140  -atorvastatin for lipid control  -stress test pending  -cardiology following    Endocrine:   -levothyroxine for hypothyroid  -HISS for glucose control  -keep fingersticks 100-180    Heme/Onc:    -lovenox and SCDs for DVT prophylaxis    Renal:   -iv locked  -voiding    ID:   -afebrile    GI:   -mrcp today to evaluate gb mass/elevated lfts  -regular diet  -senna, miralax, and dulcolax for bowel regimen  -protonix for gi prophylaxis      Spectra #[id number]

## 2021-04-05 NOTE — PROGRESS NOTE ADULT - PROBLEM SELECTOR PLAN 2
EKG with  flipped T waves in leads V2-V6, but now normalized in v3-v6.  - Cardiology consult recs appreciated  - TTE grossly unremarkable, minimal MR  - f/u  nuclear stress test

## 2021-04-05 NOTE — PROGRESS NOTE ADULT - ASSESSMENT
61y old  Female h/o esophageal spasms, smoker, COPD who presents with a chief complaint of orthostatic dizziness. Concern for CSF leak vs seroma. Consulted for medical comanagement. c/w right sided chest pain and found with elevated LFTs sec to sludge vs mass. h/o Flipped T waves in anterolateral leads that normalized on this hospitalization being followed by cards.   61y old  Female h/o esophageal spasms, smoker, COPD who presents with a recent T8-pelvis arthrodesis, and L1-L3 anterior column release on 3/12/2021 p/w orthostatic dizziness. Concern for CSF leak vs seroma. Consulted for medical comanagement. c/w right sided chest pain and found with elevated LFTs sec to sludge vs mass. h/o Flipped T waves in anterolateral leads that normalized on this hospitalization being followed by cards.

## 2021-04-05 NOTE — PROGRESS NOTE ADULT - SUBJECTIVE AND OBJECTIVE BOX
DATE OF SERVICE: 04-05-21 @ 07:00    Subjective: Patient seen and examined. No new events except as noted.     SUBJECTIVE/ROS:  feels ok       MEDICATIONS:  MEDICATIONS  (STANDING):  dextrose 40% Gel 15 Gram(s) Oral once  dextrose 5%. 1000 milliLiter(s) (50 mL/Hr) IV Continuous <Continuous>  dextrose 5%. 1000 milliLiter(s) (100 mL/Hr) IV Continuous <Continuous>  dextrose 50% Injectable 25 Gram(s) IV Push once  dextrose 50% Injectable 12.5 Gram(s) IV Push once  dextrose 50% Injectable 25 Gram(s) IV Push once  enoxaparin Injectable 40 milliGRAM(s) SubCutaneous at bedtime  fentaNYL   Patch  50 MICROgram(s)/Hr 1 Patch Transdermal every 72 hours  glucagon  Injectable 1 milliGRAM(s) IntraMuscular once  insulin lispro (ADMELOG) corrective regimen sliding scale   SubCutaneous three times a day before meals  insulin lispro (ADMELOG) corrective regimen sliding scale   SubCutaneous at bedtime  levothyroxine 200 MICROGram(s) Oral daily  melatonin 3 milliGRAM(s) Oral at bedtime  multivitamin 1 Tablet(s) Oral daily  pantoprazole    Tablet 40 milliGRAM(s) Oral before breakfast  PARoxetine 40 milliGRAM(s) Oral daily  polyethylene glycol 3350 17 Gram(s) Oral two times a day  senna 2 Tablet(s) Oral at bedtime  sodium chloride 0.9%. 1000 milliLiter(s) (75 mL/Hr) IV Continuous <Continuous>      PHYSICAL EXAM:  T(C): 36.7 (04-05-21 @ 05:11), Max: 36.9 (04-04-21 @ 16:07)  HR: 75 (04-05-21 @ 05:11) (68 - 75)  BP: 101/58 (04-05-21 @ 05:11) (101/58 - 110/64)  RR: 17 (04-05-21 @ 05:11) (17 - 18)  SpO2: 92% (04-05-21 @ 05:11) (92% - 96%)  Wt(kg): --  I&O's Summary    04 Apr 2021 07:01  -  05 Apr 2021 07:00  --------------------------------------------------------  IN: 0 mL / OUT: 25 mL / NET: -25 mL            JVP: Normal  Neck: supple  Lung: clear   CV: S1 S2 , Murmur:  Abd: soft  Ext: No edema  neuro: Awake / alert  Psych: flat affect  Skin: normal``    LABS/DATA:    CARDIAC MARKERS:                                9.3    6.78  )-----------( 560      ( 04 Apr 2021 06:58 )             30.6     04-04    139  |  101  |  6<L>  ----------------------------<  119<H>  4.0   |  28  |  0.63    Ca    9.2      04 Apr 2021 06:58    TPro  6.4  /  Alb  3.1<L>  /  TBili  0.2  /  DBili  x   /  AST  122<H>  /  ALT  108<H>  /  AlkPhos  214<H>  04-04    proBNP:   Lipid Profile:   HgA1c:   TSH:     TELE:  EKG:

## 2021-04-05 NOTE — PROGRESS NOTE ADULT - ASSESSMENT
Abn EKG  anterior T wave abn   now normalized  echo shows normal LV  function   stress test once Liver abn is resolved     nausea  has ruq abd pain   obtain ruq sono rule out acute hugo   MRCP as per primary team / GI  Elevated LFT  recommend GI eval   off statin   avoid hepatotoxic agents     DM II  Monitor finger stick. Insulin coverage. Diabetic education and Diabetic diet. Consider nutrition consultation.    DVT proph  on lovenox

## 2021-04-06 DIAGNOSIS — M48.00 SPINAL STENOSIS, SITE UNSPECIFIED: ICD-10-CM

## 2021-04-06 DIAGNOSIS — I95.1 ORTHOSTATIC HYPOTENSION: ICD-10-CM

## 2021-04-06 LAB
ALBUMIN SERPL ELPH-MCNC: 3.3 G/DL — SIGNIFICANT CHANGE UP (ref 3.3–5)
ALP SERPL-CCNC: 157 U/L — HIGH (ref 40–120)
ALT FLD-CCNC: 49 U/L — HIGH (ref 10–45)
ANION GAP SERPL CALC-SCNC: 14 MMOL/L — SIGNIFICANT CHANGE UP (ref 5–17)
AST SERPL-CCNC: 32 U/L — SIGNIFICANT CHANGE UP (ref 10–40)
BILIRUB DIRECT SERPL-MCNC: <0.1 MG/DL — SIGNIFICANT CHANGE UP (ref 0–0.2)
BILIRUB INDIRECT FLD-MCNC: SIGNIFICANT CHANGE UP MG/DL (ref 0.2–1)
BILIRUB SERPL-MCNC: 0.1 MG/DL — LOW (ref 0.2–1.2)
BUN SERPL-MCNC: 8 MG/DL — SIGNIFICANT CHANGE UP (ref 7–23)
CALCIUM SERPL-MCNC: 9.3 MG/DL — SIGNIFICANT CHANGE UP (ref 8.4–10.5)
CHLORIDE SERPL-SCNC: 102 MMOL/L — SIGNIFICANT CHANGE UP (ref 96–108)
CO2 SERPL-SCNC: 26 MMOL/L — SIGNIFICANT CHANGE UP (ref 22–31)
CREAT SERPL-MCNC: 0.62 MG/DL — SIGNIFICANT CHANGE UP (ref 0.5–1.3)
GLUCOSE SERPL-MCNC: 142 MG/DL — HIGH (ref 70–99)
HCT VFR BLD CALC: 29 % — LOW (ref 34.5–45)
HGB BLD-MCNC: 8.8 G/DL — LOW (ref 11.5–15.5)
MCHC RBC-ENTMCNC: 29.1 PG — SIGNIFICANT CHANGE UP (ref 27–34)
MCHC RBC-ENTMCNC: 30.3 GM/DL — LOW (ref 32–36)
MCV RBC AUTO: 96 FL — SIGNIFICANT CHANGE UP (ref 80–100)
NRBC # BLD: 0 /100 WBCS — SIGNIFICANT CHANGE UP (ref 0–0)
PLATELET # BLD AUTO: 568 K/UL — HIGH (ref 150–400)
POTASSIUM SERPL-MCNC: 4.5 MMOL/L — SIGNIFICANT CHANGE UP (ref 3.5–5.3)
POTASSIUM SERPL-SCNC: 4.5 MMOL/L — SIGNIFICANT CHANGE UP (ref 3.5–5.3)
PROT SERPL-MCNC: 6.6 G/DL — SIGNIFICANT CHANGE UP (ref 6–8.3)
RBC # BLD: 3.02 M/UL — LOW (ref 3.8–5.2)
RBC # FLD: 14.5 % — SIGNIFICANT CHANGE UP (ref 10.3–14.5)
SARS-COV-2 RNA SPEC QL NAA+PROBE: SIGNIFICANT CHANGE UP
SODIUM SERPL-SCNC: 142 MMOL/L — SIGNIFICANT CHANGE UP (ref 135–145)
WBC # BLD: 7.49 K/UL — SIGNIFICANT CHANGE UP (ref 3.8–10.5)
WBC # FLD AUTO: 7.49 K/UL — SIGNIFICANT CHANGE UP (ref 3.8–10.5)

## 2021-04-06 PROCEDURE — 78452 HT MUSCLE IMAGE SPECT MULT: CPT | Mod: 26

## 2021-04-06 PROCEDURE — 93018 CV STRESS TEST I&R ONLY: CPT

## 2021-04-06 PROCEDURE — 93016 CV STRESS TEST SUPVJ ONLY: CPT

## 2021-04-06 PROCEDURE — 99233 SBSQ HOSP IP/OBS HIGH 50: CPT

## 2021-04-06 RX ORDER — OXYCODONE HYDROCHLORIDE 5 MG/1
10 TABLET ORAL EVERY 4 HOURS
Refills: 0 | Status: DISCONTINUED | OUTPATIENT
Start: 2021-04-06 | End: 2021-04-07

## 2021-04-06 RX ORDER — OXYCODONE HYDROCHLORIDE 5 MG/1
5 TABLET ORAL EVERY 4 HOURS
Refills: 0 | Status: DISCONTINUED | OUTPATIENT
Start: 2021-04-06 | End: 2021-04-07

## 2021-04-06 RX ORDER — URSODIOL 250 MG/1
300 TABLET, FILM COATED ORAL EVERY 12 HOURS
Refills: 0 | Status: DISCONTINUED | OUTPATIENT
Start: 2021-04-06 | End: 2021-04-07

## 2021-04-06 RX ADMIN — ENOXAPARIN SODIUM 40 MILLIGRAM(S): 100 INJECTION SUBCUTANEOUS at 22:07

## 2021-04-06 RX ADMIN — SENNA PLUS 2 TABLET(S): 8.6 TABLET ORAL at 22:07

## 2021-04-06 RX ADMIN — SODIUM CHLORIDE 75 MILLILITER(S): 9 INJECTION INTRAMUSCULAR; INTRAVENOUS; SUBCUTANEOUS at 05:18

## 2021-04-06 RX ADMIN — PANTOPRAZOLE SODIUM 40 MILLIGRAM(S): 20 TABLET, DELAYED RELEASE ORAL at 07:57

## 2021-04-06 RX ADMIN — OXYCODONE HYDROCHLORIDE 10 MILLIGRAM(S): 5 TABLET ORAL at 22:07

## 2021-04-06 RX ADMIN — Medication 200 MICROGRAM(S): at 05:18

## 2021-04-06 RX ADMIN — OXYCODONE HYDROCHLORIDE 10 MILLIGRAM(S): 5 TABLET ORAL at 22:37

## 2021-04-06 RX ADMIN — Medication 40 MILLIGRAM(S): at 05:18

## 2021-04-06 RX ADMIN — OXYCODONE HYDROCHLORIDE 10 MILLIGRAM(S): 5 TABLET ORAL at 14:28

## 2021-04-06 RX ADMIN — Medication 3 MILLIGRAM(S): at 22:07

## 2021-04-06 RX ADMIN — FENTANYL CITRATE 1 PATCH: 50 INJECTION INTRAVENOUS at 19:00

## 2021-04-06 RX ADMIN — URSODIOL 300 MILLIGRAM(S): 250 TABLET, FILM COATED ORAL at 17:59

## 2021-04-06 NOTE — CONSULT NOTE ADULT - ASSESSMENT
61y old  Female h/o esophageal spasms, dysphagia (resolved), gastritis (followed by GI Dr Abreu, had an endoscopy in October, no interventions performed, takes pantoprazole) smoker, COPD who presents with a recent T8-pelvis arthrodesis, and L1-L3 anterior column release on 3/12/2021 p/w orthostatic dizziness. Concern for CSF leak vs seroma.  c/w right sided chest pain and found with elevated LFTs sec to sludge vs mass    COVID negative    #Elevated LFTs - now normalizing.  MRCP - no CBD stones/sludge +GB sludge - suspect likely passed sludge vs stone. Hep C negative  -no role for ERCP/GI intervention at this time  -Add Ursodiol 300 mg BID as a cholerrhetic agent  -trend LFTs  -will check Hep B and auto-immune serologies for completeness   -OK for PO diet  -no GI objection to planned cardiac work-up  -continue baseline PPI rx    Discussed with pt, all questions answered  Discussed with Medicine attending    Thank you for the courtesy of this consult.    Luca Webster PA-C    [facility]  [phone]  After hours and weekend coverage [phone]   61y old  Female h/o esophageal spasms, dysphagia (resolved), gastritis (followed by GI Dr Abreu, had an endoscopy in October, no interventions performed, takes pantoprazole) smoker, COPD who presents with a recent T8-pelvis arthrodesis, and L1-L3 anterior column release on 3/12/2021 p/w orthostatic dizziness. Concern for CSF leak vs seroma.  c/w right sided chest pain and found with elevated LFTs sec to sludge vs mass    COVID negative    #Elevated LFTs - now normalizing.  MRCP - no CBD stones/sludge +GB sludge - suspect likely passed sludge vs stone.   Hep C negative.  -No role for ERCP/GI intervention at this time  -Add Ursodiol 300 mg BID as a cholerrhetic agent  -Trend LFTs  -Will check Hep B and auto-immune serologies for completeness   -OK for PO diet  -No GI objection to planned cardiac work-up  -Continue baseline PPI rx    Discussed with pt, all questions answered  Discussed with Medicine attending    Thank you for the courtesy of this consult.    Luca Webster PA-C    [facility]  [phone]  After hours and weekend coverage [phone]

## 2021-04-06 NOTE — DIETITIAN INITIAL EVALUATION ADULT. - ADD RECOMMEND
Encouraged PO intake as tolerated. Patient made aware RD remains available. Monitor PO intake, weight, labs, skin, GI status, diet.

## 2021-04-06 NOTE — PROGRESS NOTE ADULT - SUBJECTIVE AND OBJECTIVE BOX
DATE OF SERVICE: 04-06-21 @ 10:02    Subjective: Patient seen and examined. No new events except as noted.     SUBJECTIVE/ROS:  No chest pain, dyspnea, palpitation, or dizziness.       MEDICATIONS:  MEDICATIONS  (STANDING):  enoxaparin Injectable 40 milliGRAM(s) SubCutaneous at bedtime  fentaNYL   Patch  50 MICROgram(s)/Hr 1 Patch Transdermal every 72 hours  glucagon  Injectable 1 milliGRAM(s) IntraMuscular once  levothyroxine 200 MICROGram(s) Oral daily  melatonin 3 milliGRAM(s) Oral at bedtime  multivitamin 1 Tablet(s) Oral daily  pantoprazole    Tablet 40 milliGRAM(s) Oral before breakfast  PARoxetine 40 milliGRAM(s) Oral daily  polyethylene glycol 3350 17 Gram(s) Oral two times a day  senna 2 Tablet(s) Oral at bedtime  sodium chloride 0.9%. 1000 milliLiter(s) (75 mL/Hr) IV Continuous <Continuous>  ursodiol Capsule 300 milliGRAM(s) Oral every 12 hours      PHYSICAL EXAM:  T(C): 36.7 (04-06-21 @ 07:02), Max: 36.9 (04-05-21 @ 19:35)  HR: 86 (04-06-21 @ 09:44) (66 - 86)  BP: 120/64 (04-06-21 @ 09:44) (92/57 - 126/67)  RR: 18 (04-06-21 @ 07:02) (18 - 20)  SpO2: 95% (04-06-21 @ 07:02) (94% - 96%)  Wt(kg): --  I&O's Summary    05 Apr 2021 07:01  -  06 Apr 2021 07:00  --------------------------------------------------------  IN: 0 mL / OUT: 31 mL / NET: -31 mL      Height (cm): 162.6 (04-05 @ 13:21)  Weight (kg): 78 (04-05 @ 13:21)  BMI (kg/m2): 29.5 (04-05 @ 13:21)  BSA (m2): 1.83 (04-05 @ 13:21)      JVP: Normal  Neck: supple  Lung: clear   CV: S1 S2 , Murmur:  Abd: soft  Ext: No edema  neuro: Awake / alert  Psych: flat affect  Skin: normal``    LABS/DATA:    CARDIAC MARKERS:                                8.8    7.49  )-----------( 568      ( 06 Apr 2021 05:31 )             29.0     04-06    142  |  102  |  8   ----------------------------<  142<H>  4.5   |  26  |  0.62    Ca    9.3      06 Apr 2021 05:31    TPro  6.6  /  Alb  3.3  /  TBili  0.1<L>  /  DBili  <0.1  /  AST  32  /  ALT  49<H>  /  AlkPhos  157<H>  04-06    proBNP:   Lipid Profile:   HgA1c:   TSH:     TELE:  EKG:

## 2021-04-06 NOTE — CONSULT NOTE ADULT - ATTENDING COMMENTS
Jazzmine Melendez MD, FACP, FACG, AGAF  [facility]  [phone]     After hours and weekend coverage GI service : [phone]

## 2021-04-06 NOTE — DIETITIAN INITIAL EVALUATION ADULT. - PERTINENT MEDS FT
sodium chloride 0.9%, Actigall, Zofran PRN, Compazine PRN, Protonix, Dulcolax Suppository PRN, Synthroid, multivitamin, oxycodone PRN, Miralax, senna

## 2021-04-06 NOTE — PROGRESS NOTE ADULT - ASSESSMENT
Abn EKG  anterior T wave abn   now normalized  echo shows normal LV  function   stress test     MRCP is neg for mass   shows sludge   Elevated LFT  recommend GI eval   off statin   avoid hepatotoxic agents     DM II  Monitor finger stick. Insulin coverage. Diabetic education and Diabetic diet.     DVT proph  on lovenox

## 2021-04-06 NOTE — PROGRESS NOTE ADULT - PROBLEM SELECTOR PLAN 3
history of "sludge" in gallbladder per patient. intermittent nausea  - improving. suspect transient biliary sludge that passed  - RUQ with 4.0 cm area of tumefactive sludge or solid mass within the gallbladder  - MRCP with contrast showing biliary sludge within gallbladder  - trend LFTs, downtrending  - GI consult recs appreciated,  - c/w ursodiol 300mg PO BID  - continue to hold statin

## 2021-04-06 NOTE — PROGRESS NOTE ADULT - PROBLEM SELECTOR PLAN 2
EKG with flipped T waves in leads V2-V6, but now normalized in v3-v6.  - Cardiology consult recs appreciated  - TTE grossly unremarkable, minimal MR  - f/u nuclear stress test - planned for today

## 2021-04-06 NOTE — PROGRESS NOTE ADULT - ASSESSMENT
60 y/o F s/p T8 - pelvis arthrodesis, L1-L3 anterior column release and closure 3/12/21, returned to ED with nausea and dizziness, imaging pending to rule out CSF leak    - UCHE drain in place draining, will continue to monitor output   - will assess possibility of drain removal when patient ready for discharge   - F/U w/ Dr. Fournier as outpatient for UCHE drain removal   - Primary management per neurosurgery, appreciate recs and care.

## 2021-04-06 NOTE — PROGRESS NOTE ADULT - ASSESSMENT
62 yo female with PMH of esophageal spasms, GERD, smoker, COPD, recent T8-pelvis arthrodesis, and L1-L3 anterior column release on 3/12/2021, who presents from acute rehab with orthostatic dizziness. Concern for CSF leak vs. seroma. Course c/b transaminitis found to have biliary sludge on MRCP and abnormal EKG with TWI, now pending nuclear stress test.

## 2021-04-06 NOTE — PROGRESS NOTE ADULT - PROBLEM SELECTOR PLAN 1
+orthostatics intermittently  - s/p IVF boluses with improvement.  - compression stockings  - monitor orthostatic vitals daily  - will consider starting midodrine if orthostatic hypotension persists after stress test complete

## 2021-04-06 NOTE — PROGRESS NOTE ADULT - SUBJECTIVE AND OBJECTIVE BOX
Plastic Surgery Progress Note (pg [facility]: [id number], [facility]: [phone])    SUBJECTIVE  The patient was seen and examined. No acute events overnight.     OBJECTIVE  ___________________________________________________  VITAL SIGNS / I&O's   Vital Signs Last 24 Hrs  T(C): 36.6 (06 Apr 2021 04:44), Max: 36.9 (05 Apr 2021 19:35)  T(F): 97.9 (06 Apr 2021 04:44), Max: 98.4 (05 Apr 2021 19:35)  HR: 69 (06 Apr 2021 04:44) (66 - 86)  BP: 101/53 (06 Apr 2021 04:44) (92/57 - 135/69)  BP(mean): --  RR: 19 (06 Apr 2021 04:44) (18 - 20)  SpO2: 94% (06 Apr 2021 04:44) (93% - 96%)      04 Apr 2021 07:01  -  05 Apr 2021 07:00  --------------------------------------------------------  IN:  Total IN: 0 mL    OUT:    Bulb (mL): 25 mL  Total OUT: 25 mL    Total NET: -25 mL      05 Apr 2021 07:01  -  06 Apr 2021 06:54  --------------------------------------------------------  IN:  Total IN: 0 mL    OUT:    Bulb (mL): 30 mL    Oral Fluid: 0 mL    Voided (mL): 1 mL  Total OUT: 31 mL    Total NET: -31 mL        ___________________________________________________  PHYSICAL EXAM    NAD, lying in bed  Back soft  Dressing c/d/i  No palpable collections  UCHE serous, thin fluid, yellow in color, 25cc    ___________________________________________________  LABS                        8.8    7.49  )-----------( 568      ( 06 Apr 2021 05:31 )             29.0     06 Apr 2021 05:31    142    |  102    |  8      ----------------------------<  142    4.5     |  26     |  0.62     Ca    9.3        06 Apr 2021 05:31    TPro  6.6    /  Alb  3.3    /  TBili  0.1    /  DBili  <0.1   /  AST  32     /  ALT  49     /  AlkPhos  157    06 Apr 2021 05:31    PT/INR - ( 04 Apr 2021 06:58 )   PT: 13.5 sec;   INR: 1.13 ratio         PTT - ( 04 Apr 2021 06:58 )  PTT:37.0 sec    ___________________________________________________  MEDICATIONS  (STANDING):  enoxaparin Injectable 40 milliGRAM(s) SubCutaneous at bedtime  fentaNYL   Patch  50 MICROgram(s)/Hr 1 Patch Transdermal every 72 hours  glucagon  Injectable 1 milliGRAM(s) IntraMuscular once  levothyroxine 200 MICROGram(s) Oral daily  melatonin 3 milliGRAM(s) Oral at bedtime  multivitamin 1 Tablet(s) Oral daily  pantoprazole    Tablet 40 milliGRAM(s) Oral before breakfast  PARoxetine 40 milliGRAM(s) Oral daily  polyethylene glycol 3350 17 Gram(s) Oral two times a day  senna 2 Tablet(s) Oral at bedtime  sodium chloride 0.9%. 1000 milliLiter(s) (75 mL/Hr) IV Continuous <Continuous>    MEDICATIONS  (PRN):  bisacodyl Suppository 10 milliGRAM(s) Rectal daily PRN Constipation  cyclobenzaprine 5 milliGRAM(s) Oral three times a day PRN Muscle Spasm  diphenhydrAMINE   Injectable 12.5 milliGRAM(s) IV Push once PRN nausea  ondansetron Injectable 4 milliGRAM(s) IV Push every 6 hours PRN Nausea and/or Vomiting  ondansetron Injectable 4 milliGRAM(s) IV Push once PRN Nausea and/or Vomiting  oxyCODONE    IR 10 milliGRAM(s) Oral every 4 hours PRN Severe Pain (7 - 10)  oxyCODONE    IR 5 milliGRAM(s) Oral every 4 hours PRN Moderate Pain (4 - 6)  prochlorperazine   Injectable 10 milliGRAM(s) IV Push once PRN nausea

## 2021-04-06 NOTE — PROGRESS NOTE ADULT - SUBJECTIVE AND OBJECTIVE BOX
[facility] Division of Hospital Medicine  Claribel Meraz MD  Pager (M-F, 5M-2W): [phone]  Other Times:  [phone]      Patient is a 61y old  Female who presents with a chief complaint of dizziness, poss. spinal fluid leak thoraco-lumbar (31 Mar 2021 10:49)      SUBJECTIVE / OVERNIGHT EVENTS: no acute events overnight. still with dizziness with positional changes but improving. nausea improving as well. no fever, chills, chest pain, palpitations, SOB, abd pain, nor dysuria. planned for stress test today.  ADDITIONAL REVIEW OF SYSTEMS:    MEDICATIONS  (STANDING):  enoxaparin Injectable 40 milliGRAM(s) SubCutaneous at bedtime  fentaNYL   Patch  50 MICROgram(s)/Hr 1 Patch Transdermal every 72 hours  glucagon  Injectable 1 milliGRAM(s) IntraMuscular once  levothyroxine 200 MICROGram(s) Oral daily  melatonin 3 milliGRAM(s) Oral at bedtime  multivitamin 1 Tablet(s) Oral daily  pantoprazole    Tablet 40 milliGRAM(s) Oral before breakfast  PARoxetine 40 milliGRAM(s) Oral daily  polyethylene glycol 3350 17 Gram(s) Oral two times a day  senna 2 Tablet(s) Oral at bedtime  sodium chloride 0.9%. 1000 milliLiter(s) (75 mL/Hr) IV Continuous <Continuous>  ursodiol Capsule 300 milliGRAM(s) Oral every 12 hours    MEDICATIONS  (PRN):  bisacodyl Suppository 10 milliGRAM(s) Rectal daily PRN Constipation  cyclobenzaprine 5 milliGRAM(s) Oral three times a day PRN Muscle Spasm  diphenhydrAMINE   Injectable 12.5 milliGRAM(s) IV Push once PRN nausea  ondansetron Injectable 4 milliGRAM(s) IV Push once PRN Nausea and/or Vomiting  ondansetron Injectable 4 milliGRAM(s) IV Push every 6 hours PRN Nausea and/or Vomiting  oxyCODONE    IR 10 milliGRAM(s) Oral every 4 hours PRN Severe Pain (7 - 10)  oxyCODONE    IR 5 milliGRAM(s) Oral every 4 hours PRN Moderate Pain (4 - 6)  prochlorperazine   Injectable 10 milliGRAM(s) IV Push once PRN nausea      CAPILLARY BLOOD GLUCOSE        I&O's Summary    05 Apr 2021 07:01  -  06 Apr 2021 07:00  --------------------------------------------------------  IN: 0 mL / OUT: 31 mL / NET: -31 mL        PHYSICAL EXAM:  Vital Signs Last 24 Hrs  T(C): 36.7 (06 Apr 2021 07:02), Max: 36.9 (05 Apr 2021 19:35)  T(F): 98.1 (06 Apr 2021 07:02), Max: 98.4 (05 Apr 2021 19:35)  HR: 86 (06 Apr 2021 09:44) (66 - 86)  BP: 120/64 (06 Apr 2021 09:44) (92/57 - 126/67)  BP(mean): --  RR: 18 (06 Apr 2021 07:02) (18 - 20)  SpO2: 95% (06 Apr 2021 07:02) (94% - 96%)    CONSTITUTIONAL: NAD, well-developed, well-groomed  EYES: PERRLA; conjunctiva and sclera clear  ENMT: Moist oral mucosa, no pharyngeal injection or exudates; normal dentition  NECK: Supple, no palpable masses; no thyromegaly  RESPIRATORY: Normal respiratory effort; lungs are clear to auscultation bilaterally  CARDIOVASCULAR: Regular rate and rhythm, normal S1 and S2, no murmur/rub/gallop; No lower extremity edema; Peripheral pulses are 2+ bilaterally  ABDOMEN: Soft, Nondistended,  Nontender to palpation, normoactive bowel sounds  MUSCULOSKELETAL:  No clubbing or cyanosis of digits; no joint swelling or tenderness to palpation  PSYCH: A+O to person, place, and time; affect appropriate  NEUROLOGY: CN 2-12 are intact and symmetric; no gross sensory deficits, 5/5 strength throughout  SKIN: +vertical incision site with dressing c/d/i, +UCHE drain with scant serosanguinous drainage    LABS:                        8.8    7.49  )-----------( 568      ( 06 Apr 2021 05:31 )             29.0     04-06    142  |  102  |  8   ----------------------------<  142<H>  4.5   |  26  |  0.62    Ca    9.3      06 Apr 2021 05:31    TPro  6.6  /  Alb  3.3  /  TBili  0.1<L>  /  DBili  <0.1  /  AST  32  /  ALT  49<H>  /  AlkPhos  157<H>  04-06      RADIOLOGY & ADDITIONAL TESTS:  Results Reviewed: no leukocytosis, H/H stable, Cr within normal limits, LFTs improving  Imaging Personally Reviewed:  4/5/21 MRCP with IV contrast:  IMPRESSION:    Bilateral small pleural effusions.    Mild steatosis. Gallbladder debris/sludge.    A 3.4 x 2.0 cm right lumbar fluid collection which may be postsurgical. Clinical correlation is recommended.  Electrocardiogram Personally Reviewed:    COORDINATION OF CARE:  Care Discussed with Consultants/Other Providers [Y]: GI GRACE Segovia, medicine NP Naty  Prior or Outpatient Records Reviewed [Y]: Neurosurgery, GI, Cardiology, plastics progress notes

## 2021-04-06 NOTE — CONSULT NOTE ADULT - SUBJECTIVE AND OBJECTIVE BOX
Patient is a 61y old  Female who presents with a chief complaint of dizziness, poss. spinal fluid leak thoraco-lumbar (31 Mar 2021 10:49)      HPI:  61y old  Female h/o esophageal spasms, smoker, COPD who presents with a recent T8-pelvis arthrodesis, and L1-L3 anterior column release on 3/12/2021 p/w orthostatic dizziness. Concern for CSF leak vs seroma.  c/w right sided chest pain and found with elevated LFTs sec to sludge vs mass. h/o Flipped T waves in anterolateral leads that normalized on this hospitalization being followed by cards.    pmhx esophageal spasms, dysphagia, gastritis (followed by GI Dr Abreu, had an endoscopy in October, no interventions performed, takes pantoprazole)    Noted to have elevated LFTs - rising over weekend with some reported nausea and RUQ discomfort - underwent US which raised concern for GB sludge vs mass and recommended MRI to better evaluate.  Had MRI/MRCP yesterday with anesthesia  LFTs have been downtrending, no current c/o abdominal pain, nausea or vomiting. NO personal or FH of liver disease    Denies CP, SOB, syncope  awaiting stress test    PAST MEDICAL & SURGICAL HISTORY:  Hypothyroidism    Cervical herniated disc    Spondylolisthesis, cervical region    Osteoarthritis    H/O gastritis  - on pantoprazole    History of dysphagia  resolved after surgery in 2019    Dyskinesia of esophagus    History of chronic pain  on meds for 16 yrs    Depression    Achilles tendon tear  bilateral    Degenerative tear of acetabular labrum of left hip    Fibromyalgia    Spondylogenic compression of cervical spinal cord    S/P total knee replacement, left    S/P hip replacement, right    S/P laminectomy with spinal fusion  lumbar spine    S/P arthroscopy of left knee  x 4    S/P cervical spinal fusion  cage 2019        Allergies  No Known Allergies      MEDICATIONS  (STANDING):  enoxaparin Injectable 40 milliGRAM(s) SubCutaneous at bedtime  fentaNYL   Patch  50 MICROgram(s)/Hr 1 Patch Transdermal every 72 hours  glucagon  Injectable 1 milliGRAM(s) IntraMuscular once  levothyroxine 200 MICROGram(s) Oral daily  melatonin 3 milliGRAM(s) Oral at bedtime  multivitamin 1 Tablet(s) Oral daily  pantoprazole    Tablet 40 milliGRAM(s) Oral before breakfast  PARoxetine 40 milliGRAM(s) Oral daily  polyethylene glycol 3350 17 Gram(s) Oral two times a day  senna 2 Tablet(s) Oral at bedtime  sodium chloride 0.9%. 1000 milliLiter(s) (75 mL/Hr) IV Continuous <Continuous>      MEDICATIONS  (PRN):  bisacodyl Suppository 10 milliGRAM(s) Rectal daily PRN Constipation  cyclobenzaprine 5 milliGRAM(s) Oral three times a day PRN Muscle Spasm  diphenhydrAMINE   Injectable 12.5 milliGRAM(s) IV Push once PRN nausea  ondansetron Injectable 4 milliGRAM(s) IV Push every 6 hours PRN Nausea and/or Vomiting  ondansetron Injectable 4 milliGRAM(s) IV Push once PRN Nausea and/or Vomiting  oxyCODONE    IR 10 milliGRAM(s) Oral every 4 hours PRN Severe Pain (7 - 10)  oxyCODONE    IR 5 milliGRAM(s) Oral every 4 hours PRN Moderate Pain (4 - 6)  prochlorperazine   Injectable 10 milliGRAM(s) IV Push once PRN nausea      Social History:  +tobacco, last cigarette 3/11  denies ETOH      Family History   FH +hx DVT/PE - mother  IBD (  ) Yes   ( X ) No  GI Malignancy (  )  Yes    (X  ) No      Advanced Directives: (  X   ) None    (      ) DNR    (     ) DNI    (     ) Health Care Proxy:     Review of Systems:    CONSTITUTIONAL: No weakness. No fevers. No chills. No rigors. No weight loss. No night sweats. No poor appetite.  EYES: No visual changes. No eye pain.  ENT: No hearing difficulty. No vertigo. No dysphagia. No sore throat. No Sinusitis/rhinorrhea.   NECK: No pain. No stiffness/rigidity.  CARDIAC: No chest pain. No palpitations. No lightheadedness. +abnormal EKG - see HPI  RESPIRATORY: No cough. No SOB. No hemoptysis.  GASTROINTESTINAL: see HPI  GENITOURINARY: No dysuria. No frequency. No hesitancy. No hematuria. No oliguria.  NEUROLOGICAL: See HPI. No focal weakness. No urinary or fecal incontinence. No headache. No unsteady gait. +hx chronic pain +fibromyalgia  EXTREMITIES: No lower extremity edema. No joint pain.  SKIN: No rashes. No itching. No other lesions.  PSYCHIATRIC: +hx depressionNo anxiety. No SI/HI.  ALLERGIC: No lip swelling. No hives.      Vital Signs Last 24 Hrs  T(C): 36.7 (06 Apr 2021 07:02), Max: 36.9 (05 Apr 2021 19:35)  T(F): 98.1 (06 Apr 2021 07:02), Max: 98.4 (05 Apr 2021 19:35)  HR: 86 (06 Apr 2021 09:44) (66 - 86)  BP: 120/64 (06 Apr 2021 09:44) (92/57 - 126/67)  BP(mean): --  RR: 18 (06 Apr 2021 07:02) (18 - 20)  SpO2: 95% (06 Apr 2021 07:02) (94% - 96%)    PHYSICAL EXAM:    Constitutional: NAD, well-developed non toxic appearing WF sitting at edge of bed  Neck: No LAD, supple no JVD  Respiratory: Clear b/l  Cardiovascular: S1 and S2, RRR, no M  Gastrointestinal: BS+, soft, NT/ND, neg HSM, negative Cunningham's sign  Back: +UCHE - serosanguinous,  right back/flank dressing clean and dry  Extremities: No peripheral edema, neg clubbing, cyanosis  WH scar over right knee  Vascular: 2+ peripheral pulses  Neurological: A/O x 3, no focal deficits/asymmetry  Psychiatric: Normal mood, normal affect  Skin: No rashes, anicteric        LABS:                        8.8    7.49  )-----------( 568      ( 06 Apr 2021 05:31 )             29.0     04-06    142  |  102  |  8   ----------------------------<  142<H>  4.5   |  26  |  0.62    Ca    9.3      06 Apr 2021 05:31    TPro  6.6  /  Alb  3.3  /  TBili  0.1<L>  /  DBili  <0.1  /  AST  32  /  ALT  49<H>  /  AlkPhos  157<H>  04-06      Alkaline Phosphatase, Serum: 157 U/L (04.06.21 @ 05:31)   Alkaline Phosphatase, Serum: 168 U/L (04.05.21 @ 10:48)   Alkaline Phosphatase, Serum: 214 U/L (04.04.21 @ 06:58)   Alkaline Phosphatase, Serum: 276 U/L (04.03.21 @ 07:04)   Alkaline Phosphatase, Serum: 306 U/L (04.02.21 @ 18:39)   Alkaline Phosphatase, Serum: 73 U/L (04.01.21 @ 05:48)   Aspartate Aminotransferase (AST/SGOT): 32 U/L (04.06.21 @ 05:31)   Aspartate Aminotransferase (AST/SGOT): 46 U/L (04.05.21 @ 10:48)   Aspartate Aminotransferase (AST/SGOT): 122 U/L (04.04.21 @ 06:58)   Aspartate Aminotransferase (AST/SGOT): 353 U/L (04.03.21 @ 07:04)   Aspartate Aminotransferase (AST/SGOT): 568 U/L (04.02.21 @ 18:39)   Aspartate Aminotransferase (AST/SGOT): 17 U/L (04.01.21 @ 05:48)   Alanine Aminotransferase (ALT/SGPT): 49 U/L (04.06.21 @ 05:31)   Alanine Aminotransferase (ALT/SGPT): 64 U/L (04.05.21 @ 10:48)   Alanine Aminotransferase (ALT/SGPT): 108 U/L (04.04.21 @ 06:58)   Alanine Aminotransferase (ALT/SGPT): 190 U/L (04.03.21 @ 07:04)   Alanine Aminotransferase (ALT/SGPT): 172 U/L (04.02.21 @ 18:39)   Alanine Aminotransferase (ALT/SGPT): 9 U/L (04.01.21 @ 05:48)     Hepatitis C Virus S/CO Ratio: 0.09 S/CO   Hepatitis C Virus Interpretation: Nonreact:  COVID-19 PCR . (03.30.21 @ 15:49)   COVID-19 PCR: NotDetec:    RADIOLOGY & ADDITIONAL TESTS:  EXAM:  US ABDOMEN RT UPR QUADRANT                        PROCEDURE DATE:  04/04/2021      INTERPRETATION:  CLINICAL INFORMATION: Right upper quadrant pain.    COMPARISON: None available.    TECHNIQUE: Sonography of the right upper quadrant.    FINDINGS:    Liver: Within normal limits.  Bile ducts: Normal caliber. Common bile duct measures 6 mm.  Gallbladder: Nonmobile tumefactive sludge or mass in the gallbladder measuring 4.0 x 1.6 cm.  Pancreas: Visualized portions are within normallimits.  Right kidney: 10.6 cm. No hydronephrosis.  Ascites: None.  IVC: Visualized portions are within normal limits.    IMPRESSION:    4.0 cm area of tumefactive sludge or solid mass within the gallbladder. Recommend contrast-enhanced MRI of the abdomen for further evaluation.          EXAM:  MR MRCP WAW IC                        PROCEDURE DATE:  04/05/2021      INTERPRETATION:  CLINICAL INFORMATION: Transaminitis. Gallbladder sludge/mass on right upper quadrant ultrasound from 4/4/2021.    COMPARISON: Abdominal ultrasound from 4/4/2021.    CONTRAST/COMPLICATIONS:  IV Contrast: Gadavist  7.5cc cc administered   0cc cc discarded  Oral Contrast: None  Complications: None    PROCEDURE:  MRI of the abdomen was performed.  MRCP was performed.    FINDINGS:  LOWER CHEST: Bilateral small pleural effusions with adjacent atelectasis.    LIVER: Mild steatosis. No suspicious enhancing lesion.  BILE DUCTS: No intrahepatic biliary ductal dilatation. Common bile duct measures up to 1.0 cm. No choledocholithiasis.  GALLBLADDER:Intraluminal membranous unenhanced structures likely representing sludge. No signs of intraluminal bladder wall gas. No gallbladder wall thickening or pericholecystic fluid.  SPLEEN: Within normal limits.  PANCREAS: Within normal limits.  ADRENALS: Within normal limits.  KIDNEYS/URETERS: Within normal limits. No hydronephrosis.    VISUALIZED PORTIONS:  BOWEL: Within normal limits.  PERITONEUM: No ascites.  VESSELS: Within normal limits.  RETROPERITONEUM/LYMPH NODES: No lymphadenopathy.  ABDOMINALWALL: 3.4 x 2.0 cm right lumbar fluid collection which may be postsurgical. Clinical correlation is recommended.  BONES: Posterior fixation hardware through the visualized spine. Paraspinal soft tissue edema and foci of blooming artifacts suggesting postsurgical changes.    IMPRESSION:    Bilateral small pleural effusions.    Mild steatosis. Gallbladder debris/sludge.    A 3.4 x 2.0 cm right lumbar fluid collection which may be postsurgical. Clinical correlation is recommended.       Patient is a 61y old  Female who presents with a chief complaint of dizziness, poss. spinal fluid leak thoraco-lumbar (31 Mar 2021 10:49)    HPI:  61y old  Female h/o esophageal spasms, smoker, COPD who presents with a recent T8-pelvis arthrodesis, and L1-L3 anterior column release on 3/12/2021 p/w orthostatic dizziness. Concern for CSF leak vs seroma.  c/w right sided chest pain and found with elevated LFTs sec to sludge vs mass. h/o Flipped T waves in anterolateral leads that normalized on this hospitalization being followed by cards.    pmhx esophageal spasms, dysphagia, gastritis (followed by GI Dr Abreu, had an endoscopy in October, no interventions performed, takes pantoprazole)    Noted to have elevated LFTs - rising over weekend with some reported nausea and RUQ discomfort - underwent US which raised concern for GB sludge vs mass and recommended MRI to better evaluate.  Had MRI/MRCP yesterday with anesthesia  LFTs have been downtrending, no current c/o abdominal pain, nausea or vomiting. NO personal or FH of liver disease    Denies CP, SOB, syncope  awaiting stress test    PAST MEDICAL & SURGICAL HISTORY:  Hypothyroidism    Cervical herniated disc    Spondylolisthesis, cervical region    Osteoarthritis    H/O gastritis  - on pantoprazole    History of dysphagia  resolved after surgery in 2019    Dyskinesia of esophagus    History of chronic pain  on meds for 16 yrs    Depression    Achilles tendon tear  bilateral    Degenerative tear of acetabular labrum of left hip    Fibromyalgia    Spondylogenic compression of cervical spinal cord    S/P total knee replacement, left    S/P hip replacement, right    S/P laminectomy with spinal fusion  lumbar spine    S/P arthroscopy of left knee  x 4    S/P cervical spinal fusion  cage 2019    Allergies  No Known Allergies    MEDICATIONS  (STANDING):  enoxaparin Injectable 40 milliGRAM(s) SubCutaneous at bedtime  fentaNYL   Patch  50 MICROgram(s)/Hr 1 Patch Transdermal every 72 hours  glucagon  Injectable 1 milliGRAM(s) IntraMuscular once  levothyroxine 200 MICROGram(s) Oral daily  melatonin 3 milliGRAM(s) Oral at bedtime  multivitamin 1 Tablet(s) Oral daily  pantoprazole    Tablet 40 milliGRAM(s) Oral before breakfast  PARoxetine 40 milliGRAM(s) Oral daily  polyethylene glycol 3350 17 Gram(s) Oral two times a day  senna 2 Tablet(s) Oral at bedtime  sodium chloride 0.9%. 1000 milliLiter(s) (75 mL/Hr) IV Continuous <Continuous>    MEDICATIONS  (PRN):  bisacodyl Suppository 10 milliGRAM(s) Rectal daily PRN Constipation  cyclobenzaprine 5 milliGRAM(s) Oral three times a day PRN Muscle Spasm  diphenhydrAMINE   Injectable 12.5 milliGRAM(s) IV Push once PRN nausea  ondansetron Injectable 4 milliGRAM(s) IV Push every 6 hours PRN Nausea and/or Vomiting  ondansetron Injectable 4 milliGRAM(s) IV Push once PRN Nausea and/or Vomiting  oxyCODONE    IR 10 milliGRAM(s) Oral every 4 hours PRN Severe Pain (7 - 10)  oxyCODONE    IR 5 milliGRAM(s) Oral every 4 hours PRN Moderate Pain (4 - 6)  prochlorperazine   Injectable 10 milliGRAM(s) IV Push once PRN nausea    Social History:  +tobacco, last cigarette 3/11  denies ETOH    Family History   FH +hx DVT/PE - mother  IBD (  ) Yes   ( X ) No  GI Malignancy (  )  Yes    (X  ) No    Advanced Directives: (  X   ) None    (      ) DNR    (     ) DNI    (     ) Health Care Proxy:     Review of Systems:  CONSTITUTIONAL: No weakness. No fevers. No chills. No rigors. No weight loss. No night sweats. No poor appetite.  EYES: No visual changes. No eye pain.  ENT: No hearing difficulty. No vertigo. No dysphagia. No sore throat. No Sinusitis/rhinorrhea.   NECK: No pain. No stiffness/rigidity.  CARDIAC: No chest pain. No palpitations. No lightheadedness. +abnormal EKG - see HPI  RESPIRATORY: No cough. No SOB. No hemoptysis.  GASTROINTESTINAL: see HPI  GENITOURINARY: No dysuria. No frequency. No hesitancy. No hematuria. No oliguria.  NEUROLOGICAL: See HPI. No focal weakness. No urinary or fecal incontinence. No headache. No unsteady gait. +hx chronic pain +fibromyalgia  EXTREMITIES: No lower extremity edema. No joint pain.  SKIN: No rashes. No itching. No other lesions.  PSYCHIATRIC: +hx depressionNo anxiety. No SI/HI.  ALLERGIC: No lip swelling. No hives.    Vital Signs Last 24 Hrs  T(C): 36.7 (06 Apr 2021 07:02), Max: 36.9 (05 Apr 2021 19:35)  T(F): 98.1 (06 Apr 2021 07:02), Max: 98.4 (05 Apr 2021 19:35)  HR: 86 (06 Apr 2021 09:44) (66 - 86)  BP: 120/64 (06 Apr 2021 09:44) (92/57 - 126/67)  BP(mean): --  RR: 18 (06 Apr 2021 07:02) (18 - 20)  SpO2: 95% (06 Apr 2021 07:02) (94% - 96%)    PHYSICAL EXAM:  Constitutional: NAD, well-developed non toxic appearing WF sitting at edge of bed  Neck: No LAD, supple no JVD  Respiratory: Clear b/l  Cardiovascular: S1 and S2, RRR, no M  Gastrointestinal: BS+, soft, NT/ND, neg HSM, negative Cunningham's sign  Back: +UCHE - serosanguinous,  right back/flank dressing clean and dry  Extremities: No peripheral edema, neg clubbing, cyanosis  WH scar over right knee  Vascular: 2+ peripheral pulses  Neurological: A/O x 3, no focal deficits/asymmetry  Psychiatric: Normal mood, normal affect  Skin: No rashes, anicteric    LABS:                      8.8    7.49  )-----------( 568      ( 06 Apr 2021 05:31 )             29.0     04-06    142  |  102  |  8   ----------------------------<  142<H>  4.5   |  26  |  0.62    Ca    9.3      06 Apr 2021 05:31    TPro  6.6  /  Alb  3.3  /  TBili  0.1<L>  /  DBili  <0.1  /  AST  32  /  ALT  49<H>  /  AlkPhos  157<H>  04-06  Alkaline Phosphatase, Serum: 157 U/L (04.06.21 @ 05:31)   Alkaline Phosphatase, Serum: 168 U/L (04.05.21 @ 10:48)   Alkaline Phosphatase, Serum: 214 U/L (04.04.21 @ 06:58)   Alkaline Phosphatase, Serum: 276 U/L (04.03.21 @ 07:04)   Alkaline Phosphatase, Serum: 306 U/L (04.02.21 @ 18:39)   Alkaline Phosphatase, Serum: 73 U/L (04.01.21 @ 05:48)   Aspartate Aminotransferase (AST/SGOT): 32 U/L (04.06.21 @ 05:31)   Aspartate Aminotransferase (AST/SGOT): 46 U/L (04.05.21 @ 10:48)   Aspartate Aminotransferase (AST/SGOT): 122 U/L (04.04.21 @ 06:58)   Aspartate Aminotransferase (AST/SGOT): 353 U/L (04.03.21 @ 07:04)   Aspartate Aminotransferase (AST/SGOT): 568 U/L (04.02.21 @ 18:39)   Aspartate Aminotransferase (AST/SGOT): 17 U/L (04.01.21 @ 05:48)   Alanine Aminotransferase (ALT/SGPT): 49 U/L (04.06.21 @ 05:31)   Alanine Aminotransferase (ALT/SGPT): 64 U/L (04.05.21 @ 10:48)   Alanine Aminotransferase (ALT/SGPT): 108 U/L (04.04.21 @ 06:58)   Alanine Aminotransferase (ALT/SGPT): 190 U/L (04.03.21 @ 07:04)   Alanine Aminotransferase (ALT/SGPT): 172 U/L (04.02.21 @ 18:39)   Alanine Aminotransferase (ALT/SGPT): 9 U/L (04.01.21 @ 05:48)   Hepatitis C Virus S/CO Ratio: 0.09 S/CO   Hepatitis C Virus Interpretation: Nonreact:  COVID-19 PCR . (03.30.21 @ 15:49)   COVID-19 PCR: NotDetec:    RADIOLOGY & ADDITIONAL TESTS:  EXAM:  US ABDOMEN RT UPR QUADRANT                        PROCEDURE DATE:  04/04/2021      INTERPRETATION:  CLINICAL INFORMATION: Right upper quadrant pain.    COMPARISON: None available.    TECHNIQUE: Sonography of the right upper quadrant.    FINDINGS:    Liver: Within normal limits.  Bile ducts: Normal caliber. Common bile duct measures 6 mm.  Gallbladder: Nonmobile tumefactive sludge or mass in the gallbladder measuring 4.0 x 1.6 cm.  Pancreas: Visualized portions are within normallimits.  Right kidney: 10.6 cm. No hydronephrosis.  Ascites: None.  IVC: Visualized portions are within normal limits.    IMPRESSION:    4.0 cm area of tumefactive sludge or solid mass within the gallbladder. Recommend contrast-enhanced MRI of the abdomen for further evaluation.    EXAM:  MR MRCP WAW IC                        PROCEDURE DATE:  04/05/2021      INTERPRETATION:  CLINICAL INFORMATION: Transaminitis. Gallbladder sludge/mass on right upper quadrant ultrasound from 4/4/2021.    COMPARISON: Abdominal ultrasound from 4/4/2021.    CONTRAST/COMPLICATIONS:  IV Contrast: Gadavist  7.5cc cc administered   0cc cc discarded  Oral Contrast: None  Complications: None    PROCEDURE:  MRI of the abdomen was performed.  MRCP was performed.    FINDINGS:  LOWER CHEST: Bilateral small pleural effusions with adjacent atelectasis.    LIVER: Mild steatosis. No suspicious enhancing lesion.  BILE DUCTS: No intrahepatic biliary ductal dilatation. Common bile duct measures up to 1.0 cm. No choledocholithiasis.  GALLBLADDER:Intraluminal membranous unenhanced structures likely representing sludge. No signs of intraluminal bladder wall gas. No gallbladder wall thickening or pericholecystic fluid.  SPLEEN: Within normal limits.  PANCREAS: Within normal limits.  ADRENALS: Within normal limits.  KIDNEYS/URETERS: Within normal limits. No hydronephrosis.    VISUALIZED PORTIONS:  BOWEL: Within normal limits.  PERITONEUM: No ascites.  VESSELS: Within normal limits.  RETROPERITONEUM/LYMPH NODES: No lymphadenopathy.  ABDOMINALWALL: 3.4 x 2.0 cm right lumbar fluid collection which may be postsurgical. Clinical correlation is recommended.  BONES: Posterior fixation hardware through the visualized spine. Paraspinal soft tissue edema and foci of blooming artifacts suggesting postsurgical changes.    IMPRESSION:    Bilateral small pleural effusions.    Mild steatosis. Gallbladder debris/sludge.    A 3.4 x 2.0 cm right lumbar fluid collection which may be postsurgical. Clinical correlation is recommended.

## 2021-04-06 NOTE — DIETITIAN INITIAL EVALUATION ADULT. - REASON INDICATOR FOR ASSESSMENT
Pt seen for length of stay assessment. Source: EMR, pt. Pt is a 62 yo female with PMH of esophageal spasms, dysphagia, gastritis, chronic lumbar and cervical spine disc disease, s/p cervical spinal fusion (2020), s/p lumbar fusion (2003), s/p T8-pelvis arthrodesis, L1-3 anterior column release (3/12/21) who presented for Neurosurgery admission with complaints of orthostatic dizziness x 6 days with known CSF leak on MRI 1 day PTA, admitted 3/30.

## 2021-04-06 NOTE — DIETITIAN INITIAL EVALUATION ADULT. - OTHER INFO
Pt reports good appetite, variable PO intake in setting of dislike of institutionalized foods. Observed eating lunch at time of visit, had consumed ~50% of meal. Endorses occasional nausea, no vomiting. Ordered for Zofran, Compazine PRN. Denies diarrhea/constipation. Last BM today (4/6) per pt. Denies difficulties chewing or swallowing. Confirmed NKFA.     Reports UBW ~170-175 pounds, unsure of recent weight changes. Dosing weight 171.6 pounds consistent with reported weight history. Unable to obtain new weight at time of visit, pt at edge of bed, eating lunch. Will continue to monitor and trend weights.     Encouraged PO intake as tolerated. Pt with no food preferences at this time. Further diet education deferred at this time, pt eating lunch. Pt with no nutrition-related questions or concerns at this time. Made aware RD remains available.

## 2021-04-06 NOTE — DIETITIAN INITIAL EVALUATION ADULT. - ORAL INTAKE PTA/DIET HISTORY
Pt reports good appetite and PO intake PTA. Denies following any dietary restrictions PTA. Endorses taking a multivitamin PTA.

## 2021-04-07 ENCOUNTER — TRANSCRIPTION ENCOUNTER (OUTPATIENT)
Age: 61
End: 2021-04-07

## 2021-04-07 VITALS
RESPIRATION RATE: 18 BRPM | TEMPERATURE: 98 F | OXYGEN SATURATION: 96 % | HEART RATE: 87 BPM | DIASTOLIC BLOOD PRESSURE: 70 MMHG | SYSTOLIC BLOOD PRESSURE: 135 MMHG

## 2021-04-07 LAB
A1AT SERPL-MCNC: 259 MG/DL — HIGH (ref 90–200)
ALBUMIN SERPL ELPH-MCNC: 3.5 G/DL — SIGNIFICANT CHANGE UP (ref 3.3–5)
ALP SERPL-CCNC: 146 U/L — HIGH (ref 40–120)
ALT FLD-CCNC: 41 U/L — SIGNIFICANT CHANGE UP (ref 10–45)
ANION GAP SERPL CALC-SCNC: 12 MMOL/L — SIGNIFICANT CHANGE UP (ref 5–17)
AST SERPL-CCNC: 25 U/L — SIGNIFICANT CHANGE UP (ref 10–40)
BASOPHILS # BLD AUTO: 0.08 K/UL — SIGNIFICANT CHANGE UP (ref 0–0.2)
BASOPHILS NFR BLD AUTO: 1 % — SIGNIFICANT CHANGE UP (ref 0–2)
BILIRUB SERPL-MCNC: 0.2 MG/DL — SIGNIFICANT CHANGE UP (ref 0.2–1.2)
BUN SERPL-MCNC: 9 MG/DL — SIGNIFICANT CHANGE UP (ref 7–23)
CALCIUM SERPL-MCNC: 9.8 MG/DL — SIGNIFICANT CHANGE UP (ref 8.4–10.5)
CERULOPLASMIN SERPL-MCNC: 36 MG/DL — SIGNIFICANT CHANGE UP (ref 16–45)
CHLORIDE SERPL-SCNC: 104 MMOL/L — SIGNIFICANT CHANGE UP (ref 96–108)
CO2 SERPL-SCNC: 27 MMOL/L — SIGNIFICANT CHANGE UP (ref 22–31)
CREAT SERPL-MCNC: 0.69 MG/DL — SIGNIFICANT CHANGE UP (ref 0.5–1.3)
EOSINOPHIL # BLD AUTO: 0.17 K/UL — SIGNIFICANT CHANGE UP (ref 0–0.5)
EOSINOPHIL NFR BLD AUTO: 2.1 % — SIGNIFICANT CHANGE UP (ref 0–6)
FERRITIN SERPL-MCNC: 176 NG/ML — HIGH (ref 15–150)
GLUCOSE SERPL-MCNC: 99 MG/DL — SIGNIFICANT CHANGE UP (ref 70–99)
HBV SURFACE AB SER-ACNC: SIGNIFICANT CHANGE UP
HBV SURFACE AG SER-ACNC: SIGNIFICANT CHANGE UP
HCT VFR BLD CALC: 32.6 % — LOW (ref 34.5–45)
HGB BLD-MCNC: 9.8 G/DL — LOW (ref 11.5–15.5)
IMM GRANULOCYTES NFR BLD AUTO: 0.4 % — SIGNIFICANT CHANGE UP (ref 0–1.5)
IRON SATN MFR SERPL: 10 % — LOW (ref 14–50)
IRON SATN MFR SERPL: 29 UG/DL — LOW (ref 30–160)
LYMPHOCYTES # BLD AUTO: 4.04 K/UL — HIGH (ref 1–3.3)
LYMPHOCYTES # BLD AUTO: 49.9 % — HIGH (ref 13–44)
MAGNESIUM SERPL-MCNC: 1.7 MG/DL — SIGNIFICANT CHANGE UP (ref 1.6–2.6)
MCHC RBC-ENTMCNC: 28.8 PG — SIGNIFICANT CHANGE UP (ref 27–34)
MCHC RBC-ENTMCNC: 30.1 GM/DL — LOW (ref 32–36)
MCV RBC AUTO: 95.9 FL — SIGNIFICANT CHANGE UP (ref 80–100)
MONOCYTES # BLD AUTO: 0.71 K/UL — SIGNIFICANT CHANGE UP (ref 0–0.9)
MONOCYTES NFR BLD AUTO: 8.8 % — SIGNIFICANT CHANGE UP (ref 2–14)
NEUTROPHILS # BLD AUTO: 3.06 K/UL — SIGNIFICANT CHANGE UP (ref 1.8–7.4)
NEUTROPHILS NFR BLD AUTO: 37.8 % — LOW (ref 43–77)
NRBC # BLD: 0 /100 WBCS — SIGNIFICANT CHANGE UP (ref 0–0)
PHOSPHATE SERPL-MCNC: 3.4 MG/DL — SIGNIFICANT CHANGE UP (ref 2.5–4.5)
PLATELET # BLD AUTO: 635 K/UL — HIGH (ref 150–400)
POTASSIUM SERPL-MCNC: 5 MMOL/L — SIGNIFICANT CHANGE UP (ref 3.5–5.3)
POTASSIUM SERPL-SCNC: 5 MMOL/L — SIGNIFICANT CHANGE UP (ref 3.5–5.3)
PROT SERPL-MCNC: 7 G/DL — SIGNIFICANT CHANGE UP (ref 6–8.3)
RBC # BLD: 3.4 M/UL — LOW (ref 3.8–5.2)
RBC # FLD: 14.7 % — HIGH (ref 10.3–14.5)
SODIUM SERPL-SCNC: 143 MMOL/L — SIGNIFICANT CHANGE UP (ref 135–145)
TIBC SERPL-MCNC: 292 UG/DL — SIGNIFICANT CHANGE UP (ref 220–430)
UIBC SERPL-MCNC: 263 UG/DL — SIGNIFICANT CHANGE UP (ref 110–370)
WBC # BLD: 8.09 K/UL — SIGNIFICANT CHANGE UP (ref 3.8–10.5)
WBC # FLD AUTO: 8.09 K/UL — SIGNIFICANT CHANGE UP (ref 3.8–10.5)

## 2021-04-07 PROCEDURE — 83540 ASSAY OF IRON: CPT

## 2021-04-07 PROCEDURE — 93306 TTE W/DOPPLER COMPLETE: CPT

## 2021-04-07 PROCEDURE — 86038 ANTINUCLEAR ANTIBODIES: CPT

## 2021-04-07 PROCEDURE — 80048 BASIC METABOLIC PNL TOTAL CA: CPT

## 2021-04-07 PROCEDURE — 76706 US ABDL AORTA SCREEN AAA: CPT

## 2021-04-07 PROCEDURE — 74183 MRI ABD W/O CNTR FLWD CNTR: CPT

## 2021-04-07 PROCEDURE — 86255 FLUORESCENT ANTIBODY SCREEN: CPT

## 2021-04-07 PROCEDURE — 97165 OT EVAL LOW COMPLEX 30 MIN: CPT

## 2021-04-07 PROCEDURE — 83735 ASSAY OF MAGNESIUM: CPT

## 2021-04-07 PROCEDURE — 85025 COMPLETE CBC W/AUTO DIFF WBC: CPT

## 2021-04-07 PROCEDURE — 85520 HEPARIN ASSAY: CPT

## 2021-04-07 PROCEDURE — 76705 ECHO EXAM OF ABDOMEN: CPT

## 2021-04-07 PROCEDURE — 86901 BLOOD TYPING SEROLOGIC RH(D): CPT

## 2021-04-07 PROCEDURE — 82103 ALPHA-1-ANTITRYPSIN TOTAL: CPT

## 2021-04-07 PROCEDURE — 80076 HEPATIC FUNCTION PANEL: CPT

## 2021-04-07 PROCEDURE — 93005 ELECTROCARDIOGRAM TRACING: CPT

## 2021-04-07 PROCEDURE — 87340 HEPATITIS B SURFACE AG IA: CPT

## 2021-04-07 PROCEDURE — 99233 SBSQ HOSP IP/OBS HIGH 50: CPT

## 2021-04-07 PROCEDURE — 86381 MITOCHONDRIAL ANTIBODY EACH: CPT

## 2021-04-07 PROCEDURE — 99239 HOSP IP/OBS DSCHRG MGMT >30: CPT

## 2021-04-07 PROCEDURE — 93017 CV STRESS TEST TRACING ONLY: CPT

## 2021-04-07 PROCEDURE — 85730 THROMBOPLASTIN TIME PARTIAL: CPT

## 2021-04-07 PROCEDURE — 78452 HT MUSCLE IMAGE SPECT MULT: CPT

## 2021-04-07 PROCEDURE — 80053 COMPREHEN METABOLIC PANEL: CPT

## 2021-04-07 PROCEDURE — 83550 IRON BINDING TEST: CPT

## 2021-04-07 PROCEDURE — 97116 GAIT TRAINING THERAPY: CPT

## 2021-04-07 PROCEDURE — 97530 THERAPEUTIC ACTIVITIES: CPT

## 2021-04-07 PROCEDURE — 85027 COMPLETE CBC AUTOMATED: CPT

## 2021-04-07 PROCEDURE — U0003: CPT

## 2021-04-07 PROCEDURE — 82962 GLUCOSE BLOOD TEST: CPT

## 2021-04-07 PROCEDURE — 86706 HEP B SURFACE ANTIBODY: CPT

## 2021-04-07 PROCEDURE — 84100 ASSAY OF PHOSPHORUS: CPT

## 2021-04-07 PROCEDURE — 71045 X-RAY EXAM CHEST 1 VIEW: CPT

## 2021-04-07 PROCEDURE — 86850 RBC ANTIBODY SCREEN: CPT

## 2021-04-07 PROCEDURE — 97161 PT EVAL LOW COMPLEX 20 MIN: CPT

## 2021-04-07 PROCEDURE — U0005: CPT

## 2021-04-07 PROCEDURE — 82728 ASSAY OF FERRITIN: CPT

## 2021-04-07 PROCEDURE — 86769 SARS-COV-2 COVID-19 ANTIBODY: CPT

## 2021-04-07 PROCEDURE — A9585: CPT

## 2021-04-07 PROCEDURE — 72158 MRI LUMBAR SPINE W/O & W/DYE: CPT

## 2021-04-07 PROCEDURE — 99285 EMERGENCY DEPT VISIT HI MDM: CPT | Mod: 25

## 2021-04-07 PROCEDURE — 86803 HEPATITIS C AB TEST: CPT

## 2021-04-07 PROCEDURE — 82390 ASSAY OF CERULOPLASMIN: CPT

## 2021-04-07 PROCEDURE — 85610 PROTHROMBIN TIME: CPT

## 2021-04-07 PROCEDURE — 86900 BLOOD TYPING SEROLOGIC ABO: CPT

## 2021-04-07 PROCEDURE — A9500: CPT

## 2021-04-07 PROCEDURE — 84484 ASSAY OF TROPONIN QUANT: CPT

## 2021-04-07 RX ORDER — URSODIOL 250 MG/1
1 TABLET, FILM COATED ORAL
Qty: 0 | Refills: 0 | DISCHARGE
Start: 2021-04-07

## 2021-04-07 RX ORDER — MIDODRINE HYDROCHLORIDE 2.5 MG/1
5 TABLET ORAL THREE TIMES A DAY
Refills: 0 | Status: DISCONTINUED | OUTPATIENT
Start: 2021-04-07 | End: 2021-04-07

## 2021-04-07 RX ORDER — MAGNESIUM SULFATE 500 MG/ML
2 VIAL (ML) INJECTION ONCE
Refills: 0 | Status: COMPLETED | OUTPATIENT
Start: 2021-04-07 | End: 2021-04-07

## 2021-04-07 RX ORDER — OXYCODONE HYDROCHLORIDE 5 MG/1
1 TABLET ORAL
Qty: 0 | Refills: 0 | DISCHARGE
Start: 2021-04-07

## 2021-04-07 RX ORDER — MIDODRINE HYDROCHLORIDE 2.5 MG/1
1 TABLET ORAL
Qty: 0 | Refills: 0 | DISCHARGE
Start: 2021-04-07

## 2021-04-07 RX ORDER — HYOSCYAMINE SULFATE 0.13 MG
0.25 TABLET ORAL
Qty: 0 | Refills: 0 | DISCHARGE

## 2021-04-07 RX ORDER — PANTOPRAZOLE SODIUM 20 MG/1
1 TABLET, DELAYED RELEASE ORAL
Qty: 0 | Refills: 0 | DISCHARGE
Start: 2021-04-07

## 2021-04-07 RX ORDER — SODIUM CHLORIDE 9 MG/ML
500 INJECTION INTRAMUSCULAR; INTRAVENOUS; SUBCUTANEOUS ONCE
Refills: 0 | Status: COMPLETED | OUTPATIENT
Start: 2021-04-07 | End: 2021-04-07

## 2021-04-07 RX ORDER — ROSUVASTATIN CALCIUM 5 MG/1
1 TABLET ORAL
Qty: 0 | Refills: 0 | DISCHARGE

## 2021-04-07 RX ORDER — PANTOPRAZOLE SODIUM 20 MG/1
1 TABLET, DELAYED RELEASE ORAL
Qty: 0 | Refills: 0 | DISCHARGE

## 2021-04-07 RX ORDER — FENTANYL CITRATE 50 UG/ML
1 INJECTION INTRAVENOUS
Qty: 0 | Refills: 0 | DISCHARGE
Start: 2021-04-07

## 2021-04-07 RX ADMIN — Medication 50 GRAM(S): at 11:39

## 2021-04-07 RX ADMIN — PANTOPRAZOLE SODIUM 40 MILLIGRAM(S): 20 TABLET, DELAYED RELEASE ORAL at 06:06

## 2021-04-07 RX ADMIN — MIDODRINE HYDROCHLORIDE 5 MILLIGRAM(S): 2.5 TABLET ORAL at 13:25

## 2021-04-07 RX ADMIN — Medication 1 TABLET(S): at 11:39

## 2021-04-07 RX ADMIN — Medication 200 MICROGRAM(S): at 05:54

## 2021-04-07 RX ADMIN — URSODIOL 300 MILLIGRAM(S): 250 TABLET, FILM COATED ORAL at 05:54

## 2021-04-07 RX ADMIN — Medication 40 MILLIGRAM(S): at 05:54

## 2021-04-07 RX ADMIN — ONDANSETRON 4 MILLIGRAM(S): 8 TABLET, FILM COATED ORAL at 07:42

## 2021-04-07 RX ADMIN — SODIUM CHLORIDE 500 MILLILITER(S): 9 INJECTION INTRAMUSCULAR; INTRAVENOUS; SUBCUTANEOUS at 13:26

## 2021-04-07 RX ADMIN — SODIUM CHLORIDE 75 MILLILITER(S): 9 INJECTION INTRAMUSCULAR; INTRAVENOUS; SUBCUTANEOUS at 05:52

## 2021-04-07 NOTE — DISCHARGE NOTE PROVIDER - PROVIDER TOKENS
Lionel Carranza   MRN: [id number]    Department:  [facility] Emergency Department   Date of Visit:  4/27/2019           Disclosure     Insurance plans vary and the physician(s) referred by the ER may not be covered by your plan.  Please contact tell this physician (or your personal doctor if your instructions are to return to your personal doctor) about any new or lasting problems. The primary care or specialist physician will see patients referred from the [facility] Emergency Department.  Heather Martinez PROVIDER:[TOKEN:[[id number]]],PROVIDER:[TOKEN:[[id number]]],PROVIDER:[TOKEN:[[id number]]]

## 2021-04-07 NOTE — PROGRESS NOTE ADULT - PROBLEM SELECTOR PLAN 2
EKG with flipped T waves in leads V2-V6, but now normalized in v3-v6.  - Cardiology consult recs appreciated  - TTE grossly unremarkable, minimal MR  - nuclear stress test negative for ischemia

## 2021-04-07 NOTE — PROGRESS NOTE ADULT - PROBLEM SELECTOR PLAN 3
history of "sludge" in gallbladder per patient. intermittent nausea  - improving. suspect transient biliary sludge that passed  - RUQ with 4.0 cm area of tumefactive sludge or solid mass within the gallbladder  - MRCP with contrast showing biliary sludge within gallbladder  - trend LFTs, downtrending  - GI consult recs appreciated,  - c/w ursodiol 300mg PO BID  - continue to hold statin. should have repeat LFTs in 1-2 weeks. if resolved, can resume her home rosuvastatin.  - can f/u remaining autoimmune work-up sent as outpatient  - outpatient f/u with GI

## 2021-04-07 NOTE — PROGRESS NOTE ADULT - PROBLEM SELECTOR PLAN 6
cont current tx
currently well controlled.  - c/w fentanyl patch 50mcg q72h  - c/w oxycodone 10mg PO PRN severe pain, 5mg PO PRN moderate pain  - c/w flexeril PRN muscle spasm
cont current tx
continue with pantoprazole
cont home meds
cont home meds
currently well controlled.  - c/w fentanyl patch 50mcg q72h  - c/w oxycodone 10mg PO PRN severe pain, 5mg PO PRN moderate pain  - c/w flexeril PRN muscle spasm
cont home meds

## 2021-04-07 NOTE — DISCHARGE NOTE PROVIDER - NSDCCPCAREPLAN_GEN_ALL_CORE_FT
PRINCIPAL DISCHARGE DIAGNOSIS  Diagnosis: CSF leak  Assessment and Plan of Treatment: Acute rehab  take meds as prescribed followup with pcp and neurology after rehab      SECONDARY DISCHARGE DIAGNOSES  Diagnosis: Orthostatic hypotension  Assessment and Plan of Treatment: Midodrine as prescribed  fall precautions    Diagnosis: Transaminitis  Assessment and Plan of Treatment: Transaminitis improving, follow up labs, LFTS in 1 to 2 weeks   follow up with pcp   follow up with GI

## 2021-04-07 NOTE — PROGRESS NOTE ADULT - PROBLEM SELECTOR PLAN 10
DVT ppx: lovenox  Dispo: Acute Rehab    Plan discussed with patient, GI GRACE Segovia, and medicine NP Naty.
DVT ppx: lovenox  Dispo: Acute Rehab    Plan discussed with patient, Cardiology attending Dr. Son, GI GRACE Segovia, and medicine NP Naty.

## 2021-04-07 NOTE — PROGRESS NOTE ADULT - PROBLEM SELECTOR PLAN 9
cont levothyroxine    Plan discussed with patient and neurosurgery GRACE Scott.
cont levothyroxine    Plan discussed with patient and neurosurgery GRACE Scott.
- c/w levothyroxine 200mcg daily
- c/w levothyroxine 200mcg daily

## 2021-04-07 NOTE — DISCHARGE NOTE NURSING/CASE MANAGEMENT/SOCIAL WORK - PATIENT PORTAL LINK FT
You can access the FollowMyHealth Patient Portal offered by [facility] by registering at the following website: [email or web address]. By joining [facility]’s FollowMyHealth portal, you will also be able to view your health information using other applications (apps) compatible with our system.

## 2021-04-07 NOTE — PROGRESS NOTE ADULT - ASSESSMENT
62 yo female with PMH of esophageal spasms, GERD, smoker, COPD, recent T8-pelvis arthrodesis, and L1-L3 anterior column release on 3/12/2021, who presents from acute rehab with orthostatic dizziness. Concern for CSF leak vs. seroma. Course c/b transaminitis found to have biliary sludge on MRCP and abnormal EKG with TWI but stress test without evidence of ischemia.

## 2021-04-07 NOTE — PROGRESS NOTE ADULT - REASON FOR ADMISSION
60 y/o F s/p T8 - pelvis arthrodesis, L1-L3 anterior column release and closure 3/12/21, returned to ED with nausea and dizziness, to rule out CSF leak
dizziness, poss. spinal fluid leak thoraco-lumbar

## 2021-04-07 NOTE — PROGRESS NOTE ADULT - PROBLEM SELECTOR PLAN 8
cont home meds
cont home meds
cont levothyroxine
reconsult if new issues arise
stable.  - c/w paroxetine 40mg PO daily
stable.  - c/w paroxetine 40mg PO daily

## 2021-04-07 NOTE — PROGRESS NOTE ADULT - ASSESSMENT
HPI:  61y old  Female h/o esophageal spasms, smoker, COPD who presents with a recent T8-pelvis arthrodesis, and L1-L3 anterior column release on 3/12/2021 p/w orthostatic dizziness. Her GI history includes esophageal spasms, dysphagia, gastritis (followed by GI Dr Abreu, had an endoscopy in October, no interventions performed, takes pantoprazole).  Initially noted to have transaminitis along with c/o RUQ pain and work up with US raised concern for GB sludge vs mass and recommended MRI to better evaluate.  Had MRI/MRCP yesterday with anesthesia LFTs have been downtrending, no current c/o abdominal pain, nausea or vomiting. Tolerating PO.   No GI contraindication to discharge with outpatient follow up with Dr. Abreu.        HPI:  61y old  Female h/o esophageal spasms, smoker, COPD who presents with a recent T8-pelvis arthrodesis, and L1-L3 anterior column release on 3/12/2021 p/w orthostatic dizziness. Her GI history includes esophageal spasms, dysphagia, gastritis (followed by GI Dr Abreu, had an endoscopy in October, no interventions performed, takes pantoprazole).  Initially noted to have transaminitis along with c/o RUQ pain and work up with US raised concern for GB sludge vs mass and recommended MRI to better evaluate.  Had MRI/MRCP yesterday with anesthesia LFTs have been downtrending, no current c/o abdominal pain, nausea or vomiting. Tolerating PO.   No GI contraindication to discharge with outpatient follow up with Dr. Abreu.     Luca Webster PA-C    [facility]  [phone]  After hours and weekend coverage [phone]     61y old  Female h/o esophageal spasms, dysphagia (resolved), gastritis (followed by GI Dr Abreu, had an endoscopy in October, no interventions performed, takes pantoprazole) smoker, COPD who presents with a recent T8-pelvis arthrodesis, and L1-L3 anterior column release on 3/12/2021 p/w orthostatic dizziness. Concern for CSF leak vs seroma.  c/w right sided chest pain and found with elevated LFTs sec to sludge vs mass    COVID negative    #Elevated LFTs - almost normalized.  MRCP - no CBD stones/sludge +GB sludge - suspect likely passed sludge vs stone.   Hep C negative.  -No role for ERCP/GI intervention at this time  -Continue Ursodiol 300 mg BID as a cholerrhetic agent (indefinitely)  -can follow up Hep B and auto-immune serologies results as outpt  -No GI objection to discharge  -Continue baseline PPI rx    Discussed with pt, all questions answered  Discussed with Medicine attending    Luca Webster PA-C    [facility]  [phone]  After hours and weekend coverage [phone]

## 2021-04-07 NOTE — PROGRESS NOTE ADULT - ATTENDING COMMENTS
elevated lfts, MRCP gallbladder sludge,, lfts decreased    plan continue nahid        monitor lfts ,outpatient gi follow up

## 2021-04-07 NOTE — PROGRESS NOTE ADULT - SUBJECTIVE AND OBJECTIVE BOX
[facility] Division of Hospital Medicine  Claribel Meraz MD  Pager (M-F, 9B-6G): [phone]  Other Times:  [phone]      Patient is a 61y old  Female who presents with a chief complaint of 60 y/o F s/p T8 - pelvis arthrodesis, L1-L3 anterior column release and closure 3/12/21, returned to ED with nausea and dizziness, to rule out CSF leak (07 Apr 2021 11:28)      SUBJECTIVE / OVERNIGHT EVENTS: no acute events overnight. stress test without evidence of ischemia. feels well overall. no fever, chills, chest pain, dyspnea, abd pain, n/v/c/d nor dysuria. still has occasional dizziness with sudden positional changes but overall improved.    ADDITIONAL REVIEW OF SYSTEMS:    MEDICATIONS  (STANDING):  enoxaparin Injectable 40 milliGRAM(s) SubCutaneous at bedtime  fentaNYL   Patch  50 MICROgram(s)/Hr 1 Patch Transdermal every 72 hours  glucagon  Injectable 1 milliGRAM(s) IntraMuscular once  levothyroxine 200 MICROGram(s) Oral daily  melatonin 3 milliGRAM(s) Oral at bedtime  midodrine. 5 milliGRAM(s) Oral three times a day  multivitamin 1 Tablet(s) Oral daily  pantoprazole    Tablet 40 milliGRAM(s) Oral before breakfast  PARoxetine 40 milliGRAM(s) Oral daily  polyethylene glycol 3350 17 Gram(s) Oral two times a day  senna 2 Tablet(s) Oral at bedtime  sodium chloride 0.9% Bolus 500 milliLiter(s) IV Bolus once  sodium chloride 0.9%. 1000 milliLiter(s) (75 mL/Hr) IV Continuous <Continuous>  ursodiol Capsule 300 milliGRAM(s) Oral every 12 hours    MEDICATIONS  (PRN):  bisacodyl Suppository 10 milliGRAM(s) Rectal daily PRN Constipation  cyclobenzaprine 5 milliGRAM(s) Oral three times a day PRN Muscle Spasm  diphenhydrAMINE   Injectable 12.5 milliGRAM(s) IV Push once PRN nausea  ondansetron Injectable 4 milliGRAM(s) IV Push every 6 hours PRN Nausea and/or Vomiting  ondansetron Injectable 4 milliGRAM(s) IV Push once PRN Nausea and/or Vomiting  oxyCODONE    IR 10 milliGRAM(s) Oral every 4 hours PRN Severe Pain (7 - 10)  oxyCODONE    IR 5 milliGRAM(s) Oral every 4 hours PRN Moderate Pain (4 - 6)  prochlorperazine   Injectable 10 milliGRAM(s) IV Push once PRN nausea      CAPILLARY BLOOD GLUCOSE        I&O's Summary    06 Apr 2021 07:01  -  07 Apr 2021 07:00  --------------------------------------------------------  IN: 0 mL / OUT: 20 mL / NET: -20 mL        PHYSICAL EXAM:  Vital Signs Last 24 Hrs  T(C): 36.3 (07 Apr 2021 09:22), Max: 36.8 (06 Apr 2021 15:52)  T(F): 97.4 (07 Apr 2021 09:22), Max: 98.2 (06 Apr 2021 15:52)  HR: 77 (07 Apr 2021 09:22) (69 - 88)  BP: 126/68 (07 Apr 2021 05:08) (102/62 - 128/77)  BP(mean): --  RR: 18 (07 Apr 2021 09:22) (16 - 18)  SpO2: 97% (07 Apr 2021 09:22) (93% - 98%)    CONSTITUTIONAL: NAD, well-developed, well-groomed  EYES: PERRLA; conjunctiva and sclera clear  ENMT: Moist oral mucosa, no pharyngeal injection or exudates; normal dentition  NECK: Supple, no palpable masses; no thyromegaly  RESPIRATORY: Normal respiratory effort; lungs are clear to auscultation bilaterally  CARDIOVASCULAR: Regular rate and rhythm, normal S1 and S2, no murmur/rub/gallop; No lower extremity edema; Peripheral pulses are 2+ bilaterally  ABDOMEN: Soft, Nondistended,  Nontender to palpation, normoactive bowel sounds  MUSCULOSKELETAL:  No clubbing or cyanosis of digits; no joint swelling or tenderness to palpation  PSYCH: A+O to person, place, and time; affect appropriate  NEUROLOGY: CN 2-12 are intact and symmetric; no gross sensory deficits, 5/5 strength throughout  SKIN: +vertical incision site with dressing c/d/i,  UCHE drain now removed    LABS:                        9.8    8.09  )-----------( 635      ( 07 Apr 2021 06:01 )             32.6     04-07    143  |  104  |  9   ----------------------------<  99  5.0   |  27  |  0.69    Ca    9.8      07 Apr 2021 06:01  Phos  3.4     04-07  Mg     1.7     04-07    TPro  7.0  /  Alb  3.5  /  TBili  0.2  /  DBili  x   /  AST  25  /  ALT  41  /  AlkPhos  146<H>  04-07      RADIOLOGY & ADDITIONAL TESTS:  Results Reviewed: no leukocytosis, stable H/H, mild hypomagnesemia, Cr within normal limits, LFTs improved  Imaging Personally Reviewed:  4/6/21 Nuclear Stress Test:  IMPRESSIONS:Normal Study  * Chest Pain: No chest pain with administration of  Regadenoson.  * Symptom: Shortness of breath.  * HR Response: Appropriate.  * BP Response: Appropriate.  * Heart Rhythm: Sinus Rhythm - 74 BPM.  * Baseline ECG: Nonspecific ST-T wave abnormality.  * ECG Abnormalities: None.  * Arrhythmia: None.  * Review of raw data shows: Extensive splanchnic uptake  adjacent to inferior and inferolateral wall rest, stress  and prone.  * The left ventricle was upper normal in size. Normal  myocardial perfusion scan, with no evidence of infarction  or inducible ischemia.  * Gated wall motion analysis is performed, and shows  normal wall motion with post stress LVEF of 68% and post  stress LVEDV of  109 ml.  Electrocardiogram Personally Reviewed:    COORDINATION OF CARE:  Care Discussed with Consultants/Other Providers [Y]: Cardiology attending Dr. Son, and medicine GRACE Porter  Prior or Outpatient Records Reviewed [Y]: Cardiology, Plastics progress note

## 2021-04-07 NOTE — DISCHARGE NOTE PROVIDER - CARE PROVIDER_API CALL
Percy Son  CARDIOVASCULAR DISEASE  [address], [address]  [place] [address]  Phone: [phone]  Fax: [phone]  Follow Up Time:     Suzi Salas)  Gastroenterology  [address], [address]  [place] [address]  Phone: [phone]  Fax: [phone]  Follow Up Time:     Monica Fournier)  Plastic Surgery  [address] [place] [address]  Phone: [phone]  Fax: [phone]  Follow Up Time:

## 2021-04-07 NOTE — PROGRESS NOTE ADULT - PROBLEM SELECTOR PLAN 7
stable.  - c/w pantoprazole 40mg PO daily
cont PPI
cont home meds
cont levothyroxine
continue with pantoprazole
stable.  - c/w pantoprazole 40mg PO daily
cont levothyroxine
cont levothyroxine

## 2021-04-07 NOTE — DISCHARGE NOTE PROVIDER - NSDCMRMEDTOKEN_GEN_ALL_CORE_FT
acetaminophen 325 mg oral tablet: 2 tab(s) orally every 6 hours, As needed, Temp greater or equal to 38C (100.4F), Mild Pain (1 - 3)  bisacodyl 10 mg rectal suppository: 1 suppository(ies) rectal once a day, As needed, Constipation  cyclobenzaprine 5 mg oral tablet: 1 tab(s) orally 3 times a day  diazePAM 5 mg oral tablet: 1 tab(s) orally every 8 hours, As needed, severe spasms  enoxaparin: 40 milligram(s) subcutaneous once a day (at bedtime)  fentaNYL 75 mcg/hr transdermal film, extended release: 1 patch transdermal every 72 hours  hyoscyamine: 0.25 milligram(s) orally once a day, As Needed  levothyroxine 200 mcg (0.2 mg) oral tablet: 1 tab(s) orally once a day  melatonin 3 mg oral tablet: 1 tab(s) orally once a day (at bedtime)  Multiple Vitamins oral tablet: 1 tab(s) orally once a day  ondansetron 4 mg oral tablet: 1 tab(s) orally every 8 hours, As Needed - for nausea  oxyCODONE 20 mg oral tablet: 1 tab(s) orally every 4 hours, As needed, Moderate Pain (4 - 6)  pantoprazole 20 mg oral delayed release tablet: 1 tab(s) orally once a day  PARoxetine 40 mg oral tablet: 1 tab(s) orally once a day  polyethylene glycol 3350 oral powder for reconstitution: 17 gram(s) orally 2 times a day  senna oral tablet: 2 tab(s) orally once a day (at bedtime)  Systane ophthalmic gel forming solution: 1 drop(s) to each affected eye 4 times a day   bisacodyl 10 mg rectal suppository: 1 suppository(ies) rectal once a day, As needed, Constipation  cyclobenzaprine 5 mg oral tablet: 1 tab(s) orally 3 times a day  enoxaparin: 40 milligram(s) subcutaneous once a day (at bedtime)  fentaNYL 50 mcg/hr transdermal film, extended release: 1 patch transdermal every 72 hours  levothyroxine 200 mcg (0.2 mg) oral tablet: 1 tab(s) orally once a day  melatonin 3 mg oral tablet: 1 tab(s) orally once a day (at bedtime)  midodrine 5 mg oral tablet: 1 tab(s) orally 3 times a day  Multiple Vitamins oral tablet: 1 tab(s) orally once a day  ondansetron 4 mg oral tablet: 1 tab(s) orally every 8 hours, As Needed - for nausea  oxyCODONE 10 mg oral tablet: 1 tab(s) orally every 4 hours, As needed, Severe Pain (7 - 10)  oxyCODONE 5 mg oral tablet: 1 tab(s) orally every 4 hours, As needed, Moderate Pain (4 - 6)  pantoprazole 40 mg oral delayed release tablet: 1 tab(s) orally once a day (before a meal)  PARoxetine 40 mg oral tablet: 1 tab(s) orally once a day  polyethylene glycol 3350 oral powder for reconstitution: 17 gram(s) orally 2 times a day  senna oral tablet: 2 tab(s) orally once a day (at bedtime)  Systane ophthalmic gel forming solution: 1 drop(s) to each affected eye 4 times a day  ursodiol 300 mg oral capsule: 1 cap(s) orally every 12 hours

## 2021-04-07 NOTE — PROGRESS NOTE ADULT - PROBLEM SELECTOR PROBLEM 1
CSF leak
Orthostatic hypotension
Orthostatic hypotension
CSF leak
CSF leak
Light-headedness
CSF leak
CSF leak

## 2021-04-07 NOTE — PROGRESS NOTE ADULT - SUBJECTIVE AND OBJECTIVE BOX
Patient is a 61y old  Female who presents with a chief complaint of 60 y/o F s/p T8 - pelvis arthrodesis, L1-L3 anterior column release and closure 3/12/21, returned to ED with nausea and dizziness, to rule out CSF leak (07 Apr 2021 11:28)    INTERVAL HPI/OVERNIGHT EVENTS: no events overnight, patient expressing readiness for discharge to rehab    MEDICATIONS  (STANDING):  enoxaparin Injectable 40 milliGRAM(s) SubCutaneous at bedtime  fentaNYL   Patch  50 MICROgram(s)/Hr 1 Patch Transdermal every 72 hours  glucagon  Injectable 1 milliGRAM(s) IntraMuscular once  levothyroxine 200 MICROGram(s) Oral daily  melatonin 3 milliGRAM(s) Oral at bedtime  midodrine. 5 milliGRAM(s) Oral three times a day  multivitamin 1 Tablet(s) Oral daily  pantoprazole    Tablet 40 milliGRAM(s) Oral before breakfast  PARoxetine 40 milliGRAM(s) Oral daily  polyethylene glycol 3350 17 Gram(s) Oral two times a day  senna 2 Tablet(s) Oral at bedtime  sodium chloride 0.9%. 1000 milliLiter(s) (75 mL/Hr) IV Continuous <Continuous>  ursodiol Capsule 300 milliGRAM(s) Oral every 12 hours    MEDICATIONS  (PRN):  bisacodyl Suppository 10 milliGRAM(s) Rectal daily PRN Constipation  cyclobenzaprine 5 milliGRAM(s) Oral three times a day PRN Muscle Spasm  diphenhydrAMINE   Injectable 12.5 milliGRAM(s) IV Push once PRN nausea  ondansetron Injectable 4 milliGRAM(s) IV Push every 6 hours PRN Nausea and/or Vomiting  ondansetron Injectable 4 milliGRAM(s) IV Push once PRN Nausea and/or Vomiting  oxyCODONE    IR 10 milliGRAM(s) Oral every 4 hours PRN Severe Pain (7 - 10)  oxyCODONE    IR 5 milliGRAM(s) Oral every 4 hours PRN Moderate Pain (4 - 6)  prochlorperazine   Injectable 10 milliGRAM(s) IV Push once PRN nausea      Allergies    No Known Allergies    Intolerances        Review of Systems:      Vital Signs Last 24 Hrs  T(C): 36.7 (07 Apr 2021 13:07), Max: 36.8 (06 Apr 2021 15:52)  T(F): 98 (07 Apr 2021 13:07), Max: 98.2 (06 Apr 2021 15:52)  HR: 74 (07 Apr 2021 13:07) (69 - 88)  BP: 108/55 (07 Apr 2021 13:07) (102/62 - 128/77)  BP(mean): --  RR: 18 (07 Apr 2021 13:07) (16 - 18)  SpO2: 97% (07 Apr 2021 13:07) (93% - 98%)    PHYSICAL EXAM:      LABS:                        9.8    8.09  )-----------( 635      ( 07 Apr 2021 06:01 )             32.6     04-07    143  |  104  |  9   ----------------------------<  99  5.0   |  27  |  0.69    Ca    9.8      07 Apr 2021 06:01  Phos  3.4     04-07  Mg     1.7     04-07    TPro  7.0  /  Alb  3.5  /  TBili  0.2  /  DBili  x   /  AST  25  /  ALT  41  /  AlkPhos  146<H>  04-07        LIVER FUNCTIONS - ( 07 Apr 2021 06:01 )  Alb: 3.5 g/dL / Pro: 7.0 g/dL / ALK PHOS: 146 U/L / ALT: 41 U/L / AST: 25 U/L / GGT: x             RADIOLOGY & ADDITIONAL TESTS:   Patient is a 61y old  Female who presents with a chief complaint of 62 y/o F s/p T8 - pelvis arthrodesis, L1-L3 anterior column release and closure 3/12/21, returned to ED with nausea and dizziness, to rule out CSF leak (07 Apr 2021 11:28)    HPI:  61y old  Female h/o esophageal spasms, smoker, COPD who presents with a recent T8-pelvis arthrodesis, and L1-L3 anterior column release on 3/12/2021 p/w orthostatic dizziness. Concern for CSF leak vs seroma.  c/w right sided chest pain and found with elevated LFTs sec to sludge vs mass. h/o Flipped T waves in anterolateral leads that normalized on this hospitalization being followed by cards.    Her GI history includes esophageal spasms, dysphagia, gastritis (followed by GI Dr Abreu, had an endoscopy in October, no interventions performed, takes pantoprazole)    Noted to have transaminitis - rising over weekend with some reported nausea and RUQ discomfort - underwent US which raised concern for GB sludge vs mass and recommended MRI to better evaluate.  Had MRI/MRCP yesterday with anesthesia LFTs have been downtrending, no current c/o abdominal pain, nausea or vomiting. NO personal or FH of liver disease    INTERVAL HPI/OVERNIGHT EVENTS: no events overnight, patient expressing readiness for discharge to rehab, taking po without difficulty    MEDICATIONS  (STANDING):  enoxaparin Injectable 40 milliGRAM(s) SubCutaneous at bedtime  fentaNYL   Patch  50 MICROgram(s)/Hr 1 Patch Transdermal every 72 hours  glucagon  Injectable 1 milliGRAM(s) IntraMuscular once  levothyroxine 200 MICROGram(s) Oral daily  melatonin 3 milliGRAM(s) Oral at bedtime  midodrine. 5 milliGRAM(s) Oral three times a day  multivitamin 1 Tablet(s) Oral daily  pantoprazole    Tablet 40 milliGRAM(s) Oral before breakfast  PARoxetine 40 milliGRAM(s) Oral daily  polyethylene glycol 3350 17 Gram(s) Oral two times a day  senna 2 Tablet(s) Oral at bedtime  ursodiol Capsule 300 milliGRAM(s) Oral every 12 hours    MEDICATIONS  (PRN):  bisacodyl Suppository 10 milliGRAM(s) Rectal daily PRN Constipation  cyclobenzaprine 5 milliGRAM(s) Oral three times a day PRN Muscle Spasm  diphenhydrAMINE   Injectable 12.5 milliGRAM(s) IV Push once PRN nausea  ondansetron Injectable 4 milliGRAM(s) IV Push every 6 hours PRN Nausea and/or Vomiting  ondansetron Injectable 4 milliGRAM(s) IV Push once PRN Nausea and/or Vomiting  oxyCODONE    IR 10 milliGRAM(s) Oral every 4 hours PRN Severe Pain (7 - 10)  oxyCODONE    IR 5 milliGRAM(s) Oral every 4 hours PRN Moderate Pain (4 - 6)  prochlorperazine   Injectable 10 milliGRAM(s) IV Push once PRN nausea      Allergies: No Known Allergies  Intolerances: none    Review of Systems:    CONSTITUTIONAL: No weakness. No fevers. No chills. No rigors. No weight loss. No night sweats. No poor appetite.  EYES: No visual changes. No eye pain.  ENT: No hearing difficulty. No vertigo. No dysphagia. No sore throat. No Sinusitis/rhinorrhea.   NECK: No pain. No stiffness/rigidity.  CARDIAC: No chest pain. No palpitations. No lightheadedness. +abnormal EKG - see HPI  RESPIRATORY: No cough. No SOB. No hemoptysis.  GASTROINTESTINAL: see HPI  GENITOURINARY: No dysuria. No frequency. No hesitancy. No hematuria. No oliguria.  NEUROLOGICAL: See HPI. No focal weakness. No urinary or fecal incontinence. No headache. No unsteady gait. +hx chronic pain +fibromyalgia  EXTREMITIES: No lower extremity edema. No joint pain.  SKIN: No rashes. No itching. No other lesions.  PSYCHIATRIC: +hx depressionNo anxiety. No SI/HI.  ALLERGIC: No lip swelling. No hives.      Vital Signs Last 24 Hrs  T(F): 98 (07 Apr 2021 13:07), Max: 98.2 (06 Apr 2021 15:52)  HR: 74 (07 Apr 2021 13:07) (69 - 88)  BP: 108/55 (07 Apr 2021 13:07) (102/62 - 128/77)  RR: 18 (07 Apr 2021 13:07) (16 - 18)  SpO2: 97% (07 Apr 2021 13:07) (93% - 98%)    PHYSICAL EXAM:  Constitutional: NAD, well-developed non toxic appearing WF sitting at edge of bed  Neck: No LAD, supple no JVD  Respiratory: Clear b/l  Cardiovascular: S1 and S2, RRR, no M  Gastrointestinal: BS+, soft, NT/ND, neg HSM, negative Cunningham's sign  Back: +UCHE - serosanguinous,  right back/flank dressing clean and dry  Extremities: No peripheral edema, neg clubbing, cyanosis  WH scar over right knee  Vascular: 2+ peripheral pulses  Neurological: A/O x 3, no focal deficits/asymmetry  Psychiatric: Normal mood, normal affect  Skin: No rashes, anicteric    LABS:                        9.8    8.09  )-----------( 635      ( 07 Apr 2021 06:01 )             32.6     04-07    143  |  104  |  9   ----------------------------<  99  5.0   |  27  |  0.69    Ca    9.8      07 Apr 2021 06:01  Phos  3.4     04-07  Mg     1.7     04-07    TPro  7.0  /  Alb  3.5  /  TBili  0.2  /  DBili  x   /  AST  25  /  ALT  41  /  AlkPhos  146<H>  04-07  LIVER FUNCTIONS - ( 07 Apr 2021 06:01 )  Alb: 3.5 g/dL / Pro: 7.0 g/dL / ALK PHOS: 146 U/L / ALT: 41 U/L / AST: 25 U/L / GGT: x           RADIOLOGY & ADDITIONAL TESTS:  MR MRCP w/wo IV Cont (04.05.21 @ 15:41)   PROCEDURE:  MRI of the abdomen was performed.  MRCP was performed.    FINDINGS:  LOWER CHEST: Bilateral small pleural effusions with adjacent atelectasis.    LIVER: Mild steatosis. No suspicious enhancing lesion.  BILE DUCTS: No intrahepatic biliary ductal dilatation. Common bile duct measures up to 1.0 cm. No choledocholithiasis.  GALLBLADDER:Intraluminal membranous unenhanced structures likely representing sludge. No signs of intraluminal bladder wall gas. No gallbladder wall thickening or pericholecystic fluid.  SPLEEN: Within normal limits.  PANCREAS: Within normal limits.  ADRENALS: Within normal limits.  KIDNEYS/URETERS: Within normal limits. No hydronephrosis.    VISUALIZED PORTIONS:  BOWEL: Within normal limits.  PERITONEUM: No ascites.  VESSELS: Within normal limits.  RETROPERITONEUM/LYMPH NODES: No lymphadenopathy.  ABDOMINALWALL: 3.4 x 2.0 cm right lumbar fluid collection which may be postsurgical. Clinical correlation is recommended.  BONES: Posterior fixation hardware through the visualized spine. Paraspinal soft tissue edema and foci of blooming artifacts suggesting postsurgical changes.    IMPRESSION:  Bilateral small pleural effusions.  Mild steatosis. Gallbladder debris/sludge.  A 3.4 x 2.0 cm right lumbar fluid collection which may be postsurgical. Clinical correlation is recommended.      US Abdomen Upper Quadrant Right (04.04.21 @ 13:38) >  INTERPRETATION:  CLINICAL INFORMATION: Right upper quadrant pain.  COMPARISON: None available.  TECHNIQUE: Sonography of the right upper quadrant.  FINDINGS:  Liver: Within normal limits.  Bile ducts: Normal caliber. Common bile duct measures 6 mm.  Gallbladder: Nonmobile tumefactive sludge or mass in the gallbladder measuring 4.0 x 1.6 cm.  Pancreas: Visualized portions are within normallimits.  Right kidney: 10.6 cm. No hydronephrosis.  Ascites: None.  IVC: Visualized portions are within normal limits.    IMPRESSION:  4.0 cm area of tumefactive sludge or solid mass within the gallbladder. Recommend contrast-enhanced MRI of the abdomen for further evaluation.

## 2021-04-07 NOTE — PROGRESS NOTE ADULT - PROBLEM SELECTOR PROBLEM 7
H/O gastritis
Hypothyroidism
Hypothyroidism
H/O gastritis
H/O gastritis
Depression
Hypothyroidism
H/O gastritis

## 2021-04-07 NOTE — PROGRESS NOTE ADULT - ASSESSMENT
60 y/o F s/p T8 - pelvis arthrodesis, L1-L3 anterior column release and closure 3/12/21, returned to ED with nausea and dizziness, imaging pending to rule out CSF leak    - UCHE drain in place draining, will continue to monitor output   - will assess possibility of drain removal when patient ready for discharge   - F/U w/ Dr. Fournier as outpatient for UCHE drain removal   - Primary management per neurosurgery, appreciate recs and care. 62 y/o F s/p T8 - pelvis arthrodesis, L1-L3 anterior column release and closure 3/12/21, returned to ED with nausea and dizziness, imaging pending to rule out CSF leak    - UCHE drain dc'd this AM  - F/U w/ Dr. Fournier as outpatient  - Primary management per neurosurgery, appreciate recs and care.

## 2021-04-07 NOTE — PROGRESS NOTE ADULT - NSICDXPILOT_GEN_ALL_CORE
[place]

## 2021-04-07 NOTE — PROGRESS NOTE ADULT - SUBJECTIVE AND OBJECTIVE BOX
Pt seen and examined at bedside. No acute events overnight. Pt is doing well. She complains of slight nausea, that continues to improve. She is tolerating diet.  She denies any dizziness. She states that plastic team removed UCHE drain this AM. Back dressing changed at bedside this AM. She states that she will likely be discharged to [facility] today, pending bed status.         Vital Signs Last 24 Hrs  T(C): 36.6 (07 Apr 2021 05:08), Max: 36.8 (06 Apr 2021 15:52)  T(F): 97.8 (07 Apr 2021 05:08), Max: 98.2 (06 Apr 2021 15:52)  HR: 73 (07 Apr 2021 05:08) (69 - 88)  BP: 126/68 (07 Apr 2021 05:08) (102/62 - 128/77)  BP(mean): --  RR: 18 (07 Apr 2021 05:08) (16 - 18)  SpO2: 93% (07 Apr 2021 05:08) (93% - 98%)      PHYSICAL EXAM:  Gen: NAD,A&Ox3, lying comfortably in bed  Back: soft, no fluctuance, Aquacel dressing C/D/I, No palpable, collections no Erythema, d/c or signs of infection.       I&O's Summary    06 Apr 2021 07:01  -  07 Apr 2021 07:00  --------------------------------------------------------  IN: 0 mL / OUT: 20 mL / NET: -20 mL      I&O's Detail    06 Apr 2021 07:01  -  07 Apr 2021 07:00  --------------------------------------------------------  IN:  Total IN: 0 mL    OUT:    Bulb (mL): 20 mL  Total OUT: 20 mL    Total NET: -20 mL          MEDICATIONS  (STANDING):  enoxaparin Injectable 40 milliGRAM(s) SubCutaneous at bedtime  fentaNYL   Patch  50 MICROgram(s)/Hr 1 Patch Transdermal every 72 hours  glucagon  Injectable 1 milliGRAM(s) IntraMuscular once  levothyroxine 200 MICROGram(s) Oral daily  melatonin 3 milliGRAM(s) Oral at bedtime  multivitamin 1 Tablet(s) Oral daily  pantoprazole    Tablet 40 milliGRAM(s) Oral before breakfast  PARoxetine 40 milliGRAM(s) Oral daily  polyethylene glycol 3350 17 Gram(s) Oral two times a day  senna 2 Tablet(s) Oral at bedtime  sodium chloride 0.9%. 1000 milliLiter(s) (75 mL/Hr) IV Continuous <Continuous>  ursodiol Capsule 300 milliGRAM(s) Oral every 12 hours    MEDICATIONS  (PRN):  bisacodyl Suppository 10 milliGRAM(s) Rectal daily PRN Constipation  cyclobenzaprine 5 milliGRAM(s) Oral three times a day PRN Muscle Spasm  diphenhydrAMINE   Injectable 12.5 milliGRAM(s) IV Push once PRN nausea  ondansetron Injectable 4 milliGRAM(s) IV Push once PRN Nausea and/or Vomiting  ondansetron Injectable 4 milliGRAM(s) IV Push every 6 hours PRN Nausea and/or Vomiting  oxyCODONE    IR 10 milliGRAM(s) Oral every 4 hours PRN Severe Pain (7 - 10)  oxyCODONE    IR 5 milliGRAM(s) Oral every 4 hours PRN Moderate Pain (4 - 6)  prochlorperazine   Injectable 10 milliGRAM(s) IV Push once PRN nausea      LABS:                        9.8    8.09  )-----------( 635      ( 07 Apr 2021 06:01 )             32.6     04-07    143  |  104  |  9   ----------------------------<  99  5.0   |  27  |  0.69    Ca    9.8      07 Apr 2021 06:01  Phos  3.4     04-07  Mg     1.7     04-07    TPro  7.0  /  Alb  3.5  /  TBili  0.2  /  DBili  x   /  AST  25  /  ALT  41  /  AlkPhos  146<H>  04-07

## 2021-04-07 NOTE — DISCHARGE NOTE PROVIDER - HOSPITAL COURSE
62 yo female with PMH of esophageal spasms, GERD, smoker, COPD, recent T8-pelvis arthrodesis, and L1-L3 anterior column release on 3/12/2021, who presented from acute rehab with orthostatic dizziness for 6 days and an MRI from an outside facility that revealed CSF leak. Of note, pt still had UCHE drain in place at time of admission. Pt had a repeat MRI on 3/31 while at [facility] which revealed thoracolumbar fusion with large fluid collection subcutaneously extending from T11 through L2 which likely represents a CSF leak.  Her course was c/b transaminitis and was found to have biliary sludge on MRCP. Liver enzymes normalized and GI recommended no ERCP needed at this time.  Additionally, pt complained of R rib pain, nausea, sweating and chest pain on 4/2, found to have abnormal EKG with TWI but stress test without evidence of ischemia.    62 yo female with PMH of esophageal spasms, GERD, smoker, COPD, recent T8-pelvis arthrodesis, and L1-L3 anterior column release on 3/12/2021, who presented from acute rehab with orthostatic dizziness for 6 days and an MRI from an outside facility that revealed CSF leak. Of note, pt still had UCHE drain in place at time of admission. Pt had a repeat MRI on 3/31 while at [facility] which revealed thoracolumbar fusion with large fluid collection subcutaneously extending from T11 through L2 which likely represents a CSF leak.  Her course was c/b transaminitis and was found to have biliary sludge on MRCP. Liver enzymes normalized and GI recommended no ERCP needed at this time.  Additionally, pt complained of R rib pain, nausea, sweating and chest pain on 4/2, found to have abnormal EKG with TWI but stress test without evidence of ischemia. Midodrine started for orthostatic hypotension, which she will continue at rehab. Can be weaned off if her orthostatic hypotension improves.

## 2021-04-07 NOTE — PROGRESS NOTE ADULT - SUBJECTIVE AND OBJECTIVE BOX
DATE OF SERVICE: 04-07-21 @ 09:51    Subjective: Patient seen and examined. No new events except as noted.     SUBJECTIVE/ROS:  No chest pain, dyspnea, palpitation, or dizziness.       MEDICATIONS:  MEDICATIONS  (STANDING):  enoxaparin Injectable 40 milliGRAM(s) SubCutaneous at bedtime  fentaNYL   Patch  50 MICROgram(s)/Hr 1 Patch Transdermal every 72 hours  glucagon  Injectable 1 milliGRAM(s) IntraMuscular once  levothyroxine 200 MICROGram(s) Oral daily  magnesium sulfate  IVPB 2 Gram(s) IV Intermittent once  melatonin 3 milliGRAM(s) Oral at bedtime  multivitamin 1 Tablet(s) Oral daily  pantoprazole    Tablet 40 milliGRAM(s) Oral before breakfast  PARoxetine 40 milliGRAM(s) Oral daily  polyethylene glycol 3350 17 Gram(s) Oral two times a day  senna 2 Tablet(s) Oral at bedtime  sodium chloride 0.9%. 1000 milliLiter(s) (75 mL/Hr) IV Continuous <Continuous>  ursodiol Capsule 300 milliGRAM(s) Oral every 12 hours      PHYSICAL EXAM:  T(C): 36.6 (04-07-21 @ 05:08), Max: 36.8 (04-06-21 @ 15:52)  HR: 73 (04-07-21 @ 05:08) (69 - 88)  BP: 126/68 (04-07-21 @ 05:08) (102/62 - 128/77)  RR: 18 (04-07-21 @ 05:08) (16 - 18)  SpO2: 93% (04-07-21 @ 05:08) (93% - 98%)  Wt(kg): --  I&O's Summary    06 Apr 2021 07:01  -  07 Apr 2021 07:00  --------------------------------------------------------  IN: 0 mL / OUT: 20 mL / NET: -20 mL            JVP: Normal  Neck: supple  Lung: clear   CV: S1 S2 , Murmur:  Abd: soft  Ext: No edema  neuro: Awake / alert  Psych: flat affect  Skin: normal``    LABS/DATA:    CARDIAC MARKERS:                                9.8    8.09  )-----------( 635      ( 07 Apr 2021 06:01 )             32.6     04-07    143  |  104  |  9   ----------------------------<  99  5.0   |  27  |  0.69    Ca    9.8      07 Apr 2021 06:01  Phos  3.4     04-07  Mg     1.7     04-07    TPro  7.0  /  Alb  3.5  /  TBili  0.2  /  DBili  x   /  AST  25  /  ALT  41  /  AlkPhos  146<H>  04-07    proBNP:   Lipid Profile:   HgA1c:   TSH:     TELE:  EKG:

## 2021-04-07 NOTE — PROGRESS NOTE ADULT - ASSESSMENT
60 y/o F s/p T8 - pelvis arthrodesis, L1-L3 anterior column release and closure 3/12/21, returned to ED with nausea and dizziness, to rule out CSF leak    - UCHE drain dc'd this AM, and incision cleaned and dressed with Aquacel dressing by Plastics residents. Dressing will be changed q 3 days.   - continue to monitor orthostatic hypotension.   - continue PT  - She will be d/c to rehab today, pending bed status at [facility].   - F/U w/ Dr. Fournier in [facility]   - please contact with any questions or concerns. [phone]

## 2021-04-07 NOTE — PROGRESS NOTE ADULT - PROBLEM SELECTOR PLAN 5
improved s/p vit K. monitor
s/p T8-pelvis arthrodesis, and L1-L3 anterior column release on 3/12/2021.  - Neurosurgery recs appreciated  - Plastics recs appreciated  - pain control as below  - UCHE drain removed by Plastics 4/7  - PM&R consult for return to acute rehab
improved s/p vit K. monitor
cont current tx
cont PPI
cont PPI
s/p T8-pelvis arthrodesis, and L1-L3 anterior column release on 3/12/2021.  - Neurosurgery recs appreciated  - Plastics recs appreciated  - pain control as below  - monitor UCHE drain output - per plastics notes, appears may remove prior to discharge to rehab  - PM&R consult for return to acute rehab
cont PPI

## 2021-04-07 NOTE — PROGRESS NOTE ADULT - PROBLEM SELECTOR PLAN 1
+orthostatics intermittently  - improves with IVF boluses  - c/w compression stockings  - will start midodrine 5mg TID for orthostatic hypotension  - patient encouraged to maintain adequate hydration and avoid volume depletion  - instructed on patient on waiting several minutes with positional changes, no suddent movements. mobilize as much as possible.

## 2021-04-07 NOTE — PROGRESS NOTE ADULT - PROVIDER SPECIALTY LIST ADULT
Cardiology
Cardiology
Hospitalist
Neurosurgery
Neurosurgery
Plastic Surgery
Cardiology
Gastroenterology
Hospitalist
Neurosurgery
Neurosurgery
Plastic Surgery
Cardiology
Cardiology
Neurosurgery
Neurosurgery
Plastic Surgery
Hospitalist

## 2021-04-07 NOTE — PROGRESS NOTE ADULT - SUBJECTIVE AND OBJECTIVE BOX
SUBJECTIVE:   Pt seen and examined at bedside. No acute events overnight. Back dressing changed at bedside this AM        Vital Signs Last 24 Hrs  T(C): 36.6 (07 Apr 2021 05:08), Max: 36.8 (06 Apr 2021 15:52)  T(F): 97.8 (07 Apr 2021 05:08), Max: 98.2 (06 Apr 2021 15:52)  HR: 73 (07 Apr 2021 05:08) (69 - 88)  BP: 126/68 (07 Apr 2021 05:08) (102/62 - 128/77)  BP(mean): --  RR: 18 (07 Apr 2021 05:08) (16 - 18)  SpO2: 93% (07 Apr 2021 05:08) (93% - 98%)      PHYSICAL EXAM:  NAD, lying in bed  Back soft  Dressing c/d/i  No palpable collections  UCHE serous, thin fluid      I&O's Summary    06 Apr 2021 07:01  -  07 Apr 2021 07:00  --------------------------------------------------------  IN: 0 mL / OUT: 20 mL / NET: -20 mL      I&O's Detail    06 Apr 2021 07:01  -  07 Apr 2021 07:00  --------------------------------------------------------  IN:  Total IN: 0 mL    OUT:    Bulb (mL): 20 mL  Total OUT: 20 mL    Total NET: -20 mL          MEDICATIONS  (STANDING):  enoxaparin Injectable 40 milliGRAM(s) SubCutaneous at bedtime  fentaNYL   Patch  50 MICROgram(s)/Hr 1 Patch Transdermal every 72 hours  glucagon  Injectable 1 milliGRAM(s) IntraMuscular once  levothyroxine 200 MICROGram(s) Oral daily  melatonin 3 milliGRAM(s) Oral at bedtime  multivitamin 1 Tablet(s) Oral daily  pantoprazole    Tablet 40 milliGRAM(s) Oral before breakfast  PARoxetine 40 milliGRAM(s) Oral daily  polyethylene glycol 3350 17 Gram(s) Oral two times a day  senna 2 Tablet(s) Oral at bedtime  sodium chloride 0.9%. 1000 milliLiter(s) (75 mL/Hr) IV Continuous <Continuous>  ursodiol Capsule 300 milliGRAM(s) Oral every 12 hours    MEDICATIONS  (PRN):  bisacodyl Suppository 10 milliGRAM(s) Rectal daily PRN Constipation  cyclobenzaprine 5 milliGRAM(s) Oral three times a day PRN Muscle Spasm  diphenhydrAMINE   Injectable 12.5 milliGRAM(s) IV Push once PRN nausea  ondansetron Injectable 4 milliGRAM(s) IV Push once PRN Nausea and/or Vomiting  ondansetron Injectable 4 milliGRAM(s) IV Push every 6 hours PRN Nausea and/or Vomiting  oxyCODONE    IR 10 milliGRAM(s) Oral every 4 hours PRN Severe Pain (7 - 10)  oxyCODONE    IR 5 milliGRAM(s) Oral every 4 hours PRN Moderate Pain (4 - 6)  prochlorperazine   Injectable 10 milliGRAM(s) IV Push once PRN nausea      LABS:                        9.8    8.09  )-----------( 635      ( 07 Apr 2021 06:01 )             32.6     04-07    143  |  104  |  9   ----------------------------<  99  5.0   |  27  |  0.69    Ca    9.8      07 Apr 2021 06:01  Phos  3.4     04-07  Mg     1.7     04-07    TPro  7.0  /  Alb  3.5  /  TBili  0.2  /  DBili  x   /  AST  25  /  ALT  41  /  AlkPhos  146<H>  04-07          RADIOLOGY & ADDITIONAL STUDIES: SUBJECTIVE:   Pt seen and examined at bedside. No acute events overnight. Back dressing changed at bedside this AM. UCHE dc'd        Vital Signs Last 24 Hrs  T(C): 36.6 (07 Apr 2021 05:08), Max: 36.8 (06 Apr 2021 15:52)  T(F): 97.8 (07 Apr 2021 05:08), Max: 98.2 (06 Apr 2021 15:52)  HR: 73 (07 Apr 2021 05:08) (69 - 88)  BP: 126/68 (07 Apr 2021 05:08) (102/62 - 128/77)  BP(mean): --  RR: 18 (07 Apr 2021 05:08) (16 - 18)  SpO2: 93% (07 Apr 2021 05:08) (93% - 98%)      PHYSICAL EXAM:  NAD, lying in bed  Back soft  Dressing c/d/i  No palpable collections  UCHE serous, thin fluid - removed this AM      I&O's Summary    06 Apr 2021 07:01  -  07 Apr 2021 07:00  --------------------------------------------------------  IN: 0 mL / OUT: 20 mL / NET: -20 mL      I&O's Detail    06 Apr 2021 07:01  -  07 Apr 2021 07:00  --------------------------------------------------------  IN:  Total IN: 0 mL    OUT:    Bulb (mL): 20 mL  Total OUT: 20 mL    Total NET: -20 mL          MEDICATIONS  (STANDING):  enoxaparin Injectable 40 milliGRAM(s) SubCutaneous at bedtime  fentaNYL   Patch  50 MICROgram(s)/Hr 1 Patch Transdermal every 72 hours  glucagon  Injectable 1 milliGRAM(s) IntraMuscular once  levothyroxine 200 MICROGram(s) Oral daily  melatonin 3 milliGRAM(s) Oral at bedtime  multivitamin 1 Tablet(s) Oral daily  pantoprazole    Tablet 40 milliGRAM(s) Oral before breakfast  PARoxetine 40 milliGRAM(s) Oral daily  polyethylene glycol 3350 17 Gram(s) Oral two times a day  senna 2 Tablet(s) Oral at bedtime  sodium chloride 0.9%. 1000 milliLiter(s) (75 mL/Hr) IV Continuous <Continuous>  ursodiol Capsule 300 milliGRAM(s) Oral every 12 hours    MEDICATIONS  (PRN):  bisacodyl Suppository 10 milliGRAM(s) Rectal daily PRN Constipation  cyclobenzaprine 5 milliGRAM(s) Oral three times a day PRN Muscle Spasm  diphenhydrAMINE   Injectable 12.5 milliGRAM(s) IV Push once PRN nausea  ondansetron Injectable 4 milliGRAM(s) IV Push once PRN Nausea and/or Vomiting  ondansetron Injectable 4 milliGRAM(s) IV Push every 6 hours PRN Nausea and/or Vomiting  oxyCODONE    IR 10 milliGRAM(s) Oral every 4 hours PRN Severe Pain (7 - 10)  oxyCODONE    IR 5 milliGRAM(s) Oral every 4 hours PRN Moderate Pain (4 - 6)  prochlorperazine   Injectable 10 milliGRAM(s) IV Push once PRN nausea      LABS:                        9.8    8.09  )-----------( 635      ( 07 Apr 2021 06:01 )             32.6     04-07    143  |  104  |  9   ----------------------------<  99  5.0   |  27  |  0.69    Ca    9.8      07 Apr 2021 06:01  Phos  3.4     04-07  Mg     1.7     04-07    TPro  7.0  /  Alb  3.5  /  TBili  0.2  /  DBili  x   /  AST  25  /  ALT  41  /  AlkPhos  146<H>  04-07          RADIOLOGY & ADDITIONAL STUDIES:

## 2021-04-07 NOTE — PROGRESS NOTE ADULT - ASSESSMENT
Abn EKG  anterior T wave abn   now normalized  echo shows normal LV  function   stress test shows no ischemia     DM II  Monitor finger stick. Insulin coverage. Diabetic education and Diabetic diet.     DVT proph  on lovenox

## 2021-04-08 LAB
ANA TITR SER: NEGATIVE — SIGNIFICANT CHANGE UP
MITOCHONDRIA AB SER-ACNC: SIGNIFICANT CHANGE UP
SMOOTH MUSCLE AB SER-ACNC: ABNORMAL

## 2021-04-30 ENCOUNTER — RESULT REVIEW (OUTPATIENT)
Age: 61
End: 2021-04-30

## 2021-05-17 ENCOUNTER — APPOINTMENT (OUTPATIENT)
Dept: NEUROSURGERY | Facility: CLINIC | Age: 61
End: 2021-05-17
Payer: MEDICARE

## 2021-05-17 DIAGNOSIS — M96.1 POSTLAMINECTOMY SYNDROME, NOT ELSEWHERE CLASSIFIED: ICD-10-CM

## 2021-05-17 PROCEDURE — 99024 POSTOP FOLLOW-UP VISIT: CPT

## 2021-05-19 PROBLEM — M96.1 FAILED BACK SYNDROME OF LUMBAR SPINE: Status: ACTIVE | Noted: 2020-09-29

## 2021-05-19 NOTE — HISTORY OF PRESENT ILLNESS
[FreeTextEntry1] : Ms. Chaidez is a 60 yo female with PMH of HLD, Hashimoto's, RA, fibromyalgia, nutcracker esophagus (spasms), 2003 Lumbar fusion, 2010 R THR, 2012 L TKR, left hip pain, chronic neck pain over 30 years without neck pain recently who underwent C3-C4 ACDF (6/3/2019) for cervical degenerative changes with spinal cord compression and spondylolisthesis. Of note:  Post op stroke code was called due to facial droop which resolved and negative work up.  \par \par TODAY, she arrives for an initial post op visit after rehab s/p 3/12/2021 T8-pelvis arthrodesis, L1-L3 anterior column release.  Readmitted from rehab 3/30-4/7/21 for orthostatic hypotension, no CSF leak detected, followed by cardiology.\par \par 1/19/2021:  she arrives to discuss new imaging obtained after failed SCS trial to consider surgical options.  \par \par 12/8/2020:  s/p SCS trial lead pull s/p 11/18/20 SCS trial with thoracic Nevro leads in tact visualized on xrays 11/25/20.  She reports pain relief of RLE pain, but not pain relief of most bothersome LLE pain during the trial.  She wises to discuss any options.   \par \par

## 2021-05-19 NOTE — ASSESSMENT
[FreeTextEntry1] : 62 yo female recovering very well from recent surgery.  Her posture has improved and upright, she denies new pain, numbness, tingling, or weakness with overall improved ambulation, function, and performance.  The surgical incision is well healed with no signs of irritation, erythema, edema, warmth, or drainage.\par \par We will see back in 3 months with Scoliosis xrays at that time.  PT referral provided as well.\par \par 1)  Xrays Scoliosis  done @ [facility] or [facility] only - (Entire thoracolumbar spine) - 3 months\par 2)  RTO 3 months\par 3)  PT referral

## 2021-05-19 NOTE — REVIEW OF SYSTEMS
[As Noted in HPI] : as noted in HPI [Negative] : Endocrine [Abdominal Pain] : no abdominal pain [Vomiting] : no vomiting [Diarrhea] : no diarrhea [Dysuria] : no dysuria [Incontinence] : no incontinence [Skin Lesions] : no skin lesions [Itching] : no itching [Easy Bleeding] : no tendency for easy bleeding [Easy Bruising] : no tendency for easy bruising

## 2021-05-19 NOTE — PHYSICAL EXAM
[General Appearance - Alert] : alert [General Appearance - In No Acute Distress] : in no acute distress [Normal Skin] : normal [Oriented To Time, Place, And Person] : oriented to person, place, and time [Impaired Insight] : insight and judgment were intact [Antalgic] : antalgic [PERRL With Normal Accommodation] : pupils were equal in size, round, reactive to light, with normal accommodation [Hearing Threshold Finger Rub Not [place]] : hearing was normal [Neck Appearance] : the appearance of the neck was normal [] : no respiratory distress [Respiration, Rhythm And Depth] : normal respiratory rhythm and effort [Edema] : there was no peripheral edema [Involuntary Movements] : no involuntary movements were seen [Motor Tone] : muscle strength and tone were normal [Skin Color & Pigmentation] : normal skin color and pigmentation [Clean] : clean [Dry] : dry [Well-Healed] : well-healed [Intact] : intact [No Drainage] : without drainage [Able to toe walk] : the patient was able to toe walk [Able to heel walk] : the patient was able to heel walk [Sclera] : the sclera and conjunctiva were normal [Erythema] : not erythematous [Warm] : not warm [FreeTextEntry1] : improved upright posture, gait normal and tremendously improved using cane for safety

## 2021-06-01 ENCOUNTER — APPOINTMENT (OUTPATIENT)
Dept: NEUROSURGERY | Facility: CLINIC | Age: 61
End: 2021-06-01

## 2021-07-14 NOTE — PRE-OP CHECKLIST - NEEDLE GAUGE
Assessment/Plan:         Diagnoses and all orders for this visit:    Depression, unspecified depression type; Try :  -     escitalopram (LEXAPRO) 5 mg tablet; Take 1 tablet (5 mg total) by mouth daily w Breakfast   RTC in 1-2 mos w Blood  Work,     -     TSH, 3rd generation; Future    Temporal arteritis (HCC); stable  Continue prednisone 2 5 mg Daily  RTC in 2 mos w :  -     TSH, 3rd generation; Future  -     Sedimentation rate, automated; Future  -     C-reactive protein; Future    Other hyperlipidemia; stable  Continue same  RTC in 3mos w :  -     UA (URINE) with reflex to Scope; Future  -     TSH, 3rd generation; Future  -     Lipid panel; Future    Essential hypertension  -     UA (URINE) with reflex to Scope; Future  -     TSH, 3rd generation; Future  -     Comprehensive metabolic panel; Future  -     CBC and differential; Future        Subjective:      Patient ID: Donald Guidry is a 80 y o  female  [address] is here for Regular Check up, she has few symptoms, No suicidal Nor Homicidal Ideas , Recent Blood work and med List reviewed w  Pt    The following portions of the patient's history were reviewed and updated as appropriate: allergies, current medications, past family history, past medical history, past social history, past surgical history and problem list     Review of Systems   Constitutional: Negative for chills, fatigue and fever  HENT: Negative for congestion, facial swelling, sore throat, trouble swallowing and voice change  Eyes: Negative for pain, discharge and visual disturbance  Respiratory: Negative for cough, shortness of breath and wheezing  Cardiovascular: Negative for chest pain, palpitations and leg swelling  Gastrointestinal: Negative for abdominal pain, blood in stool, constipation, diarrhea and nausea  Endocrine: Negative for polydipsia, polyphagia and polyuria  Genitourinary: Negative for difficulty urinating, hematuria and urgency     Musculoskeletal: Negative for arthralgias and myalgias  Skin: Negative for rash  Neurological: Negative for dizziness, tremors, weakness and headaches  Hematological: Negative for adenopathy  Does not bruise/bleed easily  Psychiatric/Behavioral: Negative for dysphoric mood, sleep disturbance and suicidal ideas  The patient is nervous/anxious  Objective:      /78 (BP Location: Left arm, Patient Position: Sitting, Cuff Size: Standard)   Pulse 64   Temp (!) 96 1 °F (35 6 °C) (Tympanic)   Resp 14   Ht 5' 5" (1 651 m)   Wt 64 9 kg (143 lb)   SpO2 97%   BMI 23 80 kg/m²          Physical Exam  Constitutional:       General: She is not in acute distress  HENT:      Head: Normocephalic  Mouth/Throat:      Pharynx: No oropharyngeal exudate  Eyes:      General: No scleral icterus  Conjunctiva/sclera: Conjunctivae normal       Pupils: Pupils are equal, round, and reactive to light  Neck:      Thyroid: No thyromegaly  Cardiovascular:      Rate and Rhythm: Normal rate and regular rhythm  Heart sounds: Normal heart sounds  No murmur heard  Pulmonary:      Effort: Pulmonary effort is normal  No respiratory distress  Breath sounds: Normal breath sounds  No wheezing or rales  Abdominal:      General: Bowel sounds are normal  There is no distension  Palpations: Abdomen is soft  Tenderness: There is no abdominal tenderness  There is no guarding or rebound  Musculoskeletal:         General: No tenderness  Cervical back: Neck supple  Lymphadenopathy:      Cervical: No cervical adenopathy  Skin:     Coloration: Skin is not pale  Findings: No rash  Neurological:      Mental Status: She is alert and oriented to person, place, and time  Sensory: No sensory deficit  Motor: No weakness  18

## 2021-08-01 NOTE — DISCHARGE NOTE PROVIDER - NSFOLLOWUPCLINICS_GEN_ALL_ED_FT
[facility]  Internal Medicine Residency Program  Progress Note - House Team     Patient:  Jessica Bender 76 y.o. female MRN: [id number]     Date of Service: 2021     CC: abdominal pain  Overnight events: None    Subjective     Patient was seen and examined this morning at bedside in no acute distress. Patient states that she had difficulty sleeping last night due to her dry mouth and nose. She reports she has had this problem in the past and used an \"arm and hammer\" nose spray to resolve it. Patient has also felt her anxiety has been getting worse but she believes its due to the dryness and staying in the hospital for a prolonged period. She reports her abdominal pain has been improving and she now only takes \"1 pain pill every 24 hrs\". MAR confirms this. No other complaints at this time    Objective     Physical Exam:  TEMPERATURE:  Current - Temp: 97.6 °F (36.4 °C); Max - Temp  Av.9 °F (36.6 °C)  Min: 97.6 °F (36.4 °C)  Max: 98.4 °F (36.9 °C)  RESPIRATIONS RANGE: Resp  Av.3  Min: 16  Max: 17  PULSE RANGE: Pulse  Av  Min: 63  Max: 82  BLOOD PRESSURE RANGE:  Systolic (93ABA), OKW:710 , Min:120 , :261   ; Diastolic (45WEE), CGM:24, Min:60, Max:78    PULSE OXIMETRY RANGE: SpO2  Av.5 %  Min: 97 %  Max: 98 %    I & O - 24hr:    Intake/Output Summary (Last 24 hours) at 2021 0802  Last data filed at 2021 0440  Gross per 24 hour   Intake 360 ml   Output 250 ml   Net 110 ml     I/O last 3 completed shifts: In: 360 [P.O.:360]  Out: 250 [Urine:250] No intake/output data recorded. Weight change: -1 lb 6.4 oz (-0.635 kg)    Physical Exam  Constitutional:       General: She is not in acute distress. Appearance: She is well-developed. She is obese. She is not diaphoretic. HENT:      Head: Normocephalic and atraumatic. Eyes:      General: No scleral icterus. Pupils: Pupils are equal, round, and reactive to light. Neck:      Thyroid: No thyromegaly. Vascular: No JVD. Trachea: No tracheal deviation. Cardiovascular:      Rate and Rhythm: Normal rate and regular rhythm. Heart sounds: Normal heart sounds. No murmur heard. No friction rub. No gallop. Pulmonary:      Effort: Pulmonary effort is normal. No respiratory distress. Breath sounds: Normal breath sounds. No wheezing or rales. Abdominal:      General: Bowel sounds are normal. There is no distension. Palpations: Abdomen is soft. There is no mass. Tenderness: There is no abdominal tenderness. There is no guarding or rebound. Musculoskeletal:         General: Normal range of motion. Skin:     General: Skin is warm and dry. Capillary Refill: Capillary refill takes less than 2 seconds. Coloration: Skin is not pale. Findings: Bruising (Abdominal diffuse) present. No rash. Neurological:      Mental Status: She is alert and oriented to person, place, and time. Cranial Nerves: No cranial nerve deficit. Psychiatric:         Behavior: Behavior normal.         Thought Content:  Thought content normal.         Judgment: Judgment normal.       Subject  Pertinent Labs & Imaging Studies   nuvia  CBC:   Recent Labs     07/30/21  0804 07/31/21  0500 08/01/21  0559   WBC 7.7 6.4 6.5   HGB 9.2* 8.9* 9.3*   HCT 30.1* 28.6* 30.3*   MCV 93.2 94.1 95.9    329 341       BMP:    Recent Labs     07/30/21  0804 07/31/21  0500 08/01/21  0559    134 140   K 4.1 3.9 4.5    101 105   CO2 22 24 34*   BUN 9 11 8   CREATININE 0.8 0.9 0.8   GLUCOSE 116* 124* 122*       LIVER PROFILE:   Recent Labs     07/30/21  0804 07/31/21  0500 08/01/21  0559   AST 21 19 17   ALT 25 21 19   BILITOT 2.8* 2.3* 2.1*   ALKPHOS 92 85 82       PT/INR:   Recent Labs     07/30/21  0804 07/31/21  0500 08/01/21  0559   PROTIME 15.2* 16.1* 17.8*   INR 1.4 1.4 1.6       APTT:   Recent Labs     07/30/21  0804 07/31/21  0910 08/01/21  0559   APTT 80.7* 75.3* 119.1*       Fasting Lipid Panel: drug:(Phenobarbital). Findings   Left Ventricle  Normal left ventricular chamber size. Normal left ventricular systolic function. , LVEF is 70%. Mild left ventricular concentric hypertrophy noted. Indeterminate LV diastolic function. Right Ventricle  Normal right ventricle size and function. Left Atrium  Left atrium is severely enlarged. Increased left atrial volume. Interatrial septum not well visualized but appears intact. Right Atrium  Normal right atrium. 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 Mild-to-moderate mitral stenosis - Mean gradient 5,mmHg, peak 19mmHg, MVA  1.2cm2 by continuity. There is trace mitral regurgitation. Tricuspid Valve  Tricuspid was not well visualized. There is mild tricuspid regurgitation. Mild pulmoanry hypertension, RVSP 46mmHg   Aortic Valve  s/p TAVR, 29-mm Evolut PRO+ 7/14/21  Aortic leaflets not well visualized. No hemodynamically significant aortic stenosis - mean gradient 6mmHg.  small mason-valvualr regurgitation. Pulmonic Valve  The pulmonic valve was not well visualized. Pericardial Effusion  No evidence of pericardial effusion. Pericardium appears normal.   Pleural Effusion  No evidence of pleural effusion. Aorta  Normal aortic root size and ascending aorta. Miscellaneous  The inferior vena cava not well visualized. Conclusions   Summary  Technically sub-optimal images - Definity Echo contrast used. Normal left ventricular chamber size. Normal left ventricular systolic function. , LVEF is 70%. Mild left ventricular concentric hypertrophy noted. Indeterminate LV diastolic function. Left atrium is severely enlarged. Increased left atrial volume. Interatrial septum not well visualized but appears intact. Normal right ventricle size and function. Hx of Bioprosthetic mitral valve #25 Mosaic (2007)  Leaflets not well visualized.   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 There is trace mitral regurgitation. s/p TAVR, 29-mm Evolut PRO+ 7/14/21  No hemodynamically significant aortic stenosis. Small aortic mason-valvualr regurgitation. There is mild tricuspid regurgitation. Mild pulmoanry hypertension, RVSP 46mmHg  Normal aortic root size. No evidence of pericardial effusion. No intra cardiac mass or thrombus. Compared to prior echo from 7/15/2021.    Signature   ----------------------------------------------------------------  Electronically signed by Kevin Rice MD(Interpreting  physician) on 07/28/2021 06:17 PM  ----------------------------------------------------------------  M-Mode/2D Measurements & Calculations   LV Diastolic    LV Systolic Dimension: 2.9   AV Cusp Separation: 1.4 cmLA  Dimension: 4.9  cm                           Dimension: 6.4 cmAO Root  cm              LV Volume Diastolic: 919 ml  Dimension: 3.4 cm  LV FS:40.8 %    LV Volume Systolic: 46.6 ml  LV PW           LV EDV/LV EDV Index: 505  Diastolic: 1.2  GW/87 QS/D^4MA ESV/LV ESV  cm              Index: 31.7 ml/15ml/ m^2     RV Diastolic Dimension: 2.7  LV PW Systolic: EF Calculated: 73.2 %        cm  1.3 cm          LV Mass Index: 120 l/min*m^2  Septum          LV Length: 6.7 cm            Ascending Aorta: 2.9 cm  Diastolic: 1.4                               LA volume/Index: 172 ml  cm              LVOT: 2.1 cm                 /81.13ml/m^2  Septum                                       RA Area: 01.2 cm^2  Systolic: 1.4  cm  CO: 3.42 l/min  CI: 2.02  l/m*m^2  LV Mass: 253.72  g  Doppler Measurements & Calculations   MV Peak E-Wave: 2.2 AV Peak Velocity:     LVOT Peak Velocity: 0.76 m/s  m/s                 1.65 m/s              LVOT Mean Velocity: 0.55 m/s                      AV Peak Gradient:     LVOT Peak Gradient: 2.3 mmHgLVOT  MV Peak Gradient:   10.84 mmHg            Mean Gradient: 1.3 mmHg  19.3 mmHg           AV Mean Velocity:  MV Mean Gradient: 5 1.21 m/s  mmHg                AV Mean Gradient: 6.4  MV Mean Velocity:   mmHg                  TR Velocity:3.22 m/s  0.98 m/s            AV VTI: 34.7 cm       TR Gradient:41.34 mmHg  MV P1/2t: 60.2 msec AV Area  MVA by PHT:3.65     (Continuity):1.61  cm^2                cm^2  MV Area                                   TN ED Velocity: 0.52 m/s  (continuity): 1.2   LVOT VTI: 16.1 cm  cm^2  MV E' Septal  Velocity: 0.05 m/s  MV E' Lateral  Velocity: 5 m/s  [email or web address]"/[email or web address]? [id number]    Echo Limited    Result Date: 7/16/2021  Transthoracic Echocardiography Report (TTE)  Demographics   Patient Name      Gus Raffi Gender              Female                    L   Medical Record    [id number]       Room Number         4530  Number   Account #         [de-identified]      Procedure Date      07/15/2021   Corporate ID                     Ordering Physician  Rosalinda Harman MD   Accession Number  [id number]     Referring Physician   Date of Birth     1946     Sonographer         Ariana Ortega RDCS   Age               76 year(s)     Interpreting        Rosalinda Harman MD                                   Physician                                    Any Other  Procedure Type of Study   TTE procedure:Echo Limited Study. Procedure Date Date: 07/15/2021 Start: 09:18 AM Study Location: Portable Technical Quality: Limited visualization due to body habitus. Indications:S/P TAVR. Patient Status: Pending Discharge Height: 64 inches Weight: 240 pounds BSA: 2.11 m^2 BMI: 41.2 kg/m^2 Allergies   - Other drug:(Phenobarbital). Findings   Left Ventricle  Normal left ventricle size. Estimated left ventricle ejection fraction 60+/-5 %. Right Ventricle  Normal right ventricular size and function. Mitral Valve  Well-seated surgical mitral bioprosthesis.  No significant 7/25/2021  EXAMINATION: CT OF THE ABDOMEN AND PELVIS WITHOUT CONTRAST 7/25/2021 10:27 am TECHNIQUE: CT of the abdomen and pelvis was performed without the administration of intravenous contrast. Multiplanar reformatted images are provided for review. Dose modulation, iterative reconstruction, and/or weight based adjustment of the mA/kV was utilized to reduce the radiation dose to as low as reasonably achievable. COMPARISON: July 23, 2021 HISTORY: ORDERING SYSTEM PROVIDED HISTORY: assess change of abdominal wall hematoma and groin hematoma TECHNOLOGIST PROVIDED HISTORY: Reason for exam:->assess change of abdominal wall hematoma and groin hematoma Additional Contrast?->None What reading provider will be dictating this exam?->CRC FINDINGS: Lower Chest: Heart size is top-normal to mildly enlarged. Postoperative changes related to transfemoral aortic valve replacement are noted. Mild dependent atelectasis is present at both lung bases. There is a small hiatal hernia. Organs: The gallbladder is distended without obvious wall thickening. There is a large peripherally calcified gallstone near the neck of the gallbladder. The unenhanced liver, spleen and adrenal glands are normal in appearance. The pancreas demonstrates diffuse fatty atrophy. The kidneys are without hydronephrosis or radiopaque calculus. There are bilateral indeterminate/intermediate density renal lesions. GI/Bowel: No evidence of a bowel obstruction, free air or pneumatosis. Portions the colon are decompressed, limiting assessment for mucosal based abnormalities. There is diverticulosis without evidence of diverticulitis. The appendix is not confidently visualized. No obvious pericecal inflammatory changes. The stomach and duodenal sweep are unremarkable aside from the hiatal hernia. Pelvis: The urinary bladder is decompressed around a Menendez catheter.   No obvious uterine or ovarian abnormality aside from a presumed fibroid at the fundus of the uterus. Small volume of free fluid is present in the pelvis. No pelvic lymphadenopathy. Peritoneum/Retroperitoneum: The abdominal aorta is normal in caliber. No retroperitoneal lymphadenopathy or mass. Bones/Soft Tissues: Again noted is a large hematoma along the left oblique abdominal musculature, slightly decreased in size on today's examination, measuring 12.8 x 9.2 cm (previously 13.2 x 10.7 cm). The 3.7 x 2.2 cm hematoma superficial to the right common femoral artery is stable in size. Suspect there is also a small right-sided rectus sheath hematoma on axial image 93, stable to slightly decreased in size. No acute osseous abnormality or evidence of aggressive osseous lesion. Slight interval decrease in the size of the left oblique abdominal wall intramuscular hematoma with a stable right inguinal hematoma and suspected small right rectus sheath hematoma. Multiple indeterminate bilateral renal lesions for which correlation with renal ultrasound is recommended. Additional, incidental findings as above. CT ABDOMEN PELVIS WO CONTRAST Additional Contrast? None    Result Date: 7/23/2021  EXAMINATION: CT OF THE ABDOMEN AND PELVIS WITHOUT CONTRAST 7/23/2021 3:22 pm TECHNIQUE: CT of the abdomen and pelvis was performed without the administration of intravenous contrast. Multiplanar reformatted images are provided for review. Dose modulation, iterative reconstruction, and/or weight based adjustment of the mA/kV was utilized to reduce the radiation dose to as low as reasonably achievable. COMPARISON: None.  HISTORY: ORDERING SYSTEM PROVIDED HISTORY: assess change of abdominal wall hematoma and groin hematoma TECHNOLOGIST PROVIDED HISTORY: Reason for exam:->assess change of abdominal wall hematoma and groin hematoma Additional Contrast?->None What reading provider will be dictating this exam?->CRC FINDINGS: In the left lateral abdominal wall at the level of the oblique/transverse muscles there is a large acute ultrasound. They could represent hemorrhagic cysts or cyst with high proteinaceous content, but other possibilities are not excluded. There are normal size and the density for the liver and spleen. Pancreas demonstrates some atrophy. Gallbladder is well distended with 3 cm gallstone. There is no dilatation of the biliary tree pancreatic ductal system. There is a mild to moderate size hiatal hernia. Adrenals not enlarged. Calcified atheromatous changes are seen in the abdominal aorta there is no aneurysm formation. There is no midline retro peritoneal adenopathy. Heart has normal size. There is endovascular repair for the aortic valve and there is also prosthesis for the mitral valve. The heart is not fully covered in this study. There are areas of a confluent loss of volume indicate a component of atelectasis in the medial aspect of the right lower lobe and towards the base in the left lower lobe. These appear to be more chronic findings but increase since May 7, 2021.     1.  Overall stable large hematoma of the left lateral abdominal wall with surrounding hemorrhage into adjacent muscle planes and deep subcutaneous soft tissues by oozing. The large dominant hematoma now has a uniform hyperdensity indicating acute phase. No longer seen blood/blood level which indicates a hyperacute active bleeding hematoma as demonstrate on the study of July 22nd. 2.  Hematoma of a small size superficial to the right common femoral artery. Cannot exclude a pseudoaneurysm formation. Can correlate with the ultrasound of the right groin. XR CHEST (2 VW)    Result Date: 7/9/2021  EXAMINATION: TWO XRAY VIEWS OF THE CHEST 7/9/2021 1:00 pm COMPARISON: May 26, 2021 HISTORY: ORDERING SYSTEM PROVIDED HISTORY: Dyspnea, unspecified type TECHNOLOGIST PROVIDED HISTORY: What reading provider will be dictating this exam?->CRC FINDINGS: Moderate cardiomegaly. No airspace opacity or pleural effusion.   Median sternotomy wires are present. The heart is normal in size. No pneumothorax. 1. Moderate cardiomegaly. 2. No airspace opacity or pleural effusion. CT ABDOMEN PELVIS W IV CONTRAST Additional Contrast? None    Result Date: 7/22/2021  EXAMINATION: CT OF THE ABDOMEN AND PELVIS WITH CONTRAST 7/22/2021 3:53 pm TECHNIQUE: CT of the abdomen and pelvis was performed with the administration of intravenous contrast. Multiplanar reformatted images are provided for review. Dose modulation, iterative reconstruction, and/or weight based adjustment of the mA/kV was utilized to reduce the radiation dose to as low as reasonably achievable. COMPARISON: None. HISTORY: ORDERING SYSTEM PROVIDED HISTORY: ab pain s/p TAVR TECHNOLOGIST PROVIDED HISTORY: Reason for exam:->ab pain s/p TAVR Additional Contrast?->None Decision Support Exception - unselect if not a suspected or confirmed emergency medical condition->Emergency Medical Condition (MA) What reading provider will be dictating this exam?->CRC FINDINGS: Lower Chest: Images through lung bases demonstrate mild cardiomegaly. Note is made of an aortic valve. The left atrium is enlarged. There is no right or left lung base infiltrate or effusion. Organs: The liver, spleen, pancreas and adrenal glands are unremarkable. The kidneys enhance symmetrically without evidence of hydronephrosis. GI/Bowel: There is a large stones seen within the gallbladder lumen measuring 3.4 x 2.4 cm. There are no findings of acute cholecystitis. The stomach is normally distended. There is a small hiatal hernia. There is no large or small bowel obstruction. There are no findings of inflammatory bowel disease. Multiple sigmoid diverticula noted. There is no evidence of diverticulitis. Pelvis:  Bladder is unremarkable in appearance. There is a uterine fibroid measuring approximately 3.3 x 3.4 cm. Peritoneum/Retroperitoneum: There are postsurgical changes seen within the right groin from recent surgical intervention. There is a small hematoma measuring 2.5 x 2.5 cm. The abdominal aorta is normal caliber. There is no dissection of the abdominal aorta or of the iliac arteries. There is a large left lateral abdominal wall hematoma measuring 13 cm in maximum craniocaudal dimension by 12 cm in maximum AP diameter by 8 cm in maximum transverse diameter. There is a fluid fluid level seen within the left lateral abdominal wall hematoma consistent with acute on subacute hemorrhage. Along the superior aspect of the fluid fluid level there is minimal dense layering material suggestive of mild active bleeding. Bones/Soft Tissues:  Age related degenerative changes of the visualized osseous structures without focal destructive lesion. 1. Large left lateral abdominal wall hematoma measuring 13 cm x 12 cm x 8 cm. There is a fluid fluid level seen within the abdominal wall hematoma consistent with acute on subacute hemorrhage. There is minimal dense material seen layering on the fluid fluid level centrally consistent with minimal active bleeding. Surrounding intramuscular edema is noted within the oblique muscles. There is also induration within the subcutaneous soft tissues consistent with reactive changes. 2. Postsurgical changes within the right groin from recent intervention and TAVR procedure. There is a 2.5 x 2.5 cm hematoma within the right groin. 3. Cholelithiasis. There is no evidence of acute cholecystitis. 4. There is no aortic or iliac artery injury. XR CHEST PORTABLE    Result Date: 7/25/2021  EXAMINATION: ONE XRAY VIEW OF THE CHEST 7/25/2021 9:09 am COMPARISON: July 22, 2021 HISTORY: ORDERING SYSTEM PROVIDED HISTORY: Wheezing TECHNOLOGIST PROVIDED HISTORY: Reason for exam:->Wheezing What reading provider will be dictating this exam?->CRC FINDINGS: Heart size is unable to be accurately assessed on this single portable view of the chest, but appears to be stable. Sternotomy wires are in place.  There are postprocedural changes related to transfemoral aortic valve replacement. Prominence of the pulmonary vasculature raises possibility of mild pulmonary edema. No evidence of a sizable pleural effusion or pneumothorax. No acute osseous abnormality. Prominence of pulmonary vasculature raising the possibility of mild pulmonary edema. XR CHEST PORTABLE    Result Date: 7/22/2021  EXAMINATION: ONE XRAY VIEW OF THE CHEST 7/22/2021 1:17 pm COMPARISON: March 8, May 26, July 9 HISTORY: ORDERING SYSTEM PROVIDED HISTORY: Shortness of breath TECHNOLOGIST PROVIDED HISTORY: Reason for exam:->Shortness of breath What reading provider will be dictating this exam?->CRC FINDINGS: Status post previous mid sternotomy. More recent endovascular repair for the aortic root. Heart has borderline size. The no acute infiltrates, consolidates or pleural effusions are seen. There is no perihilar vascular congestion. 1.  Status post more remote mid sternotomy. Status post recent endovascular repair for the aortic root. 2.  Borderline size heart. No acute pulmonary process. Resident's Assessment and Plan     Assessment and Plan:    Abdominal hematoma and groin hematoma secondary to heparin injection and TAVR  CT on admission showed a 13 x 12 x 8 lateral abdominal wall and a 2.5 x 2.5 groin hematoma. Follow-up CT is found abdominal and groin hematoma to be stable. Symptoms improved and pain decreasing  Received @17:15, 4mg warfarin tablet, heparin 25,000 @ 730 am Hemoglobin 8.9  Warfarin with heparin bridge started 7/28/2021  INR 1.6, 1.4 yesterday. Goal is 2.0-2.5  Continue to monitor fever leukocytosis  Plan discharge once INR therapeutic home with home health  ALDA likely prerenal secondary to intravascular volume depletion secondary to #1--resolved  Creatinine today 0.8. baseline creatinine 0.7  History A.  Fib  Continue metoprolol twice daily for rate control  Continue anticoagulation  Urinary retention secondary to coumaidin s- OK with starting in am tomorrow  Start low dose heparin protocol and give first dose coumadin in AM tomorrow (don't wait till evening)- I ordered this  Hematomas at risk for myositis ossificans  Vs infected hematoma-- watching for these  Her case also urine outflow- urethral flow affected     Resolved Urinary retention issues, dec detrussor tone, also unwilling to move 2 to pain, unwilling to valsalva   -- urinary catheter  +- bethanacol vs flomax later if need   sp remove urinary catheter - trial check post void- again requesting     Bedside commode   sheeba improving  Mobitiliy, PT/OT  Pain control- minimal opiods now    Sicca compliants- biotene etc.     Bladder scan 58cc     IV heparin bridge-- start in am +coumadin  She WILL NOT be getting lovenox shots  NO shots through skin     Patient preference for IV heparin and coumadin bridge - will use both and this means she would need to stay in hospital (vs [facility]) for next 4-5 days as anticoaglation builds up. Patient wants to \"play\" it safe, also per request of CV surgery CC- so plan to stay in house over next 5 days or so- medically high risk for rebleed or CVA  surgergy opinion 7/28 resume anticoagulation  ? bridge if need when INR drops- IV heparin vs off label use eliquis could days - discussed  Then back on coumadin goal INR 2-2.5?  At least for now--For now- CVA risk vs bleeds     Discussed w outpatient primary physician  We Discussed case with CV surgery -at [address] [place] [address]-- he does want bridging coumaidin s- OK with starting in am tomorrow  Start low dose heparin IV protocol and give first dose coumadin in AM tomorrow (don't wait till evening)- I ordered this     She WILL NOT be getting lovenox shots  NO shots through skin     Patient preference for IV heparin and coumadin bridge - will use both and this means she would need to stay in hospital (vs [facility]) for next 4-5 days as anticoaglation builds up.   Patient wants to \"play\" it safe, also per request of CV surgery CC- so plan to stay in house over next 5 days or so- medically high risk for rebleed or CVA  Done     I also believes she bleeds excessively related to lovenox above and beyond the trauma of the shots  inr 1.6 from 1.4- no ongoing bleed now  Oral percocet, no other complications,  Plan for home with home health once inc close 2.0 [facility] General Internal Medicine  General Internal Medicine  [address] [place] [address]  Phone: [phone]  Fax:

## 2021-08-09 ENCOUNTER — OUTPATIENT (OUTPATIENT)
Dept: OUTPATIENT SERVICES | Facility: HOSPITAL | Age: 61
LOS: 1 days | End: 2021-08-09
Payer: COMMERCIAL

## 2021-08-09 ENCOUNTER — APPOINTMENT (OUTPATIENT)
Dept: RADIOLOGY | Facility: CLINIC | Age: 61
End: 2021-08-09
Payer: MEDICARE

## 2021-08-09 DIAGNOSIS — Z98.890 OTHER SPECIFIED POSTPROCEDURAL STATES: Chronic | ICD-10-CM

## 2021-08-09 DIAGNOSIS — Z96.652 PRESENCE OF LEFT ARTIFICIAL KNEE JOINT: Chronic | ICD-10-CM

## 2021-08-09 DIAGNOSIS — Z98.1 ARTHRODESIS STATUS: Chronic | ICD-10-CM

## 2021-08-09 DIAGNOSIS — Z96.641 PRESENCE OF RIGHT ARTIFICIAL HIP JOINT: Chronic | ICD-10-CM

## 2021-08-09 DIAGNOSIS — Z00.8 ENCOUNTER FOR OTHER GENERAL EXAMINATION: ICD-10-CM

## 2021-08-09 PROCEDURE — 72082 X-RAY EXAM ENTIRE SPI 2/3 VW: CPT

## 2021-08-09 PROCEDURE — 72082 X-RAY EXAM ENTIRE SPI 2/3 VW: CPT | Mod: 26

## 2021-08-17 ENCOUNTER — APPOINTMENT (OUTPATIENT)
Dept: NEUROSURGERY | Facility: CLINIC | Age: 61
End: 2021-08-17
Payer: MEDICARE

## 2021-08-17 VITALS
OXYGEN SATURATION: 97 % | HEIGHT: 63 IN | HEART RATE: 73 BPM | BODY MASS INDEX: 30.65 KG/M2 | SYSTOLIC BLOOD PRESSURE: 138 MMHG | TEMPERATURE: 97 F | DIASTOLIC BLOOD PRESSURE: 64 MMHG | WEIGHT: 173 LBS

## 2021-08-17 PROCEDURE — 99214 OFFICE O/P EST MOD 30 MIN: CPT

## 2021-08-18 NOTE — ASSESSMENT
[FreeTextEntry1] : 61 years old woman with history of previous spine surgeries who developed severe kyphoscoliosis at the thoracolumbar junction with severe positive sagittal imbalance and radiculopathy and back  pain. She underwent a complex spinal fusion from T8 to pelvis. \par \par She had scoliosis x- ray and noted with stable findings. Patient reports left hip and left knee pain, uses a cane to ambulate, no other neuro sensory complaints. \par \par Plan:\par - f/u in 6 months \par - repeat thoracic and lumbar x-ray in 6 months

## 2021-08-18 NOTE — HISTORY OF PRESENT ILLNESS
[FreeTextEntry1] : The patient is with chronic lumbar and cervical spine disc disease, S/P cervical spinal fusion in 2020, S/P lumbar fusion 2003, continued to have low back pain and bilateral sciatica. She underwent stage 1 L1-L2, L2-3 lateral interbody fusion, stage 2 posterior T8-pelvis instrumented fusion with Mazor robot plastic closure on 3/12/21.\par \par She tolerated the procedures well but experienced a lot of pain. She was discharged to rehab.\par \par She was last seen on 5/17/21 with improvement in her pain control, posture and ambulation. She had scoliosis x- ray and noted with stable findings. Patient reports left hip and left knee pain, uses a cane to ambulate, no other neuro sensory complaints. \par \par

## 2021-08-18 NOTE — PHYSICAL EXAM
[General Appearance - In No Acute Distress] : in no acute distress [General Appearance - Alert] : alert [Oriented To Time, Place, And Person] : oriented to person, place, and time [Impaired Insight] : insight and judgment were intact [Affect] : the affect was normal [Mood] : the mood was normal [Person] : oriented to person [Place] : oriented to place [Time] : oriented to time [Cranial Nerves Optic (II)] : visual acuity intact bilaterally,  pupils equal round and reactive to light [Cranial Nerves Trigeminal (V)] : facial sensation intact symmetrically [Cranial Nerves Oculomotor (III)] : extraocular motion intact [Cranial Nerves Facial (VII)] : face symmetrical [Cranial Nerves Vestibulocochlear (VIII)] : hearing was intact bilaterally [Cranial Nerves Accessory (XI - Cranial And Spinal)] : head turning and shoulder shrug symmetric [Cranial Nerves Hypoglossal (XII)] : there was no tongue deviation with protrusion [Motor Tone] : muscle tone was normal in all four extremities [Motor Strength] : muscle strength was normal in all four extremities [Sensation Tactile Decrease] : light touch was intact [Abnormal Walk] : normal gait [Balance] : balance was intact [FreeTextEntry8] : uses a cane

## 2021-08-19 DIAGNOSIS — Z00.00 ENCOUNTER FOR GENERAL ADULT MEDICAL EXAMINATION W/OUT ABNORMAL FINDINGS: ICD-10-CM

## 2021-08-30 ENCOUNTER — APPOINTMENT (OUTPATIENT)
Dept: ORTHOPEDIC SURGERY | Facility: CLINIC | Age: 61
End: 2021-08-30
Payer: MEDICARE

## 2021-08-30 VITALS — HEIGHT: 63 IN | WEIGHT: 178 LBS | BODY MASS INDEX: 31.54 KG/M2

## 2021-08-30 DIAGNOSIS — Z96.652 PRESENCE OF LEFT ARTIFICIAL KNEE JOINT: ICD-10-CM

## 2021-08-30 PROCEDURE — 73502 X-RAY EXAM HIP UNI 2-3 VIEWS: CPT | Mod: LT

## 2021-08-30 PROCEDURE — 99214 OFFICE O/P EST MOD 30 MIN: CPT

## 2021-08-30 PROCEDURE — 73562 X-RAY EXAM OF KNEE 3: CPT | Mod: LT

## 2021-08-30 RX ORDER — LEVOTHYROXINE SODIUM 0.03 MG/1
25 TABLET ORAL
Qty: 90 | Refills: 0 | Status: ACTIVE | COMMUNITY
Start: 2021-06-23

## 2021-08-30 RX ORDER — FENTANYL 50 UG/H
50 PATCH, EXTENDED RELEASE TRANSDERMAL
Qty: 10 | Refills: 0 | Status: ACTIVE | COMMUNITY
Start: 2021-07-27

## 2021-08-30 RX ORDER — URSODIOL 300 MG/1
300 CAPSULE ORAL
Qty: 60 | Refills: 0 | Status: ACTIVE | COMMUNITY
Start: 2021-07-31

## 2021-08-30 NOTE — HISTORY OF PRESENT ILLNESS
[de-identified] : This is 60-year-old female experiencing Left hip pain 2-1/2 months, which is severe in intensity.  I previously diagnosed with left hip osteoarthritis.  Patient seeing Dr. Koroma. The pain substantially limits activities of daily living. Walking tolerance is reduced. Patient uses fentanyl and oxycodone for pain. Majority of her pain is in the left buttock and radiates down the leg to the foot. No significant groin pain but intermittently will have some mild groin pain. Patient recently finished PT. patient does use cane for ambulation. The patient does use a neoprene sleeve knee brace. The patient has had radiculopathy to left leg when sleeping. She also has had left hip intra-articular cortisone injections which initially helped to relieve some of her pain but then no longer is helping to relieve the pain. Patient unable to use NSAIDS due to gastritis Patient is s/p Right TAM by Dr. Hawk in 2010, Left TKA 2012 by [facility] partner @ [facility]. 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 Has hx of lumbar and cervical fusion.

## 2021-08-30 NOTE — PHYSICAL EXAM
[de-identified] : Patient is well nourished, well-developed, in no acute distress, with appropriate mood and affect. The patient is oriented to time, place, and person. Respirations are even and unlabored. Gait evaluation reveals a limp. There is no inguinal adenopathy. Examination of the contralateral hip shows normal range of motion, strength, no tenderness, and intact skin. The affected limb is well-perfused, shows a grossly normal motor and sensory examination. Examination of the hip shows no skin lesions. Hip motion is reduced and causes pain. FADIR is positive and CRIS is positive. Stinchfield test is positive. Leg lengths are approximately equal. Both hips are stable and muscle strength is normal. Pedal pulses are palpable.  Left knee motion is painless and the knee moves from 0-115 degrees. The knee is stable within that range-of-motion to AP and ML stress. The alignment of the knee is neutral. Muscle strength is normal. Pedal pulses are palpable. Hip examination was negative.\par  [de-identified] : AP and lateral x-rays of the left hip, pelvis, and femur were ordered and taken in the office and demonstrate degenerative joint disease of the hip with joint space narrowing, osteophyte formation, and subchondral sclerosis.\par \par AP, lateral, sunrise knee x-rays of the left knee were ordered and obtained in the office and demonstrate satisfactory position and alignment of the components are present. No signs of loosening are seen.

## 2021-08-30 NOTE — DISCUSSION/SUMMARY
[de-identified] : This patient is severe left hip osteoarthritis.  I had a discussion with the patient, who is a candidate for left total hip replacement. Risks, benefits and alternatives were discussed. At this point, the patient wants to hold off as the pain is not quite severe enough. I gave them a book on total joint replacements and my contact card. If they want to schedule in the future, then they will come in and we will have a full discussion.  In the meantime Celebrex prescribed.  Physical therapy prescribed.\par \par This patient is taking narcotics preoperatively and understands our narcotic cessation policy and that continued preoperative usage of narcotics will make it more difficult to control postoperative pain.  She is on high levels of narcotics at this point and it is unlikely she will be able to come off all of them.  Further, the patient understands that the orthopedic literature demonstrates an increased risk to the patient of periprosthetic joint infection, dissatisfaction, chronic pain, and impaired overall outcome in patients who have preoperative exposure to narcotics prior to total joint replacement. The patient and I formulated a plan to decrease and/or cease narcotic use prior to surgery if possible in order to mitigate some of these risks.\par

## 2021-10-04 ENCOUNTER — APPOINTMENT (OUTPATIENT)
Dept: ORTHOPEDIC SURGERY | Facility: CLINIC | Age: 61
End: 2021-10-04
Payer: MEDICARE

## 2021-10-04 VITALS — WEIGHT: 178 LBS | HEIGHT: 63 IN | BODY MASS INDEX: 31.54 KG/M2

## 2021-10-04 PROCEDURE — 99215 OFFICE O/P EST HI 40 MIN: CPT

## 2021-10-04 PROCEDURE — 99406 BEHAV CHNG SMOKING 3-10 MIN: CPT

## 2021-10-04 RX ORDER — PAROXETINE HYDROCHLORIDE 40 MG/1
40 TABLET, FILM COATED ORAL
Qty: 30 | Refills: 0 | Status: ACTIVE | COMMUNITY
Start: 2021-09-13

## 2021-10-04 NOTE — DISCUSSION/SUMMARY
[de-identified] : This patient is severe left hip osteoarthritis.  She has failed a course of conservative management I would like to proceed with a posterior approach left total hip arthroplasty using robotic navigation for assistance.\par \par The patient is an appropriate candidate for consideration of left total hip replacement. An extensive discussion was conducted of the natural history of the disease and the variety of surgical and non-surgical treatment options available to the patient. A risk/benefit analysis was discussed with the patient reviewing the advantages and disadvantages of surgical intervention at this time. Both the level and length of the patient's pain have made additional conservative treatment measures consisting of NSAIDs, physical therapy, and/or corticosteroids contraindicated. A full explanation was given of the nature and the purpose of the procedure and anesthesia, its benefits, possible alternative methods of diagnosis or treatment, the risks involved, the possibility of complications, the foreseeable consequences of the procedure and the possible results of the non-treatment. I reviewed the plan of care as well as used a model of a total hip implant equivalent to the one that will be used for their total hip joint replacement. The ability to secure the implant utilizing cement or cementless (press-fit) was discussed with the patient. The patient agrees with the plan of care, as well as the use of implants for their total hip replacement. \par \par No guarantee or assurance was made as to the results that may be obtained. Specifically, the risks were identified to include, but are not limited to the following: Infection, phlebitis, pulmonary embolism, death, paralysis, dislocation, pain, stiffness, instability, limp, weakness, breakage, leg-length inequality, uncontrolled bleeding, nerve injury, blood vessel injury, pressure sores, anesthetic risks, delayed healing of wound and bone, and wear and loosening. Further discussion was undertaken with the patient about the details of surgical preparation, treatment, and postoperative rehabilitation including medical clearance, autotransfusion, the hospital course, and the postoperative rehabilitation involved. All in all, I feel that this patient is a good candidate for surgical reconstruction.\par \par The patient and I discussed the current SARS-CoV-2 (COVID-19) pandemic which has affected our local hospitals. We discussed that our hospitals treat patients with COVID-19. All efforts will be made to avoid cohorting the patient with diagnosed or suspected COVID-19 patient. They also understand that we will screen them 24-48 hours prior to surgery. Despite our best efforts, there is a potential risk for iatrogenic transmission of COVID-19 to the patient during the perioperative period. Jose A COVID-19 during the perioperative period may increase the patient´s risks of an adverse outcome including postoperative pneumonia, difficulty breathing, requirement for a breathing tube (general endotracheal intubation), and death. The patient is understanding of this risk, and is willing to proceed with surgery at this time.\par \par This patient is taking narcotics preoperatively and understands our narcotic cessation policy and that continued preoperative usage of narcotics will make it more difficult to control postoperative pain. Further, the patient understands that the orthopedic literature demonstrates an increased risk to the patient of periprosthetic joint infection, dissatisfaction, chronic pain, and impaired overall outcome in patients who have preoperative exposure to narcotics prior to total joint replacement. The patient and I formulated a plan to decrease and/or cease narcotic use prior to surgery if possible in order to mitigate some of these risks.\par \par This patient is a smoker or has chronic use of nicotine products and understands our nicotine cessation policy and will be required to stop smoke 1 month before surgery and this will be continued through 1 month minimum after surgery. This includes all cigarettes, cigars, chewing tobacco, patches and gums which contain nicotine. I explained to the patient the risk of nicotine exposure, which is well documented in the orthopedic literature as increasing risks of wound breakdown (dehiscence), periprosthetic joint infection, dissatisfaction, chronic pain, and impaired overall outcome to the patient. The patient may be tested for nicotine in addition prior to undergoing joint arthroplasty. We discussed the options of quitting immediately, as well as the risk of tapering off with or without the use of medications or counseling. The patient is in agreement with this plan.\par An additional 7 minutes was spent on this conversation discussing the risks of continuous of narcotic smoking, benefits of smoking cessation as well as risks of this relates to surgery as documented above.

## 2021-10-04 NOTE — HISTORY OF PRESENT ILLNESS
[de-identified] : This is 60-year-old female experiencing Left hip pain 4 months, which is severe in intensity.  I previously diagnosed with left hip osteoarthritis.  Patient seeing Dr. Koroma. The pain substantially limits activities of daily living. Walking tolerance is reduced. Patient uses fentanyl and oxycodone for pain. Majority of her pain is in the left buttock and radiates down the leg to the foot. No significant groin pain but intermittently will have some mild groin pain. Patient recently finished PT. patient does use cane for ambulation. The patient does use a neoprene sleeve knee brace. The patient has had radiculopathy to left leg when sleeping. She also has had left hip intra-articular cortisone injections which initially helped to relieve some of her pain but then no longer is helping to relieve the pain. Patient unable to use NSAIDS due to gastritis Patient is s/p Right TAM by Dr. Hawk in 2010, Left TKA 2012 by [facility] partner @ [facility]. 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 Has hx of lumbar and cervical fusion.

## 2021-10-04 NOTE — PHYSICAL EXAM
[de-identified] : Patient is well nourished, well-developed, in no acute distress, with appropriate mood and affect. The patient is oriented to time, place, and person. Respirations are even and unlabored. Gait evaluation reveals a limp. There is no inguinal adenopathy. Examination of the contralateral hip shows normal range of motion, strength, no tenderness, and intact skin. The affected limb is well-perfused, shows a grossly normal motor and sensory examination. Examination of the hip shows no skin lesions. Hip motion is reduced and causes pain. FADIR is positive and CRIS is positive. Stinchfield test is positive. Leg lengths are approximately equal.  [de-identified] : AP and lateral x-rays of the left hip, pelvis, and femur were reviewed from the previous visit and demonstrate degenerative joint disease of the hip with joint space narrowing, osteophyte formation, and subchondral sclerosis.\par

## 2021-11-07 ENCOUNTER — FORM ENCOUNTER (OUTPATIENT)
Age: 61
End: 2021-11-07

## 2021-12-08 ENCOUNTER — RESULT REVIEW (OUTPATIENT)
Age: 61
End: 2021-12-08

## 2021-12-08 ENCOUNTER — OUTPATIENT (OUTPATIENT)
Dept: OUTPATIENT SERVICES | Facility: HOSPITAL | Age: 61
LOS: 1 days | End: 2021-12-08
Payer: COMMERCIAL

## 2021-12-08 VITALS
SYSTOLIC BLOOD PRESSURE: 120 MMHG | RESPIRATION RATE: 16 BRPM | WEIGHT: 175.05 LBS | HEART RATE: 76 BPM | TEMPERATURE: 97 F | DIASTOLIC BLOOD PRESSURE: 73 MMHG | HEIGHT: 64 IN | OXYGEN SATURATION: 97 %

## 2021-12-08 DIAGNOSIS — Z98.890 OTHER SPECIFIED POSTPROCEDURAL STATES: Chronic | ICD-10-CM

## 2021-12-08 DIAGNOSIS — Z96.641 PRESENCE OF RIGHT ARTIFICIAL HIP JOINT: Chronic | ICD-10-CM

## 2021-12-08 DIAGNOSIS — M19.90 UNSPECIFIED OSTEOARTHRITIS, UNSPECIFIED SITE: ICD-10-CM

## 2021-12-08 DIAGNOSIS — Z96.652 PRESENCE OF LEFT ARTIFICIAL KNEE JOINT: Chronic | ICD-10-CM

## 2021-12-08 DIAGNOSIS — Z98.1 ARTHRODESIS STATUS: Chronic | ICD-10-CM

## 2021-12-08 DIAGNOSIS — M16.12 UNILATERAL PRIMARY OSTEOARTHRITIS, LEFT HIP: ICD-10-CM

## 2021-12-08 DIAGNOSIS — Z29.9 ENCOUNTER FOR PROPHYLACTIC MEASURES, UNSPECIFIED: ICD-10-CM

## 2021-12-08 DIAGNOSIS — Z01.818 ENCOUNTER FOR OTHER PREPROCEDURAL EXAMINATION: ICD-10-CM

## 2021-12-08 LAB
A1C WITH ESTIMATED AVERAGE GLUCOSE RESULT: 5.9 % — HIGH (ref 4–5.6)
ANION GAP SERPL CALC-SCNC: 12 MMOL/L — SIGNIFICANT CHANGE UP (ref 5–17)
BLD GP AB SCN SERPL QL: NEGATIVE — SIGNIFICANT CHANGE UP
BUN SERPL-MCNC: 18 MG/DL — SIGNIFICANT CHANGE UP (ref 7–23)
CALCIUM SERPL-MCNC: 9.7 MG/DL — SIGNIFICANT CHANGE UP (ref 8.4–10.5)
CHLORIDE SERPL-SCNC: 97 MMOL/L — SIGNIFICANT CHANGE UP (ref 96–108)
CO2 SERPL-SCNC: 29 MMOL/L — SIGNIFICANT CHANGE UP (ref 22–31)
CREAT SERPL-MCNC: 0.84 MG/DL — SIGNIFICANT CHANGE UP (ref 0.5–1.3)
ESTIMATED AVERAGE GLUCOSE: 123 MG/DL — HIGH (ref 68–114)
GLUCOSE SERPL-MCNC: 58 MG/DL — LOW (ref 70–99)
HCT VFR BLD CALC: 38.2 % — SIGNIFICANT CHANGE UP (ref 34.5–45)
HGB BLD-MCNC: 12 G/DL — SIGNIFICANT CHANGE UP (ref 11.5–15.5)
MCHC RBC-ENTMCNC: 28.8 PG — SIGNIFICANT CHANGE UP (ref 27–34)
MCHC RBC-ENTMCNC: 31.4 GM/DL — LOW (ref 32–36)
MCV RBC AUTO: 91.8 FL — SIGNIFICANT CHANGE UP (ref 80–100)
MRSA PCR RESULT.: SIGNIFICANT CHANGE UP
NRBC # BLD: 0 /100 WBCS — SIGNIFICANT CHANGE UP (ref 0–0)
PLATELET # BLD AUTO: 311 K/UL — SIGNIFICANT CHANGE UP (ref 150–400)
POTASSIUM SERPL-MCNC: 4.1 MMOL/L — SIGNIFICANT CHANGE UP (ref 3.5–5.3)
POTASSIUM SERPL-SCNC: 4.1 MMOL/L — SIGNIFICANT CHANGE UP (ref 3.5–5.3)
RBC # BLD: 4.16 M/UL — SIGNIFICANT CHANGE UP (ref 3.8–5.2)
RBC # FLD: 16.2 % — HIGH (ref 10.3–14.5)
RH IG SCN BLD-IMP: POSITIVE — SIGNIFICANT CHANGE UP
S AUREUS DNA NOSE QL NAA+PROBE: SIGNIFICANT CHANGE UP
SODIUM SERPL-SCNC: 138 MMOL/L — SIGNIFICANT CHANGE UP (ref 135–145)
WBC # BLD: 5.45 K/UL — SIGNIFICANT CHANGE UP (ref 3.8–10.5)
WBC # FLD AUTO: 5.45 K/UL — SIGNIFICANT CHANGE UP (ref 3.8–10.5)

## 2021-12-08 PROCEDURE — 85027 COMPLETE CBC AUTOMATED: CPT

## 2021-12-08 PROCEDURE — 86850 RBC ANTIBODY SCREEN: CPT

## 2021-12-08 PROCEDURE — 73700 CT LOWER EXTREMITY W/O DYE: CPT | Mod: 26,LT,MC

## 2021-12-08 PROCEDURE — G0463: CPT

## 2021-12-08 PROCEDURE — 87641 MR-STAPH DNA AMP PROBE: CPT

## 2021-12-08 PROCEDURE — 86900 BLOOD TYPING SEROLOGIC ABO: CPT

## 2021-12-08 PROCEDURE — 80048 BASIC METABOLIC PNL TOTAL CA: CPT

## 2021-12-08 PROCEDURE — 83036 HEMOGLOBIN GLYCOSYLATED A1C: CPT

## 2021-12-08 PROCEDURE — 86901 BLOOD TYPING SEROLOGIC RH(D): CPT

## 2021-12-08 PROCEDURE — 73700 CT LOWER EXTREMITY W/O DYE: CPT | Mod: MC

## 2021-12-08 PROCEDURE — 87640 STAPH A DNA AMP PROBE: CPT

## 2021-12-08 RX ORDER — LEVOTHYROXINE SODIUM 125 MCG
1 TABLET ORAL
Qty: 0 | Refills: 0 | DISCHARGE

## 2021-12-08 RX ORDER — CEFAZOLIN SODIUM 1 G
2000 VIAL (EA) INJECTION ONCE
Refills: 0 | Status: COMPLETED | OUTPATIENT
Start: 2021-12-20 | End: 2021-12-20

## 2021-12-08 RX ORDER — ONDANSETRON 8 MG/1
1 TABLET, FILM COATED ORAL
Qty: 0 | Refills: 0 | DISCHARGE

## 2021-12-08 NOTE — H&P PST ADULT - HEALTH CARE MAINTENANCE
Follows up PCP every 4 months and follow up pain management monthly  Flu and Covid vaccinated  Uptodate with colonoscopy  needs to schedule

## 2021-12-08 NOTE — H&P PST ADULT - PROBLEM SELECTOR PLAN 2
Scheduled for LEFT total hip arthroplasty with JAYDON robot posterior approach on 12/20/21 with Dr. Topete  Preop instructions and chlorhexidine soap provided  Labs CBC BMP A1C T&S and MRSA performed in PST   Covid swab on 12/17  Medical clearance also on 12/17

## 2021-12-08 NOTE — H&P PST ADULT - NSICDXPASTMEDICALHX_GEN_ALL_CORE_FT
PAST MEDICAL HISTORY:  Achilles tendon tear bilateral    Cervical herniated disc     Degenerative tear of acetabular labrum of left hip     Depression     Dyskinesia of esophagus     Fibromyalgia     H/O gastritis - on pantoprazole    History of chronic pain on meds for 16 yrs    History of dysphagia resolved after surgery in 2019    HLD (hyperlipidemia)     Hypothyroidism     Osteoarthritis     Spondylogenic compression of cervical spinal cord     Spondylolisthesis, cervical region

## 2021-12-08 NOTE — H&P PST ADULT - HISTORY OF PRESENT ILLNESS
This is a 61 year old female with past medical history of hypothyroidism,  esophageal spasms, GERD, smoker, COPD (no recent exacerbation- does not have inhaler), T8-pelvis arthrodesis, and L1-L3 anterior column release performed on 3/12/2021. Was admitted to [facility] 3/0 - 4/6            Pt had a repeat MRI on 3/31 while at [facility] which revealed thoracolumbar fusion with large fluid collection subcutaneously extending from T11 through L2 which likely represents a CSF leak.  Her course was c/b transaminitis and was found to have biliary sludge on MRCP. Liver enzymes normalized and GI recommended no ERCP needed at this time.  Additionally, pt complained of R rib pain, nausea, sweating and chest pain on 4/2, found to have abnormal EKG with TWI but stress test without evidence of ischemia. Midodrine started for orthostatic hypotension, which she will continue at rehab.       Presenting to PST scheduled LEFt total hip Arthroplasty with JAYDON robot posterior approach on 12/20/21 with Dr. Topete    ** Covid swab 12/17 @ [facility] lab in [facility]  ** Covid vaccinated- [facility]  ** Denies any history of COVID 19 infection   This is a 61 year old female with past medical history of hypothyroidism,  esophageal spasms, GERD, smoker(last cigarette 11/16/21), COPD (no recent exacerbation- does not have inhaler), T8-pelvis arthrodesis, and L1-L3 anterior column release performed on 3/12/2021. Was admitted to [facility] 3/0 - 4/6            Pt had a repeat MRI on 3/31 while at [facility] which revealed thoracolumbar fusion with large fluid collection subcutaneously extending from T11 through L2 which likely represents a CSF leak.  Her course was c/b transaminitis and was found to have biliary sludge on MRCP. Liver enzymes normalized and GI recommended no ERCP needed at this time.  Additionally, pt complained of R rib pain, nausea, sweating and chest pain on 4/2, found to have abnormal EKG with TWI but stress test without evidence of ischemia. Midodrine started for orthostatic hypotension, which she will continue at rehab.       Presenting to PST scheduled LEFt total hip Arthroplasty with JAYDON robot posterior approach on 12/20/21 with Dr. Topete    ** Covid swab 12/17 @ [facility] lab in [facility]  ** Covid vaccinated- [facility]  ** Denies any history of COVID 19 infection   This is a 61 year old female with past medical history of hypothyroidism,  HLD, esophageal spasms, GERD, smoker (last cigarette  use on 11/16/21), COPD (no recent exacerbation- does not have inhaler) S/P T8-pelvis arthrodesis, and L1-L3 anterior column release performed on 3/12/2021. States she has suffered with chronic neck and lower back pain for years, has history of cervical and lumbar fusions. Reports having left sided hip pain for months, use cane during ambulation for support.  Presenting to PST scheduled LEFT total hip Arthroplasty with JAYDON robot posterior approach on 12/20/21 with Dr. Topete.     ** Covid swab 12/17 @ [facility] lab in [facility]  ** Covid vaccinated- [facility]  ** Denies any history of COVID 19 infection   This is a 61 year old female with past medical history of hypothyroidism,  HLD, esophageal spasms (occurs occassionally @[facility]), GERD, smoker (last cigarette  use on 11/16/21), COPD (no recent exacerbation- does not have inhaler) S/P T8-pelvis arthrodesis, and L1-L3 anterior column release performed on 3/12/2021. States she has suffered with chronic neck and lower back pain for years, has history of cervical and lumbar fusions. Reports having left sided hip pain for months, use cane during ambulation for support.  Presenting to PST scheduled LEFT total hip Arthroplasty with JAYDON robot posterior approach on 12/20/21 with Dr. Topete.     ** Covid swab 12/17 @ [facility] lab in [facility]  ** Covid vaccinated- [facility]  ** Denies any history of COVID 19 infection

## 2021-12-08 NOTE — H&P PST ADULT - ACTIVITY
Pt is able perform all ADLs however has HHA to assist with household chores Pt is able perform all ADLs however  limited due to pain, has HHA to assist with household chores. Denies any cardiac distress symptoms during activities

## 2021-12-08 NOTE — H&P PST ADULT - MUSCULOSKELETAL COMMENTS
Uses cane during ambulation Generalized, neck back hips, knees. Uses cane during the day, and walker at night

## 2021-12-08 NOTE — H&P PST ADULT - ASSESSMENT

## 2021-12-08 NOTE — H&P PST ADULT - REASON FOR ADMISSION
"I'm having a left hip replacement"  Goal" To walk without a cane and pain' "I'm having a left hip replacement"  Goal" To walk without a cane and pain''

## 2021-12-09 PROBLEM — E78.5 HYPERLIPIDEMIA, UNSPECIFIED: Chronic | Status: ACTIVE | Noted: 2021-12-08

## 2021-12-10 NOTE — DIETITIAN INITIAL EVALUATION ADULT. - RD TO REMAIN AVAILABLE
"1.  Does the patient have a moderate to severe fever? []  Yes  [x]  No  2. Has the patient had a serious reaction to a flu shot before? []  Yes  [x]  No  3. Has the patient ever had Guillain Minong Syndrome within 6 weeks of a previous flu shot? []  Yes   [x]  No  4. Does the patient have a serious allergy to eggs? []  Yes   [x]  No    A ""YES\"" answer to any question above means the patient is not eligible to receive the vaccine. Vaccine Information Statement(s) given and reviewed, questions answered. Patient tolerated without incident.      ALEX Garnett    " Una Chavez MS, RD, CDN Pager #[phone]/yes

## 2021-12-17 ENCOUNTER — OUTPATIENT (OUTPATIENT)
Dept: OUTPATIENT SERVICES | Facility: HOSPITAL | Age: 61
LOS: 1 days | End: 2021-12-17
Payer: COMMERCIAL

## 2021-12-17 DIAGNOSIS — Z96.652 PRESENCE OF LEFT ARTIFICIAL KNEE JOINT: Chronic | ICD-10-CM

## 2021-12-17 DIAGNOSIS — Z98.890 OTHER SPECIFIED POSTPROCEDURAL STATES: Chronic | ICD-10-CM

## 2021-12-17 DIAGNOSIS — Z98.1 ARTHRODESIS STATUS: Chronic | ICD-10-CM

## 2021-12-17 DIAGNOSIS — Z11.52 ENCOUNTER FOR SCREENING FOR COVID-19: ICD-10-CM

## 2021-12-17 DIAGNOSIS — Z96.641 PRESENCE OF RIGHT ARTIFICIAL HIP JOINT: Chronic | ICD-10-CM

## 2021-12-17 PROCEDURE — U0003: CPT

## 2021-12-17 PROCEDURE — U0005: CPT

## 2021-12-17 PROCEDURE — C9803: CPT

## 2021-12-19 ENCOUNTER — TRANSCRIPTION ENCOUNTER (OUTPATIENT)
Age: 61
End: 2021-12-19

## 2021-12-19 LAB — SARS-COV-2 RNA SPEC QL NAA+PROBE: SIGNIFICANT CHANGE UP

## 2021-12-20 ENCOUNTER — APPOINTMENT (OUTPATIENT)
Dept: ORTHOPEDIC SURGERY | Facility: HOSPITAL | Age: 61
End: 2021-12-20

## 2021-12-20 ENCOUNTER — INPATIENT (INPATIENT)
Facility: HOSPITAL | Age: 61
LOS: 1 days | Discharge: NURSING, REHAB OR LONG-TERM CARE | DRG: 470 | End: 2021-12-22
Attending: ORTHOPAEDIC SURGERY | Admitting: ORTHOPAEDIC SURGERY
Payer: COMMERCIAL

## 2021-12-20 VITALS
TEMPERATURE: 98 F | SYSTOLIC BLOOD PRESSURE: 136 MMHG | DIASTOLIC BLOOD PRESSURE: 74 MMHG | HEIGHT: 64 IN | WEIGHT: 175.05 LBS | OXYGEN SATURATION: 95 % | HEART RATE: 65 BPM | RESPIRATION RATE: 16 BRPM

## 2021-12-20 DIAGNOSIS — M16.12 UNILATERAL PRIMARY OSTEOARTHRITIS, LEFT HIP: ICD-10-CM

## 2021-12-20 DIAGNOSIS — Z98.1 ARTHRODESIS STATUS: Chronic | ICD-10-CM

## 2021-12-20 DIAGNOSIS — Z96.641 PRESENCE OF RIGHT ARTIFICIAL HIP JOINT: Chronic | ICD-10-CM

## 2021-12-20 DIAGNOSIS — Z98.890 OTHER SPECIFIED POSTPROCEDURAL STATES: Chronic | ICD-10-CM

## 2021-12-20 DIAGNOSIS — Z96.652 PRESENCE OF LEFT ARTIFICIAL KNEE JOINT: Chronic | ICD-10-CM

## 2021-12-20 PROCEDURE — 72170 X-RAY EXAM OF PELVIS: CPT | Mod: 26

## 2021-12-20 PROCEDURE — 20985 CPTR-ASST DIR MS PX: CPT

## 2021-12-20 PROCEDURE — 27130 TOTAL HIP ARTHROPLASTY: CPT | Mod: LT

## 2021-12-20 RX ORDER — TRAMADOL HYDROCHLORIDE 50 MG/1
50 TABLET ORAL EVERY 6 HOURS
Refills: 0 | Status: DISCONTINUED | OUTPATIENT
Start: 2021-12-20 | End: 2021-12-22

## 2021-12-20 RX ORDER — CELECOXIB 200 MG/1
200 CAPSULE ORAL EVERY 12 HOURS
Refills: 0 | Status: DISCONTINUED | OUTPATIENT
Start: 2021-12-21 | End: 2021-12-22

## 2021-12-20 RX ORDER — BACLOFEN 100 %
10 POWDER (GRAM) MISCELLANEOUS
Refills: 0 | Status: DISCONTINUED | OUTPATIENT
Start: 2021-12-20 | End: 2021-12-22

## 2021-12-20 RX ORDER — PANTOPRAZOLE SODIUM 20 MG/1
40 TABLET, DELAYED RELEASE ORAL
Refills: 0 | Status: DISCONTINUED | OUTPATIENT
Start: 2021-12-20 | End: 2021-12-22

## 2021-12-20 RX ORDER — BACLOFEN 100 %
1 POWDER (GRAM) MISCELLANEOUS
Qty: 0 | Refills: 0 | DISCHARGE

## 2021-12-20 RX ORDER — FOLIC ACID 0.8 MG
1 TABLET ORAL DAILY
Refills: 0 | Status: DISCONTINUED | OUTPATIENT
Start: 2021-12-20 | End: 2021-12-22

## 2021-12-20 RX ORDER — URSODIOL 250 MG/1
300 TABLET, FILM COATED ORAL EVERY 12 HOURS
Refills: 0 | Status: DISCONTINUED | OUTPATIENT
Start: 2021-12-20 | End: 2021-12-22

## 2021-12-20 RX ORDER — PANTOPRAZOLE SODIUM 20 MG/1
40 TABLET, DELAYED RELEASE ORAL ONCE
Refills: 0 | Status: COMPLETED | OUTPATIENT
Start: 2021-12-20 | End: 2021-12-20

## 2021-12-20 RX ORDER — SODIUM CHLORIDE 9 MG/ML
500 INJECTION INTRAMUSCULAR; INTRAVENOUS; SUBCUTANEOUS ONCE
Refills: 0 | Status: COMPLETED | OUTPATIENT
Start: 2021-12-20 | End: 2021-12-20

## 2021-12-20 RX ORDER — KETOROLAC TROMETHAMINE 30 MG/ML
15 SYRINGE (ML) INJECTION EVERY 6 HOURS
Refills: 0 | Status: DISCONTINUED | OUTPATIENT
Start: 2021-12-20 | End: 2021-12-21

## 2021-12-20 RX ORDER — ONDANSETRON 8 MG/1
1 TABLET, FILM COATED ORAL
Qty: 0 | Refills: 0 | DISCHARGE

## 2021-12-20 RX ORDER — POLYETHYLENE GLYCOL 3350 17 G/17G
17 POWDER, FOR SOLUTION ORAL AT BEDTIME
Refills: 0 | Status: DISCONTINUED | OUTPATIENT
Start: 2021-12-20 | End: 2021-12-22

## 2021-12-20 RX ORDER — CELECOXIB 200 MG/1
1 CAPSULE ORAL
Qty: 0 | Refills: 0 | DISCHARGE

## 2021-12-20 RX ORDER — ONDANSETRON 8 MG/1
4 TABLET, FILM COATED ORAL EVERY 6 HOURS
Refills: 0 | Status: DISCONTINUED | OUTPATIENT
Start: 2021-12-20 | End: 2021-12-22

## 2021-12-20 RX ORDER — DEXAMETHASONE 0.5 MG/5ML
8 ELIXIR ORAL ONCE
Refills: 0 | Status: COMPLETED | OUTPATIENT
Start: 2021-12-20 | End: 2021-12-20

## 2021-12-20 RX ORDER — ACETAMINOPHEN 500 MG
1000 TABLET ORAL ONCE
Refills: 0 | Status: COMPLETED | OUTPATIENT
Start: 2021-12-21 | End: 2021-12-21

## 2021-12-20 RX ORDER — ASPIRIN/CALCIUM CARB/MAGNESIUM 324 MG
81 TABLET ORAL
Refills: 0 | Status: DISCONTINUED | OUTPATIENT
Start: 2021-12-20 | End: 2021-12-22

## 2021-12-20 RX ORDER — CEFAZOLIN SODIUM 1 G
2000 VIAL (EA) INJECTION EVERY 8 HOURS
Refills: 0 | Status: COMPLETED | OUTPATIENT
Start: 2021-12-20 | End: 2021-12-21

## 2021-12-20 RX ORDER — OXYCODONE HYDROCHLORIDE 5 MG/1
5 TABLET ORAL
Refills: 0 | Status: DISCONTINUED | OUTPATIENT
Start: 2021-12-20 | End: 2021-12-22

## 2021-12-20 RX ORDER — ACETAMINOPHEN 500 MG
975 TABLET ORAL EVERY 8 HOURS
Refills: 0 | Status: DISCONTINUED | OUTPATIENT
Start: 2021-12-21 | End: 2021-12-22

## 2021-12-20 RX ORDER — SODIUM CHLORIDE 9 MG/ML
500 INJECTION INTRAMUSCULAR; INTRAVENOUS; SUBCUTANEOUS ONCE
Refills: 0 | Status: COMPLETED | OUTPATIENT
Start: 2021-12-20 | End: 2021-12-21

## 2021-12-20 RX ORDER — ASCORBIC ACID 60 MG
500 TABLET,CHEWABLE ORAL
Refills: 0 | Status: DISCONTINUED | OUTPATIENT
Start: 2021-12-20 | End: 2021-12-22

## 2021-12-20 RX ORDER — SODIUM CHLORIDE 9 MG/ML
3 INJECTION INTRAMUSCULAR; INTRAVENOUS; SUBCUTANEOUS EVERY 8 HOURS
Refills: 0 | Status: DISCONTINUED | OUTPATIENT
Start: 2021-12-20 | End: 2021-12-20

## 2021-12-20 RX ORDER — TRAMADOL HYDROCHLORIDE 50 MG/1
50 TABLET ORAL ONCE
Refills: 0 | Status: DISCONTINUED | OUTPATIENT
Start: 2021-12-20 | End: 2021-12-20

## 2021-12-20 RX ORDER — SODIUM CHLORIDE 9 MG/ML
1000 INJECTION, SOLUTION INTRAVENOUS
Refills: 0 | Status: DISCONTINUED | OUTPATIENT
Start: 2021-12-20 | End: 2021-12-20

## 2021-12-20 RX ORDER — LIDOCAINE HCL 20 MG/ML
0.2 VIAL (ML) INJECTION ONCE
Refills: 0 | Status: DISCONTINUED | OUTPATIENT
Start: 2021-12-20 | End: 2021-12-20

## 2021-12-20 RX ORDER — FENTANYL CITRATE 50 UG/ML
1 INJECTION INTRAVENOUS
Refills: 0 | Status: DISCONTINUED | OUTPATIENT
Start: 2021-12-20 | End: 2021-12-22

## 2021-12-20 RX ORDER — ONDANSETRON 8 MG/1
4 TABLET, FILM COATED ORAL ONCE
Refills: 0 | Status: DISCONTINUED | OUTPATIENT
Start: 2021-12-20 | End: 2021-12-20

## 2021-12-20 RX ORDER — ACETAMINOPHEN 500 MG
1000 TABLET ORAL ONCE
Refills: 0 | Status: COMPLETED | OUTPATIENT
Start: 2021-12-20 | End: 2021-12-20

## 2021-12-20 RX ORDER — ACETAMINOPHEN 500 MG
1000 TABLET ORAL ONCE
Refills: 0 | Status: DISCONTINUED | OUTPATIENT
Start: 2021-12-20 | End: 2021-12-20

## 2021-12-20 RX ORDER — LEVOTHYROXINE SODIUM 125 MCG
225 TABLET ORAL
Qty: 0 | Refills: 0 | DISCHARGE

## 2021-12-20 RX ORDER — SENNA PLUS 8.6 MG/1
2 TABLET ORAL AT BEDTIME
Refills: 0 | Status: DISCONTINUED | OUTPATIENT
Start: 2021-12-20 | End: 2021-12-22

## 2021-12-20 RX ORDER — HYDROMORPHONE HYDROCHLORIDE 2 MG/ML
0.5 INJECTION INTRAMUSCULAR; INTRAVENOUS; SUBCUTANEOUS
Refills: 0 | Status: DISCONTINUED | OUTPATIENT
Start: 2021-12-20 | End: 2021-12-20

## 2021-12-20 RX ORDER — FERROUS SULFATE 325(65) MG
325 TABLET ORAL DAILY
Refills: 0 | Status: DISCONTINUED | OUTPATIENT
Start: 2021-12-20 | End: 2021-12-22

## 2021-12-20 RX ORDER — SODIUM CHLORIDE 9 MG/ML
1000 INJECTION INTRAMUSCULAR; INTRAVENOUS; SUBCUTANEOUS
Refills: 0 | Status: DISCONTINUED | OUTPATIENT
Start: 2021-12-20 | End: 2021-12-22

## 2021-12-20 RX ORDER — LEVOTHYROXINE SODIUM 125 MCG
225 TABLET ORAL DAILY
Refills: 0 | Status: DISCONTINUED | OUTPATIENT
Start: 2021-12-20 | End: 2021-12-22

## 2021-12-20 RX ORDER — OXYCODONE HYDROCHLORIDE 5 MG/1
10 TABLET ORAL
Refills: 0 | Status: DISCONTINUED | OUTPATIENT
Start: 2021-12-20 | End: 2021-12-22

## 2021-12-20 RX ORDER — ATORVASTATIN CALCIUM 80 MG/1
20 TABLET, FILM COATED ORAL AT BEDTIME
Refills: 0 | Status: DISCONTINUED | OUTPATIENT
Start: 2021-12-20 | End: 2021-12-22

## 2021-12-20 RX ORDER — CHLORHEXIDINE GLUCONATE 213 G/1000ML
1 SOLUTION TOPICAL ONCE
Refills: 0 | Status: DISCONTINUED | OUTPATIENT
Start: 2021-12-20 | End: 2021-12-20

## 2021-12-20 RX ORDER — ROSUVASTATIN CALCIUM 5 MG/1
1 TABLET ORAL
Qty: 0 | Refills: 0 | DISCHARGE

## 2021-12-20 RX ADMIN — Medication 500 MILLIGRAM(S): at 21:34

## 2021-12-20 RX ADMIN — HYDROMORPHONE HYDROCHLORIDE 0.5 MILLIGRAM(S): 2 INJECTION INTRAMUSCULAR; INTRAVENOUS; SUBCUTANEOUS at 18:20

## 2021-12-20 RX ADMIN — Medication 15 MILLIGRAM(S): at 18:28

## 2021-12-20 RX ADMIN — PANTOPRAZOLE SODIUM 40 MILLIGRAM(S): 20 TABLET, DELAYED RELEASE ORAL at 11:51

## 2021-12-20 RX ADMIN — FENTANYL CITRATE 1 PATCH: 50 INJECTION INTRAVENOUS at 18:05

## 2021-12-20 RX ADMIN — URSODIOL 300 MILLIGRAM(S): 250 TABLET, FILM COATED ORAL at 22:10

## 2021-12-20 RX ADMIN — HYDROMORPHONE HYDROCHLORIDE 0.5 MILLIGRAM(S): 2 INJECTION INTRAMUSCULAR; INTRAVENOUS; SUBCUTANEOUS at 18:06

## 2021-12-20 RX ADMIN — Medication 1 DROP(S): at 22:09

## 2021-12-20 RX ADMIN — TRAMADOL HYDROCHLORIDE 50 MILLIGRAM(S): 50 TABLET ORAL at 11:51

## 2021-12-20 RX ADMIN — SODIUM CHLORIDE 500 MILLILITER(S): 9 INJECTION INTRAMUSCULAR; INTRAVENOUS; SUBCUTANEOUS at 17:34

## 2021-12-20 RX ADMIN — SODIUM CHLORIDE 150 MILLILITER(S): 9 INJECTION INTRAMUSCULAR; INTRAVENOUS; SUBCUTANEOUS at 21:35

## 2021-12-20 RX ADMIN — Medication 15 MILLIGRAM(S): at 18:13

## 2021-12-20 RX ADMIN — Medication 1000 MILLIGRAM(S): at 22:03

## 2021-12-20 RX ADMIN — Medication 81 MILLIGRAM(S): at 21:34

## 2021-12-20 RX ADMIN — ATORVASTATIN CALCIUM 20 MILLIGRAM(S): 80 TABLET, FILM COATED ORAL at 21:34

## 2021-12-20 RX ADMIN — Medication 400 MILLIGRAM(S): at 21:33

## 2021-12-20 RX ADMIN — Medication 150 MILLIGRAM(S): at 11:51

## 2021-12-20 RX ADMIN — Medication 100 MILLIGRAM(S): at 21:33

## 2021-12-20 RX ADMIN — SODIUM CHLORIDE 500 MILLILITER(S): 9 INJECTION INTRAMUSCULAR; INTRAVENOUS; SUBCUTANEOUS at 21:35

## 2021-12-20 RX ADMIN — POLYETHYLENE GLYCOL 3350 17 GRAM(S): 17 POWDER, FOR SOLUTION ORAL at 21:37

## 2021-12-20 RX ADMIN — SENNA PLUS 2 TABLET(S): 8.6 TABLET ORAL at 21:37

## 2021-12-20 RX ADMIN — SODIUM CHLORIDE 150 MILLILITER(S): 9 INJECTION INTRAMUSCULAR; INTRAVENOUS; SUBCUTANEOUS at 17:34

## 2021-12-20 RX ADMIN — FENTANYL CITRATE 1 PATCH: 50 INJECTION INTRAVENOUS at 18:36

## 2021-12-20 NOTE — PATIENT PROFILE ADULT - NSPROIMPLANTSMEDDEV_GEN_A_NUR
Right hip replacement, lumbar  lami fusion, left TKR, cervical hardware, Cage in stomach/Artificial joint

## 2021-12-20 NOTE — PRE-OP CHECKLIST - WAS PATIENT ON BETA BLOCKER?
No
[FreeTextEntry1] : Patient to return tomorrow for removal of packing and dressing change . Patient will have picc line and start IV antibiotics asap .

## 2021-12-20 NOTE — PRE-OP CHECKLIST - INTERNAL PROSTHESES
Right hip replacement, lumbar lami fusion, Left TKR, cervical hardware, cage in stomach/yes(specify)

## 2021-12-20 NOTE — PATIENT PROFILE ADULT - FALL HARM RISK - HARM RISK INTERVENTIONS

## 2021-12-21 ENCOUNTER — TRANSCRIPTION ENCOUNTER (OUTPATIENT)
Age: 61
End: 2021-12-21

## 2021-12-21 LAB
ANION GAP SERPL CALC-SCNC: 11 MMOL/L — SIGNIFICANT CHANGE UP (ref 5–17)
BUN SERPL-MCNC: 13 MG/DL — SIGNIFICANT CHANGE UP (ref 7–23)
CALCIUM SERPL-MCNC: 9.4 MG/DL — SIGNIFICANT CHANGE UP (ref 8.4–10.5)
CHLORIDE SERPL-SCNC: 101 MMOL/L — SIGNIFICANT CHANGE UP (ref 96–108)
CO2 SERPL-SCNC: 28 MMOL/L — SIGNIFICANT CHANGE UP (ref 22–31)
CREAT SERPL-MCNC: 0.73 MG/DL — SIGNIFICANT CHANGE UP (ref 0.5–1.3)
GLUCOSE SERPL-MCNC: 118 MG/DL — HIGH (ref 70–99)
HCT VFR BLD CALC: 33.6 % — LOW (ref 34.5–45)
HGB BLD-MCNC: 10.4 G/DL — LOW (ref 11.5–15.5)
MCHC RBC-ENTMCNC: 28.8 PG — SIGNIFICANT CHANGE UP (ref 27–34)
MCHC RBC-ENTMCNC: 31 GM/DL — LOW (ref 32–36)
MCV RBC AUTO: 93.1 FL — SIGNIFICANT CHANGE UP (ref 80–100)
NRBC # BLD: 0 /100 WBCS — SIGNIFICANT CHANGE UP (ref 0–0)
PLATELET # BLD AUTO: 282 K/UL — SIGNIFICANT CHANGE UP (ref 150–400)
POTASSIUM SERPL-MCNC: 5.2 MMOL/L — SIGNIFICANT CHANGE UP (ref 3.5–5.3)
POTASSIUM SERPL-SCNC: 5.2 MMOL/L — SIGNIFICANT CHANGE UP (ref 3.5–5.3)
RBC # BLD: 3.61 M/UL — LOW (ref 3.8–5.2)
RBC # FLD: 16.1 % — HIGH (ref 10.3–14.5)
SODIUM SERPL-SCNC: 140 MMOL/L — SIGNIFICANT CHANGE UP (ref 135–145)
WBC # BLD: 6.72 K/UL — SIGNIFICANT CHANGE UP (ref 3.8–10.5)
WBC # FLD AUTO: 6.72 K/UL — SIGNIFICANT CHANGE UP (ref 3.8–10.5)

## 2021-12-21 RX ORDER — ACETAMINOPHEN 500 MG
3 TABLET ORAL
Qty: 0 | Refills: 0 | DISCHARGE
Start: 2021-12-21

## 2021-12-21 RX ORDER — SENNA PLUS 8.6 MG/1
2 TABLET ORAL
Qty: 0 | Refills: 0 | DISCHARGE
Start: 2021-12-21

## 2021-12-21 RX ORDER — POLYETHYLENE GLYCOL 3350 17 G/17G
17 POWDER, FOR SOLUTION ORAL
Qty: 0 | Refills: 0 | DISCHARGE
Start: 2021-12-21

## 2021-12-21 RX ORDER — TRAMADOL HYDROCHLORIDE 50 MG/1
1 TABLET ORAL
Qty: 0 | Refills: 0 | DISCHARGE
Start: 2021-12-21

## 2021-12-21 RX ORDER — OXYCODONE HYDROCHLORIDE 5 MG/1
1 TABLET ORAL
Qty: 0 | Refills: 0 | DISCHARGE
Start: 2021-12-21

## 2021-12-21 RX ADMIN — Medication 81 MILLIGRAM(S): at 05:51

## 2021-12-21 RX ADMIN — TRAMADOL HYDROCHLORIDE 50 MILLIGRAM(S): 50 TABLET ORAL at 23:15

## 2021-12-21 RX ADMIN — Medication 325 MILLIGRAM(S): at 11:54

## 2021-12-21 RX ADMIN — OXYCODONE HYDROCHLORIDE 10 MILLIGRAM(S): 5 TABLET ORAL at 01:11

## 2021-12-21 RX ADMIN — OXYCODONE HYDROCHLORIDE 10 MILLIGRAM(S): 5 TABLET ORAL at 15:42

## 2021-12-21 RX ADMIN — Medication 500 MILLIGRAM(S): at 05:50

## 2021-12-21 RX ADMIN — Medication 400 MILLIGRAM(S): at 05:54

## 2021-12-21 RX ADMIN — PANTOPRAZOLE SODIUM 40 MILLIGRAM(S): 20 TABLET, DELAYED RELEASE ORAL at 05:50

## 2021-12-21 RX ADMIN — POLYETHYLENE GLYCOL 3350 17 GRAM(S): 17 POWDER, FOR SOLUTION ORAL at 21:41

## 2021-12-21 RX ADMIN — Medication 1 DROP(S): at 18:33

## 2021-12-21 RX ADMIN — OXYCODONE HYDROCHLORIDE 10 MILLIGRAM(S): 5 TABLET ORAL at 22:40

## 2021-12-21 RX ADMIN — FENTANYL CITRATE 1 PATCH: 50 INJECTION INTRAVENOUS at 19:20

## 2021-12-21 RX ADMIN — Medication 15 MILLIGRAM(S): at 00:19

## 2021-12-21 RX ADMIN — TRAMADOL HYDROCHLORIDE 50 MILLIGRAM(S): 50 TABLET ORAL at 23:45

## 2021-12-21 RX ADMIN — Medication 1000 MILLIGRAM(S): at 14:49

## 2021-12-21 RX ADMIN — Medication 1 TABLET(S): at 11:55

## 2021-12-21 RX ADMIN — OXYCODONE HYDROCHLORIDE 10 MILLIGRAM(S): 5 TABLET ORAL at 22:04

## 2021-12-21 RX ADMIN — OXYCODONE HYDROCHLORIDE 10 MILLIGRAM(S): 5 TABLET ORAL at 12:04

## 2021-12-21 RX ADMIN — OXYCODONE HYDROCHLORIDE 10 MILLIGRAM(S): 5 TABLET ORAL at 02:17

## 2021-12-21 RX ADMIN — OXYCODONE HYDROCHLORIDE 10 MILLIGRAM(S): 5 TABLET ORAL at 13:27

## 2021-12-21 RX ADMIN — CELECOXIB 200 MILLIGRAM(S): 200 CAPSULE ORAL at 18:30

## 2021-12-21 RX ADMIN — Medication 10 MILLIGRAM(S): at 23:15

## 2021-12-21 RX ADMIN — OXYCODONE HYDROCHLORIDE 10 MILLIGRAM(S): 5 TABLET ORAL at 18:30

## 2021-12-21 RX ADMIN — Medication 975 MILLIGRAM(S): at 22:15

## 2021-12-21 RX ADMIN — Medication 100 MILLIGRAM(S): at 05:49

## 2021-12-21 RX ADMIN — SENNA PLUS 2 TABLET(S): 8.6 TABLET ORAL at 21:42

## 2021-12-21 RX ADMIN — Medication 400 MILLIGRAM(S): at 14:03

## 2021-12-21 RX ADMIN — Medication 975 MILLIGRAM(S): at 21:42

## 2021-12-21 RX ADMIN — OXYCODONE HYDROCHLORIDE 10 MILLIGRAM(S): 5 TABLET ORAL at 02:47

## 2021-12-21 RX ADMIN — URSODIOL 300 MILLIGRAM(S): 250 TABLET, FILM COATED ORAL at 05:50

## 2021-12-21 RX ADMIN — ATORVASTATIN CALCIUM 20 MILLIGRAM(S): 80 TABLET, FILM COATED ORAL at 21:42

## 2021-12-21 RX ADMIN — URSODIOL 300 MILLIGRAM(S): 250 TABLET, FILM COATED ORAL at 18:31

## 2021-12-21 RX ADMIN — Medication 500 MILLIGRAM(S): at 18:31

## 2021-12-21 RX ADMIN — Medication 15 MILLIGRAM(S): at 11:55

## 2021-12-21 RX ADMIN — OXYCODONE HYDROCHLORIDE 10 MILLIGRAM(S): 5 TABLET ORAL at 09:10

## 2021-12-21 RX ADMIN — Medication 1000 MILLIGRAM(S): at 06:24

## 2021-12-21 RX ADMIN — Medication 81 MILLIGRAM(S): at 18:30

## 2021-12-21 RX ADMIN — Medication 15 MILLIGRAM(S): at 12:45

## 2021-12-21 RX ADMIN — FENTANYL CITRATE 1 PATCH: 50 INJECTION INTRAVENOUS at 07:07

## 2021-12-21 RX ADMIN — Medication 225 MICROGRAM(S): at 05:50

## 2021-12-21 RX ADMIN — SODIUM CHLORIDE 150 MILLILITER(S): 9 INJECTION INTRAMUSCULAR; INTRAVENOUS; SUBCUTANEOUS at 05:54

## 2021-12-21 RX ADMIN — Medication 1 DROP(S): at 05:49

## 2021-12-21 RX ADMIN — OXYCODONE HYDROCHLORIDE 10 MILLIGRAM(S): 5 TABLET ORAL at 16:45

## 2021-12-21 RX ADMIN — Medication 15 MILLIGRAM(S): at 05:55

## 2021-12-21 RX ADMIN — Medication 30 MILLIGRAM(S): at 11:56

## 2021-12-21 RX ADMIN — Medication 15 MILLIGRAM(S): at 00:49

## 2021-12-21 RX ADMIN — Medication 1 MILLIGRAM(S): at 11:54

## 2021-12-21 RX ADMIN — Medication 15 MILLIGRAM(S): at 06:25

## 2021-12-21 RX ADMIN — OXYCODONE HYDROCHLORIDE 10 MILLIGRAM(S): 5 TABLET ORAL at 00:41

## 2021-12-21 RX ADMIN — OXYCODONE HYDROCHLORIDE 10 MILLIGRAM(S): 5 TABLET ORAL at 11:10

## 2021-12-21 RX ADMIN — SODIUM CHLORIDE 500 MILLILITER(S): 9 INJECTION INTRAMUSCULAR; INTRAVENOUS; SUBCUTANEOUS at 05:55

## 2021-12-21 NOTE — PHYSICAL THERAPY INITIAL EVALUATION ADULT - PRECAUTIONS/LIMITATIONS, REHAB EVAL
Reports having left sided hip pain for months, use cane during ambulation for support./fall precautions/left hip precautions

## 2021-12-21 NOTE — PROVIDER CONTACT NOTE (OTHER) - ACTION/TREATMENT ORDERED:
MD Osullivan made aware. As per MD straight cath to be ordered. No further interventions @ this time. will cont to monitor.

## 2021-12-21 NOTE — OCCUPATIONAL THERAPY INITIAL EVALUATION ADULT - PRECAUTIONS/LIMITATIONS, REHAB EVAL
(posterior) S/P T8-pelvis arthrodesis, and L1-L3 anterior column release performed on 3/12/2021. States she has suffered with chronic neck and lower back pain for years, has history of cervical and lumbar fusions. Reports having left sided hip pain for months, use cane during ambulation for support./left hip precautions

## 2021-12-21 NOTE — DISCHARGE NOTE PROVIDER - NSDCMRMEDTOKEN_GEN_ALL_CORE_FT
acetaminophen 325 mg oral tablet: 3 tab(s) orally every 8 hours  baclofen 10 mg oral tablet: 1 tab(s) orally 2 times a day, As Needed  CeleBREX 100 mg oral capsule: 1 cap(s) orally once a day, As Needed  Ecotrin Adult Low Strength 81 mg oral delayed release tablet: 1 tab(s) orally 2 times a day  fentaNYL 50 mcg/hr transdermal film, extended release: 1 patch transdermal every 72 hours  levothyroxine: 225 microgram(s) orally once a day  Multiple Vitamins oral tablet: 1 tab(s) orally once a day  oxyCODONE 10 mg oral tablet: 1 tab(s) orally every 3 hours, As needed, Severe Pain (7 - 10)  oxyCODONE 20 mg oral tablet: 1 tab(s) orally 2 times a day, As Needed - 10)  pantoprazole 40 mg oral delayed release tablet: 1 tab(s) orally once a day (before a meal)  PARoxetine 30 mg oral tablet: 1 tab(s) orally once a day  polyethylene glycol 3350 oral powder for reconstitution: 17 gram(s) orally once a day (at bedtime), As Needed  rosuvastatin 5 mg oral tablet: 1 tab(s) orally once a day  senna oral tablet: 2 tab(s) orally once a day (at bedtime)  Systane ophthalmic gel forming solution: 1 drop(s) to each affected eye 4 times a day  traMADol 50 mg oral tablet: 1 tab(s) orally every 6 hours, As needed, Mild Pain (1 - 3)  ursodiol 300 mg oral capsule: 1 cap(s) orally every 12 hours  Zofran 4 mg oral tablet: 1 tab(s) orally every 8 hours, As Needed   acetaminophen 325 mg oral tablet: 3 tab(s) orally every 8 hours  baclofen 10 mg oral tablet: 1 tab(s) orally 2 times a day, As Needed  CeleBREX 100 mg oral capsule: 1 cap(s) orally once a day, As Needed  Ecotrin Adult Low Strength 81 mg oral delayed release tablet: 1 tab(s) orally 2 times a day x 4 weeks to prevent clots  fentaNYL 50 mcg/hr transdermal film, extended release: 1 patch transdermal every 72 hours  levothyroxine: 225 microgram(s) orally once a day  melatonin 5 mg oral tablet: 1 tab(s) orally once a day (at bedtime), As needed, Insomnia  Multiple Vitamins oral tablet: 1 tab(s) orally once a day  oxyCODONE 10 mg oral tablet: 1 tab(s) orally every 3 hours, As needed, Severe Pain (7 - 10)  oxyCODONE 5 mg oral tablet: 1 tab(s) orally every 3 hours, As needed, Moderate Pain (4 - 6)  pantoprazole 40 mg oral delayed release tablet: 1 tab(s) orally once a day (before a meal)  PARoxetine 30 mg oral tablet: 1 tab(s) orally once a day  polyethylene glycol 3350 oral powder for reconstitution: 17 gram(s) orally once a day (at bedtime), As Needed  rosuvastatin 5 mg oral tablet: 1 tab(s) orally once a day  senna oral tablet: 2 tab(s) orally once a day (at bedtime)  Systane ophthalmic gel forming solution: 1 drop(s) to each affected eye 4 times a day  traMADol 50 mg oral tablet: 1 tab(s) orally every 6 hours, As needed, Mild Pain (1 - 3)  ursodiol 300 mg oral capsule: 1 cap(s) orally every 12 hours  Zofran 4 mg oral tablet: 1 tab(s) orally every 8 hours, As Needed   acetaminophen 325 mg oral tablet: 3 tab(s) orally every 8 hours x 1 week then consider switching to prn  baclofen 10 mg oral tablet: 1 tab(s) orally 2 times a day, As Needed  CeleBREX 100 mg oral capsule: 1 cap(s) orally once a day, As Needed  Ecotrin Adult Low Strength 81 mg oral delayed release tablet: 1 tab(s) orally 2 times a day x 4 weeks to prevent clots  fentaNYL 50 mcg/hr transdermal film, extended release: 1 patch transdermal every 72 hours  levothyroxine: 225 microgram(s) orally once a day  melatonin 5 mg oral tablet: 1 tab(s) orally once a day (at bedtime), As needed, Insomnia  Multiple Vitamins oral tablet: 1 tab(s) orally once a day  oxyCODONE 10 mg oral tablet: 1 tab(s) orally every 3 hours, As needed, Severe Pain (7 - 10)  oxyCODONE 5 mg oral tablet: 1 tab(s) orally every 3 hours, As needed, Moderate Pain (4 - 6)  pantoprazole 40 mg oral delayed release tablet: 1 tab(s) orally once a day (before a meal)  PARoxetine 30 mg oral tablet: 1 tab(s) orally once a day  polyethylene glycol 3350 oral powder for reconstitution: 17 gram(s) orally once a day (at bedtime), As Needed  rosuvastatin 5 mg oral tablet: 1 tab(s) orally once a day  senna oral tablet: 2 tab(s) orally once a day (at bedtime)  Systane ophthalmic gel forming solution: 1 drop(s) to each affected eye 4 times a day  traMADol 50 mg oral tablet: 1 tab(s) orally every 6 hours, As needed, Mild Pain (1 - 3)  ursodiol 300 mg oral capsule: 1 cap(s) orally every 12 hours  Zofran 4 mg oral tablet: 1 tab(s) orally every 8 hours, As Needed

## 2021-12-21 NOTE — DISCHARGE NOTE PROVIDER - NSDCACTIVITY_GEN_ALL_CORE
Do not drive or operate machinery/Do not make important decisions/Stairs allowed/Walking - Indoors allowed/No heavy lifting/straining/Walking - Outdoors allowed Do not drive or operate machinery/Showering allowed/Do not make important decisions/Stairs allowed/Walking - Indoors allowed/No heavy lifting/straining/Walking - Outdoors allowed

## 2021-12-21 NOTE — DISCHARGE NOTE PROVIDER - NSDCFUADDINST_GEN_ALL_CORE_FT
Dressing will be changed during office visit.   Patient may shower, limit direct water to dressing, if wet pat dry.  Out of bed, ambulate, weight bearing as tolerated-   Physical therapy to assist with exercise and help increase endurance.   Please contact Doctors office regarding arrangements for out patient Physical therapy.  Keep dressing clean. Remove dressing as per instructions written on the dressing.  If no instructions remove POD#14 (1/3/22)  Patient may shower, limit direct water to dressing, if wet pat dry.  Out of bed, ambulate, weight bearing as tolerated-   Physical therapy to assist with exercise and help increase endurance.

## 2021-12-21 NOTE — PROGRESS NOTE ADULT - SUBJECTIVE AND OBJECTIVE BOX
Orthopedic Surgery Progress Note    SUBJECTIVE  Pt seen and examined on morning rounds. Endorses pain yesterday and overnight but now well controlled.    OBJECTIVE  ___________________________________________________  VITAL SIGNS / I&O's   Vital Signs Last 24 Hrs  T(C): 37 (21 Dec 2021 04:30), Max: 37 (21 Dec 2021 04:30)  T(F): 98.6 (21 Dec 2021 04:30), Max: 98.6 (21 Dec 2021 04:30)  HR: 62 (21 Dec 2021 04:30) (62 - 83)  BP: 106/65 (21 Dec 2021 04:30) (100/54 - 136/74)  BP(mean): 72 (20 Dec 2021 20:30) (72 - 88)  RR: 18 (21 Dec 2021 04:30) (16 - 18)  SpO2: 97% (21 Dec 2021 04:30) (94% - 100%)      20 Dec 2021 07:01  -  21 Dec 2021 06:40  --------------------------------------------------------  IN:    IV PiggyBack: 300 mL    Oral Fluid: 450 mL    sodium chloride 0.9%: 1225 mL    Sodium Chloride 0.9% Bolus: 1500 mL  Total IN: 3475 mL    OUT:    Intermittent Catheterization - Urethral (mL): 1725 mL  Total OUT: 1725 mL    Total NET: 1750 mL        ___________________________________________________  PHYSICAL EXAM    Alert and [place], No Acute Distress  Card: +S1/S2, RRR  Pulm: CTAB  Laterality: Left hip aquacels c/d/i  Abduction pillow in place  Calves soft, non-tender bilaterally  +PF/DF/EHL/FHL  SILT  + DP pulse    CAPILLARY BLOOD GLUCOSE    POCT Blood Glucose.: 115 mg/dL (20 Dec 2021 10:17)  ___________________________________________________  MEDICATIONS  (STANDING):  acetaminophen     Tablet .. 975 milliGRAM(s) Oral every 8 hours  acetaminophen   IVPB .. 1000 milliGRAM(s) IV Intermittent once  artificial  tears Solution 1 Drop(s) Both EYES two times a day  ascorbic acid 500 milliGRAM(s) Oral two times a day  aspirin 81 milliGRAM(s) Oral two times a day  atorvastatin 20 milliGRAM(s) Oral at bedtime  ceFAZolin   IVPB 2000 milliGRAM(s) IV Intermittent once  celecoxib 200 milliGRAM(s) Oral every 12 hours  dexAMETHasone  IVPB 8 milliGRAM(s) IV Intermittent once  fentaNYL   Patch  50 MICROgram(s)/Hr 1 Patch Transdermal every 72 hours  ferrous    sulfate 325 milliGRAM(s) Oral daily  folic acid 1 milliGRAM(s) Oral daily  ketorolac   Injectable 15 milliGRAM(s) IV Push every 6 hours  levothyroxine 225 MICROGram(s) Oral daily  multivitamin 1 Tablet(s) Oral daily  pantoprazole    Tablet 40 milliGRAM(s) Oral before breakfast  PARoxetine 30 milliGRAM(s) Oral daily  polyethylene glycol 3350 17 Gram(s) Oral at bedtime  senna 2 Tablet(s) Oral at bedtime  sodium chloride 0.9%. 1000 milliLiter(s) (150 mL/Hr) IV Continuous <Continuous>  ursodiol Capsule 300 milliGRAM(s) Oral every 12 hours    MEDICATIONS  (PRN):  baclofen 10 milliGRAM(s) Oral two times a day PRN Muscle Spasm  ondansetron Injectable 4 milliGRAM(s) IV Push every 6 hours PRN Nausea and/or Vomiting  oxyCODONE    IR 5 milliGRAM(s) Oral every 3 hours PRN Moderate Pain (4 - 6)  oxyCODONE    IR 10 milliGRAM(s) Oral every 3 hours PRN Severe Pain (7 - 10)  traMADol 50 milliGRAM(s) Oral every 6 hours PRN Mild Pain (1 - 3)   Orthopedic Surgery Progress Note    SUBJECTIVE  Pt seen and examined on morning rounds. Endorses pain yesterday and overnight but now well controlled. Straight cath x1    OBJECTIVE  ___________________________________________________  VITAL SIGNS / I&O's   Vital Signs Last 24 Hrs  T(C): 37 (21 Dec 2021 04:30), Max: 37 (21 Dec 2021 04:30)  T(F): 98.6 (21 Dec 2021 04:30), Max: 98.6 (21 Dec 2021 04:30)  HR: 62 (21 Dec 2021 04:30) (62 - 83)  BP: 106/65 (21 Dec 2021 04:30) (100/54 - 136/74)  BP(mean): 72 (20 Dec 2021 20:30) (72 - 88)  RR: 18 (21 Dec 2021 04:30) (16 - 18)  SpO2: 97% (21 Dec 2021 04:30) (94% - 100%)      20 Dec 2021 07:01  -  21 Dec 2021 06:40  --------------------------------------------------------  IN:    IV PiggyBack: 300 mL    Oral Fluid: 450 mL    sodium chloride 0.9%: 1225 mL    Sodium Chloride 0.9% Bolus: 1500 mL  Total IN: 3475 mL    OUT:    Intermittent Catheterization - Urethral (mL): 1725 mL  Total OUT: 1725 mL    Total NET: 1750 mL        ___________________________________________________  PHYSICAL EXAM    Alert and [place], No Acute Distress  Card: +S1/S2, RRR  Pulm: CTAB  Laterality: Left hip aquacels c/d/i  Abduction pillow in place  Calves soft, non-tender bilaterally  +PF/DF/EHL/FHL  SILT  + DP pulse    CAPILLARY BLOOD GLUCOSE    POCT Blood Glucose.: 115 mg/dL (20 Dec 2021 10:17)  ___________________________________________________  MEDICATIONS  (STANDING):  acetaminophen     Tablet .. 975 milliGRAM(s) Oral every 8 hours  acetaminophen   IVPB .. 1000 milliGRAM(s) IV Intermittent once  artificial  tears Solution 1 Drop(s) Both EYES two times a day  ascorbic acid 500 milliGRAM(s) Oral two times a day  aspirin 81 milliGRAM(s) Oral two times a day  atorvastatin 20 milliGRAM(s) Oral at bedtime  ceFAZolin   IVPB 2000 milliGRAM(s) IV Intermittent once  celecoxib 200 milliGRAM(s) Oral every 12 hours  dexAMETHasone  IVPB 8 milliGRAM(s) IV Intermittent once  fentaNYL   Patch  50 MICROgram(s)/Hr 1 Patch Transdermal every 72 hours  ferrous    sulfate 325 milliGRAM(s) Oral daily  folic acid 1 milliGRAM(s) Oral daily  ketorolac   Injectable 15 milliGRAM(s) IV Push every 6 hours  levothyroxine 225 MICROGram(s) Oral daily  multivitamin 1 Tablet(s) Oral daily  pantoprazole    Tablet 40 milliGRAM(s) Oral before breakfast  PARoxetine 30 milliGRAM(s) Oral daily  polyethylene glycol 3350 17 Gram(s) Oral at bedtime  senna 2 Tablet(s) Oral at bedtime  sodium chloride 0.9%. 1000 milliLiter(s) (150 mL/Hr) IV Continuous <Continuous>  ursodiol Capsule 300 milliGRAM(s) Oral every 12 hours    MEDICATIONS  (PRN):  baclofen 10 milliGRAM(s) Oral two times a day PRN Muscle Spasm  ondansetron Injectable 4 milliGRAM(s) IV Push every 6 hours PRN Nausea and/or Vomiting  oxyCODONE    IR 5 milliGRAM(s) Oral every 3 hours PRN Moderate Pain (4 - 6)  oxyCODONE    IR 10 milliGRAM(s) Oral every 3 hours PRN Severe Pain (7 - 10)  traMADol 50 milliGRAM(s) Oral every 6 hours PRN Mild Pain (1 - 3)

## 2021-12-21 NOTE — DISCHARGE NOTE PROVIDER - CARE PROVIDER_API CALL
Gunnar Topete)  Orthopaedic Surgery  [address], [address]  [place] [address]  Phone: [phone]  Fax: [phone]  Follow Up Time:

## 2021-12-21 NOTE — DISCHARGE NOTE PROVIDER - HOSPITAL COURSE
History of Present Illness    This is a 61 year old female with past medical history of hypothyroidism,  HLD, esophageal spasms (occurs occassionally @[facility]), GERD, smoker (last cigarette  use on 11/16/21), COPD (no recent exacerbation- does not have inhaler) S/P T8-pelvis arthrodesis, and L1-L3 anterior column release performed on 3/12/2021. States she has suffered with chronic neck and lower back pain for years, has history of cervical and lumbar fusions. Reports having left sided hip pain for months, use cane during ambulation for support.  Presenting to [facility] scheduled LEFT total hip Arthroplasty with JAYDON robot posterior approach on 12/20/21 with Dr. Topete.     Goal" -To walk without a cane and pain''    ** Covid swab 12/17 @ [facility] in [facility]  ** Covid vaccinated- [facility]  ** Denies any history of COVID 19 infection      Allergies/Medications:   Allergies:        Allergies:  	No Known Allergies:     Home Medications:   * Patient Currently Takes Medications as of 08-Dec-2021 12:05 documented in Structured Notes  · 	pantoprazole 40 mg oral delayed release tablet: Last Dose Taken:  , 1 tab(s) orally once a day (before a meal)  · 	ursodiol 300 mg oral capsule: Last Dose Taken:  , 1 cap(s) orally every 12 hours  · 	oxyCODONE 20 mg oral tablet: Last Dose Taken:  , 1 tab(s) orally 2 times a day, As Needed - 10)  · 	fentaNYL 50 mcg/hr transdermal film, extended release: Last Dose Taken:  , 1 patch transdermal every 72 hours  · 	Multiple Vitamins oral tablet: Last Dose Taken:  , 1 tab(s) orally once a day  · 	PARoxetine 40 mg oral tablet: Last Dose Taken:  , 1 tab(s) orally once a day  · 	Systane ophthalmic gel forming solution: Last Dose Taken:  , 1 drop(s) to each affected eye 4 times a day  · 	levothyroxine: Last Dose Taken:  , 225 microgram(s) orally once a day  · 	rosuvastatin 5 mg oral tablet: Last Dose Taken:  , 1 tab(s) orally once a day  · 	baclofen 10 mg oral tablet: Last Dose Taken:  , 1 tab(s) orally 2 times a day, As Needed  · 	CeleBREX 100 mg oral capsule: Last Dose Taken:  , 1 cap(s) orally once a day, As Needed  · 	Zofran 4 mg oral tablet: Last Dose Taken:  , 1 tab(s) orally every 8 hours, As Needed    PMH/PSH/FH/SH:    Past Medical, Past Surgical, and Family History:  PAST MEDICAL HISTORY:  Achilles tendon tear bilateral  Cervical herniated disc   Degenerative tear of acetabular labrum of left hip   Depression   Dyskinesia of esophagus   Fibromyalgia   H/O gastritis - on pantoprazole  History of chronic pain on meds for 16 yrs  History of dysphagia resolved after surgery in 2019  HLD (hyperlipidemia)   Hypothyroidism   Osteoarthritis   Spondylogenic compression of cervical spinal cord   Spondylolisthesis, cervical region.     PAST SURGICAL HISTORY:  S/P arthroscopy of left knee x 4  S/P cervical spinal fusion cage 2019  S/P hip replacement, right   S/P laminectomy with spinal fusion lumbar spine  S/P total knee replacement, left.     12/20/21- Patient presents to the hospital for elective surgery, underwent a left total hip replacement-  -tolerated procedure without complications.  Patient was evaluated by Physical therapy whom recommended home with out patient PT for discharge plan.  Patient is stable for discharge when cleared by PT. History of Present Illness    This is a 61 year old female with past medical history of hypothyroidism,  HLD, esophageal spasms (occurs [facility]assionally @[facility]), GERD, smoker (last cigarette  use on 11/16/21), COPD (no recent exacerbation- does not have inhaler) S/P T8-pelvis arthrodesis, and L1-L3 anterior column release performed on 3/12/2021. States she has suffered with chronic neck and lower back pain for years, has history of cervical and lumbar fusions. Reports having left sided hip pain for months, use cane during ambulation for support.  Presenting to PST scheduled LEFT total hip Arthroplasty with JAYDON robot posterior approach on 12/20/21 with Dr. Topete.     Goal" -To walk without a cane and pain''    ** Covid swab 12/17 @ [facility] in [place]  ** Covid vaccinated- [facility]  ** Denies any history of COVID 19 infection      Allergies/Medications:   Allergies:        Allergies:  	No Known Allergies:     Home Medications:   * Patient Currently Takes Medications as of 08-Dec-2021 12:05 documented in Structured Notes  · 	pantoprazole 40 mg oral delayed release tablet: Last Dose Taken:  , 1 tab(s) orally once a day (before a meal)  · 	ursodiol 300 mg oral capsule: Last Dose Taken:  , 1 cap(s) orally every 12 hours  · 	oxyCODONE 20 mg oral tablet: Last Dose Taken:  , 1 tab(s) orally 2 times a day, As Needed - 10)  · 	fentaNYL 50 mcg/hr transdermal film, extended release: Last Dose Taken:  , 1 patch transdermal every 72 hours  · 	Multiple Vitamins oral tablet: Last Dose Taken:  , 1 tab(s) orally once a day  · 	PARoxetine 40 mg oral tablet: Last Dose Taken:  , 1 tab(s) orally once a day  · 	Systane ophthalmic gel forming solution: Last Dose Taken:  , 1 drop(s) to each affected eye 4 times a day  · 	levothyroxine: Last Dose Taken:  , 225 microgram(s) orally once a day  · 	rosuvastatin 5 mg oral tablet: Last Dose Taken:  , 1 tab(s) orally once a day  · 	baclofen 10 mg oral tablet: Last Dose Taken:  , 1 tab(s) orally 2 times a day, As Needed  · 	CeleBREX 100 mg oral capsule: Last Dose Taken:  , 1 cap(s) orally once a day, As Needed  · 	Zofran 4 mg oral tablet: Last Dose Taken:  , 1 tab(s) orally every 8 hours, As Needed    PMH/PSH/FH/SH:    Past Medical, Past Surgical, and Family History:  PAST MEDICAL HISTORY:  Achilles tendon tear bilateral  Cervical herniated disc   Degenerative tear of acetabular labrum of left hip   Depression   Dyskinesia of esophagus   Fibromyalgia   H/O gastritis - on pantoprazole  History of chronic pain on meds for 16 yrs  History of dysphagia resolved after surgery in 2019  HLD (hyperlipidemia)   Hypothyroidism   Osteoarthritis   Spondylogenic compression of cervical spinal cord   Spondylolisthesis, cervical region.     PAST SURGICAL HISTORY:  S/P arthroscopy of left knee x 4  S/P cervical spinal fusion cage 2019  S/P hip replacement, right   S/P laminectomy with spinal fusion lumbar spine  S/P total knee replacement, left.     12/20/21- Patient presents to the hospital for elective surgery, underwent a left total hip replacement-  -tolerated procedure without complications.  Patient was evaluated by Physical therapy whom recommended subacute rehab  Will discharge when bed available

## 2021-12-21 NOTE — PROVIDER CONTACT NOTE (OTHER) - ASSESSMENT
Pt remains A&Ox4, VSS. Pt was DTV @0400. Pt denies pain upon palpation and denies feeling the urge to urinate. No distention noted. Bladder scan showed >600cc in bladder.

## 2021-12-21 NOTE — OCCUPATIONAL THERAPY INITIAL EVALUATION ADULT - PERTINENT HX OF CURRENT PROBLEM, REHAB EVAL
61F admitted to [facility] on 12/20/21 pmh of hypothyroidism,  HLD, esophageal spasms (occurs occasionally @[facility]), GERD, smoker (last cigarette  use on 11/16/21), COPD (no recent exacerbation- does not have inhaler)

## 2021-12-21 NOTE — PHYSICAL THERAPY INITIAL EVALUATION ADULT - GAIT DEVIATIONS NOTED, PT EVAL
decreased soham/decreased velocity of limb motion/decreased step length/decreased weight-shifting ability

## 2021-12-21 NOTE — DISCHARGE NOTE PROVIDER - NSDCFUADDAPPT_GEN_ALL_CORE_FT
Please call Dr. Topete' s office within next few days to schedule a follow up appointment about 14 days after surgery.    Please follow up with Dr Topete after discharged from rehab

## 2021-12-22 ENCOUNTER — TRANSCRIPTION ENCOUNTER (OUTPATIENT)
Age: 61
End: 2021-12-22

## 2021-12-22 VITALS
TEMPERATURE: 98 F | SYSTOLIC BLOOD PRESSURE: 126 MMHG | OXYGEN SATURATION: 95 % | DIASTOLIC BLOOD PRESSURE: 70 MMHG | RESPIRATION RATE: 18 BRPM | HEART RATE: 58 BPM

## 2021-12-22 LAB
ANION GAP SERPL CALC-SCNC: 6 MMOL/L — SIGNIFICANT CHANGE UP (ref 5–17)
BUN SERPL-MCNC: 11 MG/DL — SIGNIFICANT CHANGE UP (ref 7–23)
CALCIUM SERPL-MCNC: 9.1 MG/DL — SIGNIFICANT CHANGE UP (ref 8.4–10.5)
CHLORIDE SERPL-SCNC: 103 MMOL/L — SIGNIFICANT CHANGE UP (ref 96–108)
CO2 SERPL-SCNC: 30 MMOL/L — SIGNIFICANT CHANGE UP (ref 22–31)
CREAT SERPL-MCNC: 0.76 MG/DL — SIGNIFICANT CHANGE UP (ref 0.5–1.3)
GLUCOSE SERPL-MCNC: 112 MG/DL — HIGH (ref 70–99)
HCT VFR BLD CALC: 30.5 % — LOW (ref 34.5–45)
HGB BLD-MCNC: 9.5 G/DL — LOW (ref 11.5–15.5)
MCHC RBC-ENTMCNC: 28.7 PG — SIGNIFICANT CHANGE UP (ref 27–34)
MCHC RBC-ENTMCNC: 31.1 GM/DL — LOW (ref 32–36)
MCV RBC AUTO: 92.1 FL — SIGNIFICANT CHANGE UP (ref 80–100)
NRBC # BLD: 0 /100 WBCS — SIGNIFICANT CHANGE UP (ref 0–0)
PLATELET # BLD AUTO: 213 K/UL — SIGNIFICANT CHANGE UP (ref 150–400)
POTASSIUM SERPL-MCNC: 4.1 MMOL/L — SIGNIFICANT CHANGE UP (ref 3.5–5.3)
POTASSIUM SERPL-SCNC: 4.1 MMOL/L — SIGNIFICANT CHANGE UP (ref 3.5–5.3)
RBC # BLD: 3.31 M/UL — LOW (ref 3.8–5.2)
RBC # FLD: 16 % — HIGH (ref 10.3–14.5)
SODIUM SERPL-SCNC: 139 MMOL/L — SIGNIFICANT CHANGE UP (ref 135–145)
WBC # BLD: 6.09 K/UL — SIGNIFICANT CHANGE UP (ref 3.8–10.5)
WBC # FLD AUTO: 6.09 K/UL — SIGNIFICANT CHANGE UP (ref 3.8–10.5)

## 2021-12-22 PROCEDURE — 72170 X-RAY EXAM OF PELVIS: CPT

## 2021-12-22 PROCEDURE — C1776: CPT

## 2021-12-22 PROCEDURE — 82962 GLUCOSE BLOOD TEST: CPT

## 2021-12-22 PROCEDURE — C1889: CPT

## 2021-12-22 PROCEDURE — 97530 THERAPEUTIC ACTIVITIES: CPT

## 2021-12-22 PROCEDURE — 85027 COMPLETE CBC AUTOMATED: CPT

## 2021-12-22 PROCEDURE — C9399: CPT

## 2021-12-22 PROCEDURE — 97110 THERAPEUTIC EXERCISES: CPT

## 2021-12-22 PROCEDURE — 97161 PT EVAL LOW COMPLEX 20 MIN: CPT

## 2021-12-22 PROCEDURE — S2900: CPT

## 2021-12-22 PROCEDURE — 80048 BASIC METABOLIC PNL TOTAL CA: CPT

## 2021-12-22 PROCEDURE — 97165 OT EVAL LOW COMPLEX 30 MIN: CPT

## 2021-12-22 PROCEDURE — C1713: CPT

## 2021-12-22 RX ORDER — LANOLIN ALCOHOL/MO/W.PET/CERES
1 CREAM (GRAM) TOPICAL
Qty: 0 | Refills: 0 | DISCHARGE
Start: 2021-12-22

## 2021-12-22 RX ORDER — LANOLIN ALCOHOL/MO/W.PET/CERES
5 CREAM (GRAM) TOPICAL AT BEDTIME
Refills: 0 | Status: DISCONTINUED | OUTPATIENT
Start: 2021-12-22 | End: 2021-12-22

## 2021-12-22 RX ORDER — ASPIRIN/CALCIUM CARB/MAGNESIUM 324 MG
1 TABLET ORAL
Qty: 0 | Refills: 0 | DISCHARGE

## 2021-12-22 RX ORDER — OXYCODONE HYDROCHLORIDE 5 MG/1
20 TABLET ORAL EVERY 6 HOURS
Refills: 0 | Status: DISCONTINUED | OUTPATIENT
Start: 2021-12-22 | End: 2021-12-22

## 2021-12-22 RX ORDER — OXYCODONE HYDROCHLORIDE 5 MG/1
1 TABLET ORAL
Qty: 0 | Refills: 0 | DISCHARGE
Start: 2021-12-22

## 2021-12-22 RX ADMIN — PANTOPRAZOLE SODIUM 40 MILLIGRAM(S): 20 TABLET, DELAYED RELEASE ORAL at 05:47

## 2021-12-22 RX ADMIN — OXYCODONE HYDROCHLORIDE 20 MILLIGRAM(S): 5 TABLET ORAL at 02:08

## 2021-12-22 RX ADMIN — FENTANYL CITRATE 1 PATCH: 50 INJECTION INTRAVENOUS at 20:37

## 2021-12-22 RX ADMIN — Medication 500 MILLIGRAM(S): at 05:47

## 2021-12-22 RX ADMIN — CELECOXIB 200 MILLIGRAM(S): 200 CAPSULE ORAL at 06:20

## 2021-12-22 RX ADMIN — OXYCODONE HYDROCHLORIDE 20 MILLIGRAM(S): 5 TABLET ORAL at 17:48

## 2021-12-22 RX ADMIN — Medication 1 DROP(S): at 18:18

## 2021-12-22 RX ADMIN — URSODIOL 300 MILLIGRAM(S): 250 TABLET, FILM COATED ORAL at 17:49

## 2021-12-22 RX ADMIN — Medication 5 MILLIGRAM(S): at 02:15

## 2021-12-22 RX ADMIN — Medication 975 MILLIGRAM(S): at 05:48

## 2021-12-22 RX ADMIN — Medication 81 MILLIGRAM(S): at 17:51

## 2021-12-22 RX ADMIN — OXYCODONE HYDROCHLORIDE 20 MILLIGRAM(S): 5 TABLET ORAL at 11:45

## 2021-12-22 RX ADMIN — Medication 1 MILLIGRAM(S): at 11:12

## 2021-12-22 RX ADMIN — OXYCODONE HYDROCHLORIDE 5 MILLIGRAM(S): 5 TABLET ORAL at 11:03

## 2021-12-22 RX ADMIN — OXYCODONE HYDROCHLORIDE 20 MILLIGRAM(S): 5 TABLET ORAL at 02:40

## 2021-12-22 RX ADMIN — Medication 975 MILLIGRAM(S): at 15:12

## 2021-12-22 RX ADMIN — Medication 225 MICROGRAM(S): at 05:47

## 2021-12-22 RX ADMIN — Medication 975 MILLIGRAM(S): at 06:20

## 2021-12-22 RX ADMIN — CELECOXIB 200 MILLIGRAM(S): 200 CAPSULE ORAL at 17:49

## 2021-12-22 RX ADMIN — Medication 975 MILLIGRAM(S): at 16:00

## 2021-12-22 RX ADMIN — OXYCODONE HYDROCHLORIDE 20 MILLIGRAM(S): 5 TABLET ORAL at 11:01

## 2021-12-22 RX ADMIN — CELECOXIB 200 MILLIGRAM(S): 200 CAPSULE ORAL at 18:30

## 2021-12-22 RX ADMIN — Medication 500 MILLIGRAM(S): at 17:50

## 2021-12-22 RX ADMIN — CELECOXIB 200 MILLIGRAM(S): 200 CAPSULE ORAL at 05:47

## 2021-12-22 RX ADMIN — Medication 325 MILLIGRAM(S): at 11:08

## 2021-12-22 RX ADMIN — Medication 1 DROP(S): at 11:12

## 2021-12-22 RX ADMIN — URSODIOL 300 MILLIGRAM(S): 250 TABLET, FILM COATED ORAL at 05:47

## 2021-12-22 RX ADMIN — OXYCODONE HYDROCHLORIDE 20 MILLIGRAM(S): 5 TABLET ORAL at 18:30

## 2021-12-22 RX ADMIN — Medication 1 TABLET(S): at 11:04

## 2021-12-22 RX ADMIN — OXYCODONE HYDROCHLORIDE 5 MILLIGRAM(S): 5 TABLET ORAL at 11:45

## 2021-12-22 RX ADMIN — Medication 81 MILLIGRAM(S): at 05:48

## 2021-12-22 RX ADMIN — Medication 10 MILLIGRAM(S): at 17:49

## 2021-12-22 RX ADMIN — Medication 30 MILLIGRAM(S): at 11:35

## 2021-12-22 NOTE — DISCHARGE NOTE NURSING/CASE MANAGEMENT/SOCIAL WORK - PATIENT PORTAL LINK FT
You can access the FollowMyHealth Patient Portal offered by [facility] by registering at the following website: [email or web address]. By joining [facility]’s FollowMyHealth portal, you will also be able to view your health information using other applications (apps) compatible with our system.

## 2021-12-22 NOTE — PROGRESS NOTE ADULT - SUBJECTIVE AND OBJECTIVE BOX
Post op Day [2 ]    Patient resting without complaints.  No chest pain, SOB, N/V.    T(C): 36.7 (12-22-21 @ 05:43), Max: 37.9 (12-22-21 @ 00:00)  HR: 63 (12-22-21 @ 05:43) (60 - 76)  BP: 125/70 (12-22-21 @ 05:43) (96/56 - 125/70)  RR: 18 (12-22-21 @ 05:43) (18 - 18)  SpO2: 95% (12-22-21 @ 05:43) (92% - 99%)  Wt(kg): --    Exam:  Alert and Oriented, No Acute Distress  L Hip aquacel c/d/i with soft/compressible compartments  No abd pillow on patient or at bedside, pillow placed between legs in interm  leg length appropriate  Calves Soft, Non-tender bilaterally  +PF/DF/EHL/FHL  SILT  +DP Pulse                        10.4   6.72  )-----------( 282      ( 21 Dec 2021 07:22 )             33.6    12-21    140  |  101  |  13  ----------------------------<  118<H>  5.2   |  28  |  0.73    Ca    9.4      21 Dec 2021 07:21

## 2021-12-22 NOTE — PROGRESS NOTE ADULT - ASSESSMENT
Assessment: Patient is a 61y Female S/p Left TAM. VSS. NAD    Plan:  -PT/OT-WBAT With Posterior Hip Precautions  -IS encouraged  -DVT PPx - Aspirin 81 mg BID  -OOB to chair  -FU AM Labs  -Pain Control - PRN ; patient with history of chronic pain, may need to adjust pain meds as needed  -Continue Current Tx  -Dispo planning    Orthopedic Surgery  Team Pager #[phone].
A/p: 61yFemale s/p LTHA.  VSS. NAD.

## 2021-12-22 NOTE — DISCHARGE NOTE NURSING/CASE MANAGEMENT/SOCIAL WORK - NSDCVIVACCINE_GEN_ALL_CORE_FT
COVID-19 vaccine, vector-nr, [id number], PF, 0.5 mL (Kendy); 21-Mar-2021 12:57; Nithya Choe (NANNETTE); Kendy; [id number] (Exp. Date: 21-Mar-2021); IntraMuscular; Deltoid Left.; 0.5 milliLiter(s);

## 2021-12-22 NOTE — PROGRESS NOTE ADULT - PROBLEM SELECTOR PLAN 1
PT/OT-WBAT, post precautions.  NEEDS ABD PILLOW AT ALL TIMES IN BED  IS  DVT PPx  Pain Control  Continue Current Tx.  Discharge planning HOLLIS Loco PA-C  Team Pager: #[phone]

## 2021-12-22 NOTE — DISCHARGE NOTE NURSING/CASE MANAGEMENT/SOCIAL WORK - NSDCPEFALRISK_GEN_ALL_CORE
For information on Fall & Injury Prevention, visit: [email or web address] OR  [email or web address] OR  https://www.cdc.gov/steadi/patient.html

## 2022-01-10 ENCOUNTER — APPOINTMENT (OUTPATIENT)
Dept: ORTHOPEDIC SURGERY | Facility: CLINIC | Age: 62
End: 2022-01-10
Payer: MEDICARE

## 2022-01-10 VITALS — BODY MASS INDEX: 31.89 KG/M2 | WEIGHT: 180 LBS | HEIGHT: 63 IN

## 2022-01-10 PROCEDURE — 99024 POSTOP FOLLOW-UP VISIT: CPT

## 2022-01-10 PROCEDURE — 73502 X-RAY EXAM HIP UNI 2-3 VIEWS: CPT

## 2022-01-10 NOTE — DISCUSSION/SUMMARY
[de-identified] : The patient is doing well after left total hip arthroplasty. Written infectious precautions were reviewed. The patient will progress with physical therapy at this time and they will work on transitioning from requiring assistive devices for ambulation. Aspirin therapy will be continued for the full 4 week postoperative course for the purpose of orthopedic thromboembolism prophylaxis. Return around the 6 week anniversary from surgery for follow-up evaluation.

## 2022-01-10 NOTE — PHYSICAL EXAM
[de-identified] : Patient is well nourished, well-developed, in no acute distress, with appropriate mood and affect. The patient is oriented to time, place, and person. Respirations are even and unlabored. Examination reveals satisfactory wound healing. No surrounding erythema. Motion is good and relatively pain free. Leg lengths are acceptable.\par \par The patient returns to the office today for staple removal from the left hip wound. The staples have been in since surgery. The patient has no complaints. The wound is healing well and is without evidence of infection or wound dehiscence. The wound was prepped with betadine and a staple removal tool was used to remove all staples in their entirety. Steri strips were placed over the wound and the patient was instructed to leave these in place until they fall off on their own. The patient tolerated the procedure well and there were no immediate complications. The wound edges are well adhered. The patient was instructed to continue to keep it clean.  [de-identified] : AP pelvis, AP hip, and lateral x-rays of the left hip were ordered and obtained in the office and demonstrate satisfactory position and alignment of the components are present. No signs of loosening are seen.

## 2022-01-10 NOTE — HISTORY OF PRESENT ILLNESS
[de-identified] : Status-post left total hip arthroplasty here for initial postoperative evaluation. Excellent progress is noted in terms of pain and restoration of function. Pain is well controlled with oral medications. There has been no change in medical health since discharge. The patient does require assistive devices.

## 2022-01-31 ENCOUNTER — RESULT REVIEW (OUTPATIENT)
Age: 62
End: 2022-01-31

## 2022-01-31 ENCOUNTER — APPOINTMENT (OUTPATIENT)
Dept: RADIOLOGY | Facility: CLINIC | Age: 62
End: 2022-01-31
Payer: MEDICARE

## 2022-01-31 ENCOUNTER — OUTPATIENT (OUTPATIENT)
Dept: OUTPATIENT SERVICES | Facility: HOSPITAL | Age: 62
LOS: 1 days | End: 2022-01-31
Payer: COMMERCIAL

## 2022-01-31 DIAGNOSIS — M47.814 SPONDYLOSIS WITHOUT MYELOPATHY OR RADICULOPATHY, THORACIC REGION: ICD-10-CM

## 2022-01-31 DIAGNOSIS — Z96.652 PRESENCE OF LEFT ARTIFICIAL KNEE JOINT: Chronic | ICD-10-CM

## 2022-01-31 DIAGNOSIS — Z98.890 OTHER SPECIFIED POSTPROCEDURAL STATES: Chronic | ICD-10-CM

## 2022-01-31 DIAGNOSIS — Z98.1 ARTHRODESIS STATUS: Chronic | ICD-10-CM

## 2022-01-31 DIAGNOSIS — Z96.641 PRESENCE OF RIGHT ARTIFICIAL HIP JOINT: Chronic | ICD-10-CM

## 2022-01-31 PROCEDURE — 72100 X-RAY EXAM L-S SPINE 2/3 VWS: CPT | Mod: 26

## 2022-01-31 PROCEDURE — 72100 X-RAY EXAM L-S SPINE 2/3 VWS: CPT

## 2022-01-31 PROCEDURE — 72070 X-RAY EXAM THORAC SPINE 2VWS: CPT

## 2022-01-31 PROCEDURE — 72070 X-RAY EXAM THORAC SPINE 2VWS: CPT | Mod: 26

## 2022-02-01 ENCOUNTER — APPOINTMENT (OUTPATIENT)
Dept: NEUROSURGERY | Facility: CLINIC | Age: 62
End: 2022-02-01
Payer: MEDICARE

## 2022-02-01 VITALS
DIASTOLIC BLOOD PRESSURE: 73 MMHG | SYSTOLIC BLOOD PRESSURE: 139 MMHG | WEIGHT: 180 LBS | OXYGEN SATURATION: 97 % | TEMPERATURE: 98 F | HEIGHT: 63 IN | HEART RATE: 70 BPM | BODY MASS INDEX: 31.89 KG/M2

## 2022-02-01 DIAGNOSIS — M47.814 SPONDYLOSIS W/OUT MYELOPATHY OR RADICULOPATHY, THORACIC REGION: ICD-10-CM

## 2022-02-01 DIAGNOSIS — G95.20 UNSPECIFIED CORD COMPRESSION: ICD-10-CM

## 2022-02-01 DIAGNOSIS — M54.16 RADICULOPATHY, LUMBAR REGION: ICD-10-CM

## 2022-02-01 DIAGNOSIS — M40.204 UNSPECIFIED KYPHOSIS, THORACIC REGION: ICD-10-CM

## 2022-02-01 PROCEDURE — 99213 OFFICE O/P EST LOW 20 MIN: CPT

## 2022-02-03 PROBLEM — G95.20 CERVICAL CORD COMPRESSION WITH MYELOPATHY: Status: ACTIVE | Noted: 2019-05-07

## 2022-02-03 PROBLEM — M54.16 CHRONIC LUMBAR RADICULOPATHY: Status: ACTIVE | Noted: 2020-12-08

## 2022-02-03 PROBLEM — M47.814 DEGENERATIVE ARTHRITIS OF THORACIC SPINE: Status: ACTIVE | Noted: 2021-03-01

## 2022-02-03 PROBLEM — M40.204 THORACIC KYPHOSIS: Status: ACTIVE | Noted: 2021-03-01

## 2022-02-08 NOTE — ASSESSMENT
[FreeTextEntry1] : 62 yo with extensive spine fusions whose xrays show intact hardware and good alignment.  She continues to experience pain and spasms in supine of UE, back, and sometimes LE.  I will obtain an MRI of the cervical and thoracic spine to assess the nerves and spinal canal for new or worsening pathology.  \par \par 1)  MRI Cervical wo - anytime prior to f/u \par 2)  MRI Thoracic wo - "           "\par 3)  Scoliosis Xrays (Entire thoracolumbar) @ [facility] or [place] only please\par 4)  RTO 3 months

## 2022-02-08 NOTE — PHYSICAL EXAM
[General Appearance - Alert] : alert [General Appearance - In No Acute Distress] : in no acute distress [Well-Healed] : well-healed [No Drainage] : without drainage [Normal Skin] : normal [Oriented To Time, Place, And Person] : oriented to person, place, and time [Impaired Insight] : insight and judgment were intact [Antalgic] : antalgic [Able to toe walk] : the patient was able to toe walk [Able to heel walk] : the patient was able to heel walk [Intact] : all sensory within normal limits bilaterally [Sclera] : the sclera and conjunctiva were normal [PERRL With Normal Accommodation] : pupils were equal in size, round, reactive to light, with normal accommodation [Hearing Threshold Finger Rub Not [place]] : hearing was normal [Neck Appearance] : the appearance of the neck was normal [] : no respiratory distress [Respiration, Rhythm And Depth] : normal respiratory rhythm and effort [Edema] : there was no peripheral edema [Involuntary Movements] : no involuntary movements were seen [Motor Tone] : muscle strength and tone were normal [Skin Color & Pigmentation] : normal skin color and pigmentation [Erythema] : not erythematous [Warm] : not warm [FreeTextEntry1] : improved upright posture, gait normal and tremendously improved using cane for safety

## 2022-02-08 NOTE — HISTORY OF PRESENT ILLNESS
[FreeTextEntry1] : Ms. Chaidez is a 62 yo female with PMH of HLD, Hashimoto's, RA, fibromyalgia, nutcracker esophagus (spasms), 2003 Lumbar fusion, 2010 R THR, 2012 L TKR, left hip pain, s/p C3-C4 ACDF (6/3/2019) for cervical degenerative changes with spinal cord compression and spondylolisthesis. Of note:  Post op stroke code was called due to facial droop which resolved and negative work up.  \par \par TODAY, 2/1/22, she arrives with complaint of chronic spontaneous arm/ leg movements/ spasms when lying down which persist.  Overall, ambulating very well with a cane and since surgery has improved tremendously.  \par \par 5/17/21:  she arrives for an initial post op visit after rehab s/p 3/12/2021 T8-pelvis arthrodesis, L1-L3 anterior column release.  Readmitted from rehab 3/30-4/7/21 for orthostatic hypotension, no CSF leak detected, followed by cardiology.\par \par 1/19/2021:  she arrives to discuss new imaging obtained after failed SCS trial to consider surgical options.  \par \par 12/8/2020:  s/p SCS trial lead pull s/p 11/18/20 SCS trial with thoracic Nevro leads in tact visualized on xrays 11/25/20.  She reports pain relief of RLE pain, but not pain relief of most bothersome LLE pain during the trial.  She wises to discuss any options.   \par \par

## 2022-02-17 ENCOUNTER — NON-APPOINTMENT (OUTPATIENT)
Age: 62
End: 2022-02-17

## 2022-02-22 ENCOUNTER — APPOINTMENT (OUTPATIENT)
Dept: ORTHOPEDIC SURGERY | Facility: CLINIC | Age: 62
End: 2022-02-22
Payer: MEDICARE

## 2022-02-22 DIAGNOSIS — M16.12 UNILATERAL PRIMARY OSTEOARTHRITIS, LEFT HIP: ICD-10-CM

## 2022-02-22 PROCEDURE — 99024 POSTOP FOLLOW-UP VISIT: CPT

## 2022-02-22 NOTE — HISTORY OF PRESENT ILLNESS
[de-identified] : Status-post left total hip arthroplasty here for routine postoperative evaluation. Excellent progress is noted in terms of pain and restoration of function. Pain is well controlled with oral medications. There has been no change in medical health since discharge. The patient does not require assistive devices. Smoking again

## 2022-02-22 NOTE — DISCUSSION/SUMMARY
[de-identified] : The patient is doing well after left total hip arthroplasty. Continue wbat. Posterior hip precautiosn reinforced. Return around the 6 month anniversary from surgery for follow-up evaluation.

## 2022-02-22 NOTE — PHYSICAL EXAM
[de-identified] : Patient is well nourished, well-developed, in no acute distress, with appropriate mood and affect. The patient is oriented to time, place, and person. Respirations are even and unlabored. Examination reveals satisfactory wound healing. No surrounding erythema. Motion is good and relatively pain free. Leg lengths are acceptable.

## 2022-03-16 ENCOUNTER — RX RENEWAL (OUTPATIENT)
Age: 62
End: 2022-03-16

## 2022-06-27 ENCOUNTER — OUTPATIENT (OUTPATIENT)
Dept: OUTPATIENT SERVICES | Facility: HOSPITAL | Age: 62
LOS: 1 days | End: 2022-06-27
Payer: COMMERCIAL

## 2022-06-27 ENCOUNTER — APPOINTMENT (OUTPATIENT)
Dept: RADIOLOGY | Facility: CLINIC | Age: 62
End: 2022-06-27

## 2022-06-27 DIAGNOSIS — Z96.641 PRESENCE OF RIGHT ARTIFICIAL HIP JOINT: Chronic | ICD-10-CM

## 2022-06-27 DIAGNOSIS — Z96.652 PRESENCE OF LEFT ARTIFICIAL KNEE JOINT: Chronic | ICD-10-CM

## 2022-06-27 DIAGNOSIS — G95.20 UNSPECIFIED CORD COMPRESSION: ICD-10-CM

## 2022-06-27 DIAGNOSIS — Z98.1 ARTHRODESIS STATUS: Chronic | ICD-10-CM

## 2022-06-27 DIAGNOSIS — Z00.8 ENCOUNTER FOR OTHER GENERAL EXAMINATION: ICD-10-CM

## 2022-06-27 DIAGNOSIS — Z98.890 OTHER SPECIFIED POSTPROCEDURAL STATES: Chronic | ICD-10-CM

## 2022-06-27 PROCEDURE — 72082 X-RAY EXAM ENTIRE SPI 2/3 VW: CPT

## 2022-06-27 PROCEDURE — 72082 X-RAY EXAM ENTIRE SPI 2/3 VW: CPT | Mod: 26

## 2022-07-05 ENCOUNTER — APPOINTMENT (OUTPATIENT)
Dept: NEUROSURGERY | Facility: CLINIC | Age: 62
End: 2022-07-05

## 2022-07-05 VITALS
WEIGHT: 171 LBS | OXYGEN SATURATION: 95 % | DIASTOLIC BLOOD PRESSURE: 76 MMHG | HEART RATE: 74 BPM | BODY MASS INDEX: 30.3 KG/M2 | SYSTOLIC BLOOD PRESSURE: 137 MMHG | HEIGHT: 63 IN

## 2022-07-05 DIAGNOSIS — M48.07 SPINAL STENOSIS, LUMBOSACRAL REGION: ICD-10-CM

## 2022-07-05 DIAGNOSIS — Z98.1 ARTHRODESIS STATUS: ICD-10-CM

## 2022-07-05 DIAGNOSIS — Z98.890 OTHER SPECIFIED POSTPROCEDURAL STATES: ICD-10-CM

## 2022-07-05 PROCEDURE — 99211 OFF/OP EST MAY X REQ PHY/QHP: CPT

## 2022-07-05 RX ORDER — MELOXICAM 15 MG/1
15 TABLET ORAL
Qty: 90 | Refills: 0 | Status: DISCONTINUED | COMMUNITY
Start: 2018-10-23 | End: 2022-07-05

## 2022-07-05 RX ORDER — CELECOXIB 100 MG/1
100 CAPSULE ORAL
Qty: 60 | Refills: 2 | Status: DISCONTINUED | COMMUNITY
Start: 2021-08-30 | End: 2022-07-05

## 2022-07-05 RX ORDER — GABAPENTIN 300 MG/1
300 CAPSULE ORAL
Qty: 90 | Refills: 0 | Status: DISCONTINUED | COMMUNITY
Start: 2019-04-01 | End: 2022-07-05

## 2022-07-05 RX ORDER — CELECOXIB 200 MG/1
200 CAPSULE ORAL
Qty: 60 | Refills: 0 | Status: DISCONTINUED | COMMUNITY
Start: 2019-02-05 | End: 2022-07-05

## 2022-07-05 RX ORDER — PREDNISONE 20 MG/1
20 TABLET ORAL
Qty: 15 | Refills: 0 | Status: DISCONTINUED | COMMUNITY
Start: 2020-04-26 | End: 2022-07-05

## 2022-07-05 RX ORDER — SUCRALFATE 1 G/10ML
1 SUSPENSION ORAL
Qty: 200 | Refills: 0 | Status: DISCONTINUED | COMMUNITY
Start: 2020-04-19 | End: 2022-07-05

## 2022-07-05 RX ORDER — MIDODRINE HYDROCHLORIDE 10 MG/1
10 TABLET ORAL
Qty: 90 | Refills: 0 | Status: DISCONTINUED | COMMUNITY
Start: 2021-05-13 | End: 2022-07-05

## 2022-07-05 RX ORDER — OXYCODONE 10 MG/1
10 TABLET ORAL
Qty: 20 | Refills: 0 | Status: DISCONTINUED | COMMUNITY
Start: 2021-04-16 | End: 2022-07-05

## 2022-07-05 RX ORDER — METHYLPREDNISOLONE 4 MG/1
4 TABLET ORAL
Qty: 21 | Refills: 0 | Status: DISCONTINUED | COMMUNITY
Start: 2019-01-26 | End: 2022-07-05

## 2022-07-05 RX ORDER — SULFAMETHOXAZOLE AND TRIMETHOPRIM 800; 160 MG/1; MG/1
800-160 TABLET ORAL
Qty: 14 | Refills: 0 | Status: DISCONTINUED | COMMUNITY
Start: 2020-11-24 | End: 2022-07-05

## 2022-07-05 RX ORDER — GABAPENTIN 800 MG/1
800 TABLET, COATED ORAL
Qty: 90 | Refills: 0 | Status: DISCONTINUED | COMMUNITY
Start: 2019-05-09 | End: 2022-07-05

## 2022-07-05 RX ORDER — GABAPENTIN 600 MG/1
600 TABLET, COATED ORAL
Refills: 0 | Status: DISCONTINUED | COMMUNITY
End: 2022-07-05

## 2022-07-05 RX ORDER — FAMOTIDINE 20 MG/1
20 TABLET, FILM COATED ORAL
Qty: 60 | Refills: 0 | Status: DISCONTINUED | COMMUNITY
Start: 2020-03-02 | End: 2022-07-05

## 2022-07-05 RX ORDER — ONDANSETRON 4 MG/1
4 TABLET ORAL
Qty: 60 | Refills: 0 | Status: DISCONTINUED | COMMUNITY
Start: 2020-07-06 | End: 2022-07-05

## 2022-07-06 PROBLEM — Z98.1 S/P LUMBAR FUSION: Status: ACTIVE | Noted: 2020-12-08

## 2022-07-06 PROBLEM — Z98.1 S/P CERVICAL SPINAL FUSION: Status: ACTIVE | Noted: 2019-06-18

## 2022-07-06 PROBLEM — Z98.890 HISTORY OF THORACIC SURGERY: Status: ACTIVE | Noted: 2021-04-30

## 2022-07-06 PROBLEM — Z98.1 S/P FUSION OF THORACIC SPINE: Status: ACTIVE | Noted: 2021-04-30

## 2022-07-06 PROBLEM — M48.07 LUMBOSACRAL STENOSIS: Status: ACTIVE | Noted: 2020-07-20

## 2022-07-06 RX ORDER — ONDANSETRON 4 MG/1
4 TABLET ORAL
Refills: 0 | Status: ACTIVE | COMMUNITY

## 2022-07-08 NOTE — PHYSICAL EXAM
PC made to mom, Anny, together called [place] and reached  however she said she could not schedule right now due to being busy. She committed to calling mom back today, indicating it would be around lunch time today. Writer asked mom to call this writer if she does not hear back today.   [de-identified] : Patient is well nourished, well-developed, in no acute distress, with appropriate mood and affect. The patient is oriented to time, place, and person. Respirations are even and unlabored. Gait evaluation does not reveal a limp. There is no inguinal adenopathy. Examination of the contralateral hip shows normal range of motion, strength, no tenderness, and intact skin. The affected limb is well-perfused and showed 2+ dp/pt pulses, without skin lesions, shows a grossly normal motor and sensory examination. Examination of the hip shows no skin lesions. Hip motion is full and painless from 0-90 degrees extension to flexion, 20 degrees adduction and 20 degrees abduction, and 15 degrees internal and 30 degrees external rotation. Leg lengths are approximately equal. FADIR is negative and CRIS is negative. Stinchfield test is negative. Both hips are stable and muscle strength is normal with good strength with resisted abduction and adduction. Pedal pulses are palpable. [de-identified] : AP and lateral x-rays of the left hip, pelvis, and femur were ordered and taken in the office and demonstrate degenerative joint disease of the hip with joint space narrowing, osteophyte formation, and subchondral sclerosis.\par

## 2022-07-25 NOTE — PHYSICAL EXAM
[General Appearance - Alert] : alert [General Appearance - In No Acute Distress] : in no acute distress [Well-Healed] : well-healed [No Drainage] : without drainage [Normal Skin] : normal [Oriented To Time, Place, And Person] : oriented to person, place, and time [Impaired Insight] : insight and judgment were intact [Antalgic] : antalgic [Able to toe walk] : the patient was able to toe walk [Able to heel walk] : the patient was able to heel walk [Intact] : all sensory within normal limits bilaterally [Sclera] : the sclera and conjunctiva were normal [PERRL With Normal Accommodation] : pupils were equal in size, round, reactive to light, with normal accommodation [Hearing Threshold Finger Rub Not [place]] : hearing was normal [Neck Appearance] : the appearance of the neck was normal [] : no respiratory distress [Respiration, Rhythm And Depth] : normal respiratory rhythm and effort [Edema] : there was no peripheral edema [Involuntary Movements] : no involuntary movements were seen [Motor Tone] : muscle strength and tone were normal [Skin Color & Pigmentation] : normal skin color and pigmentation [Erythema] : not erythematous [Warm] : not warm [FreeTextEntry1] : improved upright posture, gait normal and tremendously improved using cane for safety

## 2022-07-25 NOTE — ASSESSMENT
[FreeTextEntry1] : MRI and xrays reviewed showing good fusion.  Call to schedule follow up as needed.\par \par f/u PRN

## 2022-07-25 NOTE — HISTORY OF PRESENT ILLNESS
[FreeTextEntry1] : Ms. Chaidez is a 63 yo female with PMH of HLD, Hashimoto's, RA, fibromyalgia, nutcracker esophagus (spasms), 2003 Lumbar fusion, 2010 R THR, 2012 L TKR, left hip pain, s/p C3-C4 ACDF (6/3/2019) for cervical degenerative changes with spinal cord compression and spondylolisthesis. Of note:  Post op stroke code was called due to facial droop which resolved and negative work up.  \par \par TODAY, 7/5/22, arrives with MRI and Xrays for review.  Feeling well, ambulating independently, denies pain.  Mild flexibility limitations expected, mild RLE numbness back of leg.\par \par 2/1/22, she arrives with complaint of chronic spontaneous arm/ leg movements/ spasms when lying down which persist.  Overall, ambulating very well with a cane and since surgery has improved tremendously.  \par \par 5/17/21:  she arrives for an initial post op visit after rehab s/p 3/12/2021 T8-pelvis arthrodesis, L1-L3 anterior column release.  Readmitted from rehab 3/30-4/7/21 for orthostatic hypotension, no CSF leak detected, followed by cardiology.\par \par 1/19/2021:  she arrives to discuss new imaging obtained after failed SCS trial to consider surgical options.  \par \par 12/8/2020:  s/p SCS trial lead pull s/p 11/18/20 SCS trial with thoracic Nevro leads in tact visualized on xrays 11/25/20.  She reports pain relief of RLE pain, but not pain relief of most bothersome LLE pain during the trial.  She wises to discuss any options.   \par \par

## 2022-08-23 ENCOUNTER — APPOINTMENT (OUTPATIENT)
Dept: ORTHOPEDIC SURGERY | Facility: CLINIC | Age: 62
End: 2022-08-23

## 2022-08-23 VITALS — HEIGHT: 63 IN | WEIGHT: 170 LBS | BODY MASS INDEX: 30.12 KG/M2

## 2022-08-23 DIAGNOSIS — M25.561 PAIN IN RIGHT KNEE: ICD-10-CM

## 2022-08-23 DIAGNOSIS — Z96.642 PRESENCE OF LEFT ARTIFICIAL HIP JOINT: ICD-10-CM

## 2022-08-23 DIAGNOSIS — G89.29 PAIN IN RIGHT KNEE: ICD-10-CM

## 2022-08-23 DIAGNOSIS — M17.11 UNILATERAL PRIMARY OSTEOARTHRITIS, RIGHT KNEE: ICD-10-CM

## 2022-08-23 PROCEDURE — 73564 X-RAY EXAM KNEE 4 OR MORE: CPT | Mod: RT

## 2022-08-23 PROCEDURE — 99214 OFFICE O/P EST MOD 30 MIN: CPT | Mod: 25

## 2022-08-23 PROCEDURE — 73502 X-RAY EXAM HIP UNI 2-3 VIEWS: CPT | Mod: LT

## 2022-08-23 PROCEDURE — 20610 DRAIN/INJ JOINT/BURSA W/O US: CPT | Mod: RT

## 2022-08-23 NOTE — DISCUSSION/SUMMARY
[de-identified] : Doing very well status post left total hip arthroplasty.  Has right knee osteoarthritis that is severe.  The patient is not an appropriate candidate for surgical intervention at this time. An extensive discussion was conducted on the natural history of the disease and the variety of surgical and non-surgical options available to the patient including, but not limited to non-steroidal anti-inflammatory medications, steroid injections, physical therapy, maintenance of ideal body weight, and reduction of activity.  Today we performed a right knee intra-articular cortisone injection. The patient will schedule an appointment as needed.\par \par Informed consent for the right knee injection was obtained. All questions were answered. A time out was performed. The right knee was prepped and draped in sterile fashion. Using sterile technique, the right knee was injected with 80mg of Kenalog, 4cc of 1% lidocaine, 4cc of 0.25% marcaine using a 21-gauge needle. A sterile dressing was applied. Post injection instructions were reviewed. The patient tolerated the procedure well.\par \par

## 2022-08-23 NOTE — PHYSICAL EXAM
[de-identified] : Patient is well nourished, well-developed, in no acute distress, with appropriate mood and affect. The patient is oriented to time, place, and person. Respirations are even and unlabored. Gait evaluation does not reveal a limp. There is no inguinal adenopathy.The left limb is well-perfused and showed 2+ dp/pt pulses, without skin lesions, shows a grossly normal motor and sensory examination. Examination of the hip shows a well healed surgical scar. Hip motion is full and painless from 0-90 degrees extension to flexion, 20 degrees adduction and 20 degrees abduction, and 15 degrees internal and 30 degrees external rotation. Leg lengths are approximately equal. Both hips are stable and muscle strength is normal with good strength with resisted abduction and adduction. Pedal pulses are palpable. The right limb is well-perfused, without skin lesions, shows a grossly normal motor and sensory examination. Knee motion is significantly reduced and does cause significant pain. The knee moves from 0 to 125 degrees. The knee is stable within that range-of-motion to AP and ML stress. The alignment of the knee is 5 degrees varus. Muscle strength is normal. Pedal pulses are palpable. Hip examination was negative.\par  [de-identified] : AP pelvis, AP hip, and lateral x-rays of the left hip were ordered and obtained in the office and demonstrate satisfactory position and alignment of the components are present. No signs of loosening are seen.\par \par Long standing knee, AP knee, lateral knee, and patellar views of the right knee were ordered and taken in the office and demonstrate degenerative joint disease of the knee with joint space narrowing, osteophyte formation, and subchondral sclerosis.

## 2022-08-23 NOTE — HISTORY OF PRESENT ILLNESS
[de-identified] : This is very nice 62-year-old female experiencing right knee pain, which is severe in intensity. The pain substantially limits activities of daily living. Walking tolerance is reduced. Medication and activity modification have been minimally effective for a period lasting greater than three months in duration. Assistive devices and external support were not deemed by the patient to be helpful in improving their function. Due to the severity of osteoarthritis and level of pain, physical therapy is contraindicated. Pain and restriction of function are intolerable at this time. The patient denies any radiation of the pain to the feet and it is not associated with numbness, tingling, or weakness.  Also has history of a left total hip arthroplasty by me 8 months ago doing very well.

## 2022-08-30 NOTE — PROVIDER CONTACT NOTE (OTHER) - SITUATION
[facility] PHYSICAL MEDICINE [facility]ILITATION CLIN[facility] [address]  3RD FLOOR  [facility] [address]  Phone: [phone]  Fax: [phone]    August 30, 2022        Terri Prado  [address] [facility] [address]          To whom it may concern:    RE: Terri Youngrashaun    {WORK NOTE CHOICES:[id number]}    Please contact me for questions or concerns.      Sincerely,        Eden Muniz PA-C   Pt on 2L NC

## 2022-10-24 NOTE — ASU PATIENT PROFILE, ADULT - FALL HARM RISK
ekg on chart from 9/12/22 with cardiac office note.     Patient directed to Radiology Department for CXR after Pre Admission Testing Appointment.     Too early to draw type and screen in PAT.  Please obtain blood bank specimen in pre-op on the day of surgery.      Patient viewed general PAT education video as instructed in their preoperative information received from their surgeon.  Patient stated the general PAT education video was viewed in its entirety and survey completed.  Copies of [facility] general education handouts (Incentive Spirometry, Meds to Beds Program, Patient Belongings, Pre-op skin preparation instructions, Blood Glucose testing, Visitor policy, Surgery FAQ, Code H) distributed to patient if not printed. Education related to the PAT pass and skin preparation for surgery (if applicable) completed in PAT as a reinforcement to PAT education video. Patient instructed to return PAT pass provided today as well as completed skin preparation sheet (if applicable) on the day of procedure.     Additionally if patient had not viewed video yet but intended to view it at home or in our waiting area, then referred them to the handout with QR code/link provided during PAT visit.  Instructed patient to complete survey after viewing the video in its entirety.  Encouraged patient/family to read [facility] general education handouts thoroughly and notify PAT staff with any questions or concerns. Patient verbalized understanding of all information and priority content.    Patient to apply Chlorhexadine wipes  to surgical area (as instructed) the night before procedure and the AM of procedure. Wipes provided.    
surgery

## 2022-10-26 NOTE — DISCHARGE NOTE PROVIDER - INSTRUCTIONS
[FreeTextEntry1] : hld continue simvastatin bp acceptable continue metoprolol and losartan lab evaluation\par dm does not always take metformin 
regular diet

## 2023-04-20 NOTE — PATIENT PROFILE ADULT - NSPROEDAABILITYLEARN_GEN_A_NUR
Health maintenance reviewed with patient, patient declined health maintenance at this time with documentation from provider noted in chart. WILD request sent to clinic\\[facility]\"" for health maintenance review.      none

## 2023-05-24 NOTE — H&P PST ADULT - REASON FOR ADMISSION
Problem: At Risk for Falls  Goal: # Patient does not fall  Outcome: Outcome Met, Continue evaluating goal progress toward completion  Goal: # Takes action to control fall-related risks  Outcome: Outcome Met, Continue evaluating goal progress toward completion     Problem: VTE, Risk for  Goal: # No s/s of VTE  Outcome: Outcome Met, Continue evaluating goal progress toward completion     Problem: Pain  Goal: Acceptable pain/comfort level is achieved/maintained at rest (based on Pain Behaviors Scale)  Description:  This goal is used for patients who are not able to self-report pain and are assessed for pain using the Pain Behaviors Scale  Outcome: Outcome Met, Continue evaluating goal progress toward completion     Problem: Pressure Injury, Risk for  Goal: # Skin remains intact  Outcome: Outcome Met, Continue evaluating goal progress toward completion neck surgery No

## 2023-10-23 NOTE — DISCHARGE NOTE PROVIDER - NSDCCPTREATMENT_GEN_ALL_CORE_FT
Pt scheduled 10/26 at EM   PRINCIPAL PROCEDURE  Procedure: Left total hip arthroplasty  Findings and Treatment:

## 2023-11-02 NOTE — RESULTS/DATA
[FreeTextEntry1] : [facility]\par    [facility] Department of Radiology\par   Radiology Report\par \par \par Patient Name: KEVIN MORA  Report Date: 07-Jan-2021 15:47.00 \par Patient ID: [id number] ([id number]), [id number] (EPI)  Accession No.: [id number] \par Patient Birth Date: 1960  Report Status: F \par Referring Physician: [id number] BENNETT CANDELARIA   Reason For Study: z98.1  \par \par EXAM: CT LUMBAR SPINE\par \par EXAM: CT THORACIC SPINE\par \par \par PROCEDURE DATE: 01/06/2021\par \par \par \par INTERPRETATION: CT THORACIC SPINE, CT LUMBAR SPINE dated 1/6/2021 4:40 PM\par \par INDICATION: Back pain. Prior fusion. Presurgical planning\par \par COMPARISON: None available.\par \par TECHNIQUE: CT imaging of the thoracic and lumbar spine was performed. The data was reformatted in the axial, coronal, and sagittal planes. Additionally, 3-D reformatted imaging was created.\par \par FINDINGS:\par DISC LEVEL EVALUATION:\par \par C7/T1: Mild right foraminal narrowing. No central canal or left foraminal narrowing.\par T1/T2: No central canal or foraminal narrowing.\par T2/T3: Severe right facet degenerative change. No central canal or foraminal narrowing.\par T3/T4: Moderate bilateral facet degenerative change. No central canal or foraminal narrowing.\par T4/T5: Severe bilateral facet degenerative change. No central canal or foraminal narrowing.\par T5/T6: Severe bilateral facet degenerative change. No central canal or foraminal narrowing.\par T6/T7: Moderate to severe bilateral facet degenerative change. No central canal or foraminal narrowing.\par T7/T8: Moderate bilateral facet degenerative changes worse on the right. No central canal or foraminal narrowing.\par T8/T9: Mild bilateral facet degenerative change. No central canal or foraminal narrowing.\par T9/T10: Posterior osseous ridging. Mild to moderate facet degenerative change. Left paracentral soft disc osteophyte complex mildly indents the ventral thecal sac. No central canal narrowing. Mild right foraminal narrowing.\par T10/T11: Mild to moderate posterior osseous ridging. Small central disc osteophyte complex. Mild facet degenerative change. No central canal or foraminal narrowing.\par T11/T12: Mild posterior osseous ridging with small central disc osteophyte complex. Moderate to severe bilateral facet degenerative change. No central canal or foraminal narrowing.\par T12/L1: Moderate to severe bilateral facet degenerative change. No central canal or foraminal narrowing.\par \par L1/L2: Moderate bilateral facet degenerative change and ligamentous hypertrophy. Mild to moderate left foraminal narrowing. No central canal or right foraminal narrowing.\par L2/L3: Bilateral rods and pedicle screws in the L3 vertebra. No lucency to suggest loosening. Mild retrolisthesis of L2 on L3 with posterior osseous ridging and moderate left and moderate to severe right foraminal narrowing. Mild to moderate central canal stenosis\par L3/L4: Bilateral rods and pedicle screws are present within the L4 vertebra. No lucency to suggest loosening. Interbody spacer present at L3-4. There is posterior and anterior osseous fusion. Beam hardening artifact from hardware limits evaluation of central canal. No foraminal narrowing.\par L4/L5: Bilateral rods and pedicle screws are present in the L5 vertebra. There is no lucency to suggest loosening. The hardware is intact. Interbody spacer in place. Posterior and anterior osseous fusion noted. Beam hardening artifacts limit evaluation the central canal. Mild bilateral foraminal narrowing.\par L5/S1: Facet degenerative change. Mild anterolisthesis of L5 on S1. Mild to moderate right and mild left foraminal narrowing. No central canal stenosis.\par \par SPINAL ALIGNMENT: There is a dextrocurvature of the thoracolumbar spine with apex at L1. Varying levels of disc space narrowing noted.\par SI JOINTS: There is spurring and productive change of both sacral iliac joints, worse on the right.\par OSSEOUS STRUCTURES: There is a donor site involving the right iliac bone. There is no fracture.\par PARASPINAL MUSCLE AND SOFT TISSUES: Mild to moderate atrophy of the paraspinal musculature. Severe atrophy at the level of the lower lumbar spine and sacrum.\par INTRAABDOMINAL/INTRAPELVIC SOFT TISSUES: Mild scattered vascular calcifications.\par \par 3-D reformatted imaging confirms these findings.\par \par IMPRESSION:\par \par 1. Status post posterior and anterior fusion from L3 through L5 with solid osseous fusion noted. No lucency to suggest loosening.\par 2. Adjacent segment syndrome at L2-3 with mild to moderate central canal stenosis and moderate to severe bilateral foraminal narrowing.\par 3. Additional multilevel spondylosis as detailed above\par \par \par ESTEVAN CHILDERS MD; Attending Radiologist\par This document has been electronically signed. Jan 7 2021 3:47PM\par \par  \par \par \par \par \par \par [facility]\par    [facility] Department of Radiology\par   Radiology Report\par \par Patient Name: KEVIN MORA  Report Date: 06-Jan-2021 16:07.00 \par Patient ID: [id number] ([id number]), [id number] (EPI)  Accession No.: [id number] \par Patient Birth Date: 1960  Report Status: F \par Referring Physician: [id number] BENNETT CANDELARIA   Reason For Study: z98.1  \par \par EXAM: XR SPINE ENTIRE THOR LUM 2-3V\par PROCEDURE DATE: 01/06/2021\par \par INTERPRETATION: CLINICAL INDICATION: prior spine surgery; pain\par \par EXAM:\par Long plate frontal and lateral views of the spinal column from 1/6/2021 at 1404. No similar prior studies available for comparison.\par \par IMPRESSION:\par L3-L5 level posterior spinal fusion hardware present consisting of pedicle screws with interconnecting rods, L4-L5 level cross-link, and cylindrical metallic spacer cages at the respective disc levels. Mid cervical level zero profile combined fusion and interbody disc spacer implant also noted.\par \par No compression fractures.\par \par L2 on L3 retrolisthesis however without associated spondylolysis defects. Remaining vertebral body alignment maintained.\par \par Multilevel degenerative disc changes. Exaggerated thoracic kyphosis. Broad slight spinal dextrocurvature.\par \par Unremarkable partially visualized right total hip prosthesis. Advanced left hip osteoarthritic change apparent.\par \par Generalized osteopenia otherwise no discrete lytic or blastic lesions.\par \par Also correlate with findings on concurrently performed thoracic and lumbar spine CT.\par \par CHIQUITA SEBASTIAN M.D., ATTENDING RADIOLOGIST\par This document has been electronically signed. Jan 6 2021 4:07PM Retention Suture Bite Size: 3 mm

## 2023-12-29 NOTE — ASSESSMENT
[FreeTextEntry1] : SCS trial leads pulled today, 2 tiny puncture sites remain intact with no signs of irritation, erythema, edema, warmth, or drainage.  She did not receive the pain relief she was hoping for during the trial and will return to discuss any other options next week with Dr. Cohen.\par \par  Yes

## 2024-04-25 NOTE — ASU PATIENT PROFILE, ADULT - NSALCOHOLPROBLEMSRELYN_GEN_A_CORE_SD
Spoke with patient. She states that her blood sugars have been from 300-600 for the past 3 weeks. She is healing from a foot injury.  She states that she gives Levemir 3 units at bedtime and sometimes Humalog at night, if blood sugar is high. Patient states that she has been taking Humalog when blood sugars are high, but it is not working. She states that the pen expires 05/2024. She has since finished that pen today and has more in the fridge with an expiration date of 07/2024..   Patient state was given 3 pages of instructions regarding her insulin prior to a colonoscopy coming up soon. Also has a question about how long it takes for food to get \"into her system\" after eating.  Patient can be reached at [phone].   no

## 2024-07-08 NOTE — PHYSICAL THERAPY INITIAL EVALUATION ADULT - PERTINENT HX OF CURRENT PROBLEM, REHAB EVAL
----- Message from Marie Lopez MA sent at 7/8/2024 10:26 AM EDT -----  Regarding: Crown  Swallowed a crown 10 days ago and does not think he has passed it yet.  Asking what to do.  [phone]    I explained that a crown is small and he likely will not see the crown in his stool potentially.  I do not think an x-ray changes management but is more of interest.  He has no symptoms.  I have reassured him.  He will call with any other concerns.     Pt is a 59 yr old female s/p C3 ACDF with history of Hashimoto's hypothyroid, dyskinesia of esophagus , severe OA, Fibromyalgia, Chronic pain , with mew onset bilateral upper extremity electric shocks, posterior head tingling, numbness of 5 th digits, found to have C3-4 Anterior spondylolisthesis and degenerative changes with spinal cord compression. Post op course complicated by R facial droop CT/CTA preliminary report negative.

## 2024-08-13 NOTE — DIETITIAN INITIAL EVALUATION ADULT. - BODY MASS INDEX
In setting of covid infection  Respiratory status stable, not requiring increased supplemental oxygen from baseline  Echocardiogram without mention of right heart strain  Renal function test improving  Could Consider vq scan to rule out pulmonary embolism but already on warfarin for mechanical valve    With D-dimer up we will check Dopplers bilateral lower extremities       29.5

## 2024-11-25 ENCOUNTER — APPOINTMENT (OUTPATIENT)
Dept: ORTHOPEDIC SURGERY | Facility: CLINIC | Age: 64
End: 2024-11-25
Payer: MEDICARE

## 2024-11-25 VITALS — BODY MASS INDEX: 28.17 KG/M2 | HEIGHT: 63 IN | WEIGHT: 159 LBS

## 2024-11-25 DIAGNOSIS — M17.11 UNILATERAL PRIMARY OSTEOARTHRITIS, RIGHT KNEE: ICD-10-CM

## 2024-11-25 PROCEDURE — 73564 X-RAY EXAM KNEE 4 OR MORE: CPT | Mod: RT

## 2024-11-25 PROCEDURE — 99215 OFFICE O/P EST HI 40 MIN: CPT

## 2025-01-02 ENCOUNTER — APPOINTMENT (OUTPATIENT)
Dept: ORTHOPEDIC SURGERY | Facility: HOSPITAL | Age: 65
End: 2025-01-02

## 2025-01-28 NOTE — H&P PST ADULT - PAIN, FACTORS THAT RELIEVE, PROFILE
-Keep dressings clean and dry. Change dressings if they become over saturated, soiled or fall off.     -On the days you are not changing your dressings, avoid getting the dressings wet. It is not harmful to get your wound wet when you are washing your wound before applying a new dressing.    -If you need to change your dressings at home: Wash your wound with normal saline, wound cleanser, or unscented soap and water prior to applying your new dressings. Please avoid cleansing with hydrogen peroxide or rubbing alcohol. Hydrogen peroxide and rubbing alcohol are toxic to new tissue and skin cells.    -Avoid prolonged standing or sitting without elevating your legs.    -Remove your compression garments if you have severe pain, severe swelling, numbness, color change, or temperature change in your toes. If you need to remove your compression garments, do so by unrolling them. Do not cut the compression garments off, this is to prevent cutting yourself on accident.    -Should you experience any significant changes in your wound, such as signs of infection (increasing redness, swelling, localized heat, increased pain, fever > 101 F, chills) or have any questions regarding your home care instructions, please contact the wound center at [phone]. If after hours, contact your primary care physician or go to the hospital emergency room.     -If you are admitted to any hospital, you will need a new referral to come back to the wound clinic and any scheduled appointments that you already have, may be cancelled.     -If you are 5 or more minutes late for an appointment, we reserve the right to cancel and reschedule that appointment. Additionally, if you are habitually late or not showing (3 late cancellations and/or no shows), we reserve the right to cancel your remaining appointments and it will be your responsibility to obtain a new referral if services are still needed.         medications

## 2025-07-26 ENCOUNTER — NON-APPOINTMENT (OUTPATIENT)
Age: 65
End: 2025-07-26